# Patient Record
Sex: FEMALE | Race: WHITE | Employment: OTHER | URBAN - METROPOLITAN AREA
[De-identification: names, ages, dates, MRNs, and addresses within clinical notes are randomized per-mention and may not be internally consistent; named-entity substitution may affect disease eponyms.]

---

## 2017-01-06 ENCOUNTER — OFFICE VISIT (OUTPATIENT)
Dept: FAMILY MEDICINE | Facility: CLINIC | Age: 72
End: 2017-01-06

## 2017-01-06 VITALS
HEIGHT: 63 IN | BODY MASS INDEX: 21.81 KG/M2 | HEART RATE: 65 BPM | WEIGHT: 123.08 LBS | OXYGEN SATURATION: 100 % | TEMPERATURE: 97.5 F | DIASTOLIC BLOOD PRESSURE: 77 MMHG | SYSTOLIC BLOOD PRESSURE: 111 MMHG

## 2017-01-06 DIAGNOSIS — R09.89 CHOKING EPISODE: ICD-10-CM

## 2017-01-06 DIAGNOSIS — R29.6 FALLS FREQUENTLY: Primary | ICD-10-CM

## 2017-01-06 LAB
BUN SERPL-MCNC: 11 MG/DL (ref 7–30)
CALCIUM SERPL-MCNC: 9.2 MG/DL (ref 8.5–10.4)
CHLORIDE SERPLBLD-SCNC: 102 MMOL/L (ref 94–109)
CO2 SERPL-SCNC: 29 MMOL/L (ref 20–32)
CREAT SERPL-MCNC: 0.7 MG/DL (ref 0.6–1.3)
EGFR CALCULATED (BLACK REFERENCE): 106.1
EGFR CALCULATED (NON BLACK REFERENCE): 87.7
GLUCOSE SERPL-MCNC: 90 MG/DL (ref 60–109)
POTASSIUM SERPL-SCNC: 4.3 MMOL/L (ref 3.4–5.3)
SODIUM SERPL-SCNC: 136 MMOL/L (ref 133–144)
T3FREE SERPL-MCNC: 2.2 PG/ML (ref 2.3–4.2)
T4 FREE SERPL-MCNC: 1.19 NG/DL (ref 0.76–1.46)
TSH SERPL DL<=0.05 MIU/L-ACNC: 0.25 MU/L (ref 0.4–4)

## 2017-01-06 RX ORDER — FEXOFENADINE HCL AND PSEUDOEPHEDRINE HCL 180; 240 MG/1; MG/1
1 TABLET, EXTENDED RELEASE ORAL DAILY
Status: ON HOLD | COMMUNITY
End: 2021-09-27

## 2017-01-06 ASSESSMENT — PAIN SCALES - GENERAL: PAINLEVEL: NO PAIN (0)

## 2017-01-06 NOTE — NURSING NOTE
"71 year old  Chief Complaint   Patient presents with     Fall     Today visit was recommended by her Neurologist to be seen.        Blood pressure 111/77, pulse 65, temperature 97.5  F (36.4  C), height 5' 2.99\" (160 cm), weight 123 lb 1.3 oz (55.829 kg), SpO2 100 %, not currently breastfeeding. Body mass index is 21.81 kg/(m^2).  Patient Active Problem List   Diagnosis     Bladder neck obstruction     Constipation     Transient global amnesia     Other hammer toe (acquired)     Ostium secundum type atrial septal defect     Senile osteoporosis     Syndrome affecting cervical region     Variants of migraine     Malignant neoplasm of skin     Insomnia     Hypothyroidism     Routine general medical examination at a health care facility     Encounter for counseling     Family history of cardiovascular disease     Family history of malignant neoplasm of ovary     Tear film insufficiency     Circulatory system disorder     PFO (patent foramen ovale)     Gastroesophageal reflux disease without esophagitis     Diarrhea       Wt Readings from Last 2 Encounters:   01/06/17 123 lb 1.3 oz (55.829 kg)   11/11/16 121 lb (54.885 kg)     BP Readings from Last 3 Encounters:   01/06/17 111/77   11/11/16 110/76   06/01/16 105/68         Current Outpatient Prescriptions   Medication     fexofenadine-pseudoePHEDrine (ALLEGRA-D 24) 180-240 MG per 24 hr tablet     gabapentin (NEURONTIN) 300 MG capsule     citalopram (CELEXA) 10 MG tablet     levothyroxine (SYNTHROID, LEVOTHROID) 88 MCG tablet     pravastatin (PRAVACHOL) 10 MG tablet     zolpidem (AMBIEN) 10 MG tablet     triamcinolone (NASACORT) 55 MCG/ACT nasal inhaler     omeprazole (PRILOSEC) 20 MG capsule     VITAMIN D, CHOLECALCIFEROL, PO     aspirin 81 MG tablet     Riboflavin (VITAMIN B-2 PO)     Rizatriptan Benzoate (MAXALT PO)     albuterol (PROAIR HFA, PROVENTIL HFA, VENTOLIN HFA) 108 (90 BASE) MCG/ACT inhaler     ALPHA LIPOIC ACID PO     Nutritional Supplements (DHEA PO)     " Cyanocobalamin (VITAMIN B 12 PO)     MAGNESIUM PO     Multiple vitamin TABS     Calcium-Vitamin D 600-200 MG-UNIT TABS     [DISCONTINUED] gabapentin (NEURONTIN) 300 MG capsule     No current facility-administered medications for this visit.       Social History   Substance Use Topics     Smoking status: Former Smoker -- 0.50 packs/day for 10 years     Quit date: 01/01/1970     Smokeless tobacco: Never Used     Alcohol Use: No       Health Maintenance Due   Topic Date Due     FIT Q1 YR (NO INBASKET)  11/19/1955     HEPATITIS C SCREENING  11/19/1963     DEXA Q1 YR  11/19/1975     ADVANCE DIRECTIVE PLANNING Q5 YRS (NO INBASKET)  08/24/2012     COLONOSCOPY Q5 YR INBASKET MESSAGE  11/16/2015       No results found for this basename: irma Jennings CMA  January 6, 2017 11:14 AM

## 2017-01-06 NOTE — MR AVS SNAPSHOT
"              After Visit Summary   1/6/2017    Rakel Parish    MRN: [id number]           Patient Information     Date Of Birth          1945        Visit Information        Provider Department      1/6/2017 11:20 AM Aida Bey MD [facility]        Today's Diagnoses     Falls frequently    -  1     Choking episode            Follow-ups after your visit        Who to contact     Please call your clinic at [phone] to:    Ask questions about your health    Make or cancel appointments    Discuss your medicines    Learn about your test results    Speak to your doctor   If you have compliments or concerns about an experience at your clinic, or if you wish to file a complaint, please contact [facility] Patient Relations at [phone] or email us at [email or web address]         Additional Information About Your Visit        MyChart Information     [employer] gives you secure access to your electronic health record. If you see a primary care provider, you can also send messages to your care team and make appointments. If you have questions, please call your primary care clinic.  If you do not have a primary care provider, please call [phone] and they will assist you.      [employer] is an electronic gateway that provides easy, online access to your medical records. With [employer], you can request a clinic appointment, read your test results, renew a prescription or communicate with your care team.     To access your existing account, please contact your [facility] or call [phone] for assistance.        Care EveryWhere ID     This is your Care EveryWhere ID. This could be used by other organizations to access your [place] medical records  [id number]        Your Vitals Were     Pulse Temperature Height BMI (Body Mass Index) Pulse Oximetry       65 97.5  F (36.4  C) 5' 2.99\" (160 cm) 21.81 kg/m2 100%        Blood Pressure " from Last 3 Encounters:   01/06/17 111/77   11/11/16 110/76   06/01/16 105/68    Weight from Last 3 Encounters:   01/06/17 123 lb 1.3 oz (55.829 kg)   11/11/16 121 lb (54.885 kg)   06/01/16 120 lb 8 oz (54.658 kg)              We Performed the Following     Basic Metabolic Panel ([place])     T3 Free     T4 free     TSH        Primary Care Provider Office Phone # Fax #    Aida Bey [phone]       [facility] [address] [facility] [address]  [facility] [address]        Thank you!     Thank you for choosing [facility]  for your care. Our goal is always to provide you with excellent care. Hearing back from our patients is one way we can continue to improve our services. Please take a few minutes to complete the written survey that you may receive in the mail after your visit with us. Thank you!             Your Updated Medication List - Protect others around you: Learn how to safely use, store and throw away your medicines at www.disposemymeds.org.          This list is accurate as of: 1/6/17 11:51 AM.  Always use your most recent med list.                   Brand Name Dispense Instructions for use    albuterol 108 (90 BASE) MCG/ACT Inhaler    PROAIR HFA/PROVENTIL HFA/VENTOLIN HFA     Inhale 2 puffs into the lungs every 6 hours       ALPHA LIPOIC ACID PO      Take 200 mg by mouth daily       aspirin 81 MG tablet      Take by mouth daily       Calcium-Vitamin D 600-200 MG-UNIT Tabs      Take 1 tablet by mouth daily       citalopram 10 MG tablet    celeXA    90 tablet    Take 1 tab daily       DHEA PO      Take 5 mg by mouth daily       fexofenadine-pseudoePHEDrine 180-240 MG per 24 hr tablet    ALLEGRA-D 24     Take 1 tablet by mouth daily       gabapentin 300 MG capsule    NEURONTIN    360 capsule    Take 1 capsule (300 mg) by mouth 4 times daily       levothyroxine 88 MCG tablet    SYNTHROID/LEVOTHROID    90 tablet    Take 1 tablet (88 mcg) by mouth daily       MAGNESIUM PO       Take by mouth daily       MAXALT PO      Take 5-10 mg by mouth as needed       Multiple vitamin Tabs      Take by mouth daily       omeprazole 20 MG CR capsule    priLOSEC     Take by mouth daily       pravastatin 10 MG tablet    PRAVACHOL    90 tablet    Take 1 tablet (10 mg) by mouth every evening       triamcinolone 55 MCG/ACT Inhaler    NASACORT    1 Bottle    Spray 1 spray in nostril daily       VITAMIN B 12 PO      Take by mouth daily       VITAMIN B-2 PO      Take 400 mg by mouth daily       VITAMIN D (CHOLECALCIFEROL) PO      Take 2,000 Int'l Units by mouth daily       zolpidem 10 MG tablet    AMBIEN    30 tablet    Take 1 tablet (10 mg) by mouth nightly as needed for sleep

## 2017-02-09 ENCOUNTER — DOCUMENTATION ONLY (OUTPATIENT)
Dept: OTHER | Facility: CLINIC | Age: 72
End: 2017-02-09

## 2017-02-09 DIAGNOSIS — Z71.89 ACP (ADVANCE CARE PLANNING): Primary | Chronic | ICD-10-CM

## 2017-03-07 DIAGNOSIS — G47.00 PERSISTENT INSOMNIA: ICD-10-CM

## 2017-03-08 RX ORDER — ZOLPIDEM TARTRATE 10 MG/1
TABLET ORAL
Qty: 30 TABLET | Refills: 5
Start: 2017-03-08 | End: 2018-02-23

## 2017-04-24 DIAGNOSIS — G43.919 INTRACTABLE MIGRAINE, UNSPECIFIED MIGRAINE TYPE: ICD-10-CM

## 2017-04-24 RX ORDER — GABAPENTIN 300 MG/1
CAPSULE ORAL
Qty: 360 CAPSULE | Refills: 1 | Status: SHIPPED | OUTPATIENT
Start: 2017-04-24 | End: 2017-10-16

## 2017-05-26 ENCOUNTER — TRANSFERRED RECORDS (OUTPATIENT)
Dept: HEALTH INFORMATION MANAGEMENT | Facility: CLINIC | Age: 72
End: 2017-05-26

## 2017-06-12 ENCOUNTER — TRANSFERRED RECORDS (OUTPATIENT)
Dept: HEALTH INFORMATION MANAGEMENT | Facility: CLINIC | Age: 72
End: 2017-06-12

## 2017-07-20 ENCOUNTER — HOSPITAL ENCOUNTER (OUTPATIENT)
Dept: LAB | Facility: CLINIC | Age: 72
Discharge: HOME | End: 2017-07-20
Attending: PHYSICIAN ASSISTANT | Admitting: PHYSICIAN ASSISTANT
Payer: MEDICARE

## 2017-07-20 ENCOUNTER — OFFICE VISIT (OUTPATIENT)
Dept: FAMILY MEDICINE | Facility: CLINIC | Age: 72
End: 2017-07-20

## 2017-07-20 VITALS
DIASTOLIC BLOOD PRESSURE: 65 MMHG | HEIGHT: 63 IN | HEART RATE: 67 BPM | SYSTOLIC BLOOD PRESSURE: 92 MMHG | WEIGHT: 121.25 LBS | TEMPERATURE: 98.1 F | OXYGEN SATURATION: 97 % | BODY MASS INDEX: 21.48 KG/M2

## 2017-07-20 DIAGNOSIS — K21.9 GASTROESOPHAGEAL REFLUX DISEASE WITHOUT ESOPHAGITIS: ICD-10-CM

## 2017-07-20 DIAGNOSIS — R19.7 DIARRHEA, UNSPECIFIED TYPE: Primary | ICD-10-CM

## 2017-07-20 PROCEDURE — 87177 OVA AND PARASITES SMEARS: CPT | Performed by: PHYSICIAN ASSISTANT

## 2017-07-20 PROCEDURE — 87209 SMEAR COMPLEX STAIN: CPT | Performed by: PHYSICIAN ASSISTANT

## 2017-07-20 ASSESSMENT — PAIN SCALES - GENERAL: PAINLEVEL: NO PAIN (0)

## 2017-07-20 NOTE — MR AVS SNAPSHOT
After Visit Summary   7/20/2017    Rakel Parish    MRN: [id number]           Patient Information     Date Of Birth          1945        Visit Information        Provider Department      7/20/2017 4:00 PM Marleny Jones PA-C [facility]        Today's Diagnoses     Diarrhea, unspecified type    -  1    Gastroesophageal reflux disease without esophagitis           Follow-ups after your visit        Future tests that were ordered for you today     Open Future Orders        Priority Expected Expires Ordered    Ova and Parasite Exam Routine Routine  8/19/2017 7/20/2017    Enteric Bacteria and Virus Panel by [facility]OTT Stool Routine  8/19/2017 7/20/2017    Giardia antigen Routine  8/19/2017 7/20/2017            Who to contact     Please call your clinic at [phone] to:    Ask questions about your health    Make or cancel appointments    Discuss your medicines    Learn about your test results    Speak to your doctor   If you have compliments or concerns about an experience at your clinic, or if you wish to file a complaint, please contact [facility] Patient Relations at [phone] or email us at [email or web address]         Additional Information About Your Visit        MyChart Information     [employer] gives you secure access to your electronic health record. If you see a primary care provider, you can also send messages to your care team and make appointments. If you have questions, please call your primary care clinic.  If you do not have a primary care provider, please call [phone] and they will assist you.      [employer] is an electronic gateway that provides easy, online access to your medical records. With [employer], you can request a clinic appointment, read your test results, renew a prescription or communicate with your care team.     To access your existing account, please contact your [facility] or call  "[phone] for assistance.        Care EveryWhere ID     This is your Care EveryWhere ID. This could be used by other organizations to access your [place] medical records  [id number]        Your Vitals Were     Pulse Temperature Height Pulse Oximetry BMI (Body Mass Index)       67 98.1  F (36.7  C) (Oral) 5' 2.99\" (160 cm) 97% 21.48 kg/m2        Blood Pressure from Last 3 Encounters:   07/20/17 92/65   01/06/17 111/77   11/11/16 110/76    Weight from Last 3 Encounters:   07/20/17 121 lb 4 oz (55 kg)   01/06/17 123 lb 1.3 oz (55.8 kg)   11/11/16 121 lb (54.9 kg)              We Performed the Following     CBC with platelets differential     CRP inflammation        Primary Care Provider Office Phone # Fax #    Aida Bey [phone]       [facility] [address] [facility] [address]  [facility] [address]        Equal Access to Services     U[facility]IR [facility] Center[facility]ENNY : Hadii aad ku hadasho Soomaali, waaxda luqadaha, qaybta kaalmada adeegyada, waxay yaoin haykirstie prater . So [facility] [phone].    ATENCIÓN: Si habla español, tiene a levy disposición servicios gratuitos de asistencia lingüística. Reinier al [phone].    We comply with applicable federal civil rights laws and [place] laws. We do not discriminate on the basis of race, color, national origin, age, disability sex, sexual orientation or gender identity.            Thank you!     Thank you for choosing [facility]  for your care. Our goal is always to provide you with excellent care. Hearing back from our patients is one way we can continue to improve our services. Please take a few minutes to complete the written survey that you may receive in the mail after your visit with us. Thank you!             Your Updated Medication List - Protect others around you: Learn how to safely use, store and throw away your medicines at www.disposemymeds.org.          This list is accurate as of: 7/20/17  5:16 PM.  Always use your most " recent med list.                   Brand Name Dispense Instructions for use Diagnosis    albuterol 108 (90 BASE) MCG/ACT Inhaler    PROAIR HFA/PROVENTIL HFA/VENTOLIN HFA     Inhale 2 puffs into the lungs every 6 hours        ALPHA LIPOIC ACID PO      Take 200 mg by mouth daily        aspirin 81 MG tablet      Take by mouth daily        Calcium-Vitamin D 600-200 MG-UNIT Tabs      Take 1 tablet by mouth daily        citalopram 10 MG tablet    celeXA    90 tablet    Take 1 tab daily    VLAD (generalized anxiety disorder)       DHEA PO      Take 5 mg by mouth daily        fexofenadine-pseudoePHEDrine 180-240 MG per 24 hr tablet    ALLEGRA-D 24     Take 1 tablet by mouth daily        gabapentin 300 MG capsule    NEURONTIN    360 capsule    TAKE ONE CAPSULE BY MOUTH 4 TIMES A DAY    Intractable migraine, unspecified migraine type       levothyroxine 88 MCG tablet    SYNTHROID/LEVOTHROID    90 tablet    Take 1 tablet (88 mcg) by mouth daily    Hypothyroidism, unspecified type       MAGNESIUM PO      Take by mouth daily        MAXALT PO      Take 5-10 mg by mouth as needed        Multiple vitamin Tabs      Take by mouth daily        omeprazole 20 MG CR capsule    priLOSEC     Take by mouth daily        pravastatin 10 MG tablet    PRAVACHOL    90 tablet    Take 1 tablet (10 mg) by mouth every evening    Hyperlipidemia LDL goal <130       triamcinolone 55 MCG/ACT Inhaler    NASACORT    1 Bottle    Spray 1 spray in nostril daily    Nasal congestion       VITAMIN B 12 PO      Take by mouth daily        VITAMIN B-2 PO      Take 400 mg by mouth daily        VITAMIN D (CHOLECALCIFEROL) PO      Take 2,000 Int'l Units by mouth daily        zolpidem 10 MG tablet    AMBIEN    30 tablet    TAKE 1 TABLET BY MOUTH AT BEDTIME AS NEEDED FOR SLEEP    Persistent insomnia

## 2017-07-20 NOTE — PROGRESS NOTES
SUBJECTIVE:                                                    Rakel Parish is a 71 year old female who presents to clinic today for the following health issues:    Diarrhea:  2 weeks history of loose stools. 5 episodes of lose stool daily.  Explosive at times and occasionally watery.  No blood or mucous. Diarrhea has improved over the past week but she has felt nauseas and had increase in epigastric symptoms and heartburn.  She has a known history of Reflux and had EGD in the past. She takes omeprazole about every other day but is not able to not take it.      Description:   Consistency of stool: loose  Blood in stool: no   Number of loose stools in past 24 hours: 1    Progression of Symptoms:  same    Accompanying Signs & Symptoms:  Fever: no   Nausea or vomiting; no   Abdominal pain: no.  Rare episode of abdominal cramping with the stools that improved with a bowel movement  Episodes of constipation: no   Weight loss: no     History:   Ill contacts: Sister had stomach flu and was hospitalized for dehydration 3 weeks ago.  Recent use of antibiotics: no    Recent travels: Recently traveled to [facility]         Recent medication-new or changes(Rx or OTC): no     Precipitating factors:   ?    Alleviating factors:   Improved with time    Therapies Tried and outcome:  none;     History of chronic GERD.  Has had EGD 3/2016.  Records from [facility] were reviewed.  Takes prilosec 20mg most days.      Problem list and histories reviewed & adjusted, as indicated.  Additional history: as documented    Patient Active Problem List   Diagnosis     Bladder neck obstruction     Constipation     Transient global amnesia     Other hammer toe (acquired)     Ostium secundum type atrial septal defect     Senile osteoporosis     Syndrome affecting cervical region     Variants of migraine     Malignant neoplasm of skin     Insomnia     Hypothyroidism     Routine general medical examination at a health care facility     Encounter  for counseling     Family history of cardiovascular disease     Family history of malignant neoplasm of ovary     Tear film insufficiency     Circulatory system disorder     PFO (patent foramen ovale)     Gastroesophageal reflux disease without esophagitis     Diarrhea     ACP (advance care planning)     Past Surgical History:   Procedure Laterality Date     APPENDECTOMY       BUNIONECTOMY Right 2010     COLONOSCOPY  11/16/10    Every 7 years     HC REPAIR OF HAMMERTOE,ONE       TONSILLECTOMY & ADENOIDECTOMY       TUBAL LIGATION         Social History   Substance Use Topics     Smoking status: Former Smoker     Packs/day: 0.50     Years: 10.00     Quit date: 1970     Smokeless tobacco: Never Used     Alcohol use No     Family History   Problem Relation Age of Onset     Breast Cancer Niece 40     Maternal     C.A.D. Father 85      of renal failure. ASCVD, MI x 2     Prostate Cancer Father 80     CANCER Brother      CLL     Prostate Cancer Brother 68     Ovarian Cancer Sister 82     OSTEOPOROSIS Mother 94      at 94 after hip fracture     Dementia Mother      Hypertension Mother      Colon Cancer No family hx of          Current Outpatient Prescriptions   Medication Sig Dispense Refill     gabapentin (NEURONTIN) 300 MG capsule TAKE ONE CAPSULE BY MOUTH 4 TIMES A  capsule 1     zolpidem (AMBIEN) 10 MG tablet TAKE 1 TABLET BY MOUTH AT BEDTIME AS NEEDED FOR SLEEP 30 tablet 5     fexofenadine-pseudoePHEDrine (ALLEGRA-D 24) 180-240 MG per 24 hr tablet Take 1 tablet by mouth daily       citalopram (CELEXA) 10 MG tablet Take 1 tab daily 90 tablet 3     levothyroxine (SYNTHROID, LEVOTHROID) 88 MCG tablet Take 1 tablet (88 mcg) by mouth daily 90 tablet 3     pravastatin (PRAVACHOL) 10 MG tablet Take 1 tablet (10 mg) by mouth every evening 90 tablet 4     triamcinolone (NASACORT) 55 MCG/ACT nasal inhaler Spray 1 spray in nostril daily 1 Bottle 1     omeprazole (PRILOSEC) 20 MG capsule Take by mouth  "daily       VITAMIN D, CHOLECALCIFEROL, PO Take 2,000 Int'l Units by mouth daily       aspirin 81 MG tablet Take by mouth daily       Riboflavin (VITAMIN B-2 PO) Take 400 mg by mouth daily       Rizatriptan Benzoate (MAXALT PO) Take 5-10 mg by mouth as needed       albuterol (PROAIR HFA, PROVENTIL HFA, VENTOLIN HFA) 108 (90 BASE) MCG/ACT inhaler Inhale 2 puffs into the lungs every 6 hours       ALPHA LIPOIC ACID PO Take 200 mg by mouth daily       Nutritional Supplements (DHEA PO) Take 5 mg by mouth daily       Cyanocobalamin (VITAMIN B 12 PO) Take by mouth daily       MAGNESIUM PO Take by mouth daily       Multiple vitamin TABS Take by mouth daily       Calcium-Vitamin D 600-200 MG-UNIT TABS Take 1 tablet by mouth daily       Allergies   Allergen Reactions     Cyclobenzaprine Other (See Comments)     Levofloxacin Unknown     Tendon injury (torn)     Rofecoxib Nausea     Simvastatin Other (See Comments)     Trazodone Other (See Comments)     Other reaction(s): Nightmares     Vicodin [Hydrocodone-Acetaminophen] Itching     Latex Rash         Reviewed and updated as needed this visit by clinical staffTobacco  Allergies  Meds  Problems       Reviewed and updated as needed this visit by Provider  Allergies  Meds  Problems         ROS:  Constitutional, HEENT, cardiovascular, pulmonary, gi and gu systems are negative, except as otherwise noted.      OBJECTIVE:   BP 92/65  Pulse 67  Temp 98.1  F (36.7  C) (Oral)  Ht 5' 2.99\" (160 cm)  Wt 121 lb 4 oz (55 kg)  SpO2 97%  BMI 21.48 kg/m2  Body mass index is 21.48 kg/(m^2).  GENERAL: healthy, alert and no distress  HENT: ear canals and TM's normal, nose and mouth without ulcers or lesions  NECK: no adenopathy, no asymmetry, masses, or scars and thyroid normal to palpation  RESP: lungs clear to auscultation - no rales, rhonchi or wheezes  CV: regular rate and rhythm, normal S1 S2, no S3 or S4, no murmur, click or rub, no peripheral edema and peripheral pulses " strong  ABDOMEN: Soft with normoactive bowel sounds.  Mild epigastric tenderness with rebound or guarding.  No organomegaly, no masses.  MS: no gross musculoskeletal defects noted, no edema    Diagnostic Test Results:  Results for orders placed or performed in visit on 07/20/17   CBC with platelets differential   Result Value Ref Range    WBC 5.9 4.0 - 11.0 10e9/L    RBC Count 4.10 3.8 - 5.2 10e12/L    Hemoglobin 13.0 11.7 - 15.7 g/dL    Hematocrit 39.5 35.0 - 47.0 %    MCV 96 78 - 100 fl    MCH 31.7 26.5 - 33.0 pg    MCHC 32.9 31.5 - 36.5 g/dL    RDW 12.8 10.0 - 15.0 %    Platelet Count 240 150 - 450 10e9/L    Diff Method Automated Method     % Neutrophils 54.3 %    % Lymphocytes 37.0 %    % Monocytes 6.5 %    % Eosinophils 1.4 %    % Basophils 0.5 %    % Immature Granulocytes 0.3 %    Nucleated RBCs 0 0 /100    Absolute Neutrophil 3.2 1.6 - 8.3 10e9/L    Absolute Lymphocytes 2.2 0.8 - 5.3 10e9/L    Absolute Monocytes 0.4 0.0 - 1.3 10e9/L    Absolute Eosinophils 0.1 0.0 - 0.7 10e9/L    Absolute Basophils 0.0 0.0 - 0.2 10e9/L    Abs Immature Granulocytes 0.0 0 - 0.4 10e9/L    Absolute Nucleated RBC 0.0    CRP inflammation   Result Value Ref Range    CRP Inflammation <2.9 0.0 - 8.0 mg/L       ASSESSMENT/PLAN:         ICD-10-CM    1. Diarrhea, unspecified type R19.7 Ova and Parasite Exam Routine     Enteric Bacteria and Virus Panel by [facility] Stool     Giardia antigen     CBC with platelets differential     CRP inflammation   2. Gastroesophageal reflux disease without esophagitis K21.9      Labs as noted above.  If diarrhea does not continue to improve or returns consider stool tests.  Suspect post gastroenteritis worsening of reflux.   Take omeprazole every day for the next 4 weeks.  Can add maalox or Mylanta during the day. Recommend eating small meals throughout the day and no eating late at night.  Can add ranitidine if needed.  Follow up if persistent problems.      Marleny Jones PA-C  [facility]

## 2017-07-20 NOTE — NURSING NOTE
"71 year old  Chief Complaint   Patient presents with     Diarrhea     losse stools for 2 weeks not eating alot      Headache       Blood pressure 92/65, pulse 67, temperature 98.1  F (36.7  C), temperature source Oral, height 5' 2.99\" (160 cm), weight 121 lb 4 oz (55 kg), SpO2 97 %, not currently breastfeeding. Body mass index is 21.48 kg/(m^2).  Patient Active Problem List   Diagnosis     Bladder neck obstruction     Constipation     Transient global amnesia     Other hammer toe (acquired)     Ostium secundum type atrial septal defect     Senile osteoporosis     Syndrome affecting cervical region     Variants of migraine     Malignant neoplasm of skin     Insomnia     Hypothyroidism     Routine general medical examination at a health care facility     Encounter for counseling     Family history of cardiovascular disease     Family history of malignant neoplasm of ovary     Tear film insufficiency     Circulatory system disorder     PFO (patent foramen ovale)     Gastroesophageal reflux disease without esophagitis     Diarrhea     ACP (advance care planning)       Wt Readings from Last 2 Encounters:   07/20/17 121 lb 4 oz (55 kg)   01/06/17 123 lb 1.3 oz (55.8 kg)     BP Readings from Last 3 Encounters:   07/20/17 92/65   01/06/17 111/77   11/11/16 110/76         Current Outpatient Prescriptions   Medication     gabapentin (NEURONTIN) 300 MG capsule     zolpidem (AMBIEN) 10 MG tablet     fexofenadine-pseudoePHEDrine (ALLEGRA-D 24) 180-240 MG per 24 hr tablet     citalopram (CELEXA) 10 MG tablet     levothyroxine (SYNTHROID, LEVOTHROID) 88 MCG tablet     pravastatin (PRAVACHOL) 10 MG tablet     triamcinolone (NASACORT) 55 MCG/ACT nasal inhaler     omeprazole (PRILOSEC) 20 MG capsule     VITAMIN D, CHOLECALCIFEROL, PO     aspirin 81 MG tablet     Riboflavin (VITAMIN B-2 PO)     Rizatriptan Benzoate (MAXALT PO)     albuterol (PROAIR HFA, PROVENTIL HFA, VENTOLIN HFA) 108 (90 BASE) MCG/ACT inhaler     ALPHA LIPOIC ACID " PO     Nutritional Supplements (DHEA PO)     Cyanocobalamin (VITAMIN B 12 PO)     MAGNESIUM PO     Multiple vitamin TABS     Calcium-Vitamin D 600-200 MG-UNIT TABS     No current facility-administered medications for this visit.        Social History   Substance Use Topics     Smoking status: Former Smoker     Packs/day: 0.50     Years: 10.00     Quit date: 1/1/1970     Smokeless tobacco: Never Used     Alcohol use No       Health Maintenance Due   Topic Date Due     HEPATITIS C SCREENING  11/19/1963     DEXA Q1 YR  11/19/1975       No results found for: PAP      July 20, 2017 4:09 PM

## 2017-07-21 LAB
BASOPHILS # BLD AUTO: 0 10E9/L (ref 0–0.2)
BASOPHILS NFR BLD AUTO: 0.5 %
CRP SERPL-MCNC: <2.9 MG/L (ref 0–8)
DIFFERENTIAL METHOD BLD: NORMAL
EOSINOPHIL # BLD AUTO: 0.1 10E9/L (ref 0–0.7)
EOSINOPHIL NFR BLD AUTO: 1.4 %
ERYTHROCYTE [DISTWIDTH] IN BLOOD BY AUTOMATED COUNT: 12.8 % (ref 10–15)
HCT VFR BLD AUTO: 39.5 % (ref 35–47)
HGB BLD-MCNC: 13 G/DL (ref 11.7–15.7)
IMM GRANULOCYTES # BLD: 0 10E9/L (ref 0–0.4)
IMM GRANULOCYTES NFR BLD: 0.3 %
LYMPHOCYTES # BLD AUTO: 2.2 10E9/L (ref 0.8–5.3)
LYMPHOCYTES NFR BLD AUTO: 37 %
MCH RBC QN AUTO: 31.7 PG (ref 26.5–33)
MCHC RBC AUTO-ENTMCNC: 32.9 G/DL (ref 31.5–36.5)
MCV RBC AUTO: 96 FL (ref 78–100)
MONOCYTES # BLD AUTO: 0.4 10E9/L (ref 0–1.3)
MONOCYTES NFR BLD AUTO: 6.5 %
NEUTROPHILS # BLD AUTO: 3.2 10E9/L (ref 1.6–8.3)
NEUTROPHILS NFR BLD AUTO: 54.3 %
NRBC # BLD AUTO: 0 10*3/UL
NRBC BLD AUTO-RTO: 0 /100
PLATELET # BLD AUTO: 240 10E9/L (ref 150–450)
RBC # BLD AUTO: 4.1 10E12/L (ref 3.8–5.2)
WBC # BLD AUTO: 5.9 10E9/L (ref 4–11)

## 2017-07-23 ENCOUNTER — HOSPITAL ENCOUNTER (OUTPATIENT)
Facility: CLINIC | Age: 72
Setting detail: SPECIMEN
Discharge: HOME | End: 2017-07-23
Admitting: PHYSICIAN ASSISTANT
Payer: MEDICARE

## 2017-07-23 ENCOUNTER — HOSPITAL ENCOUNTER (OUTPATIENT)
Dept: LAB | Facility: CLINIC | Age: 72
End: 2017-07-23
Payer: COMMERCIAL

## 2017-07-23 DIAGNOSIS — R19.7 DIARRHEA, UNSPECIFIED TYPE: ICD-10-CM

## 2017-07-23 PROCEDURE — 87209 SMEAR COMPLEX STAIN: CPT | Performed by: PHYSICIAN ASSISTANT

## 2017-07-23 PROCEDURE — 87177 OVA AND PARASITES SMEARS: CPT | Performed by: PHYSICIAN ASSISTANT

## 2017-07-25 ENCOUNTER — HOSPITAL ENCOUNTER (OUTPATIENT)
Dept: LAB | Facility: CLINIC | Age: 72
Discharge: HOME | End: 2017-07-25
Attending: FAMILY MEDICINE | Admitting: INTERNAL MEDICINE
Payer: MEDICARE

## 2017-07-25 DIAGNOSIS — R19.7 DIARRHEA, UNSPECIFIED TYPE: ICD-10-CM

## 2017-07-25 DIAGNOSIS — R19.7 DIARRHEA: ICD-10-CM

## 2017-07-25 DIAGNOSIS — R19.7 DIARRHEA, UNSPECIFIED TYPE: Primary | ICD-10-CM

## 2017-07-25 LAB
C DIFF TOX B STL QL: NORMAL
SPECIMEN SOURCE: NORMAL

## 2017-07-25 PROCEDURE — 87209 SMEAR COMPLEX STAIN: CPT | Performed by: PHYSICIAN ASSISTANT

## 2017-07-25 PROCEDURE — 87329 GIARDIA AG IA: CPT | Performed by: PHYSICIAN ASSISTANT

## 2017-07-25 PROCEDURE — 87493 C DIFF AMPLIFIED PROBE: CPT | Performed by: INTERNAL MEDICINE

## 2017-07-25 PROCEDURE — 87177 OVA AND PARASITES SMEARS: CPT | Performed by: PHYSICIAN ASSISTANT

## 2017-07-26 LAB
G LAMBLIA AG STL QL IA: NORMAL
MICRO REPORT STATUS: NORMAL
O+P STL MICRO: NORMAL
SPECIMEN SOURCE: NORMAL

## 2017-07-31 ENCOUNTER — TRANSFERRED RECORDS (OUTPATIENT)
Dept: HEALTH INFORMATION MANAGEMENT | Facility: CLINIC | Age: 72
End: 2017-07-31

## 2017-10-16 DIAGNOSIS — G43.919 INTRACTABLE MIGRAINE, UNSPECIFIED MIGRAINE TYPE: ICD-10-CM

## 2017-10-18 RX ORDER — GABAPENTIN 300 MG/1
CAPSULE ORAL
Qty: 360 CAPSULE | Refills: 1 | Status: SHIPPED | OUTPATIENT
Start: 2017-10-18 | End: 2018-04-26

## 2017-10-18 NOTE — TELEPHONE ENCOUNTER
To MD to suri, not on RN refill protocol    Last seen at [facility] July 2017 , last filled for 3 month supply 3 months ago.   Yulissa Lucero RN  October 18, 2017 8:44 AM

## 2017-11-21 DIAGNOSIS — F41.1 GAD (GENERALIZED ANXIETY DISORDER): ICD-10-CM

## 2017-11-21 RX ORDER — CITALOPRAM HYDROBROMIDE 10 MG/1
TABLET ORAL
Qty: 90 TABLET | Refills: 2 | Status: SHIPPED | OUTPATIENT
Start: 2017-11-21 | End: 2018-08-16

## 2017-11-28 DIAGNOSIS — E03.9 HYPOTHYROIDISM, UNSPECIFIED TYPE: ICD-10-CM

## 2017-11-28 RX ORDER — LEVOTHYROXINE SODIUM 88 UG/1
TABLET ORAL
Qty: 90 TABLET | Refills: 0 | Status: SHIPPED | OUTPATIENT
Start: 2017-11-28 | End: 2018-01-19

## 2017-11-28 NOTE — TELEPHONE ENCOUNTER
Medication is being filled for 1 time refill only due to:  Patient needs labs thyroid labs. Future labs ordered thyroid labs.

## 2017-12-13 ENCOUNTER — TRANSFERRED RECORDS (OUTPATIENT)
Dept: HEALTH INFORMATION MANAGEMENT | Facility: CLINIC | Age: 72
End: 2017-12-13

## 2017-12-26 DIAGNOSIS — E78.5 HYPERLIPIDEMIA LDL GOAL <130: ICD-10-CM

## 2017-12-26 RX ORDER — PRAVASTATIN SODIUM 10 MG
TABLET ORAL
Qty: 90 TABLET | Refills: 0 | Status: SHIPPED | OUTPATIENT
Start: 2017-12-26 | End: 2018-01-19

## 2018-01-18 NOTE — PROGRESS NOTES
SUBJECTIVE:                                                            Rakel Parish is a 73 year old female who presents for Preventive Visit.  Are you in the first 12 months of your Medicare Part B coverage?  No  Healthy Habits:    Do you get at least three servings of calcium containing foods daily (dairy, green leafy vegetables, etc.)? yes    Amount of exercise or daily activities, outside of work: most days     Problems taking medications regularly No    Medication side effects: No    Have you had an eye exam in the past two years? yes    Do you see a dentist twice per year? yes    Do you have sleep apnea, excessive snoring or daytime drowsiness? Disruptive sleep.   Concerns:   - Recent Chiropractor adjustment  - left ring finger is tender at the base X one week  - Lots of Tinnitus [present for years] but worse in the last months, bilateral. No recent dental work. Maybe some more hearing loss.  Not ready for hearing aids.   - Headaches are better on Celexa and Neuronton 300 QID  - VLAD: on Celexa and attitude is better [less fear]  - persistent insomnia: not on ambien at this and sleep is acceptable.   - Hypothyroidism on Levothyroxine 88 mcg  - Hyperlipidemia: 10 mg daily   OB/GYN HISTORY:   Obstetric History       T0      TAB0   SAB0   E0   M0   L2       # Outcome Date GA Lbr Cory/2nd Weight Sex Delivery Anes PTL Lv   2 Para            1 Para               PAST MEDICAL HISTORY:  Past Medical History   Diagnosis Date     Osteopenia      Migraine, unspecified, without mention of intractable migraine without mention of status migrainosus      Unspecified asthma(493.90)      Global amnesia      Hypothyroidism     Hyperlipidaemia LDL goal < 130      Hypothyroidism      Insomnia      Raynaud's disease      Asthma      Osteoporosis      Past Surgical History:   Procedure Laterality Date     APPENDECTOMY       BUNIONECTOMY Right 2010     COLONOSCOPY  11/16/10    Every 7 years     HC REPAIR OF  SIDRA,DIYA       TONSILLECTOMY & ADENOIDECTOMY       TUBAL LIGATION  1965   SOCIAL HISTORY:  . 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  Headaches are limiting. Two adult children with six grandchildren. Will spend some time in [place] this winter.   Social History   Substance Use Topics     Smoking status: Former Smoker     Packs/day: 0.50     Years: 10.00     Quit date: 1/1/1970     Smokeless tobacco: Never Used     Alcohol use No     The patient does not drink >3 drinks per day nor >7 drinks per week.  Today's PHQ-2 Score:   PHQ-2 ( 1999 Pfizer) 1/6/2017 11/11/2016   Q1: Little interest or pleasure in doing things 0 0   Q2: Feeling down, depressed or hopeless 0 0   PHQ-2 Score 0 0   Do you feel safe in your environment - Yes  Do you have a Health Care Directive?: Yes: Patient states has Advance Directive and will bring in a copy to clinic.  Current providers sharing in care for this patient include:   Patient Care Team:  Aida Bey MD as PCP - General (Family Practice)  Alondra Foster MD as MD (Internal Medicine)  Marleny Jones PA-C as Physician Assistant (Family Practice)  Aida Bey MD as MD (Family Practice)  Cristobal Fox MD as MD (Neurology)    Hearing impairment: Yes, Difficulty following a conversation in a noisy restaurant or crowded room.    Ability to successfully perform activities of daily living: Yes, no assistance needed     Fall risk: has tripped X 2 in the last week         Home safety:  Talked about walking sticks   click delete button to remove this line nowThe following health maintenance items are reviewed in Epic and correct as of today:  Health Maintenance   Topic Date Due     FIT Q1 YR  11/19/1955     HEPATITIS C SCREENING  11/19/1963     DEXA Q1 YR  11/19/1975     COLONOSCOPY Q5 YR  11/16/2015     INFLUENZA VACCINE (SYSTEM ASSIGNED)  09/01/2017     FALL RISK ASSESSMENT  01/06/2018     MAMMO SCREEN Q2 YR (SYSTEM ASSIGNED)  12/06/2018      "TETANUS IMMUNIZATION (SYSTEM ASSIGNED)  10/21/2020     LIPID SCREEN Q5 YR FEMALE (SYSTEM ASSIGNED)  10/14/2021     ADVANCE DIRECTIVE PLANNING Q5 YRS  02/09/2022     PNEUMOCOCCAL  Completed   ROS:  C: NEGATIVE for fever, chills, change in weight  E/M: NEGATIVE for ear, mouth and throat problems  R: NEGATIVE for significant cough or SOB  CV: NEGATIVE for chest pain, palpitations or peripheral edema  Social History:   Retired.  Exercises daily.  Involved with her Islam.   OBJECTIVE:                                                            /78 (BP Location: Right arm, Patient Position: Chair, Cuff Size: Adult Regular)  Temp 97.2  F (36.2  C) (Temporal)  Ht 5' 2.95\" (159.9 cm)  Wt 127 lb (57.6 kg)  SpO2 99%  BMI 22.53 kg/m2 Estimated body mass index is 22.53 kg/(m^2) as calculated from the following:    Height as of this encounter: 5' 2.95\" (159.9 cm).    Weight as of this encounter: 127 lb (57.6 kg).   Wt Readings from Last 5 Encounters:   01/19/18 127 lb (57.6 kg)   07/20/17 121 lb 4 oz (55 kg)   01/06/17 123 lb 1.3 oz (55.8 kg)   11/11/16 121 lb (54.9 kg)   06/01/16 120 lb 8 oz (54.7 kg)   PE:  Alert and oriented X 3.  No evidence of memory loss.   GENERAL: healthy, alert and no distress  EYES: Eyes grossly normal to inspection  NECK: no adenopathy, no asymmetry, masses, or scars and thyroid normal to palpation  RESP: lungs clear to auscultation - no rales, rhonchi or wheezes  BREAST: normal without masses, tenderness or nipple discharge and no palpable axillary masses or adenopathy  CV: regular rate and rhythm, normal S1 S2, no S3 or S4, no murmur, click or rub, no peripheral edema and peripheral pulses strong  ABDOMEN: soft, nontender, no hepatosplenomegaly, no masses and bowel sounds normal  MS: no gross musculoskeletal defects noted, no edema  SKIN: no suspicious lesions or rashes  GYN: Declined   NEURO: Normal strength and tone, mentation intact and speech normal  PSYCH: mentation appears normal, " "affect normal/bright  ASSESSMENT / PLAN:                                                                ICD-10-CM    1. Annual physical exam Z00.00 Mammogram will be scheduled. DEXA at a later date. Declined DEXA. Was on Fosamax one year or more. Immunization up to date.  Once new Shingles - advised immunization.    2. Colon cancer screening Z12.11 Due 11/2017 - will schedule   3. VLAD (generalized anxiety disorder) F41.1 citalopram (CELEXA) 10 MG tablet continue   4. Hypothyroidism, unspecified type E03.9 levothyroxine (SYNTHROID, LEVOTHROID) 88 MCG tablet -RX sent.  Labs in future when lipids are drawn   5. Hyperlipidemia LDL goal <130 E78.5 pravastatin (PRAVACHOL) 10 MG tablet - take daily.  Fasting labs    End of Life Planning:  Patient currently has an advanced directive: Yes.  Practitioner is supportive of decision.  COUNSELING:  Reviewed preventive health counseling, as reflected in patient instructions       Regular exercise  /78 (BP Location: Right arm, Patient Position: Chair, Cuff Size: Adult Regular)  Temp 97.2  F (36.2  C) (Temporal)  Ht 5' 2.95\" (159.9 cm)  Wt 127 lb (57.6 kg)  SpO2 99%  BMI 22.53 kg/m2  Estimated body mass index is 21.48 kg/(m^2) as calculated from the following:    Height as of 7/20/17: 1.6 m (5' 2.99\").    Weight as of 7/20/17: 55 kg (121 lb 4 oz).     reports that she quit smoking about 48 years ago. She has a 5.00 pack-year smoking history. She has never used smokeless tobacco.  Appropriate preventive services were discussed with this patient, including applicable screening as appropriate for cardiovascular disease, diabetes, osteopenia/osteoporosis, and glaucoma.  As appropriate for age/gender, discussed screening for colorectal cancer, prostate cancer, breast cancer, and cervical cancer. Checklist reviewing preventive services available has been given to the patient.    Reviewed patients plan of care and provided an AVS. The Basic Care Plan (routine screening as " documented in Health Maintenance) for Rakel meets the Care Plan requirement. This Care Plan has been established and reviewed with the Patient.  Counseling Resources:  ATP IV Guidelines  Pooled Cohorts Equation Calculator  Breast Cancer Risk Calculator  FRAX Risk Assessment  ICSI Preventive Guidelines  Dietary Guidelines for Americans, 2010  USDA's MyPlate  ASA Prophylaxis

## 2018-01-19 ENCOUNTER — OFFICE VISIT (OUTPATIENT)
Dept: FAMILY MEDICINE | Facility: CLINIC | Age: 73
End: 2018-01-19
Payer: COMMERCIAL

## 2018-01-19 VITALS
HEIGHT: 63 IN | WEIGHT: 127 LBS | SYSTOLIC BLOOD PRESSURE: 126 MMHG | OXYGEN SATURATION: 99 % | TEMPERATURE: 97.2 F | DIASTOLIC BLOOD PRESSURE: 78 MMHG | BODY MASS INDEX: 22.5 KG/M2

## 2018-01-19 DIAGNOSIS — H93.13 TINNITUS, BILATERAL: ICD-10-CM

## 2018-01-19 DIAGNOSIS — Z00.00 ANNUAL PHYSICAL EXAM: Primary | ICD-10-CM

## 2018-01-19 DIAGNOSIS — E78.5 HYPERLIPIDEMIA LDL GOAL <130: ICD-10-CM

## 2018-01-19 DIAGNOSIS — Z13.1 SCREENING FOR DIABETES MELLITUS: ICD-10-CM

## 2018-01-19 DIAGNOSIS — E03.9 HYPOTHYROIDISM, UNSPECIFIED TYPE: ICD-10-CM

## 2018-01-19 RX ORDER — PRAVASTATIN SODIUM 10 MG
10 TABLET ORAL DAILY
Qty: 90 TABLET | Refills: 3 | Status: SHIPPED | OUTPATIENT
Start: 2018-01-19 | End: 2019-02-06

## 2018-01-19 RX ORDER — LEVOTHYROXINE SODIUM 88 UG/1
88 TABLET ORAL DAILY
Qty: 90 TABLET | Refills: 3 | Status: SHIPPED | OUTPATIENT
Start: 2018-01-19 | End: 2019-02-06

## 2018-01-19 ASSESSMENT — PAIN SCALES - GENERAL: PAINLEVEL: MILD PAIN (3)

## 2018-01-19 NOTE — NURSING NOTE
"72 year old  Chief Complaint   Patient presents with     Physical     Pt. is not fasting.        Blood pressure 126/78, temperature 97.2  F (36.2  C), temperature source Temporal, height 5' 2.95\" (159.9 cm), weight 127 lb (57.6 kg), SpO2 99 %, not currently breastfeeding. Body mass index is 22.53 kg/(m^2).  Patient Active Problem List   Diagnosis     Bladder neck obstruction     Constipation     Transient global amnesia     Other hammer toe (acquired)     Ostium secundum type atrial septal defect     Senile osteoporosis     Syndrome affecting cervical region     Variants of migraine     Malignant neoplasm of skin     Insomnia     Hypothyroidism     Routine general medical examination at a health care facility     Encounter for counseling     Family history of cardiovascular disease     Family history of malignant neoplasm of ovary     Tear film insufficiency     Circulatory system disorder     PFO (patent foramen ovale)     Gastroesophageal reflux disease without esophagitis     Diarrhea     ACP (advance care planning)       Wt Readings from Last 2 Encounters:   01/19/18 127 lb (57.6 kg)   07/20/17 121 lb 4 oz (55 kg)     BP Readings from Last 3 Encounters:   01/19/18 126/78   07/20/17 92/65   01/06/17 111/77         Current Outpatient Prescriptions   Medication     pravastatin (PRAVACHOL) 10 MG tablet     levothyroxine (SYNTHROID/LEVOTHROID) 88 MCG tablet     citalopram (CELEXA) 10 MG tablet     gabapentin (NEURONTIN) 300 MG capsule     zolpidem (AMBIEN) 10 MG tablet     fexofenadine-pseudoePHEDrine (ALLEGRA-D 24) 180-240 MG per 24 hr tablet     triamcinolone (NASACORT) 55 MCG/ACT nasal inhaler     omeprazole (PRILOSEC) 20 MG capsule     VITAMIN D, CHOLECALCIFEROL, PO     aspirin 81 MG tablet     Riboflavin (VITAMIN B-2 PO)     Rizatriptan Benzoate (MAXALT PO)     albuterol (PROAIR HFA, PROVENTIL HFA, VENTOLIN HFA) 108 (90 BASE) MCG/ACT inhaler     ALPHA LIPOIC ACID PO     Nutritional Supplements (DHEA PO)     " Cyanocobalamin (VITAMIN B 12 PO)     MAGNESIUM PO     Multiple vitamin TABS     Calcium-Vitamin D 600-200 MG-UNIT TABS     No current facility-administered medications for this visit.        Social History   Substance Use Topics     Smoking status: Former Smoker     Packs/day: 0.50     Years: 10.00     Quit date: 1/1/1970     Smokeless tobacco: Never Used     Alcohol use No       Health Maintenance Due   Topic Date Due     FIT Q1 YR  11/19/1955     HEPATITIS C SCREENING  11/19/1963     DEXA Q1 YR  11/19/1975     COLONOSCOPY Q5 YR  11/16/2015     INFLUENZA VACCINE (SYSTEM ASSIGNED)  09/01/2017     FALL RISK ASSESSMENT  01/06/2018       No results found for: MIGUELINA Dong MA  January 19, 2018 10:43 AM

## 2018-01-19 NOTE — PATIENT INSTRUCTIONS
Colonoscopy:     [facility] [phone].      [facility] [phone]    [facility]: [phone] endoscopy    [facility]: [phone]    Mammogram: [facility]    Labs when fasting

## 2018-01-19 NOTE — MR AVS SNAPSHOT
After Visit Summary   1/19/2018    Rakel Parish    MRN: [id number]           Patient Information     Date Of Birth          1945        Visit Information        Provider Department      1/19/2018 10:40 AM Aida Bey MD [facility]        Today's Diagnoses     Annual physical exam    -  1    Tinnitus, bilateral        Hyperlipidemia LDL goal <130        Hypothyroidism, unspecified type        Screening for diabetes mellitus          Care Instructions    Colonoscopy:     [facility] [phone].      [facility] [phone]    [facility]: [phone] endoscopy    [facility]: [phone]    Mammogram: [facility]    Labs when fasting             Follow-ups after your visit        Who to contact     Please call your clinic at [phone] to:    Ask questions about your health    Make or cancel appointments    Discuss your medicines    Learn about your test results    Speak to your doctor   If you have compliments or concerns about an experience at your clinic, or if you wish to file a complaint, please contact [facility] Patient Relations at [phone] or email us at [email or web address]         Additional Information About Your Visit        MyChart Information     [employer] gives you secure access to your electronic health record. If you see a primary care provider, you can also send messages to your care team and make appointments. If you have questions, please call your primary care clinic.  If you do not have a primary care provider, please call [phone] and they will assist you.      [employer] is an electronic gateway that provides easy, online access to your medical records. With uberVU, you can request a clinic appointment, read your test results, renew a prescription or communicate with your care team.     To access your existing account, please contact your [facility]  "or call [phone] for assistance.        Care EveryWhere ID     This is your Care EveryWhere ID. This could be used by other organizations to access your [facility] medical records  [id number]        Your Vitals Were     Temperature Height Pulse Oximetry BMI (Body Mass Index)          97.2  F (36.2  C) (Temporal) 5' 2.95\" (159.9 cm) 99% 22.53 kg/m2         Blood Pressure from Last 3 Encounters:   01/19/18 126/78   07/20/17 92/65   01/06/17 111/77    Weight from Last 3 Encounters:   01/19/18 127 lb (57.6 kg)   07/20/17 121 lb 4 oz (55 kg)   01/06/17 123 lb 1.3 oz (55.8 kg)              We Performed the Following     Basic Metabolic Panel ([place])          Today's Medication Changes          These changes are accurate as of: 1/19/18 11:12 AM.  If you have any questions, ask your nurse or doctor.               These medicines have changed or have updated prescriptions.        Dose/Directions    levothyroxine 88 MCG tablet   Commonly known as:  SYNTHROID/LEVOTHROID   This may have changed:  See the new instructions.   Used for:  Hypothyroidism, unspecified type   Changed by:  Aida Bey MD        Dose:  88 mcg   Take 1 tablet (88 mcg) by mouth daily   Quantity:  90 tablet   Refills:  3       pravastatin 10 MG tablet   Commonly known as:  PRAVACHOL   This may have changed:  See the new instructions.   Used for:  Hyperlipidemia LDL goal <130   Changed by:  Aida Bey MD        Dose:  10 mg   Take 1 tablet (10 mg) by mouth daily   Quantity:  90 tablet   Refills:  3            Where to get your medicines      These medications were sent to [id number] - [place], [place] - [address]  [address] [address]     Phone:  [phone]     levothyroxine 88 MCG tablet    pravastatin 10 MG tablet                Primary Care Provider Office Phone # Fax #    Aida Bey [phone] [address] [address]        Equal Access to Services     " CASSANDRA HARVEY : Hadii aad ku stephania Christianson, waaxda luqadaha, qaybta kaalmada adeezekiel, keagan francesco haykirstie hollidaymliliegeorge prater . So [facility] [place] [phone].    ATENCIÓN: Si habla valencia, tiene a levy disposición servicios gratuitos de asistencia lingüística. Llame al [phone].    We comply with applicable federal civil rights laws and [place] laws. We do not discriminate on the basis of race, color, national origin, age, disability, sex, sexual orientation, or gender identity.            Thank you!     Thank you for choosing [facility]  for your care. Our goal is always to provide you with excellent care. Hearing back from our patients is one way we can continue to improve our services. Please take a few minutes to complete the written survey that you may receive in the mail after your visit with us. Thank you!             Your Updated Medication List - Protect others around you: Learn how to safely use, store and throw away your medicines at www.disposemymeds.org.          This list is accurate as of: 1/19/18 11:12 AM.  Always use your most recent med list.                   Brand Name Dispense Instructions for use Diagnosis    albuterol 108 (90 BASE) MCG/ACT Inhaler    PROAIR HFA/PROVENTIL HFA/VENTOLIN HFA     Inhale 2 puffs into the lungs every 6 hours        ALPHA LIPOIC ACID PO      Take 200 mg by mouth daily        aspirin 81 MG tablet      Take by mouth daily        Calcium-Vitamin D 600-200 MG-UNIT Tabs      Take 1 tablet by mouth daily        citalopram 10 MG tablet    celeXA    90 tablet    TAKE 1 TABLET BY MOUTH EVERY DAY    VLAD (generalized anxiety disorder)       DHEA PO      Take 5 mg by mouth daily        fexofenadine-pseudoePHEDrine 180-240 MG per 24 hr tablet    ALLEGRA-D 24     Take 1 tablet by mouth daily        gabapentin 300 MG capsule    NEURONTIN    360 capsule    TAKE ONE CAPSULE BY MOUTH 4 TIMES A DAY    Intractable migraine, unspecified migraine type       levothyroxine 88 MCG  tablet    SYNTHROID/LEVOTHROID    90 tablet    Take 1 tablet (88 mcg) by mouth daily    Hypothyroidism, unspecified type       MAGNESIUM PO      Take by mouth daily        MAXALT PO      Take 5-10 mg by mouth as needed        Multiple vitamin Tabs      Take by mouth daily        omeprazole 20 MG CR capsule    priLOSEC     Take by mouth daily        pravastatin 10 MG tablet    PRAVACHOL    90 tablet    Take 1 tablet (10 mg) by mouth daily    Hyperlipidemia LDL goal <130       triamcinolone 55 MCG/ACT Inhaler    NASACORT    1 Bottle    Spray 1 spray in nostril daily    Nasal congestion       VITAMIN B 12 PO      Take by mouth daily        VITAMIN B-2 PO      Take 400 mg by mouth daily        VITAMIN D (CHOLECALCIFEROL) PO      Take 2,000 Int'l Units by mouth daily        zolpidem 10 MG tablet    AMBIEN    30 tablet    TAKE 1 TABLET BY MOUTH AT BEDTIME AS NEEDED FOR SLEEP    Persistent insomnia

## 2018-01-22 ENCOUNTER — HOSPITAL ENCOUNTER (OUTPATIENT)
Dept: MAMMOGRAPHY | Facility: CLINIC | Age: 73
End: 2018-01-22
Attending: FAMILY MEDICINE
Payer: MEDICARE

## 2018-01-22 ENCOUNTER — HOSPITAL ENCOUNTER (OUTPATIENT)
Dept: LAB | Facility: CLINIC | Age: 73
Discharge: HOME | End: 2018-01-22
Attending: FAMILY MEDICINE | Admitting: FAMILY MEDICINE
Payer: MEDICARE

## 2018-01-22 DIAGNOSIS — E03.9 HYPOTHYROIDISM, UNSPECIFIED TYPE: ICD-10-CM

## 2018-01-22 DIAGNOSIS — E78.5 HYPERLIPIDEMIA LDL GOAL <130: ICD-10-CM

## 2018-01-22 DIAGNOSIS — Z12.31 VISIT FOR SCREENING MAMMOGRAM: ICD-10-CM

## 2018-01-22 DIAGNOSIS — Z13.1 SCREENING FOR DIABETES MELLITUS: ICD-10-CM

## 2018-01-22 LAB
ANION GAP SERPL CALCULATED.3IONS-SCNC: 8 MMOL/L (ref 3–14)
BUN SERPL-MCNC: 21 MG/DL (ref 7–30)
CALCIUM SERPL-MCNC: 8.7 MG/DL (ref 8.5–10.1)
CHLORIDE SERPL-SCNC: 106 MMOL/L (ref 94–109)
CHOLEST SERPL-MCNC: 214 MG/DL
CO2 SERPL-SCNC: 26 MMOL/L (ref 20–32)
CREAT SERPL-MCNC: 0.82 MG/DL (ref 0.52–1.04)
GFR SERPL CREATININE-BSD FRML MDRD: 69 ML/MIN/1.7M2
GLUCOSE SERPL-MCNC: 94 MG/DL (ref 70–99)
HDLC SERPL-MCNC: 82 MG/DL
LDLC SERPL CALC-MCNC: 110 MG/DL
NONHDLC SERPL-MCNC: 132 MG/DL
POTASSIUM SERPL-SCNC: 4.2 MMOL/L (ref 3.4–5.3)
SODIUM SERPL-SCNC: 140 MMOL/L (ref 133–144)
T3 SERPL-MCNC: 99 NG/DL (ref 60–181)
T4 FREE SERPL-MCNC: 1.11 NG/DL (ref 0.76–1.46)
TRIGL SERPL-MCNC: 109 MG/DL
TSH SERPL DL<=0.005 MIU/L-ACNC: 0.22 MU/L (ref 0.4–4)

## 2018-01-22 PROCEDURE — 80061 LIPID PANEL: CPT | Performed by: FAMILY MEDICINE

## 2018-01-22 PROCEDURE — 84439 ASSAY OF FREE THYROXINE: CPT | Performed by: FAMILY MEDICINE

## 2018-01-22 PROCEDURE — 77063 BREAST TOMOSYNTHESIS BI: CPT

## 2018-01-22 PROCEDURE — 84480 ASSAY TRIIODOTHYRONINE (T3): CPT | Performed by: FAMILY MEDICINE

## 2018-01-22 PROCEDURE — 36415 COLL VENOUS BLD VENIPUNCTURE: CPT | Performed by: FAMILY MEDICINE

## 2018-01-22 PROCEDURE — 84443 ASSAY THYROID STIM HORMONE: CPT | Performed by: FAMILY MEDICINE

## 2018-01-22 PROCEDURE — 80048 BASIC METABOLIC PNL TOTAL CA: CPT | Performed by: FAMILY MEDICINE

## 2018-02-02 ENCOUNTER — TELEPHONE (OUTPATIENT)
Dept: FAMILY MEDICINE | Facility: CLINIC | Age: 73
End: 2018-02-02

## 2018-02-02 DIAGNOSIS — M54.50 LOWER BACK PAIN: Primary | ICD-10-CM

## 2018-02-02 NOTE — TELEPHONE ENCOUNTER
I spoke with Dr. Hahn and she is ok with the referral for Physical Therapy to [facility] for her lower back.  I called her and told her that I have faxed the referral to [facility] Physical Therapy.  Rodri ARNDT  [facility]  February 2, 2018 4:48 PM            ----- Message from Kalyani Francisco sent at 2/2/2018  8:15 AM CST -----  Regarding: PT REQUESTING ORDERS FOR PHYSICAL THERAPY FOR LOWER BACK - DR HAHN  Contact: [phone]  Pt requesting orders for physical therapy for lower back from Dr. Hahn for her to see a provider at [facility]. Per Pt, they discuss this at her last visit.     Please give Pt a call back at [phone].    Thank you,    Kalyani  Call Center    Please DO NOT send message and or reply back to sender. Call center Representatives DO NOT respond to Messages.

## 2018-02-15 ENCOUNTER — TRANSFERRED RECORDS (OUTPATIENT)
Dept: HEALTH INFORMATION MANAGEMENT | Facility: CLINIC | Age: 73
End: 2018-02-15

## 2018-02-23 DIAGNOSIS — G47.00 PERSISTENT INSOMNIA: ICD-10-CM

## 2018-02-23 RX ORDER — ZOLPIDEM TARTRATE 10 MG/1
10 TABLET ORAL
Qty: 30 TABLET | Refills: 0
Start: 2018-02-23 | End: 2018-08-23

## 2018-02-23 NOTE — TELEPHONE ENCOUNTER
Maida pt, last seen for physical on 1/19/18.  Has been on this med for years, since 2015 .  She last filled for # 30 with 5 refills back on 3/8/17.  On time for refill.      To Zoe to auth, as Maida not available  Will not add extra refills this time as not primary MD.    Yulissa Lucero RN  February 23, 2018 11:29 AM

## 2018-04-26 DIAGNOSIS — G43.919 INTRACTABLE MIGRAINE, UNSPECIFIED MIGRAINE TYPE: ICD-10-CM

## 2018-04-26 RX ORDER — GABAPENTIN 300 MG/1
CAPSULE ORAL
Qty: 360 CAPSULE | Refills: 1 | Status: SHIPPED | OUTPATIENT
Start: 2018-04-26 | End: 2018-11-02

## 2018-04-26 NOTE — TELEPHONE ENCOUNTER
Last visit 1/19/18, no future appt    Routing refill request to provider for review/approval because:  Drug not on the [facility] refill protocol

## 2018-08-16 DIAGNOSIS — F41.1 GAD (GENERALIZED ANXIETY DISORDER): ICD-10-CM

## 2018-08-17 RX ORDER — CITALOPRAM HYDROBROMIDE 10 MG/1
TABLET ORAL
Qty: 90 TABLET | Refills: 1 | Status: SHIPPED | OUTPATIENT
Start: 2018-08-17 | End: 2019-02-06

## 2018-08-17 NOTE — TELEPHONE ENCOUNTER
Last seen 1/19/18,  OK to refill for 6 months    Prescription approved per [facility] Refill Protocol.      Yulissa Lucero RN  August 17, 2018 3:08 PM

## 2018-08-23 DIAGNOSIS — G47.00 PERSISTENT INSOMNIA: ICD-10-CM

## 2018-08-23 RX ORDER — ZOLPIDEM TARTRATE 10 MG/1
10 TABLET ORAL
Qty: 30 TABLET | Refills: 0 | Status: SHIPPED | OUTPATIENT
Start: 2018-08-23 | End: 2019-02-07

## 2018-08-24 NOTE — TELEPHONE ENCOUNTER
I called this prescription for the Ambien into the [facility] [id number].  Approved per Dr. Foster.  Rodri ARNDT  [facility]  August 24, 2018 11:00 AM

## 2018-11-02 DIAGNOSIS — G43.919 INTRACTABLE MIGRAINE, UNSPECIFIED MIGRAINE TYPE: ICD-10-CM

## 2018-11-07 RX ORDER — GABAPENTIN 300 MG/1
CAPSULE ORAL
Qty: 360 CAPSULE | Refills: 1 | Status: SHIPPED | OUTPATIENT
Start: 2018-11-07 | End: 2019-02-06

## 2018-11-07 NOTE — TELEPHONE ENCOUNTER
Last seen 1/19/18 at [facility] not on refill RN protocol,  To MD to suri    Last filled for 360 with 1 refill ( 6 month supply) on 4/26/18  On time for refill    Pt using for headaches.    Yulissa Lucero RN  November 7, 2018 10:44 AM

## 2018-11-19 ENCOUNTER — TELEPHONE (OUTPATIENT)
Dept: FAMILY MEDICINE | Facility: CLINIC | Age: 73
End: 2018-11-19

## 2018-11-19 NOTE — TELEPHONE ENCOUNTER
Spoke to pt - advised that she needs to see sports med provider to evaluate, and then order appropriate PT.  Spoke to pt - states she will choose to go to [facility] near her home, and try to see sports med provider there. She will call back prn concerns.  Agustina James RN  Care Coordinator  [facility]

## 2018-11-19 NOTE — TELEPHONE ENCOUNTER
----- Message from Aida Bey MD sent at 11/17/2018  7:42 AM CST -----  Regarding: RE: Dinh Bey requesting Physical Therapy Referral  Contact: [phone]  Agustina:    Typically I am ok with A PT referral however I see that she was seen in February for similar pain,  Maybe it is  Best she see sports medicine and they can assess and decide if PT is appropriate.     Can you discuss with her.     Aida Bey MD  ----- Message -----     From: Agustina James, RN     Sent: 11/16/2018   4:47 PM       To: Aida Bey MD  Subject: FW: Dinh Bey requesting Physical Therap#    She has not been seen for this - does she need a visit for this?   No chiro notes that I can see - I have not talked to her -thanks  ----- Message -----     From: Dominique De La Torre     Sent: 11/16/2018  10:31 AM       To: [facility] Nurse Pool  Subject: Dinh Bey requesting Physical Therapy Re#    [facility] Call Center    Phone Message    May a detailed message be left on voicemail: yes    Reason for Call: Other: Patient has been having back pain for a while, has been going to a chiropractor. Patient states has not been helping. Patient is wanting to get a Physical Therapy Referral to [facility].  Please advise.     Action Taken: Message routed to:  [facility]

## 2019-02-06 ENCOUNTER — OFFICE VISIT (OUTPATIENT)
Dept: FAMILY MEDICINE | Facility: CLINIC | Age: 74
End: 2019-02-06
Payer: COMMERCIAL

## 2019-02-06 VITALS
OXYGEN SATURATION: 98 % | HEIGHT: 63 IN | DIASTOLIC BLOOD PRESSURE: 70 MMHG | WEIGHT: 127.5 LBS | TEMPERATURE: 97.4 F | SYSTOLIC BLOOD PRESSURE: 109 MMHG | RESPIRATION RATE: 16 BRPM | HEART RATE: 60 BPM | BODY MASS INDEX: 22.59 KG/M2

## 2019-02-06 DIAGNOSIS — Z00.00 ANNUAL PHYSICAL EXAM: Primary | ICD-10-CM

## 2019-02-06 DIAGNOSIS — E03.9 HYPOTHYROIDISM, UNSPECIFIED TYPE: ICD-10-CM

## 2019-02-06 DIAGNOSIS — G43.919 INTRACTABLE MIGRAINE, UNSPECIFIED MIGRAINE TYPE: ICD-10-CM

## 2019-02-06 DIAGNOSIS — Z78.0 MENOPAUSE: ICD-10-CM

## 2019-02-06 DIAGNOSIS — F41.1 GAD (GENERALIZED ANXIETY DISORDER): ICD-10-CM

## 2019-02-06 DIAGNOSIS — Z12.11 SCREEN FOR COLON CANCER: ICD-10-CM

## 2019-02-06 DIAGNOSIS — E78.5 HYPERLIPIDEMIA LDL GOAL <130: ICD-10-CM

## 2019-02-06 DIAGNOSIS — Z11.59 NEED FOR HEPATITIS C SCREENING TEST: ICD-10-CM

## 2019-02-06 DIAGNOSIS — Z13.220 SCREENING CHOLESTEROL LEVEL: ICD-10-CM

## 2019-02-06 LAB
CHOLEST SERPL-MCNC: 235 MG/DL (ref 0–200)
CHOLEST/HDLC SERPL: 2.7 {RATIO} (ref 0–5)
ERYTHROCYTE [DISTWIDTH] IN BLOOD BY AUTOMATED COUNT: 12.2 %
FASTING SPECIMEN: ABNORMAL
HCT VFR BLD AUTO: 40.9 % (ref 35–47)
HDLC SERPL-MCNC: 87 MG/DL
HEMOGLOBIN: 13.6 G/DL (ref 11.7–15.7)
LDLC SERPL CALC-MCNC: 101 MG/DL (ref 0–129)
MCH RBC QN AUTO: 31.9 PG (ref 26.5–35)
MCHC RBC AUTO-ENTMCNC: 33.3 G/DL (ref 32–36)
MCV RBC AUTO: 96 FL (ref 78–100)
PLATELET # BLD AUTO: 282 K/UL (ref 150–450)
RBC # BLD AUTO: 4.26 M/UL (ref 3.8–5.2)
T4 FREE SERPL-MCNC: 1.05 NG/DL (ref 0.76–1.46)
TRIGL SERPL-MCNC: 234 MG/DL (ref 0–150)
TSH SERPL DL<=0.005 MIU/L-ACNC: 0.36 MU/L (ref 0.4–4)
VLDL-CHOLESTEROL: 47 (ref 7–32)
WBC # BLD AUTO: 6.5 K/UL (ref 4–11)

## 2019-02-06 RX ORDER — CITALOPRAM HYDROBROMIDE 10 MG/1
10 TABLET ORAL DAILY
Qty: 90 TABLET | Refills: 3 | Status: SHIPPED | OUTPATIENT
Start: 2019-02-06 | End: 2020-02-04

## 2019-02-06 RX ORDER — PRAVASTATIN SODIUM 10 MG
10 TABLET ORAL DAILY
Qty: 90 TABLET | Refills: 3 | Status: SHIPPED | OUTPATIENT
Start: 2019-02-06 | End: 2020-03-18

## 2019-02-06 RX ORDER — LEVOTHYROXINE SODIUM 88 UG/1
88 TABLET ORAL DAILY
Qty: 90 TABLET | Refills: 3 | Status: SHIPPED | OUTPATIENT
Start: 2019-02-06 | End: 2019-02-10

## 2019-02-06 RX ORDER — GABAPENTIN 300 MG/1
CAPSULE ORAL
Qty: 360 CAPSULE | Refills: 3 | Status: SHIPPED | OUTPATIENT
Start: 2019-02-06 | End: 2020-03-18

## 2019-02-06 ASSESSMENT — MIFFLIN-ST. JEOR: SCORE: 1051.08

## 2019-02-06 ASSESSMENT — PAIN SCALES - GENERAL: PAINLEVEL: NO PAIN (0)

## 2019-02-06 NOTE — NURSING NOTE
"73 year old  Chief Complaint   Patient presents with     Physical       Blood pressure 109/70, pulse 60, temperature 97.4  F (36.3  C), temperature source Oral, resp. rate 16, height 1.598 m (5' 2.91\"), weight 57.8 kg (127 lb 8 oz), SpO2 98 %, not currently breastfeeding. Body mass index is 22.65 kg/m .  Patient Active Problem List   Diagnosis     Bladder neck obstruction     Constipation     Transient global amnesia     Other hammer toe (acquired)     Ostium secundum type atrial septal defect     Senile osteoporosis     Syndrome affecting cervical region     Variants of migraine     Malignant neoplasm of skin     Insomnia     Hypothyroidism     Routine general medical examination at a health care facility     Encounter for counseling     Family history of cardiovascular disease     Family history of malignant neoplasm of ovary     Tear film insufficiency     Circulatory system disorder     PFO (patent foramen ovale)     Gastroesophageal reflux disease without esophagitis     Diarrhea     ACP (advance care planning)       Wt Readings from Last 2 Encounters:   02/06/19 57.8 kg (127 lb 8 oz)   01/19/18 57.6 kg (127 lb)     BP Readings from Last 3 Encounters:   02/06/19 109/70   01/19/18 126/78   07/20/17 92/65         Current Outpatient Medications   Medication     albuterol (PROAIR HFA, PROVENTIL HFA, VENTOLIN HFA) 108 (90 BASE) MCG/ACT inhaler     ALPHA LIPOIC ACID PO     aspirin 81 MG tablet     Calcium-Vitamin D 600-200 MG-UNIT TABS     citalopram (CELEXA) 10 MG tablet     Cyanocobalamin (VITAMIN B 12 PO)     fexofenadine-pseudoePHEDrine (ALLEGRA-D 24) 180-240 MG per 24 hr tablet     gabapentin (NEURONTIN) 300 MG capsule     levothyroxine (SYNTHROID/LEVOTHROID) 88 MCG tablet     MAGNESIUM PO     Multiple vitamin TABS     Nutritional Supplements (DHEA PO)     omeprazole (PRILOSEC) 20 MG capsule     pravastatin (PRAVACHOL) 10 MG tablet     Riboflavin (VITAMIN B-2 PO)     Rizatriptan Benzoate (MAXALT PO)     " triamcinolone (NASACORT) 55 MCG/ACT nasal inhaler     VITAMIN D, CHOLECALCIFEROL, PO     zolpidem (AMBIEN) 10 MG tablet     No current facility-administered medications for this visit.        Social History     Tobacco Use     Smoking status: Former Smoker     Packs/day: 0.50     Years: 10.00     Pack years: 5.00     Last attempt to quit: 1970     Years since quittin.1     Smokeless tobacco: Never Used   Substance Use Topics     Alcohol use: No     Alcohol/week: 0.0 oz     Drug use: No       Health Maintenance Due   Topic Date Due     FIT Q1 YR  1955     HEPATITIS C SCREENING  1963     DEXA Q1 YR  1975     COLONOSCOPY Q5 YR  2015       No results found for: PAP      2019 1:10 PM

## 2019-02-06 NOTE — PROGRESS NOTES
"Rakel Parish is here for a general check up. She is not fasting. She is up to date on eye exams (no concern for macular degeneration) and dental visits. Wears seat belt-yes. Bike helmet- yes.   Concerns today:    HCM  Nancy is a 74 yo female who is a patient of Dr. Bey (on sabbatical). Nancy is in good health, here for a general check up. Vaccines up to date, she completed the Shingrix series.  She is due for colonoscopy, last completed in 2010. She is due for DEXA scan, last completed in 2013 at [facility]. She has a hx of osteopenia and took Fosamax for 2 years. Last mammogram 1/22/18, normal.     Diet: Wide variety, eats fish, some meat.     GERD  aNncy takes omeprazole 20 mg daily, she is trying to taper off. She has a lot of belching after eating. She does have some dysphagia. She had an upper endoscopy March 2016, no hiatal hernia but some gastritis seen.. She does not take ibuprofen.      Advance directive:  On file dated 2016  Hearing concerns: Note her hearing is changing, declines formal evaluation.   Fall Risk: No recent falls, working on balance.   Independent at home: Yes  Safe : Yes  Memory concerns: Yes, \"my memory is crap\". Her mom had dementia in her 80s and she is concerned. She feels foggy on gabapentin. Declines memory evaluation.     COGNITIVE SCREEN  1) Repeat 3 items (Banana, Sunrise, Chair)    2) Clock draw: NORMAL  3) 3 item recall: Recalls 3 objects  Results: 3 items recalled: COGNITIVE IMPAIRMENT LESS LIKELY    Mini-CogTM Copyright S Christian. Licensed by the author for use in [facility] [employer]; reprinted with permission (ashley@.[email or web address]). All rights reserved.        Health Maintenance   Topic Date Due     FIT Q1 YR  11/19/1955     HEPATITIS C SCREENING  11/19/1963     DEXA Q1 YR  11/19/1975     COLONOSCOPY Q5 YR  11/16/2015     FALL RISK ASSESSMENT  01/06/2018     PHQ-2 Q1 YR  01/19/2019     MAMMO SCREEN Q2 YR (SYSTEM ASSIGNED)  01/22/2020     DTAP/TDAP/TD IMMUNIZATION (4 - Td) " 10/21/2020     ADVANCE DIRECTIVE PLANNING Q5 YRS  2022     LIPID SCREEN Q5 YR FEMALE (SYSTEM ASSIGNED)  2023     INFLUENZA VACCINE  Completed     ZOSTER IMMUNIZATION  Completed     IPV IMMUNIZATION  Aged Out     MENINGITIS IMMUNIZATION  Aged Out         Patient Active Problem List   Diagnosis     Bladder neck obstruction     Constipation     Transient global amnesia     Other hammer toe (acquired)     Ostium secundum type atrial septal defect     Senile osteoporosis     Syndrome affecting cervical region     Variants of migraine     Malignant neoplasm of skin     Insomnia     Hypothyroidism     Routine general medical examination at a health care facility     Encounter for counseling     Family history of cardiovascular disease     Family history of malignant neoplasm of ovary     Tear film insufficiency     Circulatory system disorder     PFO (patent foramen ovale)     Gastroesophageal reflux disease without esophagitis     Diarrhea     ACP (advance care planning)       Past Surgical History:   Procedure Laterality Date     APPENDECTOMY       BUNIONECTOMY Right 2010     COLONOSCOPY  11/16/10    Every 7 years     HC REPAIR OF HAMMERTOE,ONE       TONSILLECTOMY & ADENOIDECTOMY       TUBAL LIGATION  1965       Family History   Problem Relation Age of Onset     Breast Cancer Niece 40        Maternal     C.A.D. Father 85         of renal failure. ASCVD, MI x 2     Prostate Cancer Father 80     Cancer Brother         CLL     Prostate Cancer Brother 68     Ovarian Cancer Sister 82     Osteoporosis Mother 94         at 94 after hip fracture     Dementia Mother      Hypertension Mother      Colon Cancer No family hx of        Social  .    2 adult children and 6 grandchildren.     HABITS:  Tob: None  ETOH: None  Calcium: 3 per day and calcium/vitamin D supplement.   Caffeine: 1/day  Exercise: Walking daily    OB/GYN HISTORY:  LMP: No LMP recorded. Patient is postmenopausal.  Hx abnormal pap?   No, paps no longer indicated  Contraception: n/a  G 2   P 2   A 0  Self Breast exam: sometimes    Current Outpatient Medications   Medication Sig Dispense Refill     albuterol (PROAIR HFA, PROVENTIL HFA, VENTOLIN HFA) 108 (90 BASE) MCG/ACT inhaler Inhale 2 puffs into the lungs every 6 hours       ALPHA LIPOIC ACID PO Take 200 mg by mouth daily       aspirin 81 MG tablet Take by mouth daily       Calcium-Vitamin D 600-200 MG-UNIT TABS Take 1 tablet by mouth daily       citalopram (CELEXA) 10 MG tablet Take 1 tablet (10 mg) by mouth daily 90 tablet 3     Cyanocobalamin (VITAMIN B 12 PO) Take by mouth daily       fexofenadine-pseudoePHEDrine (ALLEGRA-D 24) 180-240 MG per 24 hr tablet Take 1 tablet by mouth daily       gabapentin (NEURONTIN) 300 MG capsule TAKE ONE CAPSULE BY MOUTH 4 TIMES A  capsule 3     levothyroxine (SYNTHROID/LEVOTHROID) 88 MCG tablet Take 1 tablet (88 mcg) by mouth daily Except skip Sunday dose 90 tablet 3     MAGNESIUM PO Take by mouth daily       Multiple vitamin TABS Take by mouth daily       Nutritional Supplements (DHEA PO) Take 5 mg by mouth daily       omeprazole (PRILOSEC) 20 MG capsule Take by mouth daily       pravastatin (PRAVACHOL) 10 MG tablet Take 1 tablet (10 mg) by mouth daily 90 tablet 3     Riboflavin (VITAMIN B-2 PO) Take 400 mg by mouth daily       Rizatriptan Benzoate (MAXALT PO) Take 5-10 mg by mouth as needed       triamcinolone (NASACORT) 55 MCG/ACT nasal inhaler Spray 1 spray in nostril daily 1 Bottle 1     VITAMIN D, CHOLECALCIFEROL, PO Take 2,000 Int'l Units by mouth daily       zolpidem (AMBIEN) 10 MG tablet Take 1 tablet (10 mg) by mouth nightly as needed for sleep 30 tablet 0     Allergies   Allergen Reactions     Cyclobenzaprine Other (See Comments)     Levofloxacin Unknown     Tendon injury (torn)     Rofecoxib Nausea     Simvastatin Other (See Comments)     Trazodone Other (See Comments)     Other reaction(s): Nightmares     Vicodin  "[Hydrocodone-Acetaminophen] Itching     Latex Rash       ROS  CONSTITUTIONAL:NEGATIVE for fever, chills, change in weight  INTEGUMENTARY/SKIN: NEGATIVE for worrisome rashes, moles or lesions  EYES: NEGATIVE for vision changes or irritation  ENT/MOUTH: NEGATIVE for ear, mouth and throat problems  RESP:NEGATIVE for significant cough or SOB  BREAST: NEGATIVE for masses, tenderness or discharge  CV: NEGATIVE for chest pain, palpitations, MAN, orthopnea, PND  or peripheral edema  GI: NEGATIVE for nausea, abdominal pain, or change in bowel habits. Hx of GERD, taking omeprazole.   :NEGATIVE for frequency, dysuria, or hematuria.  MUSCULOSKELETAL:NEGATIVE for significant arthralgias or myalgia  NEURO: NEGATIVE for weakness, dizziness or paresthesias. Hx of migraines.   ENDOCRINE: NEGATIVE for polyuria/dipsia,  temperature intolerance, skin/hair changes  HEME/ALLERGY/IMMUNE: NEGATIVE for bleeding problems  PSYCHIATRIC: NEGATIVE for changes in mood or affect    EXAM  /70 (BP Location: Right arm, Patient Position: Sitting, Cuff Size: Adult Regular)   Pulse 60   Temp 97.4  F (36.3  C) (Oral)   Resp 16   Ht 1.598 m (5' 2.91\")   Wt 57.8 kg (127 lb 8 oz)   SpO2 98%   BMI 22.65 kg/m    GENERAL APPEARANCE: Alert, pleasant, NAD  EYES: PERRL, EOMI, conjunctiva clear  HENT: TM normal bilaterally. Nose and mouth without lesions  NECK: no adenopathy, thyroid normal to palpation   RESP: lungs clear to auscultation bilaterally,   BREAST: normal without masses, no tenderness or nipple discharge and no palpable  axillary masses or adenopathy   CV: regular rate and rhythm, normal S1 S2, no murmur, no carotid bruits  ABDOMEN: soft, nontender, without HSM or masses. Bowel sounds normal  : Not done  RECTAL EXAM: not done   MS: extremities normal- no gross deformities noted, no tender, hot or swollen joints.    SKIN: no suspicious lesions or rashes  NEURO: Normal strength and tone, sensory exam grossly normal, DTR normoreflexive " in upper and lower extremities  PSYCH: mentation appears normal. and affect normal/bright.  EXT: no peripheral edema, pedal pulses palpable    Assessment:  (Z00.00) Annual physical exam  (primary encounter diagnosis)  Comment: 72 yo female in good health  Plan: CBC with Plt ([facility])        Anticipatory guidance given today regarding diet, exercise, calcium intake.     (Z12.11) Screen for colon cancer  Comment: due for routine screening  Plan: GASTROENTEROLOGY ADULT REF PROCEDURE ONLY         Other; ([place] location), Fecal colorectal         cancer screen FIT        Will give FIT card, discussed need for colonoscopy    (Z11.59) Need for hepatitis C screening test  Comment: routine screening  Plan: Hepatitis C Screen Reflex to HCV RNA Quant and         Genotype            (Z13.220) Screening cholesterol level  Comment: Routine screening, not fasting  Plan: Lipid Panel ([place])        Continue medication    (Z78.0) Menopause  Comment: due for routine DEXA  Plan: Dexa hip/pelvis/spine*        No current use of Fosamax. Discussed adequate calcium/ D intake    (F41.1) VLAD (generalized anxiety disorder)  Comment: currently doing well with citalopram  Plan: citalopram (CELEXA) 10 MG tablet        Refilled medication    (E03.9) Hypothyroidism, unspecified type  Comment: TSH is suppressed  TSH   Date Value Ref Range Status   02/06/2019 0.36 (L) 0.40 - 4.00 mU/L Final     Plan: levothyroxine (SYNTHROID/LEVOTHROID) 88 MCG         tablet, TSH with free T4 reflex        Recommend she hold levothyroxine dose on Sunday, return in 6 wks for rechedck    (G43.919) Intractable migraine, unspecified migraine type  Comment: doing well with gabapentin  Plan: gabapentin (NEURONTIN) 300 MG capsule        Refilled me to help prevent migraine headache    (E78.5) Hyperlipidemia LDL goal <130  Comment: on Pravachol, LDL in target range  Plan: pravastatin (PRAVACHOL) 10 MG tablet          Recent Labs   Lab Test 02/06/19  1337 01/22/18  0750   05/07/15  0803   CHOL 235.0* 214*   < > 205*   HDL 87.0 82   < > 70   .0 110*   < > 118   TRIG 234.0* 109   < > 87   CHOLHDLRATIO 2.7  --   --  2.9    < > = values in this interval not displayed.         Alondra Foster MD  Internal Medicine/Pediatrics      I, Katie Guajardo, am serving as a scribe to document services personally performed by Dr. Alondra Foster, based on data collection and the provider's statements to me. Dr. Foster has reviewed, edited, and approved the above note.

## 2019-02-07 DIAGNOSIS — G47.00 PERSISTENT INSOMNIA: ICD-10-CM

## 2019-02-07 NOTE — TELEPHONE ENCOUNTER
Last visit 2/6/18  Last refill 8/24/18    Routing refill request to provider for review/approval because:  Drug not on the [facility] refill protocol   Agustina James RN  [facility]

## 2019-02-08 LAB — HCV AB SERPL QL IA: NONREACTIVE

## 2019-02-08 RX ORDER — ZOLPIDEM TARTRATE 10 MG/1
10 TABLET ORAL
Qty: 30 TABLET | Refills: 0
Start: 2019-02-08 | End: 2019-11-08

## 2019-02-10 RX ORDER — LEVOTHYROXINE SODIUM 88 UG/1
88 TABLET ORAL DAILY
Qty: 90 TABLET | Refills: 3
Start: 2019-02-10 | End: 2020-03-06

## 2019-02-27 ENCOUNTER — NURSE TRIAGE (OUTPATIENT)
Dept: NURSING | Facility: CLINIC | Age: 74
End: 2019-02-27

## 2019-02-27 NOTE — TELEPHONE ENCOUNTER
"Patient calling transferred from [facility]. Patient reporting abdominal cramping starting 5 days ago while traveling in [place]. Reporting she returned from [place] yesterday. Ongoing mild abdominal \"discomfort.\" Loose stool x 1 today. Reporting she has been taking Pepto Bismol daily. Loose stools 1-4 per day x 5 days. Afebrile.   Per guidelines advised to be seen with in 4 hours. Patient unsure what she will do at this time. Offered to transfer to [facility]. Patient declines.    Ana Maria Darnell RN  [facility]        Reason for Disposition    [1] Constant abdominal pain AND [2] present > 2 hours    Additional Information    Negative: Shock suspected (e.g., cold/pale/clammy skin, too weak to stand, low BP, rapid pulse)    Negative: Difficult to awaken or acting confused  (e.g., disoriented, slurred speech)    Negative: Sounds like a life-threatening emergency to the triager    Negative: Vomiting also present and worse than the diarrhea    Negative: [1] Blood in stool AND [2] without diarrhea    Negative: [1] SEVERE abdominal pain (e.g., excruciating) AND [2] present > 1 hour    Negative: [1] SEVERE abdominal pain AND [2] age > 60    Negative: [1] Blood in the stool AND [2] moderate or large amount of blood    Negative: Black or tarry bowel movements  (Exception: chronic-unchanged  black-grey bowel movements AND is taking iron pills or Pepto-bismol)    Negative: [1] Drinking very little AND [2] dehydration suspected (e.g., no urine > 12 hours, very dry mouth, very lightheaded)    Negative: Patient sounds very sick or weak to the triager    Negative: [1] SEVERE diarrhea (e.g., 7 or more times / day more than normal) AND [2]  age > 60 years    Protocols used: DIARRHEA-ADULT-      "

## 2019-05-01 ENCOUNTER — HOSPITAL ENCOUNTER (OUTPATIENT)
Dept: BONE DENSITY | Facility: CLINIC | Age: 74
Discharge: HOME | End: 2019-05-01
Attending: INTERNAL MEDICINE | Admitting: INTERNAL MEDICINE
Payer: COMMERCIAL

## 2019-05-01 DIAGNOSIS — Z78.0 MENOPAUSE: ICD-10-CM

## 2019-05-01 PROCEDURE — 77080 DXA BONE DENSITY AXIAL: CPT

## 2019-05-03 DIAGNOSIS — M81.0 SENILE OSTEOPOROSIS: Primary | ICD-10-CM

## 2019-05-13 ENCOUNTER — OFFICE VISIT (OUTPATIENT)
Dept: FAMILY MEDICINE | Facility: CLINIC | Age: 74
End: 2019-05-13
Payer: COMMERCIAL

## 2019-05-13 VITALS
OXYGEN SATURATION: 98 % | BODY MASS INDEX: 22.86 KG/M2 | SYSTOLIC BLOOD PRESSURE: 109 MMHG | TEMPERATURE: 97.5 F | DIASTOLIC BLOOD PRESSURE: 75 MMHG | HEART RATE: 60 BPM | HEIGHT: 63 IN | WEIGHT: 129 LBS

## 2019-05-13 DIAGNOSIS — R51.9 NOCTURNAL HEADACHES: Primary | ICD-10-CM

## 2019-05-13 RX ORDER — PREDNISONE 10 MG/1
TABLET ORAL
Qty: 19 TABLET | Refills: 0 | Status: SHIPPED | OUTPATIENT
Start: 2019-05-13 | End: 2019-11-08

## 2019-05-13 ASSESSMENT — MIFFLIN-ST. JEOR: SCORE: 1057.88

## 2019-05-13 NOTE — NURSING NOTE
"73 year old  Chief Complaint   Patient presents with     RECHECK     pt has been sick for 6 weeks. She just finished 10 days. she has been having to take her migrane medication more. She also reports headaches , and pressure on the brooke eof her head.        Blood pressure 109/75, pulse 60, temperature 97.5  F (36.4  C), temperature source Oral, height 1.598 m (5' 2.91\"), weight 58.5 kg (129 lb), SpO2 98 %, not currently breastfeeding. Body mass index is 22.91 kg/m .  Patient Active Problem List   Diagnosis     Bladder neck obstruction     Constipation     Transient global amnesia     Other hammer toe (acquired)     Ostium secundum type atrial septal defect     Senile osteoporosis     Syndrome affecting cervical region     Variants of migraine     Malignant neoplasm of skin     Insomnia     Hypothyroidism     Routine general medical examination at a health care facility     Encounter for counseling     Family history of cardiovascular disease     Family history of malignant neoplasm of ovary     Tear film insufficiency     Circulatory system disorder     PFO (patent foramen ovale)     Gastroesophageal reflux disease without esophagitis     Diarrhea     ACP (advance care planning)       Wt Readings from Last 2 Encounters:   05/13/19 58.5 kg (129 lb)   02/06/19 57.8 kg (127 lb 8 oz)     BP Readings from Last 3 Encounters:   05/13/19 109/75   02/06/19 109/70   01/19/18 126/78         Current Outpatient Medications   Medication     albuterol (PROAIR HFA, PROVENTIL HFA, VENTOLIN HFA) 108 (90 BASE) MCG/ACT inhaler     ALPHA LIPOIC ACID PO     aspirin 81 MG tablet     Calcium-Vitamin D 600-200 MG-UNIT TABS     citalopram (CELEXA) 10 MG tablet     Cyanocobalamin (VITAMIN B 12 PO)     fexofenadine-pseudoePHEDrine (ALLEGRA-D 24) 180-240 MG per 24 hr tablet     gabapentin (NEURONTIN) 300 MG capsule     levothyroxine (SYNTHROID/LEVOTHROID) 88 MCG tablet     MAGNESIUM PO     Multiple vitamin TABS     Nutritional Supplements (DHEA " PO)     omeprazole (PRILOSEC) 20 MG capsule     pravastatin (PRAVACHOL) 10 MG tablet     Riboflavin (VITAMIN B-2 PO)     Rizatriptan Benzoate (MAXALT PO)     triamcinolone (NASACORT) 55 MCG/ACT nasal inhaler     VITAMIN D, CHOLECALCIFEROL, PO     zolpidem (AMBIEN) 10 MG tablet     No current facility-administered medications for this visit.        Social History     Tobacco Use     Smoking status: Former Smoker     Packs/day: 0.50     Years: 10.00     Pack years: 5.00     Last attempt to quit: 1970     Years since quittin.3     Smokeless tobacco: Never Used   Substance Use Topics     Alcohol use: No     Alcohol/week: 0.0 oz     Drug use: No       Health Maintenance Due   Topic Date Due     FIT Q1 YR  1955       No results found for: PAP      May 13, 2019 10:42 AM

## 2019-05-13 NOTE — PROGRESS NOTES
"  SUBJECTIVE:   Rakel Parish is a 73 year old female who presents to clinic today for a return visit.    # Sinus Issues  - history of nocturnal headaches for years  - also has history of migraines, typically one a month    - past couple of months has had a headache every night  - typically nighttime headaches occur around 3am, more recently have been occurring around midnight  - feels pain on the entire left side of her head, from scalp to jaw  - throbbing pain  - additionally has \"stabbing pains\" behind left eye  - typically start after being in bed for a couple of hours  - wake her up from sleep  - has taken to sleeping upright which results in less severe pain  - has pain every night  - pain typically worsens if she doesn't do anything, takes about 15 minutes to reach peak severity  - can't remember the last time she laid down during the day, so unsure if would cause pains  - when she wakes up with the pains, she sits up, uses neti pot, sometimes ice  - doing these things lessens the pain   - symptoms last until she \"does something\" for them  - she has some immediate relief after using the neti pot and blowing her nose, relieves the \"stuffiness\" and allows her to breath better  - if particularly severe will use Rizatriptan which typically eliminates pain  - headaches can last 5 hours    - no accompanying nausea    - has intermittent daytime headaches, identical as the above  - happen maybe once a week  - typically more common in the Spring and Fall  - no association with exertion/exercise    - with her migraines, she typically gets \"flashing lights\" in vision during her headaches  - rarely will see \"prism\", without a headache    - when she is outside her throat gets \"scratchy\"  - mild congestion, worse in the evening    - saw ENT about 12 weeks ago, told she had swelling in left side of nose  - was prescribed 10 days of amoxicillin which she recently completed  - typically takes Allegra and Nasocort for her " allergies  - has appointment Allergist coming up next week    - has seen neurology in the past, last visit was over a year ago  - previously she reports a neurologist gave her percocet to take for migraines in order to not need to take triptans so often, she reports taking about once a month  - doesn't regularly take Tylenol/NSAIDs because they upset her stomach    11/9/15 MRI Brain  FINDINGS: The ventricles and basal cisterns are within normal limits  in configuration. There is no midline shift. There are no extra-axial  fluid collections. There is no evidence for acute stroke or for acute  intracranial hemorrhage. Gray-white differentiation is well  maintained.     There is no sinusitis or mastoiditis.  IMPRESSION: Normal brain MRI.    - reports she has had a sleep study in the past, told she didn't have sleep apnea or require sleep apnea    ROS: Denies fevers, chills, chest pain, difficulty breathing, abdominal pain    Patient Active Problem List   Diagnosis     Bladder neck obstruction     Constipation     Transient global amnesia     Other hammer toe (acquired)     Ostium secundum type atrial septal defect     Senile osteoporosis     Syndrome affecting cervical region     Variants of migraine     Malignant neoplasm of skin     Insomnia     Hypothyroidism     Routine general medical examination at a health care facility     Encounter for counseling     Family history of cardiovascular disease     Family history of malignant neoplasm of ovary     Tear film insufficiency     Circulatory system disorder     PFO (patent foramen ovale)     Gastroesophageal reflux disease without esophagitis     Diarrhea     ACP (advance care planning)     Current Outpatient Medications   Medication     albuterol (PROAIR HFA, PROVENTIL HFA, VENTOLIN HFA) 108 (90 BASE) MCG/ACT inhaler     ALPHA LIPOIC ACID PO     aspirin 81 MG tablet     Calcium-Vitamin D 600-200 MG-UNIT TABS     citalopram (CELEXA) 10 MG tablet     Cyanocobalamin  "(VITAMIN B 12 PO)     fexofenadine-pseudoePHEDrine (ALLEGRA-D 24) 180-240 MG per 24 hr tablet     gabapentin (NEURONTIN) 300 MG capsule     levothyroxine (SYNTHROID/LEVOTHROID) 88 MCG tablet     MAGNESIUM PO     Multiple vitamin TABS     Nutritional Supplements (DHEA PO)     omeprazole (PRILOSEC) 20 MG capsule     pravastatin (PRAVACHOL) 10 MG tablet     Riboflavin (VITAMIN B-2 PO)     Rizatriptan Benzoate (MAXALT PO)     triamcinolone (NASACORT) 55 MCG/ACT nasal inhaler     VITAMIN D, CHOLECALCIFEROL, PO     zolpidem (AMBIEN) 10 MG tablet     No current facility-administered medications for this visit.        I have reviewed the patient's relevant past medical history.     OBJECTIVE:   /75   Pulse 60   Temp 97.5  F (36.4  C) (Oral)   Ht 1.598 m (5' 2.91\")   Wt 58.5 kg (129 lb)   SpO2 98%   BMI 22.91 kg/m      Constitutional: well-appearing, appears stated age  Eyes: conjunctivae without erythema, sclera anicteric, PERRL, EOMI  Skin: no rashes, lesions, or wounds  Psych: affect is full and appropriate, speech is fluent and non-pressured  Neuro: CN 2-12 grossly intact, gait normal.     ASSESSMENT AND PLAN:     (R51) Nocturnal headaches  (primary encounter diagnosis)  Comment: Months of subacute worsening of chronic nocturnal headaches. The clear association with laying flat and the headaches wakening her up from sleep are both concerning for processes that increase intracranial pressure (mass effect, pseudotumor, etc.). Her history is rather bizarre though with these same headaches alternatively labeled as sinus related and as migraines. Furthermore, she experiences significant relief from her headache with both a triptan and with using a neti pot. She came in mostly she reports looking for imaging of her sinuses. I advised her that I would not image her sinuses until we decide whether what she really needs is intracranial imaging.   She believes she may be able to get in to see the neurologist who has " previously evaluated her for her migraines later this week, which I have encouraged her to do to assess need for repeat MRI given the worsening of her headaches. She will also see allergy later this week. I have offered her referral to ENT at the  if she desires after seeing the neurologist and allergist.    She requested medication for pain relief as well. Given that she does not tolerate NSAIDs and I am not clear on what I am treating, I am hesitant to prescribe anything. Ultimately, I did offer a prednisone taper which can assist with some possible causes of her headaches including medication overuse (from the triptan) and allergic sinusitis. Because her allergist wanted her not to take her typical allergy medications before that visit, I have advised her not to start the prednisone before seeing the allergist as well.    Plan: predniSONE (DELTASONE) 10 MG tablet    Hernandez Charles   [facility]  05/13/2019, 11:17 AM

## 2019-05-15 ENCOUNTER — TRANSFERRED RECORDS (OUTPATIENT)
Dept: HEALTH INFORMATION MANAGEMENT | Facility: CLINIC | Age: 74
End: 2019-05-15

## 2019-06-10 ASSESSMENT — ENCOUNTER SYMPTOMS
HEADACHES: 1
SORE THROAT: 1
TROUBLE SWALLOWING: 0
NUMBNESS: 0
BRUISES/BLEEDS EASILY: 1
NECK PAIN: 1
TASTE DISTURBANCE: 1
DISTURBANCES IN COORDINATION: 0
SMELL DISTURBANCE: 1
MUSCLE CRAMPS: 0
TINGLING: 0
MEMORY LOSS: 1
SEIZURES: 0
LOSS OF CONSCIOUSNESS: 0
SINUS PAIN: 0
SINUS CONGESTION: 1
WEAKNESS: 0
JOINT SWELLING: 0
TREMORS: 0
SPEECH CHANGE: 0
BACK PAIN: 1
ARTHRALGIAS: 0
HOARSE VOICE: 1
MUSCLE WEAKNESS: 0
STIFFNESS: 0
MYALGIAS: 1
DIZZINESS: 0
SWOLLEN GLANDS: 0
NECK MASS: 0
PARALYSIS: 0

## 2019-06-13 ENCOUNTER — OFFICE VISIT (OUTPATIENT)
Dept: ENDOCRINOLOGY | Facility: CLINIC | Age: 74
End: 2019-06-13
Attending: INTERNAL MEDICINE
Payer: COMMERCIAL

## 2019-06-13 VITALS
BODY MASS INDEX: 22.84 KG/M2 | WEIGHT: 128.9 LBS | HEIGHT: 63 IN | DIASTOLIC BLOOD PRESSURE: 69 MMHG | SYSTOLIC BLOOD PRESSURE: 106 MMHG | HEART RATE: 61 BPM

## 2019-06-13 DIAGNOSIS — E03.9 HYPOTHYROIDISM, UNSPECIFIED TYPE: ICD-10-CM

## 2019-06-13 DIAGNOSIS — M81.0 OSTEOPOROSIS, UNSPECIFIED OSTEOPOROSIS TYPE, UNSPECIFIED PATHOLOGICAL FRACTURE PRESENCE: ICD-10-CM

## 2019-06-13 DIAGNOSIS — M81.0 OSTEOPOROSIS, UNSPECIFIED OSTEOPOROSIS TYPE, UNSPECIFIED PATHOLOGICAL FRACTURE PRESENCE: Primary | ICD-10-CM

## 2019-06-13 LAB
ANION GAP SERPL CALCULATED.3IONS-SCNC: 5 MMOL/L (ref 3–14)
BUN SERPL-MCNC: 14 MG/DL (ref 7–30)
CALCIUM SERPL-MCNC: 8.7 MG/DL (ref 8.5–10.1)
CHLORIDE SERPL-SCNC: 106 MMOL/L (ref 94–109)
CO2 SERPL-SCNC: 26 MMOL/L (ref 20–32)
CREAT SERPL-MCNC: 0.72 MG/DL (ref 0.52–1.04)
GFR SERPL CREATININE-BSD FRML MDRD: 82 ML/MIN/{1.73_M2}
GLUCOSE SERPL-MCNC: 76 MG/DL (ref 70–99)
POTASSIUM SERPL-SCNC: 4.5 MMOL/L (ref 3.4–5.3)
PTH-INTACT SERPL-MCNC: 54 PG/ML (ref 18–80)
SODIUM SERPL-SCNC: 138 MMOL/L (ref 133–144)
TSH SERPL DL<=0.005 MIU/L-ACNC: 0.58 MU/L (ref 0.4–4)

## 2019-06-13 RX ORDER — ZOLPIDEM TARTRATE 5 MG/1
TABLET ORAL
COMMUNITY
End: 2019-11-08 | Stop reason: DRUGHIGH

## 2019-06-13 ASSESSMENT — PAIN SCALES - GENERAL: PAINLEVEL: NO PAIN (0)

## 2019-06-13 ASSESSMENT — MIFFLIN-ST. JEOR: SCORE: 1057.43

## 2019-06-13 NOTE — NURSING NOTE
Chief Complaint   Patient presents with     Consult     Senile Osteoporosis     Esther Aparicio MA

## 2019-06-13 NOTE — PATIENT INSTRUCTIONS
- Labs today. We will notify you with results  - take your calcium pill twice a day  - come back to clinic in 2 years with another bone density scan  - have your primary check vitamin d and calcium levels in 1 year

## 2019-06-13 NOTE — LETTER
6/13/2019       RE: Rakel Parish  [address] [place] [address]     Dear Colleague,    Thank you for referring your patient, Rakel Parish, to the [facility] ENDOCRINOLOGY at [facility]. Please see a copy of my visit note below.    Endocrinology Clinic Visit 6/13/2019    NAME:  Rakel Parish  PCP:  Aida Bey  MRN:  [id number]  Reason for Consult:  osteopenia  Requesting Provider:  Alondra Foster    Chief Complaint     Chief Complaint   Patient presents with     Consult     Senile Osteoporosis       History of Present Illness   Rakel Parish is a 73 year old female who is seen in clinic for consultation of osteopenia.     Patient was referred by her pcp to discuss recent dxa results. She is a little reluctant to restart therapy, but is willing to be on treatment if we think it's necessary.    Patient DXA history:  - 2000: lowest T-score -0.8 at femoral neck  - 2006: lowest T-score -1.7 at left femoral neck  - 2010: lowest T-score -1.9 at left femoral neck  - 2013: lowest T-score -2.2 at left femoral neck  - 05/2019: lowest T-score -2.4 at left femoral neck    Patient treatment history:  - 2003 - 2010: Risendronate (Actonel)  - 2013: Fosamax  - Patient isn't exactly sure about specific dates    - Previous fractures: none  - Family history of fragility fracture in parent: yes, mom  - Current smoking: no  - Steroid Use: systemic: takes prednisone occasionally (3 times in the past year) - takes it for headaches and neck spasms. Is on them for 5-6 days at a time.   - Rheumatoid arthritis: no  - Alcohol: none  - Other medications known to affect BMD: levothyroxine (has been on for 10 years)  - Calcium intake: Dietary: probably 1 serving of dairy daily (yogurt, cheese, cottage cheese), Supplements: 600mg daily   - Vitamin D intake: Supplements: yes with calcium, unclear dose  - Reported loss of height: maybe 1 inch  - Menstrual history: age at menopause:  50, was on HRT for 1-2 years  - Tendency for falls: no  - GI history: Malabsorption (IBD, Celiac, gastric bypass ): none. She is on a ppi for gerd  - History of kidney stones: no  - History of thyroid disease: yes  - Physical activity: walks or goes to gym every day. For about 60 minutes. If gym, will do weight or cardio  - Weight history: around 120-125 lbs for most of life      Problem List     Patient Active Problem List   Diagnosis     Bladder neck obstruction     Constipation     Transient global amnesia     Other hammer toe (acquired)     Ostium secundum type atrial septal defect     Senile osteoporosis     Syndrome affecting cervical region     Variants of migraine     Malignant neoplasm of skin     Insomnia     Hypothyroidism     Routine general medical examination at a health care facility     Encounter for counseling     Family history of cardiovascular disease     Family history of malignant neoplasm of ovary     Tear film insufficiency     Circulatory system disorder     PFO (patent foramen ovale)     Gastroesophageal reflux disease without esophagitis     Diarrhea     ACP (advance care planning)        Medications     Current Outpatient Medications   Medication     albuterol (PROAIR HFA, PROVENTIL HFA, VENTOLIN HFA) 108 (90 BASE) MCG/ACT inhaler     ALPHA LIPOIC ACID PO     aspirin 81 MG tablet     Calcium-Vitamin D 600-200 MG-UNIT TABS     citalopram (CELEXA) 10 MG tablet     fexofenadine-pseudoePHEDrine (ALLEGRA-D 24) 180-240 MG per 24 hr tablet     gabapentin (NEURONTIN) 300 MG capsule     levothyroxine (SYNTHROID/LEVOTHROID) 88 MCG tablet     MAGNESIUM PO     Multiple vitamin TABS     omeprazole (PRILOSEC) 20 MG capsule     pravastatin (PRAVACHOL) 10 MG tablet     Riboflavin (VITAMIN B-2 PO)     Rizatriptan Benzoate (MAXALT PO)     triamcinolone (NASACORT) 55 MCG/ACT nasal inhaler     VITAMIN D, CHOLECALCIFEROL, PO     zolpidem (AMBIEN) 10 MG tablet     Multiple Vitamins-Iron (MULTIPLE VITAMIN/IRON  OR)     zolpidem (AMBIEN) 5 MG tablet     No current facility-administered medications for this visit.         Allergies     Allergies   Allergen Reactions     Cyclobenzaprine Other (See Comments)     Levofloxacin Unknown     Tendon injury (torn)     Rofecoxib Nausea     Simvastatin Other (See Comments)     Trazodone Other (See Comments)     Other reaction(s): Nightmares     Vicodin [Hydrocodone-Acetaminophen] Itching     Latex Rash       Medical / Surgical History     Past Medical History:   Diagnosis Date     Asthma      Global amnesia     Hypothyroidism     Hyperlipidaemia LDL goal < 130      Hypothyroidism      Insomnia      Migraine, unspecified, without mention of intractable migraine without mention of status migrainosus      Osteopenia      Osteoporosis      Raynaud's disease      Unspecified asthma(493.90)      Past Surgical History:   Procedure Laterality Date     APPENDECTOMY       BUNIONECTOMY Right 2010     COLONOSCOPY  11/16/10    Every 7 years     HC REPAIR OF HAMMERTOE,ONE       TONSILLECTOMY & ADENOIDECTOMY       TUBAL LIGATION         Social History     Social History     Socioeconomic History     Marital status:      Spouse name: Not on file     Number of children: Not on file     Years of education: Not on file     Highest education level: Not on file   Occupational History     Not on file   Social Needs     Financial resource strain: Not on file     Food insecurity:     Worry: Not on file     Inability: Not on file     Transportation needs:     Medical: Not on file     Non-medical: Not on file   Tobacco Use     Smoking status: Former Smoker     Packs/day: 0.50     Years: 10.00     Pack years: 5.00     Last attempt to quit: 1970     Years since quittin.4     Smokeless tobacco: Never Used   Substance and Sexual Activity     Alcohol use: No     Alcohol/week: 0.0 oz     Drug use: No     Sexual activity: Yes     Partners: Male   Lifestyle     Physical activity:     Days per  "week: Not on file     Minutes per session: Not on file     Stress: Not on file   Relationships     Social connections:     Talks on phone: Not on file     Gets together: Not on file     Attends Confucianist service: Not on file     Active member of club or organization: Not on file     Attends meetings of clubs or organizations: Not on file     Relationship status: Not on file     Intimate partner violence:     Fear of current or ex partner: Not on file     Emotionally abused: Not on file     Physically abused: Not on file     Forced sexual activity: Not on file   Other Topics Concern     Parent/sibling w/ CABG, MI or angioplasty before 65F 55M? Not Asked   Social History Narrative     Not on file       Family History     Family History   Problem Relation Age of Onset     Breast Cancer Niece 40        Maternal     C.A.D. Father 85         of renal failure. ASCVD, MI x 2     Prostate Cancer Father 80     Cancer Brother         CLL     Prostate Cancer Brother 68     Ovarian Cancer Sister 82     Osteoporosis Mother 94         at 94 after hip fracture     Dementia Mother      Hypertension Mother      Colon Cancer No family hx of        ROS   10 point ROS negative unless noted above    Physical Exam    Previous Weights:    Wt Readings from Last 2 Encounters:   19 58.5 kg (128 lb 14.4 oz)   19 58.5 kg (129 lb)                    BMI:  Body mass index is 22.9 kg/m .  /69   Pulse 61   Ht 1.598 m (5' 2.91\")   Wt 58.5 kg (128 lb 14.4 oz)   BMI 22.90 kg/m     Gen: NAD, sitting in chair, conversant  HEENT:  Normocephalic, atraumatic, PERRL, no scleral icterus   Neck: supple, no LAD, no thyromegaly  CV: RRR, +s1/s2, no murmurs  Resp: CTAB  Abd: +BS, soft, NT/ND  Ext: WWP.  no edema.    Skin: warm and dry  Msk: intact muscle bulk, no tenderness over spine  Neuro: alert and oriented, no gross focal deficits    Labs/Imaging   Labs and imaging were reviewed in EPIC    Summary of recent findings:     TSH "   Date Value Ref Range Status   02/06/2019 0.36 (L) 0.40 - 4.00 mU/L Final   01/22/2018 0.22 (L) 0.40 - 4.00 mU/L Final   01/06/2017 0.25 (L) 0.40 - 4.00 mU/L Final   10/14/2016 0.25 (L) 0.40 - 4.00 mU/L Final   10/26/2015 1.77 0.40 - 4.00 mU/L Final     T4 Free   Date Value Ref Range Status   02/06/2019 1.05 0.76 - 1.46 ng/dL Final   01/22/2018 1.11 0.76 - 1.46 ng/dL Final   01/06/2017 1.19 0.76 - 1.46 ng/dL Final   10/14/2016 1.13 0.76 - 1.46 ng/dL Final       Creatinine   Date Value Ref Range Status   01/22/2018 0.82 0.52 - 1.04 mg/dL Final       No results found for: [id number], [id number], [id number]    Recent Labs   Lab Test 01/22/18  0750 01/06/17  1142 10/14/16  0739    136.0 141   POTASSIUM 4.2 4.3 4.3   CHLORIDE 106 102.0 106   CO2 26 29.0 31   ANIONGAP 8  --  4   GLC 94 90.0 89   BUN 21 11.0 13   CR 0.82 0.7 0.87   ERIC 8.7 9.2 9.1         Assessment / Plan     1. Low bone density for age (osteopenia)  Patient has had ~7 years of bisphosphonate therapy, last treated around 2013 (unclear exact date). She is in a drug holiday. She has never had an osteoporotic T-score and has never broken a bone. Most recent T-score is -2.4, however it is on a different scanner than previous DXAs, so we cannot make a direct comparison. Certainly, her t-score have steadily declined with time and most likely she will develop osteoporosis in the future. We cannot use FRAX scores after someone has been on treatment, however she is higher risk based on age and family history. There is no 100% right answer in this scenario. We discussed treatment vs monitoring and patient prefers continued monitoring, which is reasonable.  - check calcium, vitamin d, bmp, pth  - increase calcium to 600mg bid   - continue weight bearing activity  - repeat dxa in 2 years  - would start treatment sooner if she breaks a bone over the next 2 years  - primary care to order vitamin d and bmp in 1 year    Follow up: 2 years    Patient was seen and  discussed with attending Dr. Demetrice Bingham MD  [id number], Endocrinology Fellow    Physician Attestation   I, Amelia Suresh, saw this patient with the fellow and agree with the fellow's findings and plan of care as documented in the resident s note.      I personally reviewed vital signs, medications, labs and imaging.    Key findings: Osteopenia: s/p bisphosphonate use. Plan: repeat DXA in 2 years. Treat when T-score <= -2.5, or clinical deterioration (fx). Check labs today    Amelia Suresh  Date of Service (when I saw the patient): 6/13/19    Again, thank you for allowing me to participate in the care of your patient.      Sincerely,    Walter Bingham MD

## 2019-06-14 LAB
DEPRECATED CALCIDIOL+CALCIFEROL SERPL-MC: <55 UG/L (ref 20–75)
VITAMIN D2 SERPL-MCNC: <5 UG/L
VITAMIN D3 SERPL-MCNC: 50 UG/L

## 2019-08-20 ENCOUNTER — OFFICE VISIT (OUTPATIENT)
Dept: FAMILY MEDICINE | Facility: CLINIC | Age: 74
End: 2019-08-20
Payer: COMMERCIAL

## 2019-08-20 VITALS
BODY MASS INDEX: 22.47 KG/M2 | RESPIRATION RATE: 16 BRPM | WEIGHT: 126.5 LBS | HEART RATE: 54 BPM | SYSTOLIC BLOOD PRESSURE: 100 MMHG | DIASTOLIC BLOOD PRESSURE: 66 MMHG | OXYGEN SATURATION: 99 %

## 2019-08-20 DIAGNOSIS — R19.7 DIARRHEA OF PRESUMED INFECTIOUS ORIGIN: Primary | ICD-10-CM

## 2019-08-20 RX ORDER — GALCANEZUMAB 120 MG/ML
INJECTION, SOLUTION SUBCUTANEOUS
Refills: 1 | COMMUNITY
Start: 2019-07-27 | End: 2022-09-15

## 2019-08-20 NOTE — NURSING NOTE
73 year old  Chief Complaint   Patient presents with     Diarrhea     x 9 days     Gas     Abdominal Cramping       Blood pressure 100/66, pulse 54, resp. rate 16, weight 57.4 kg (126 lb 8 oz), SpO2 99 %, not currently breastfeeding. Body mass index is 22.47 kg/m .  Patient Active Problem List   Diagnosis     Bladder neck obstruction     Constipation     Transient global amnesia     Other hammer toe (acquired)     Ostium secundum type atrial septal defect     Senile osteoporosis     Syndrome affecting cervical region     Variants of migraine     Malignant neoplasm of skin     Insomnia     Hypothyroidism     Routine general medical examination at a health care facility     Encounter for counseling     Family history of cardiovascular disease     Family history of malignant neoplasm of ovary     Tear film insufficiency     Circulatory system disorder     PFO (patent foramen ovale)     Gastroesophageal reflux disease without esophagitis     Diarrhea     ACP (advance care planning)       Wt Readings from Last 2 Encounters:   08/20/19 57.4 kg (126 lb 8 oz)   06/13/19 58.5 kg (128 lb 14.4 oz)     BP Readings from Last 3 Encounters:   08/20/19 100/66   06/13/19 106/69   05/13/19 109/75         Current Outpatient Medications   Medication     albuterol (PROAIR HFA, PROVENTIL HFA, VENTOLIN HFA) 108 (90 BASE) MCG/ACT inhaler     ALPHA LIPOIC ACID PO     aspirin 81 MG tablet     Calcium-Vitamin D 600-200 MG-UNIT TABS     citalopram (CELEXA) 10 MG tablet     EMGALITY 120 MG/ML SOSY     fexofenadine-pseudoePHEDrine (ALLEGRA-D 24) 180-240 MG per 24 hr tablet     gabapentin (NEURONTIN) 300 MG capsule     levothyroxine (SYNTHROID/LEVOTHROID) 88 MCG tablet     MAGNESIUM PO     Multiple vitamin TABS     Multiple Vitamins-Iron (MULTIPLE VITAMIN/IRON OR)     omeprazole (PRILOSEC) 20 MG capsule     pravastatin (PRAVACHOL) 10 MG tablet     Riboflavin (VITAMIN B-2 PO)     Rizatriptan Benzoate (MAXALT PO)     triamcinolone (NASACORT) 55  MCG/ACT nasal inhaler     VITAMIN D, CHOLECALCIFEROL, PO     zolpidem (AMBIEN) 10 MG tablet     zolpidem (AMBIEN) 5 MG tablet     No current facility-administered medications for this visit.        Social History     Tobacco Use     Smoking status: Former Smoker     Packs/day: 0.50     Years: 10.00     Pack years: 5.00     Last attempt to quit: 1970     Years since quittin.6     Smokeless tobacco: Never Used   Substance Use Topics     Alcohol use: No     Alcohol/week: 0.0 oz     Drug use: No       Health Maintenance Due   Topic Date Due     FIT  1955       No results found for: PAP      2019 11:22 AM

## 2019-08-20 NOTE — PROGRESS NOTES
SUBJECTIVE:   Rakel Parish is a 73 year old female who presents to clinic today for a return visit.    # Loose stools  - at baseline, she is normally constipated, takes fiber and stool softeners -- has had no changes to regimen  - presents with 9-10 day history of loose, soft, thin caliber, non-liquid stools (started 8/10/2019)  - [place]  -  who [place] also [place] the cabin has not fallen ill  - frequency peaked with bowel movement every 2 hours, currently every 3-4 hours  - bowel movements all preceded by urge/cramping feeling to go  - no abdominal pain  - no blood, dark/tarry stools, or fever  - no exposures to pets or young children  - 1 day of chills and feeling of fatigue 4 days ago  - 1 episode of generalized achy abdominal pain last night lasting 30 minutes, since resolved, no associated bowel movement with this pain  - appetite initially reduced 2-3 days prior to onset of illness  - appetite now is back at baseline    - of note, son is carrier for C. Dif  - no antibiotic use in past 90 days    ROS: Denies fevers, chills, chest pain, difficulty breathing, abdominal pain    Patient Active Problem List   Diagnosis     Bladder neck obstruction     Constipation     Transient global amnesia     Other hammer toe (acquired)     Ostium secundum type atrial septal defect     Senile osteoporosis     Syndrome affecting cervical region     Variants of migraine     Malignant neoplasm of skin     Insomnia     Hypothyroidism     Routine general medical examination at a health care facility     Encounter for counseling     Family history of cardiovascular disease     Family history of malignant neoplasm of ovary     Tear film insufficiency     Circulatory system disorder     PFO (patent foramen ovale)     Gastroesophageal reflux disease without esophagitis     Diarrhea     ACP (advance care planning)     Current Outpatient Medications   Medication     albuterol  (PROAIR HFA, PROVENTIL HFA, VENTOLIN HFA) 108 (90 BASE) MCG/ACT inhaler     ALPHA LIPOIC ACID PO     aspirin 81 MG tablet     Calcium-Vitamin D 600-200 MG-UNIT TABS     citalopram (CELEXA) 10 MG tablet     EMGALITY 120 MG/ML SOSY     fexofenadine-pseudoePHEDrine (ALLEGRA-D 24) 180-240 MG per 24 hr tablet     gabapentin (NEURONTIN) 300 MG capsule     levothyroxine (SYNTHROID/LEVOTHROID) 88 MCG tablet     MAGNESIUM PO     Multiple vitamin TABS     Multiple Vitamins-Iron (MULTIPLE VITAMIN/IRON OR)     omeprazole (PRILOSEC) 20 MG capsule     pravastatin (PRAVACHOL) 10 MG tablet     Riboflavin (VITAMIN B-2 PO)     Rizatriptan Benzoate (MAXALT PO)     triamcinolone (NASACORT) 55 MCG/ACT nasal inhaler     VITAMIN D, CHOLECALCIFEROL, PO     zolpidem (AMBIEN) 10 MG tablet     zolpidem (AMBIEN) 5 MG tablet     No current facility-administered medications for this visit.      I have reviewed the patient's relevant past medical history.     OBJECTIVE:   /66 (BP Location: Left arm, Patient Position: Sitting, Cuff Size: Adult Regular)   Pulse 54   Resp 16   Wt 57.4 kg (126 lb 8 oz)   SpO2 99%   BMI 22.47 kg/m      Constitutional: Well-appearing, appears stated age  Eyes: Conjunctivae without erythema, sclera anicteric  Cardiac: Regular rate and rhythm, normal S1/S2, no murmur, gallops, or rubs  Respiratory: Lungs clear to auscultation bilaterally, normal work of breathing, no wheezes, crackles, or rhonchi  GI: Soft, non-distended, no organomegaly or masses. Normal bowel sounds. Mild tenderness at LLQ with palpation.  MSK: No obvious deformities, warm and well perfused  Skin: No rashes, lesions, or wounds  Psych: Affect is full and appropriate, speech is fluent and non-pressured    ASSESSMENT AND PLAN:     (R19.7) Diarrhea of presumed infectious origin  (primary encounter diagnosis)  Comment: Differential includes infectious enteritis, diverticulitis, and irritable bowel syndrome. Likely viral enteritis, given her  clinical history of an acute diarrheal process and lack of other inflammatory symptoms (blood or mucus in stool) to suggest a severe bacterial enteritis. Antibiotics would not be appropriate at this time. She is afebrile, has had no other symptoms, and appears clinically well today with normal vitals sign which are all reassuring. However, her prolonged course of continued diarrheal symptoms warrants workup with stool PCR for viral and bacterial causes, O&P and Giardia, as well as C. Diff. Lower on the differential with C. Diff but her son is reportedly a carrier. Less likely diverticulitis given lack of persistent abdominal pain, fever, or associated symptoms of nausea and vomiting. She was only mildly tender on abdominal exam, and has had no constant pain during her illness. Less likely irritable bowel as her symptoms are more acute rather than a chronic process.    Recommended she continue supportive cares and stays well hydrated. Imodium or Pepto-Bismol are appropriate for symptomatic management. Can resume her fiber as well since she discontinued it when her loose stools began.  Plan: Enteric Bacteria and Virus Panel by [facility],         Clostridium difficile toxin B PCR, Ova and         Parasite Exam Routine, Giardia antigen    Leland Butterfield MS3  [facility]  08/20/2019, 4:49 PM     I was present with the medical student who participated in the service and in the documentation of the note. I have verified the history and personally performed the physical exam and medical decision making. 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 We discussed the possibility of diverticulitis and CT imaging but will hold off for now given that her abdominal pain was for such a brief period of time and self-resolved. Stool studies as above and symptomatic treatment for diarrhea.    Hernandez Charles MD  10:35 AM, August 21,  2019

## 2019-08-27 ENCOUNTER — HOSPITAL ENCOUNTER (OUTPATIENT)
Dept: LAB | Facility: CLINIC | Age: 74
Discharge: HOME | End: 2019-08-27
Attending: FAMILY MEDICINE | Admitting: FAMILY MEDICINE
Payer: COMMERCIAL

## 2019-08-27 DIAGNOSIS — R19.7 DIARRHEA OF PRESUMED INFECTIOUS ORIGIN: ICD-10-CM

## 2019-08-27 LAB
C COLI+JEJUNI+LARI FUSA STL QL NAA+PROBE: NOT DETECTED
C DIFF TOX B STL QL: NEGATIVE
EC STX1 GENE STL QL NAA+PROBE: NOT DETECTED
EC STX2 GENE STL QL NAA+PROBE: NOT DETECTED
ENTERIC PATHOGEN COMMENT: NORMAL
NOROV GI+II ORF1-ORF2 JNC STL QL NAA+PR: NOT DETECTED
RVA NSP5 STL QL NAA+PROBE: NOT DETECTED
SALMONELLA SP RPOD STL QL NAA+PROBE: NOT DETECTED
SHIGELLA SP+EIEC IPAH STL QL NAA+PROBE: NOT DETECTED
SPECIMEN SOURCE: NORMAL
V CHOL+PARA RFBL+TRKH+TNAA STL QL NAA+PR: NOT DETECTED
Y ENTERO RECN STL QL NAA+PROBE: NOT DETECTED

## 2019-08-27 PROCEDURE — 87493 C DIFF AMPLIFIED PROBE: CPT | Performed by: FAMILY MEDICINE

## 2019-08-27 PROCEDURE — 87177 OVA AND PARASITES SMEARS: CPT | Performed by: FAMILY MEDICINE

## 2019-08-27 PROCEDURE — 87329 GIARDIA AG IA: CPT | Mod: XU | Performed by: FAMILY MEDICINE

## 2019-08-27 PROCEDURE — 87506 IADNA-DNA/RNA PROBE TQ 6-11: CPT | Performed by: FAMILY MEDICINE

## 2019-08-27 PROCEDURE — 87209 SMEAR COMPLEX STAIN: CPT | Performed by: FAMILY MEDICINE

## 2019-08-28 LAB
G LAMBLIA AG STL QL IA: NORMAL
O+P STL MICRO: NORMAL
O+P STL MICRO: NORMAL
SPECIMEN SOURCE: NORMAL
SPECIMEN SOURCE: NORMAL

## 2019-08-29 ENCOUNTER — TELEPHONE (OUTPATIENT)
Dept: FAMILY MEDICINE | Facility: CLINIC | Age: 74
End: 2019-08-29

## 2019-08-29 ENCOUNTER — NURSE TRIAGE (OUTPATIENT)
Dept: FAMILY MEDICINE | Facility: CLINIC | Age: 74
End: 2019-08-29

## 2019-08-29 NOTE — TELEPHONE ENCOUNTER
Health Call Center    Phone Message    May a detailed message be left on voicemail: yes    Reason for Call: Other: Pt is wanting to let Dr. Aida eBy know that pt is feeling weak, fatigued, left side flank pain for the last 3 days, pt is wondering if Dr. Billy Bey can order labs at the [facility] lab, please call the pt back ASAP, thank you     Action Taken: Message routed to:  [facility]: sanchez

## 2019-08-29 NOTE — TELEPHONE ENCOUNTER
Patient c/o left side flank pain x 3 days. The pain is constant but does not interfere with sleep or daily activities. She denies fever, abdominal pain, leg/foot weakness, burning with urination, hematuria. She admits to feeling very fatigued today.     Reason for Disposition    [1] MILD pain (i.e., scale 1-3; does not interfere with normal activities) AND [2] present > 3 days    Protocols used: FLANK PAIN-A-    Advised patient be seen in clinic today or tomorrow, she agrees with this plan, call transferred to front desk for scheduling.   Susy Johnson RN  08/29/19  12:15 PM

## 2019-08-30 NOTE — TELEPHONE ENCOUNTER
Called patient to check in.    Loose stools are still loose but not as frequent  Thinks the flank pain is just a pulled muscle and had canceled appointment with Dr. Bey.    Sounds reasonable for Nancy to just self-monitor symptoms over the long weekend and get in touch Tuesday if not continuing to improve.    Hernandez Charles MD on 8/30/2019 at 2:58 PM

## 2019-09-19 ENCOUNTER — TRANSFERRED RECORDS (OUTPATIENT)
Dept: HEALTH INFORMATION MANAGEMENT | Facility: CLINIC | Age: 74
End: 2019-09-19

## 2019-10-02 ENCOUNTER — HEALTH MAINTENANCE LETTER (OUTPATIENT)
Age: 74
End: 2019-10-02

## 2019-10-08 ENCOUNTER — TRANSFERRED RECORDS (OUTPATIENT)
Dept: HEALTH INFORMATION MANAGEMENT | Facility: CLINIC | Age: 74
End: 2019-10-08

## 2019-10-10 ENCOUNTER — TRANSFERRED RECORDS (OUTPATIENT)
Dept: HEALTH INFORMATION MANAGEMENT | Facility: CLINIC | Age: 74
End: 2019-10-10

## 2019-11-08 DIAGNOSIS — G47.00 PERSISTENT INSOMNIA: ICD-10-CM

## 2019-11-08 RX ORDER — NARATRIPTAN 2.5 MG/1
2.5 TABLET ORAL
Refills: 3 | COMMUNITY
Start: 2019-06-29 | End: 2023-03-21

## 2019-11-08 RX ORDER — ZOLPIDEM TARTRATE 10 MG/1
10 TABLET ORAL
Qty: 30 TABLET | Refills: 0 | Status: SHIPPED | OUTPATIENT
Start: 2019-11-08 | End: 2020-03-19

## 2019-11-08 NOTE — TELEPHONE ENCOUNTER
Last visit 8/20/19  Last refill 2/8/19 - checked   Agustina James RN  [facility]

## 2019-12-23 ENCOUNTER — TELEPHONE (OUTPATIENT)
Dept: FAMILY MEDICINE | Facility: CLINIC | Age: 74
End: 2019-12-23

## 2019-12-23 NOTE — TELEPHONE ENCOUNTER
Dr. Foster - see below.   ++++++++++++++++++++++++  Patient wants to reduce gabapentin, Currently taking gabapentin 300 mg 4 times daily. She is requesting Dr. Foster make this change. Informed patient to schedule an appointment with Dr. Foster to discuss medication change, since she is former Dr. Bey patient. Patient declines appointment at this time and states she has a physical exam in March. Will route to Dr. Foster to review.     Susy Johnson RN  12/23/19  2:28 PM

## 2019-12-23 NOTE — TELEPHONE ENCOUNTER
Health Call Center    Phone Message    May a detailed message be left on voicemail: yes    Reason for Call: Medication Question or concern regarding medication   Prescription Clarification  Name of Medication: gabapentin (NEURONTIN) 300 MG capsule     Prescribing Provider: Alondra Foster MD   Pharmacy: [id number] - [place], [place] - [address]   What on the order needs clarification?   The patient started a new injection once a month and it helps her migraines and wanted to know if she can get an additional 100 mg of this medication on top of the 300 mg so she can slowly decrease from this medication please call patient to address concerns.          Action Taken: Message routed to:  [facility]: [facility]

## 2019-12-26 NOTE — TELEPHONE ENCOUNTER
Called patient and relayed message from Dr. Foster, below. She will think about it, and either call her neurologist or schedule an appointment with Dr. Foster to discuss.    Susy Johnson RN      I agree with clinic appt to discuss management of migraine medication.   If she is seeing a neurologist or other provider, she can discuss taper with them as they may be willing to make medication adjustments.     Otherwise she can see me to discuss in March.     Alondra Foster MD   Internal Medicine/Pediatrics

## 2020-01-31 DIAGNOSIS — F41.1 GAD (GENERALIZED ANXIETY DISORDER): ICD-10-CM

## 2020-01-31 NOTE — TELEPHONE ENCOUNTER
Last time prescribed: 2/6/19, 90 tabs x 3 refills  Last office visit: 8/20/19  Next appointment: 3/18/20  Prescription approved per [facility] Refill Protocol.  Agustina James RN  [facility]

## 2020-02-04 RX ORDER — CITALOPRAM HYDROBROMIDE 10 MG/1
10 TABLET ORAL DAILY
Qty: 90 TABLET | Refills: 1 | Status: SHIPPED | OUTPATIENT
Start: 2020-02-04 | End: 2020-07-21

## 2020-03-06 DIAGNOSIS — E03.9 HYPOTHYROIDISM, UNSPECIFIED TYPE: ICD-10-CM

## 2020-03-06 RX ORDER — LEVOTHYROXINE SODIUM 88 UG/1
88 TABLET ORAL DAILY
Qty: 90 TABLET | Refills: 0 | Status: SHIPPED | OUTPATIENT
Start: 2020-03-06 | End: 2020-03-18

## 2020-03-06 NOTE — TELEPHONE ENCOUNTER
Last office visit was on 08/20/2019, next visit is on 03/18/2020 with Dr. Foster.      Prescription approved per [facility] Refill Protocol.    Yulissa Lucero RN  March 6, 2020 4:18 PM

## 2020-03-10 ENCOUNTER — TRANSFERRED RECORDS (OUTPATIENT)
Dept: HEALTH INFORMATION MANAGEMENT | Facility: CLINIC | Age: 75
End: 2020-03-10

## 2020-03-18 ENCOUNTER — VIRTUAL VISIT (OUTPATIENT)
Dept: FAMILY MEDICINE | Facility: CLINIC | Age: 75
End: 2020-03-18
Payer: COMMERCIAL

## 2020-03-18 DIAGNOSIS — E78.5 HYPERLIPIDEMIA LDL GOAL <130: ICD-10-CM

## 2020-03-18 DIAGNOSIS — E03.9 HYPOTHYROIDISM, UNSPECIFIED TYPE: ICD-10-CM

## 2020-03-18 DIAGNOSIS — K21.9 GASTROESOPHAGEAL REFLUX DISEASE WITHOUT ESOPHAGITIS: Primary | ICD-10-CM

## 2020-03-18 DIAGNOSIS — F41.9 ANXIETY: ICD-10-CM

## 2020-03-18 DIAGNOSIS — G47.00 PERSISTENT INSOMNIA: ICD-10-CM

## 2020-03-18 DIAGNOSIS — G43.919 INTRACTABLE MIGRAINE, UNSPECIFIED MIGRAINE TYPE: ICD-10-CM

## 2020-03-18 RX ORDER — PRAVASTATIN SODIUM 10 MG
10 TABLET ORAL DAILY
Qty: 90 TABLET | Refills: 3 | Status: SHIPPED | OUTPATIENT
Start: 2020-03-18 | End: 2021-01-23

## 2020-03-18 RX ORDER — UBIDECARENONE 75 MG
100 CAPSULE ORAL DAILY
Status: ON HOLD | COMMUNITY
End: 2021-09-27

## 2020-03-18 RX ORDER — GABAPENTIN 100 MG/1
100 CAPSULE ORAL 3 TIMES DAILY
Qty: 90 CAPSULE | Refills: 1 | Status: SHIPPED | OUTPATIENT
Start: 2020-03-18 | End: 2020-05-19

## 2020-03-18 RX ORDER — GABAPENTIN 300 MG/1
CAPSULE ORAL
Qty: 360 CAPSULE | Refills: 3 | Status: SHIPPED | OUTPATIENT
Start: 2020-03-18 | End: 2021-04-07

## 2020-03-18 RX ORDER — LEVOTHYROXINE SODIUM 88 UG/1
88 TABLET ORAL DAILY
Qty: 90 TABLET | Refills: 3 | Status: SHIPPED | OUTPATIENT
Start: 2020-03-18 | End: 2021-03-24

## 2020-03-18 NOTE — PROGRESS NOTES
"Rakel Parish is a 74 year old female who is being evaluated via a billable telephone visit.      The patient has been notified of following: medications review celexa increase in dosage.      \"This telephone visit will be conducted via a call between you and your physician/provider. We have found that certain health care needs can be provided without the need for a physical exam.  This service lets us provide the care you need with a short phone conversation.  If a prescription is necessary we can send it directly to your pharmacy.  If lab work is needed we can place an order for that and you can then stop by our lab to have the test done at a later time.    If during the course of the call the physician/provider feels a telephone visit is not appropriate, you will not be charged for this service.\"       Rakel Parish complains of    Chief Complaint   Patient presents with     Refill Request     celexa dosage increased        I have reviewed and updated the patient's Past Medical History, Social History, Family History and Medication List.    ALLERGIES  Cyclobenzaprine; Levofloxacin; Rofecoxib; Simvastatin; Trazodone; Vicodin [hydrocodone-acetaminophen]; and Latex    Rhymon LPN     GERD  Nancy reports a history of dyspepsia, bloating, flatus and abdominal discomfort after eating. She has been following with a GI doctor. They recommended recent dose change of omeprazole from once daily to twice daily. She had not been consistent with once daily dosing. Had been taking a single dose 3x per week with breakthrough symptoms. She has since resumed daily dosing and reports she is feeling much better. The GI doctor also recommended she increase the citalopram dose to help with GI symptoms but she currently feels well and is not sure this is needed. No hx of ulcers, no current dysphagia. She follows with [facility] and her last visit there was one week ago.        Hypothyroidism  Nancy has hypothyroidism. She " takes 88mcg dose Monday through Saturday, then skips the Sunday dose. She is due for medication refills. Her last TSH was in June 2019. She tends toward constipation.  Weight has been stable  TSH   Date Value Ref Range Status   06/13/2019 0.58 0.40 - 4.00 mU/L Final       Hyperlipidemia LDL goal <130  Nancy takes pravastatin for treatment of hyperlipidemia. LDL was in target range. No hx of ASCVD or Stroke. No hx of DM or hypertension. She will be due for labs this summer. Deferring office visits just now, due to COVID19 outbreak.   Recent Labs   Lab Test 02/06/19  1337 01/22/18  0750  05/07/15  0803   CHOL 235.0* 214*   < > 205*   HDL 87.0 82   < > 70   .0 110*   < > 118   TRIG 234.0* 109   < > 87   CHOLHDLRATIO 2.7  --   --  2.9    < > = values in this interval not displayed.       Intractable migraines  Nancy [facility] with Crist[facility], for treatment of migraine headaches. She notes they are triggered by sun, heat and airline travel. She can get as many as 3 per month. She was recently started on Emgality monthly injection and thinks that is helping. She also takes gabapentin 300mg po TID for prevention of migraine headaches. Now that she is on the Emgality, she would like to try weaning from the gabapentin. She is asking me for 100mg size capsules with the plan of slowly weaning down on medication. She wishes to start by taking 300mg BID then 200mg as the last dose of the day. If headaches return then she will resume next higher dose.       Anxiety  Nancy uses Escitalopram 10mg dose for treatment of anxiety. She was started on this 4 yrs ago for treatment of anxiety. She reports her mood is stable, no panic attacks. Denies depression. GAD7 and PHQ9 inventories sent to pt via My Chart, prior to visit, pending. She uses Ambien, 5mg dose for insomnia and is asking for medication refill today.    ASSESSMENT:.  (K21.9) Gastroesophageal reflux disease without esophagitis  (primary  encounter diagnosis)  Comment: doing with with daily dosing of omeprazole, follows with [place] [facility] doctor  Plan: continue current dosing. No need for additional medication, intervention    (E03.9) Hypothyroidism, unspecified type  Comment: currently compliant with medication, cannot come in just now due to COVID 19 outbreak  Plan: levothyroxine (SYNTHROID/LEVOTHROID) 88 MCG         tablet        Check labs in next 3 months, when situation allows. Refilled medication    (E78.5) Hyperlipidemia LDL goal <130  Comment: due for medication refill, cannot come in for labs at this time due to COVID19 outbreak  Plan: pravastatin (PRAVACHOL) 10 MG tablet        Will refill medication, recheck labs as situation allows.    (G43.919) Intractable migraine, unspecified migraine type  Comment: [facility], wishes to try to wean gabapentin  Plan: gabapentin (NEURONTIN) 300 MG capsule,         gabapentin (NEURONTIN) 100 MG capsule        Recommend she go very slowly, start with 300mg bid then 200mg in evening x 2 wks, then may decrease to 100mg in evening, then no tablet in the evening. She will follow up with status.    (F41.9) Anxiety  Comment: doing well with escitalopram  Plan: continue current dose    (G47.00) Persistent insomnia  Comment: uses Ambien 5mg dose for insomnia  Plan: zolpidem (AMBIEN) 10 MG tablet        Refilled medication      I have reviewed the note as documented above.  This accurately captures the substance of my conversation with the patient.      Phone call contact time 25 minutes    Alondra Foster MD  Internal Medicine/Pediatrics

## 2020-03-19 RX ORDER — ZOLPIDEM TARTRATE 10 MG/1
10 TABLET ORAL
Qty: 30 TABLET | Refills: 0 | Status: SHIPPED | OUTPATIENT
Start: 2020-03-19 | End: 2021-04-17

## 2020-04-15 DIAGNOSIS — G43.919 INTRACTABLE MIGRAINE, UNSPECIFIED MIGRAINE TYPE: ICD-10-CM

## 2020-04-15 RX ORDER — GABAPENTIN 100 MG/1
100 CAPSULE ORAL 3 TIMES DAILY
Qty: 90 CAPSULE | Refills: 1 | Status: CANCELLED | OUTPATIENT
Start: 2020-04-15

## 2020-05-18 DIAGNOSIS — G43.919 INTRACTABLE MIGRAINE, UNSPECIFIED MIGRAINE TYPE: ICD-10-CM

## 2020-05-18 RX ORDER — GABAPENTIN 100 MG/1
100 CAPSULE ORAL 3 TIMES DAILY
Qty: 90 CAPSULE | Refills: 1 | Status: CANCELLED | OUTPATIENT
Start: 2020-05-18

## 2020-05-18 NOTE — TELEPHONE ENCOUNTER
Last time prescribed: 03/18/2020 , 90 caps x 1 refills  Last office visit: 03/18/2020  Next appointment: 07/15/2020    Routing refill request to provider for review/approval because:  Drug not on the [facility] refill protocol  - checked with pt - she is still taking the gabapentin 3 times daily.  Agustina James RN  [facility]

## 2020-05-19 DIAGNOSIS — G43.919 INTRACTABLE MIGRAINE, UNSPECIFIED MIGRAINE TYPE: ICD-10-CM

## 2020-05-19 RX ORDER — GABAPENTIN 100 MG/1
100 CAPSULE ORAL 3 TIMES DAILY
Qty: 90 CAPSULE | Refills: 1 | Status: SHIPPED | OUTPATIENT
Start: 2020-05-19 | End: 2021-04-17

## 2020-05-19 RX ORDER — GABAPENTIN 100 MG/1
100 CAPSULE ORAL 3 TIMES DAILY
Qty: 90 CAPSULE | Refills: 1 | Status: CANCELLED | OUTPATIENT
Start: 2020-05-19

## 2020-07-10 ENCOUNTER — VIRTUAL VISIT (OUTPATIENT)
Dept: FAMILY MEDICINE | Facility: OTHER | Age: 75
End: 2020-07-10

## 2020-07-10 ENCOUNTER — AMBULATORY - HEALTHEAST (OUTPATIENT)
Dept: FAMILY MEDICINE | Facility: CLINIC | Age: 75
End: 2020-07-10

## 2020-07-10 DIAGNOSIS — Z20.822 SUSPECTED COVID-19 VIRUS INFECTION: ICD-10-CM

## 2020-07-10 NOTE — PROGRESS NOTES
"Date: 07/10/2020 08:16:22  Clinician: Ana Maria Caputo  Clinician NPI: [id number]  Patient: nelda saldaña  Patient : 1945  Patient Address: [address] [facility] [address] [facility] [place] [address] [address]  Patient Phone: [phone]  Visit Protocol: URI  Patient Summary:  nelda is a 74 year old ( : 1945 ) female who initiated a Visit for COVID-19 (Coronavirus) evaluation and screening. When asked the question \"Please sign me up to receive news, health information and promotions from [employer].\", nelda responded \"No\".    nelda states her symptoms started 1-2 days ago.   Her symptoms consist of diarrhea, chills, and nasal congestion.   Symptom details   Nasal secretions: The color of her mucus is clear.   nelda denies having wheezing, nausea, teeth pain, ageusia, vomiting, rhinitis, malaise, ear pain, headache, sore throat, enlarged lymph nodes, myalgias, anosmia, facial pain or pressure, fever, and cough. She also denies having recent facial or sinus surgery in the past 60 days and taking antibiotic medication in the past month. She is not experiencing dyspnea.   Precipitating events  She has not recently been exposed to someone with influenza. nelda has been in close contact with the following high risk individuals: people with asthma, heart disease or diabetes and adults 65 or older.   Pertinent COVID-19 (Coronavirus) information  In the past 14 days, nelda has not worked in a congregate living setting.   She does not work or volunteer as healthcare worker or a  and does not work or volunteer in a healthcare facility.   nelda also has not lived in a congregate living setting in the past 14 days. She does not live with a healthcare worker.   nelda has not had a close contact with a laboratory-confirmed COVID-19 patient within 14 days of symptom onset.   Pertinent medical history  nelda does not get yeast infections when she takes antibiotics.   nelda does not need a return to work/school " note.   Weight: 121 lbs   nelda does not smoke or use smokeless tobacco.   Weight: 121 lbs    MEDICATIONS: Celexa oral, Emgality Syringe subcutaneous, levothyroxine oral, Neurontin oral, ALLERGIES: NKDA  Clinician Response:  Dear nelda,   Your symptoms show that you may have coronavirus (COVID-19). This illness can cause fever, cough and trouble breathing. Many people get a mild case and get better on their own. Some people can get very sick.  What should I do?  We would like to test you for this virus.   1. Please call [phone] to schedule your visit. Explain that you were referred by [facility] to have a COVID-19 test. Be [facility].  The following will serve as your written order for this COVID Test, ordered by me, for the indication of suspected COVID [Z20.828]: The test will be ordered in [employer], our electronic health record, after you are scheduled. It will show as ordered and authorized by Vik Waterman MD.  Order: COVID-19 (Coronavirus) PCR for SYMPTOMATIC testing from [facility].      2. When it's time for your COVID test:  Stay at least 6 feet away from others. (If someone will drive you to your test, stay in the backseat, as far away from the  as you can.)   Cover your mouth and nose with a mask, tissue or washcloth.  Go straight to the testing site. Don't make any stops on the way there or back.      3.Starting now: Stay home and away from others (self-isolate) until:   You've had no fever---and no medicine that reduces fever---for 3 full days (72 hours). And...   Your other symptoms have gotten better. For example, your cough or breathing has improved. And...   At least 10 days have passed since your symptoms started.       During this time, don't leave the house except for testing or medical care.   Stay in your own room, even for meals. Use your own bathroom if you can.   Stay away from others in your home. No hugging, kissing or shaking hands. No visitors.  Don't  "go to work, school or anywhere else.    Clean \"high touch\" surfaces often (doorknobs, counters, handles, etc.). Use a household cleaning spray or wipes. You'll find a full list of  on the EPA website: www.epa.gov/pesticide-registration/list-n-disinfectants-use-against-sars-cov-2.   Cover your mouth and nose with a mask, tissue or washcloth to avoid spreading germs.  Wash your hands and face often. Use soap and water.  Caregivers in these groups are at risk for severe illness due to COVID-19:  o People 65 years and older  o People who live in a nursing home or long-term care facility  o People with chronic disease (lung, heart, cancer, diabetes, kidney, liver, immunologic)  o People who have a weakened immune system, including those who:   Are in cancer treatment  Take medicine that weakens the immune system, such as corticosteroids  Had a bone marrow or organ transplant  Have an immune deficiency  Have poorly controlled HIV or AIDS  Are obese (body mass index of 40 or higher)  Smoke regularly   o Caregivers should wear gloves while washing dishes, handling laundry and cleaning bedrooms and bathrooms.  o Use caution when washing and drying laundry: Don't shake dirty laundry, and use the warmest water setting that you can.  o For more tips, go to www.cdc.gov/coronavirus/2019-ncov/downloads/10Things.pdf.    4.Sign up for [employer]. We know it's scary to hear that you might have COVID-19. We want to track your symptoms to make sure you're okay over the next 2 weeks. Please look for an email from [employer]---this is a free, online program that we'll use to keep in touch. To sign up, follow the link in the email. Learn more at [email or web address]  How can I take care of myself?   Get lots of rest. Drink extra fluids (unless a doctor has told you not to).   Take Tylenol (acetaminophen) for fever or pain. If you have liver or kidney problems, ask your family doctor if it's okay to take Tylenol.   " Adults can take either:    650 mg (two 325 mg pills) every 4 to 6 hours, or...   1,000 mg (two 500 mg pills) every 8 hours as needed.    Note: Don't take more than 3,000 mg in one day. Acetaminophen is found in many medicines (both prescribed and over-the-counter medicines). Read all labels to be sure you don't take too much.   For children, check the Tylenol bottle for the right dose. The dose is based on the child's age or weight.    If you have other health problems (like cancer, heart failure, an organ transplant or severe kidney disease): Call your specialty clinic if you don't feel better in the next 2 days.       Know when to call 911. Emergency warning signs include:    Trouble breathing or shortness of breath Pain or pressure in the chest that doesn't go away Feeling confused like you haven't felt before, or not being able to wake up Bluish-colored lips or face.  Where can I get more information?   [facility] -- About COVID-19: [email or web address]   CDC -- What to Do If You're Sick: www.cdc.gov/coronavirus/2019-ncov/about/steps-when-sick.html   CDC -- Ending Home Isolation: www.cdc.gov/coronavirus/2019-ncov/hcp/disposition-in-home-patients.html   [facility] -- Caring for Someone: www.cdc.gov/coronavirus/2019-ncov/if-you-are-sick/care-for-someone.html   [facility] -- Interim Guidance for Hospital Discharge to Home: www.health.[facility].[place]/diseases/coronavirus/hcp/hospdischarge.pdf   [facility] clinical trials (COVID-19 research studies): clinicalaffairs.[facility]-clinical-trials    Below are the COVID-19 hotlines at the [place] Department of [facility] ([facility]). Interpreters are available.    For health questions: Call 300-417-9429 or 1-131.986.6436 (7 a.m. to 7 p.m.) For questions about schools and childcare: Call 884-816-8007 or 1-275.148.1230 (7 a.m. to 7 p.m.)    Diagnosis: Diarrhea, unspecified  Diagnosis ICD: R19.7

## 2020-07-12 ENCOUNTER — AMBULATORY - HEALTHEAST (OUTPATIENT)
Dept: FAMILY MEDICINE | Facility: CLINIC | Age: 75
End: 2020-07-12

## 2020-07-12 DIAGNOSIS — Z20.822 SUSPECTED COVID-19 VIRUS INFECTION: ICD-10-CM

## 2020-07-16 ENCOUNTER — COMMUNICATION - HEALTHEAST (OUTPATIENT)
Dept: FAMILY MEDICINE | Facility: CLINIC | Age: 75
End: 2020-07-16

## 2020-07-21 DIAGNOSIS — F41.1 GAD (GENERALIZED ANXIETY DISORDER): ICD-10-CM

## 2020-07-21 RX ORDER — CITALOPRAM HYDROBROMIDE 10 MG/1
TABLET ORAL
Qty: 90 TABLET | Refills: 1 | Status: SHIPPED | OUTPATIENT
Start: 2020-07-21 | End: 2020-12-03

## 2020-07-22 ENCOUNTER — TRANSFERRED RECORDS (OUTPATIENT)
Dept: HEALTH INFORMATION MANAGEMENT | Facility: CLINIC | Age: 75
End: 2020-07-22

## 2020-08-04 NOTE — PATIENT INSTRUCTIONS
Phone: [phone]  [facility]    Patient Education   Personalized Prevention Plan  You are due for the preventive services outlined below.  Your care team is available to assist you in scheduling these services.  If you have already completed any of these items, please share that information with your care team to update in your medical record.  Health Maintenance Due   Topic Date Due     Mammogram  01/22/2020     Annual Wellness Visit  02/06/2020     Osteoporosis Screening  05/01/2020

## 2020-08-04 NOTE — PROGRESS NOTES
Rakel S Wedl is here for a general check up. She is not fasting. She is up to date on eye exams (cataracts) and dental visits. Wears seat belt-yes. Bike helmet- na.   Concerns today:    HCM  Nancy is a 75 yo woman with hx of hypothyroidism, hyperlipidemia, migraine headaches and osteopenia. She is here for preventive exam. Due for routine mammogram. Up to date on DEXA (2019) and colonoscopy (2018, hemorrhoids only at [facility]). She is due for Td vaccine. Has completed pneumococcal and shingrix series.     Advance directive: on file  Hearing concerns: no concerns  Fall Risk: works on balance daily  Independent at home: yes  Safe : yes  Memory concerns: no    COGNITIVE SCREEN  1) Repeat 3 items (Banana, Sunrise, Chair)    2) Clock draw: NORMAL  3) 3 item recall: Recalls 3 objects  Results: 3 items recalled: COGNITIVE IMPAIRMENT LESS LIKELY    Mini-CogTM Copyright S Christian. Licensed by the author for use in [facility]; reprinted with permission (ashley@[facility]). All rights reserved.       Hyperlipidemia LDL goal <130  Nancy is on Pravastatin 10mg daily. Has taken this for at least 10 yrs. Hx of transient global amnesa. Father with stroke (70)and heart disease (60s) She is due for lipid profile. No personal history of heart disease or stroke. Smoke for about 5 yr, 1 pk per week, exercises daily, no intolerance.     Hypothyroidism  Nancy takes levothyroxine 88mcg 6 days a week (skips Sundays) for hypothyroidism.  Good compliance, usually takes on an empty stomach. Hx of constipation, uses fiber. Due for TSH today.    Osteopenia of both hips  Nancy's last DEXA was in 2019 . Lumbar spine scores were normal. Right femoral neck score T -1.7, Left femoral neck score T -2.4. Her FRAX score was elevated and I recommended she consider resuming treatment for osteopenia. She had taken Fosamax x 2 yrs. She has underlying GERD symptoms so I had suggested she return to endocrinology clinic to discuss other  treatment options. No hx of fracture . Currently she takes calcium supplement 600mg /Vitamin D daily. Eats fish once a week. Gets almond milk 1per day and one additional serving. Family hx of osteoporosis in mother.     Hot flashes  Since menopause. They usually occur at night. Was on gabapentin for headache. Gets up with very hot feet and awakens to soak them in cool water. Symptoms have been persistent since menopause. She reports taking estrogen in her 50s but it did not really help.     Headaches  Nancy currently follows with Cristobal Fox MD, [facility]. She sues a rescue naratriptan, about once a month.  She has been using Emgality as preventive injection once a month, for the past year. It has dramatically helped. Prior to use, she was having 12 migraines a month. Headaches worsened with menopause.  Son and nephew have migraines.    She is prescribed Gabapentin 300mg po QID. She tried lowering to TID dosing but she has already had 2 migraines this month so does not wish to taper.     Sleep quality varies, she can usually fall asleep but hot flashes wake her up.  Gets migraine in the middle of the night. She is usually able to get relief with naratriptan 2.5mg dose.    Urinary frequency  Nancy reports nocturia x 3. The hot flashes stimulate her bladder and she has to get up. Saw a neurologist, had a bladder stimulation treatment.     Health Maintenance   Topic Date Due     MAMMO SCREENING  01/22/2020     MEDICARE ANNUAL WELLNESS VISIT  02/06/2020     DEXA  05/01/2020     INFLUENZA VACCINE (1) 09/01/2020     DTAP/TDAP/TD IMMUNIZATION (4 - Td) 10/21/2020     FALL RISK ASSESSMENT  03/18/2021     ADVANCE CARE PLANNING  02/09/2022     COLORECTAL CANCER SCREENING  02/01/2023     LIPID  02/06/2024     HEPATITIS C SCREENING  Completed     PHQ-2  Completed     PNEUMOCOCCAL IMMUNIZATION 65+ LOW/MEDIUM RISK  Completed     ZOSTER IMMUNIZATION  Completed     IPV IMMUNIZATION  Aged Out     MENINGITIS  IMMUNIZATION  Aged Out         Patient Active Problem List   Diagnosis     Bladder neck obstruction     Constipation     Transient global amnesia     Other hammer toe (acquired)     Ostium secundum type atrial septal defect     Senile osteoporosis     Syndrome affecting cervical region     Variants of migraine     Malignant neoplasm of skin     Insomnia     Hypothyroidism     Family history of malignant neoplasm of ovary     Tear film insufficiency     Circulatory system disorder     PFO (patent foramen ovale)     Gastroesophageal reflux disease without esophagitis     ACP (advance care planning)       Past Surgical History:   Procedure Laterality Date     APPENDECTOMY       BUNIONECTOMY Right 2010     COLONOSCOPY  11/16/10    Every 7 years     COLONOSCOPY  2018    hemorrhoids, [facility]     FOOT SURGERY Left     tendon rupture     HC REPAIR OF HAMMERTOE,ONE       TONSILLECTOMY & ADENOIDECTOMY       TUBAL LIGATION  1965       Family History   Problem Relation Age of Onset     Breast Cancer Niece 40        Maternal     C.A.D. Father 85         of renal failure. ASCVD, MI x 2     Prostate Cancer Father 80     Cancer Brother         CLL     Prostate Cancer Brother 68     Ovarian Cancer Sister 82     Osteoporosis Mother 94         at 94 after hip fracture     Dementia Mother      Hypertension Mother      Colon Cancer No family hx of        Social    2 sons, 6 grandchildren    HABITS:  Tob: quit ,  5 yr pack hx  ETOH: none  Calcium: supplement plus diet  Caffeine: green tea  Exercise: regular walking    OB/GYN HISTORY:  LMP: postmenopausal  No bleeding    Current Outpatient Medications   Medication Sig Dispense Refill     albuterol (PROAIR HFA, PROVENTIL HFA, VENTOLIN HFA) 108 (90 BASE) MCG/ACT inhaler Inhale 2 puffs into the lungs every 6 hours       ALPHA LIPOIC ACID PO Take 200 mg by mouth daily       aspirin 81 MG tablet Take by mouth daily       Calcium-Vitamin D 600-200 MG-UNIT TABS Take  1 tablet by mouth daily       citalopram (CELEXA) 10 MG tablet TAKE 1 TABLET BY MOUTH EVERY DAY 90 tablet 1     cyanocobalamin (VITAMIN B-12) 100 MCG tablet Take 100 mcg by mouth daily       EMGALITY 120 MG/ML SOSY as needed  1     fexofenadine-pseudoePHEDrine (ALLEGRA-D 24) 180-240 MG per 24 hr tablet Take 1 tablet by mouth daily       gabapentin (NEURONTIN) 100 MG capsule Take 1 capsule (100 mg) by mouth 3 times daily Continue taking gabapentin 300mg four times a day 90 capsule 1     gabapentin (NEURONTIN) 300 MG capsule TAKE ONE CAPSULE BY MOUTH 4 TIMES A  capsule 3     levothyroxine (SYNTHROID/LEVOTHROID) 88 MCG tablet Take 1 tablet (88 mcg) by mouth daily Except skip Sunday dose 90 tablet 3     MAGNESIUM PO Take by mouth daily       Multiple vitamin TABS Take by mouth daily       naratriptan (AMERGE) 2.5 MG tablet TAKE 1 TABLET (2.5 MG) BY ORAL ROUTE ONCE MAY REPEAT AFTER 4 HOURS  3     omeprazole (PRILOSEC) 20 MG capsule Take by mouth daily       pravastatin (PRAVACHOL) 10 MG tablet Take 1 tablet (10 mg) by mouth daily 90 tablet 3     Rizatriptan Benzoate (MAXALT PO) Take 5-10 mg by mouth as needed       triamcinolone (NASACORT) 55 MCG/ACT nasal inhaler Spray 1 spray in nostril daily 1 Bottle 1     VITAMIN D, CHOLECALCIFEROL, PO Take 2,000 Int'l Units by mouth daily       zolpidem (AMBIEN) 10 MG tablet Take 1 tablet (10 mg) by mouth nightly as needed for sleep 30 tablet 0     Allergies   Allergen Reactions     Cyclobenzaprine Other (See Comments)     Levofloxacin Unknown     Tendon injury (torn)     Rofecoxib Nausea     Simvastatin Other (See Comments)     Trazodone Other (See Comments)     Other reaction(s): Nightmares     Vicodin [Hydrocodone-Acetaminophen] Itching     Latex Rash         ROS  CONSTITUTIONAL:NEGATIVE for fever, chills, change in weight  INTEGUMENTARY/SKIN: NEGATIVE for worrisome rashes, moles or lesions  EYES: NEGATIVE for vision changes or irritation  ENT/MOUTH: NEGATIVE for ear,  "mouth and throat problems  RESP:NEGATIVE for significant cough or SOB  BREAST: NEGATIVE for masses, tenderness or discharge  CV: NEGATIVE for chest pain, palpitations, MAN, orthopnea, PND  or peripheral edema  GI: NEGATIVE for nausea, abdominal pain, heartburn, or change in bowel habits  :NEGATIVE for frequency, dysuria, or hematuria  Positive Nocturia as above  MUSCULOSKELETAL:NEGATIVE for significant arthralgias or myalgia  NEURO: NEGATIVE for weakness, dizziness or paresthesias, Positive migraines as above  ENDOCRINE: NEGATIVE for polyuria/dipsia,  temperature intolerance, skin/hair changes. Thyroid disease as aboveHEME/ALLERGY/IMMUNE: NEGATIVE for bleeding problems  PSYCHIATRIC: NEGATIVE for changes in mood or affect    EXAM  /68   Pulse 61   Temp 97.8  F (36.6  C)   Resp 14   Ht 1.598 m (5' 2.91\")   Wt 56.9 kg (125 lb 8 oz)   SpO2 96%   BMI 22.29 kg/m    GENERAL APPEARANCE: Alert, pleasant, NAD  EYES: PERRL, EOMI, conjunctiva clear  HENT: TM normal bilaterally. Nose and mouth without lesions  NECK: no adenopathy, thyroid normal to palpation   RESP: lungs clear to auscultation bilaterally,   BREAST: normal without masses, no tenderness or nipple discharge and no palpable  axillary masses or adenopathy   CV: regular rate and rhythm, normal S1 S2, no murmur, no carotid bruits  ABDOMEN: soft, nontender, without HSM or masses. Bowel sounds normal  MS: extremities normal- no gross deformities noted, no tender, hot or swollen joints.    SKIN: no suspicious lesions or rashes  NEURO: Normal strength and tone, sensory exam grossly normal, DTR normoreflexive in upper and lower extremities  PSYCH: mentation appears normal. and affect normal/bright.  EXT: no peripheral edema, pedal pulses palpable    Assessment:  (Z00.00) Encounter for Medicare annual wellness exam  (primary encounter diagnosis)  Comment: 74 year old woman in stable health  Plan: Mammogram - routine screening        Anticipatory guidance " given today regarding diet, exercise and calcium intake, safety. She is up to date on HM x Td vaccine booster.       (Z23) Need for Td vaccine  Comment: due for routine booster  Plan: TD (ADULT, 7+) PRESERVE FREE        given    (E78.5) Hyperlipidemia LDL goal <130  Comment: on rosuvastatin, LDL in target range  Recent Labs   Lab Test 08/14/20  1400 02/06/19  1337  05/07/15  0803   CHOL 223* 235.0*   < > 205*   HDL 74 87.0   < > 70   * 101.0   < > 118   TRIG 157* 234.0*   < > 87   CHOLHDLRATIO  --  2.7  --  2.9    < > = values in this interval not displayed.       Plan: Comprehensive Metabolic Panel ([facility]), Lipid         Profile, CANCELED: Lipid Panel ([facility])        Refilled medication    (E03.9) Hypothyroidism, unspecified type  Comment: TSH in target range  TSH   Date Value Ref Range Status   08/14/2020 0.85 0.40 - 4.00 mU/L Final     Plan: TSH with free T4 reflex        Refill medications when needed    (M85.851,  M85.852) Osteopenia of both hips  Comment: on fosamax x 2 yr in the past, now progressive bone loss  Plan: encouraged her to see endocrinologist regarding treatment options.     Alondra Foster MD  Internal Medicine/Pediatrics

## 2020-08-14 ENCOUNTER — OFFICE VISIT (OUTPATIENT)
Dept: FAMILY MEDICINE | Facility: CLINIC | Age: 75
End: 2020-08-14
Payer: COMMERCIAL

## 2020-08-14 VITALS
HEART RATE: 61 BPM | RESPIRATION RATE: 14 BRPM | WEIGHT: 125.5 LBS | OXYGEN SATURATION: 96 % | SYSTOLIC BLOOD PRESSURE: 103 MMHG | DIASTOLIC BLOOD PRESSURE: 68 MMHG | BODY MASS INDEX: 22.24 KG/M2 | HEIGHT: 63 IN | TEMPERATURE: 97.8 F

## 2020-08-14 DIAGNOSIS — Z23 NEED FOR TD VACCINE: ICD-10-CM

## 2020-08-14 DIAGNOSIS — E03.9 HYPOTHYROIDISM, UNSPECIFIED TYPE: ICD-10-CM

## 2020-08-14 DIAGNOSIS — E78.5 HYPERLIPIDEMIA LDL GOAL <130: ICD-10-CM

## 2020-08-14 DIAGNOSIS — M85.852 OSTEOPENIA OF BOTH HIPS: ICD-10-CM

## 2020-08-14 DIAGNOSIS — M85.851 OSTEOPENIA OF BOTH HIPS: ICD-10-CM

## 2020-08-14 DIAGNOSIS — Z00.00 ENCOUNTER FOR MEDICARE ANNUAL WELLNESS EXAM: Primary | ICD-10-CM

## 2020-08-14 LAB
ALBUMIN SERPL-MCNC: 3.9 G/DL (ref 3.2–4.5)
ALP SERPL-CCNC: 60 U/L (ref 40–150)
ALT SERPL-CCNC: 25 U/L (ref 0–50)
AST SERPL-CCNC: 36 U/L (ref 0–45)
BILIRUB SERPL-MCNC: 0.5 MG/DL (ref 0.2–1.3)
BUN SERPL-MCNC: 16 MG/DL (ref 7–30)
CALCIUM SERPL-MCNC: 9.1 MG/DL (ref 8.5–10.4)
CHLORIDE SERPLBLD-SCNC: 101 MMOL/L (ref 94–109)
CHOLEST SERPL-MCNC: 223 MG/DL
CO2 SERPL-SCNC: 28 MMOL/L (ref 20–32)
CREAT SERPL-MCNC: 0.9 MG/DL (ref 0.6–1.3)
EGFR CALCULATED (BLACK REFERENCE): 78.7
EGFR CALCULATED (NON BLACK REFERENCE): 65.1
GLUCOSE SERPL-MCNC: 91 MG/DL (ref 60–99)
HDLC SERPL-MCNC: 74 MG/DL
LDLC SERPL CALC-MCNC: 118 MG/DL
NONHDLC SERPL-MCNC: 149 MG/DL
POTASSIUM SERPL-SCNC: 3.9 MMOL/L (ref 3.4–5.3)
PROT SERPL-MCNC: 6.6 G/DL (ref 6.8–8.8)
SODIUM SERPL-SCNC: 144 MMOL/L (ref 137.3–146.3)
TRIGL SERPL-MCNC: 157 MG/DL
TSH SERPL DL<=0.005 MIU/L-ACNC: 0.85 MU/L (ref 0.4–4)

## 2020-08-14 ASSESSMENT — PATIENT HEALTH QUESTIONNAIRE - PHQ9
5. POOR APPETITE OR OVEREATING: NOT AT ALL
SUM OF ALL RESPONSES TO PHQ QUESTIONS 1-9: 4

## 2020-08-14 ASSESSMENT — ANXIETY QUESTIONNAIRES
IF YOU CHECKED OFF ANY PROBLEMS ON THIS QUESTIONNAIRE, HOW DIFFICULT HAVE THESE PROBLEMS MADE IT FOR YOU TO DO YOUR WORK, TAKE CARE OF THINGS AT HOME, OR GET ALONG WITH OTHER PEOPLE: NOT DIFFICULT AT ALL
1. FEELING NERVOUS, ANXIOUS, OR ON EDGE: NOT AT ALL
3. WORRYING TOO MUCH ABOUT DIFFERENT THINGS: SEVERAL DAYS
2. NOT BEING ABLE TO STOP OR CONTROL WORRYING: NOT AT ALL
6. BECOMING EASILY ANNOYED OR IRRITABLE: SEVERAL DAYS
5. BEING SO RESTLESS THAT IT IS HARD TO SIT STILL: NOT AT ALL
GAD7 TOTAL SCORE: 3
7. FEELING AFRAID AS IF SOMETHING AWFUL MIGHT HAPPEN: SEVERAL DAYS

## 2020-08-14 ASSESSMENT — MIFFLIN-ST. JEOR: SCORE: 1037

## 2020-08-14 NOTE — NURSING NOTE
Prior to immunization administration, verified patients identity using patient s name and date of birth. Please see Immunization Activity for additional information.     Screening Questionnaire for Adult Immunization    Are you sick today?   No   Do you have allergies to medications, food, a vaccine component or latex?   No   Have you ever had a serious reaction after receiving a vaccination?   No   Do you have a long-term health problem with heart, lung, kidney, or metabolic disease (e.g., diabetes), asthma, a blood disorder, no spleen, complement component deficiency, a cochlear implant, or a spinal fluid leak?  Are you on long-term aspirin therapy?   No   Do you have cancer, leukemia, HIV/AIDS, or any other immune system problem?   No   Do you have a parent, brother, or sister with an immune system problem?   No   In the past 3 months, have you taken medications that affect  your immune system, such as prednisone, other steroids, or anticancer drugs; drugs for the treatment of rheumatoid arthritis, Crohn s disease, or psoriasis; or have you had radiation treatments?   No   Have you had a seizure, or a brain or other nervous system problem?   No   During the past year, have you received a transfusion of blood or blood    products, or been given immune (gamma) globulin or antiviral drug?   No   For women: Are you pregnant or is there a chance you could become       pregnant during the next month?   No   Have you received any vaccinations in the past 4 weeks?   No     Immunization questionnaire answers were all negative.        Per orders of Dr. Foster, injection of TD given by Alexandra Benton MA. Patient instructed to remain in clinic for 15 minutes afterwards, and to report any adverse reaction to me immediately.       Screening performed by Alexandra Benton MA on 8/14/2020 at 2:04 PM.

## 2020-08-14 NOTE — NURSING NOTE
"74 year old  Chief Complaint   Patient presents with     [facility]     Physical     pt reports she is not sleeping well and thinks it may be due to a medication dose. Also wants Dr. Foster to take over all her medications       Blood pressure 103/68, pulse 61, temperature 97.8  F (36.6  C), resp. rate 14, height 1.598 m (5' 2.91\"), weight 56.9 kg (125 lb 8 oz), SpO2 96 %, not currently breastfeeding. Body mass index is 22.29 kg/m .  Patient Active Problem List   Diagnosis     Bladder neck obstruction     Constipation     Transient global amnesia     Other hammer toe (acquired)     Ostium secundum type atrial septal defect     Senile osteoporosis     Syndrome affecting cervical region     Variants of migraine     Malignant neoplasm of skin     Insomnia     Hypothyroidism     Family history of malignant neoplasm of ovary     Tear film insufficiency     Circulatory system disorder     PFO (patent foramen ovale)     Gastroesophageal reflux disease without esophagitis     ACP (advance care planning)       Wt Readings from Last 2 Encounters:   08/14/20 56.9 kg (125 lb 8 oz)   08/20/19 57.4 kg (126 lb 8 oz)     BP Readings from Last 3 Encounters:   08/14/20 103/68   08/20/19 100/66   06/13/19 106/69         Current Outpatient Medications   Medication     aspirin 81 MG tablet     Calcium-Vitamin D 600-200 MG-UNIT TABS     citalopram (CELEXA) 10 MG tablet     cyanocobalamin (VITAMIN B-12) 100 MCG tablet     EMGALITY 120 MG/ML SOSY     fexofenadine-pseudoePHEDrine (ALLEGRA-D 24) 180-240 MG per 24 hr tablet     gabapentin (NEURONTIN) 300 MG capsule     levothyroxine (SYNTHROID/LEVOTHROID) 88 MCG tablet     MAGNESIUM PO     Multiple vitamin TABS     naratriptan (AMERGE) 2.5 MG tablet     omeprazole (PRILOSEC) 20 MG capsule     pravastatin (PRAVACHOL) 10 MG tablet     triamcinolone (NASACORT) 55 MCG/ACT nasal inhaler     VITAMIN D, CHOLECALCIFEROL, PO     zolpidem (AMBIEN) 10 MG tablet     albuterol (PROAIR HFA, PROVENTIL " HFA, VENTOLIN HFA) 108 (90 BASE) MCG/ACT inhaler     ALPHA LIPOIC ACID PO     gabapentin (NEURONTIN) 100 MG capsule     Rizatriptan Benzoate (MAXALT PO)     No current facility-administered medications for this visit.        Social History     Tobacco Use     Smoking status: Former Smoker     Packs/day: 0.50     Years: 10.00     Pack years: 5.00     Last attempt to quit: 1970     Years since quittin.6     Smokeless tobacco: Never Used   Substance Use Topics     Alcohol use: No     Alcohol/week: 0.0 standard drinks     Drug use: No       Health Maintenance Due   Topic Date Due     MAMMO SCREENING  2020       No results found for: PAP      2020 1:03 PM

## 2020-08-15 ASSESSMENT — ANXIETY QUESTIONNAIRES: GAD7 TOTAL SCORE: 3

## 2020-09-01 ENCOUNTER — TELEPHONE (OUTPATIENT)
Dept: FAMILY MEDICINE | Facility: CLINIC | Age: 75
End: 2020-09-01

## 2020-09-01 DIAGNOSIS — G43.809 VARIANTS OF MIGRAINE: Primary | ICD-10-CM

## 2020-09-01 NOTE — TELEPHONE ENCOUNTER
Health Call Center    Phone Message    May a detailed message be left on voicemail: yes     Reason for Call: Order(s): Other:   Reason for requested: Col  Date needed: asap  Provider name: Dr. Foster    The patient is calling to ask the care team when will be a good time to get an updated colonoscopy,  said [facility] told her she may need to get a updated screening please review and follow up with the patient on this concern thank you      Action Taken: Message routed to:  [facility]: [facility]    Travel Screening: Not Applicable

## 2020-09-02 ENCOUNTER — HOSPITAL ENCOUNTER (OUTPATIENT)
Dept: MAMMOGRAPHY | Facility: CLINIC | Age: 75
Discharge: HOME | End: 2020-09-02
Attending: INTERNAL MEDICINE | Admitting: INTERNAL MEDICINE
Payer: COMMERCIAL

## 2020-09-02 DIAGNOSIS — Z00.00 ENCOUNTER FOR MEDICARE ANNUAL WELLNESS EXAM: ICD-10-CM

## 2020-09-02 DIAGNOSIS — Z12.31 VISIT FOR SCREENING MAMMOGRAM: ICD-10-CM

## 2020-09-02 PROCEDURE — 77063 BREAST TOMOSYNTHESIS BI: CPT

## 2020-09-02 NOTE — TELEPHONE ENCOUNTER
Reviewed chart - unable to find previous colonoscopy record from 2018.  Dr Foster noted in recent physical with pt that she was up-to-date with colon cancer screening.   Spoke to pt - she will check with [facility] re; last colonoscopy and get records to our chart. She will also check with them when next colonoscopy would be due.   She will call back prn.  Agustina James RN  [facility]

## 2020-10-08 ENCOUNTER — TELEPHONE (OUTPATIENT)
Dept: FAMILY MEDICINE | Facility: CLINIC | Age: 75
End: 2020-10-08

## 2020-10-08 NOTE — TELEPHONE ENCOUNTER
Spoke with patient about her Tetanus immunization not being covered by Medicare Part B.    She will need to submit her paid invoice to Part D for reimbursement.  Radha Hayes RN

## 2020-10-14 ENCOUNTER — TRANSFERRED RECORDS (OUTPATIENT)
Dept: HEALTH INFORMATION MANAGEMENT | Facility: CLINIC | Age: 75
End: 2020-10-14

## 2020-12-02 DIAGNOSIS — F41.1 GAD (GENERALIZED ANXIETY DISORDER): ICD-10-CM

## 2020-12-03 NOTE — TELEPHONE ENCOUNTER
[facility] Call Center    Phone Message    May a detailed message be left on voicemail: yes     Reason for Call: Medication Question or concern regarding medication   Prescription Clarification  Name of Medication: citalopram (CELEXA) 10 MG tablet  Prescribing Provider: Dr. John but Patient said it's Dr. Foster. Don't know who Dr. John is.    [id number] [address]     What on the order needs clarification? Patient said the pharmacy told her that she will need a prior authorization and an appointment to see Dr. Foster before she can get this medication refilled. Patient wants to speak to a nurse because she recently had a physical on 08/14/2020. Please call patient. Thank you.           Action Taken: Message routed to:  [facility]: [facility]    [place] Screening: Not Applicable

## 2020-12-03 NOTE — TELEPHONE ENCOUNTER
Last office visit was on 08/14/2020, no future visits scheduled.      Pt was a pt of Maida's in the past,  Not sure how this Dr. 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 .      Pingel pt now.     Prescription approved per [facility].      Called pt and LVM done, and explained confusion.        Yulissa Lucero RN  December 4, 2020 11:50 AM

## 2020-12-04 RX ORDER — CITALOPRAM HYDROBROMIDE 10 MG/1
10 TABLET ORAL DAILY
Qty: 90 TABLET | Refills: 1 | Status: SHIPPED | OUTPATIENT
Start: 2020-12-04 | End: 2021-04-27

## 2021-01-03 ENCOUNTER — VIRTUAL VISIT (OUTPATIENT)
Dept: FAMILY MEDICINE | Facility: OTHER | Age: 76
End: 2021-01-03
Payer: COMMERCIAL

## 2021-01-03 DIAGNOSIS — Z20.822 SUSPECTED COVID-19 VIRUS INFECTION: Primary | ICD-10-CM

## 2021-01-03 DIAGNOSIS — Z20.822 SUSPECTED COVID-19 VIRUS INFECTION: ICD-10-CM

## 2021-01-03 PROCEDURE — U0005 INFEC AGEN DETEC AMPLI PROBE: HCPCS | Performed by: NURSE PRACTITIONER

## 2021-01-03 PROCEDURE — U0003 INFECTIOUS AGENT DETECTION BY NUCLEIC ACID (DNA OR RNA); SEVERE ACUTE RESPIRATORY SYNDROME CORONAVIRUS 2 (SARS-COV-2) (CORONAVIRUS DISEASE [COVID-19]), AMPLIFIED PROBE TECHNIQUE, MAKING USE OF HIGH THROUGHPUT TECHNOLOGIES AS DESCRIBED BY CMS-2020-01-R: HCPCS | Performed by: NURSE PRACTITIONER

## 2021-01-03 PROCEDURE — 99421 OL DIG E/M SVC 5-10 MIN: CPT | Performed by: NURSE PRACTITIONER

## 2021-01-03 NOTE — PROGRESS NOTES
"Date: 2021 10:48:31  Clinician: Ophelia Kurtz  Clinician NPI: [id number]  Patient: nelda saldaña  Patient : 1945  Patient Address: [facility] [place] [address] [address] [address]  Patient Phone: [phone]  Visit Protocol: URI  Patient Summary:  nelda is a 75 year old ( : 1945 ) female who initiated a [facility] Visit for COVID-19 (Coronavirus) evaluation and screening. When asked the question \"Please sign me up to receive news, health information and promotions from [facility].\", nelda responded \"No\".    nelda states her symptoms started today.   Her symptoms consist of chills and rhinitis.   Symptom details   Nasal secretions: The color of her mucus is clear.   nelda denies having diarrhea, facial pain or pressure, myalgias, anosmia, ear pain, headache, wheezing, fever, cough, nasal congestion, nausea, malaise, sore throat, teeth pain, ageusia, and vomiting. She also denies having recent facial or sinus surgery in the past 60 days and taking antibiotic medication in the past month. She is not experiencing dyspnea.   Precipitating events  She has not recently been exposed to someone with influenza. nelda has been in close contact with the following high risk individuals: adults 65 or older and people with asthma, heart disease or diabetes.   Pertinent COVID-19 (Coronavirus) information  nelda does not work or volunteer as healthcare worker or a . In the past 14 days, nelda has not worked or volunteered at a healthcare facility or group living setting.   In the past 14 days, she also has not lived in a congregate living setting.   nelda has not had a close contact with a laboratory-confirmed COVID-19 patient within 14 days of symptom onset.    nelda has been tested for COVID-19.      Date(s) of her COVID-19 test as reported by the patient (free text): 2020       Result of COVID-19 test as reported by the patient (free text):  had sx       Type of test as " reported by the patient (free text): nasal        Pertinent medical history  nelda has asthma. She uses quick-relief inhaler less than two times per week. She refills her quick-relief inhaler less than two times per year. She wakes up at night with asthma symptoms less than two times per month.   She has not been told by her provider to avoid NSAIDs.   nelda does not get yeast infections when she takes antibiotics.   nelda does not have diabetes. She denies having immunosuppressive conditions (e.g., chemotherapy, HIV, organ transplant, long-term use of steroids or other immunosuppressive medications, splenectomy). She denies having congestive heart failure and severe COPD.   nelda does not need a return to work/school note.   nelda does not smoke or use smokeless tobacco.   Weight: 122 lbs    MEDICATIONS: Emgality Syringe subcutaneous, levothyroxine oral, Neurontin oral, Celexa oral, ALLERGIES: NKDA  Clinician Response:  Dear nelda,   Your symptoms show that you may have coronavirus (COVID-19). This illness can cause fever, cough and trouble breathing. Many people get a mild case and get better on their own. Some people can get very sick.  What should I do?  We would like to test you for this virus.   1. Please call [phone] to schedule your visit. Explain that you were referred by [facility] to have a COVID-19 test. Be ready to share your [facility] visit ID number.  * If you need to schedule in [facility] please call [phone] or for [place] employees please call [phone].  * If you need to schedule in the [place] area please call [phone]. [place] employees call [phone].  The following will serve as your written order for this COVID Test, ordered by me, for the indication of suspected COVID [Z20.828]: The test will be ordered in [employer], our electronic health record, after you are scheduled. It will show as ordered and authorized by Vik Waterman MD.  Order: COVID-19 (Coronavirus) PCR  "[facility].   2. When it's time for your COVID test:  Stay at least 6 feet away from others. (If someone will drive you to your test, stay in the backseat, as far away from the  as you can.)   Cover your mouth and nose with a mask, tissue or washcloth.  Go straight to the testing site. Don't make any stops on the way there or back.      3.Starting now: Stay home and away from others (self-isolate) until:   You've had no fever---and no medicine that reduces fever---for one full day (24 hours). And...   Your other symptoms have gotten better. For example, your cough or breathing has improved. And...   At least 10 days have passed since your symptoms started.       During this time, don't leave the house except for testing or medical care.   Stay in your own room, even for meals. Use your own bathroom if you can.   Stay away from others in your home. No hugging, kissing or shaking hands. No visitors.  Don't go to work, school or anywhere else.    Clean \"high touch\" surfaces often (doorknobs, counters, handles, etc.). Use a household cleaning spray or wipes. You'll find a full list of  on the EPA website: www.epa.gov/pesticide-registration/list-n-disinfectants-use-against-sars-cov-2.   Cover your mouth and nose with a mask, tissue or washcloth to avoid spreading germs.  Wash your hands and face often. Use soap and water.  Caregivers in these groups are at risk for severe illness due to COVID-19:  o People 65 years and older  o People who live in a nursing home or long-term care facility  o People with chronic disease (lung, heart, cancer, diabetes, kidney, liver, immunologic)  o People who have a weakened immune system, including those who:   Are in cancer treatment  Take medicine that weakens the immune system, such as corticosteroids  Had a bone marrow or organ transplant  Have an immune deficiency  Have poorly controlled HIV or AIDS  Are obese (body mass index of 40 or higher)  " Smoke regularly   o Caregivers should wear gloves while washing dishes, handling laundry and cleaning bedrooms and bathrooms.  o Use caution when washing and drying laundry: Don't shake dirty laundry, and use the warmest water setting that you can.  o For more tips, go to www.cdc.gov/coronavirus/2019-ncov/downloads/10Things.pdf.    4.Sign up for [employer]. We know it's scary to hear that you might have COVID-19. We want to track your symptoms to make sure you're okay over the next 2 weeks. Please look for an email from [employer]---this is a free, online program that we'll use to keep in touch. To sign up, follow the link in the email. Learn more at [email or web address]  How can I take care of myself?   Get lots of rest. Drink extra fluids (unless a doctor has told you not to).   Take Tylenol (acetaminophen) for fever or pain. If you have liver or kidney problems, ask your family doctor if it's okay to take Tylenol.   Adults can take either:    650 mg (two 325 mg pills) every 4 to 6 hours, or...   1,000 mg (two 500 mg pills) every 8 hours as needed.    Note: Don't take more than 3,000 mg in one day. Acetaminophen is found in many medicines (both prescribed and over-the-counter medicines). Read all labels to be sure you don't take too much.   For children, check the Tylenol bottle for the right dose. The dose is based on the child's age or weight.    If you have other health problems (like cancer, heart failure, an organ transplant or severe kidney disease): Call your specialty clinic if you don't feel better in the next 2 days.       Know when to call 911. Emergency warning signs include:    Trouble breathing or shortness of breath Pain or pressure in the chest that doesn't go away Feeling confused like you haven't felt before, or not being able to wake up Bluish-colored lips or face.  Where can I get more information?    [facility] -- About COVID-19: [email or web address]   CDC --  What to Do If You're Sick: www.cdc.gov/coronavirus/2019-ncov/about/steps-when-sick.html   CDC -- Ending Home Isolation: www.cdc.gov/coronavirus/2019-ncov/hcp/disposition-in-home-patients.html   [facility] -- Caring for Someone: www.cdc.gov/coronavirus/2019-ncov/if-you-are-sick/care-for-someone.html   [facility] -- Interim Guidance for Hospital Discharge to Home: www.[facility].[place]./diseases/coronavirus/hcp/hospdischarge.pdf   [facility] clinical trials (COVID-19 research studies): clinicalaffairs.[facility]-clinical-trials    Below are the COVID-19 hotlines at the [place] Department of [facility] ([facility]). Interpreters are available.    For health questions: Call 804-536-8106 or 1-346.287.3534 (7 a.m. to 7 p.m.) For questions about schools and childcare: Call 046-801-8841 or 1-913.647.5461 (7 a.m. to 7 p.m.)    Diagnosis: Chills (without fever)  Diagnosis ICD: R68.83

## 2021-01-04 LAB
SARS-COV-2 RNA SPEC QL NAA+PROBE: NOT DETECTED
SPECIMEN SOURCE: NORMAL

## 2021-01-20 ENCOUNTER — TELEPHONE (OUTPATIENT)
Dept: FAMILY MEDICINE | Facility: CLINIC | Age: 76
End: 2021-01-20

## 2021-01-20 NOTE — TELEPHONE ENCOUNTER
[facility]    Phone Message    May a detailed message be left on voicemail: yes     Reason for Call: Other: Pt requesting call back. Pt stated she received a letter from colon and rec that she was due for another colonoscopy. But pt did not think she needed to have any more, pt needing to verify     Action Taken: Message routed to:  [place] [facility]: [place]

## 2021-01-21 NOTE — TELEPHONE ENCOUNTER
Called patient, MIKAM that the colonoscopy report did not indicate that she needed another colonoscopy in 3 years, the exam was normal. Call [facility] to get clarification. Call back with questions.    Susy Johnson RN  01/21/21  9:17 AM

## 2021-01-22 DIAGNOSIS — E78.5 HYPERLIPIDEMIA LDL GOAL <130: ICD-10-CM

## 2021-01-22 NOTE — TELEPHONE ENCOUNTER
Last office visit was on 08/14/2020, no future visits scheduled.    Prescription approved per [facility] Refill Protocol.    Routing refill request to provider for review/approval because:  Drug interaction warning    Yulissa Lucero RN  January 23, 2021 4:52 PM

## 2021-01-23 RX ORDER — PRAVASTATIN SODIUM 10 MG
10 TABLET ORAL DAILY
Qty: 90 TABLET | Refills: 2 | Status: SHIPPED | OUTPATIENT
Start: 2021-01-23 | End: 2021-04-19

## 2021-03-23 DIAGNOSIS — E03.9 HYPOTHYROIDISM, UNSPECIFIED TYPE: ICD-10-CM

## 2021-03-23 NOTE — TELEPHONE ENCOUNTER
Last time prescribed: 3/18/2020 , 90  tabs/caps x 3  refills  Last office visit: 7/15/20 - annual exam  Next appointment: 3/31/2021 - for an acute problem  Last TSH 8/14/20    Prescription approved per Refill Protocol.  Susy Johnson RN  03/24/21  4:20 PM

## 2021-03-24 RX ORDER — LEVOTHYROXINE SODIUM 88 UG/1
88 TABLET ORAL DAILY
Qty: 90 TABLET | Refills: 1 | Status: SHIPPED | OUTPATIENT
Start: 2021-03-24 | End: 2021-04-17

## 2021-04-07 DIAGNOSIS — G43.919 INTRACTABLE MIGRAINE, UNSPECIFIED MIGRAINE TYPE: ICD-10-CM

## 2021-04-07 NOTE — TELEPHONE ENCOUNTER
Last office visit was on 08/14/2020, no future visits scheduled.    Last refil gabapentin 300 mg 1/9/21 - checked .  Agustina James RN  [facility]

## 2021-04-08 RX ORDER — GABAPENTIN 300 MG/1
300 CAPSULE ORAL 4 TIMES DAILY
Qty: 120 CAPSULE | Refills: 0 | Status: SHIPPED | OUTPATIENT
Start: 2021-04-08 | End: 2021-04-16

## 2021-04-08 NOTE — TELEPHONE ENCOUNTER
Patient now scheduled for 4/16/21 for Med Refill (gabapentin) in-clinic.    Teed up 30-day supply.    Susy Johnson RN  04/08/21  9:17 AM

## 2021-04-16 ENCOUNTER — OFFICE VISIT (OUTPATIENT)
Dept: FAMILY MEDICINE | Facility: CLINIC | Age: 76
End: 2021-04-16
Payer: COMMERCIAL

## 2021-04-16 VITALS
HEIGHT: 64 IN | SYSTOLIC BLOOD PRESSURE: 90 MMHG | HEART RATE: 71 BPM | WEIGHT: 129 LBS | DIASTOLIC BLOOD PRESSURE: 53 MMHG | BODY MASS INDEX: 22.02 KG/M2 | TEMPERATURE: 96.8 F | OXYGEN SATURATION: 98 %

## 2021-04-16 DIAGNOSIS — E78.5 HYPERLIPIDEMIA LDL GOAL <130: ICD-10-CM

## 2021-04-16 DIAGNOSIS — E03.9 ACQUIRED HYPOTHYROIDISM: ICD-10-CM

## 2021-04-16 DIAGNOSIS — G47.00 PERSISTENT INSOMNIA: ICD-10-CM

## 2021-04-16 DIAGNOSIS — E78.5 HYPERLIPIDEMIA LDL GOAL <100: ICD-10-CM

## 2021-04-16 DIAGNOSIS — G43.919 INTRACTABLE MIGRAINE, UNSPECIFIED MIGRAINE TYPE: Primary | ICD-10-CM

## 2021-04-16 DIAGNOSIS — E03.9 HYPOTHYROIDISM, UNSPECIFIED TYPE: ICD-10-CM

## 2021-04-16 LAB
ALBUMIN SERPL-MCNC: 3.3 G/DL (ref 3.2–4.5)
ALP SERPL-CCNC: 75 U/L (ref 40–150)
ALT SERPL-CCNC: 26 U/L (ref 0–50)
AST SERPL-CCNC: 39 U/L (ref 0–45)
BILIRUB SERPL-MCNC: 0.6 MG/DL (ref 0.2–1.3)
BUN SERPL-MCNC: 20 MG/DL (ref 7–30)
CALCIUM SERPL-MCNC: 9.3 MG/DL (ref 8.5–10.4)
CHLORIDE SERPLBLD-SCNC: 109 MMOL/L (ref 94–109)
CHOLEST SERPL-MCNC: 234 MG/DL (ref 0–200)
CHOLEST/HDLC SERPL: 3.3 {RATIO} (ref 0–5)
CO2 SERPL-SCNC: 28 MMOL/L (ref 20–32)
CREAT SERPL-MCNC: 0.8 MG/DL (ref 0.6–1.3)
EGFR CALCULATED (NON BLACK REFERENCE): 74.3
FASTING SPECIMEN: NO
GLUCOSE SERPL-MCNC: 95 MG/DL (ref 60–99)
HDLC SERPL-MCNC: 71 MG/DL
LDLC SERPL CALC-MCNC: 134 MG/DL (ref 0–129)
POTASSIUM SERPL-SCNC: 4.2 MMOL/L (ref 3.4–5.3)
PROT SERPL-MCNC: 6.6 G/DL (ref 6.8–8.8)
SODIUM SERPL-SCNC: 141 MMOL/L (ref 137.3–146.3)
TRIGL SERPL-MCNC: 144 MG/DL (ref 0–150)
TSH SERPL DL<=0.005 MIU/L-ACNC: 1.34 MU/L (ref 0.4–4)
VLDL-CHOLESTEROL: 29 (ref 7–32)

## 2021-04-16 RX ORDER — GABAPENTIN 300 MG/1
300 CAPSULE ORAL 4 TIMES DAILY
Qty: 120 CAPSULE | Refills: 0 | Status: SHIPPED | OUTPATIENT
Start: 2021-04-16 | End: 2021-04-16

## 2021-04-16 RX ORDER — GABAPENTIN 300 MG/1
300 CAPSULE ORAL 4 TIMES DAILY
Qty: 360 CAPSULE | Refills: 3 | Status: SHIPPED | OUTPATIENT
Start: 2021-04-16 | End: 2022-04-08

## 2021-04-16 ASSESSMENT — PATIENT HEALTH QUESTIONNAIRE - PHQ9
5. POOR APPETITE OR OVEREATING: NOT AT ALL
SUM OF ALL RESPONSES TO PHQ QUESTIONS 1-9: 2

## 2021-04-16 ASSESSMENT — ANXIETY QUESTIONNAIRES
5. BEING SO RESTLESS THAT IT IS HARD TO SIT STILL: NOT AT ALL
2. NOT BEING ABLE TO STOP OR CONTROL WORRYING: NOT AT ALL
IF YOU CHECKED OFF ANY PROBLEMS ON THIS QUESTIONNAIRE, HOW DIFFICULT HAVE THESE PROBLEMS MADE IT FOR YOU TO DO YOUR WORK, TAKE CARE OF THINGS AT HOME, OR GET ALONG WITH OTHER PEOPLE: NOT DIFFICULT AT ALL
3. WORRYING TOO MUCH ABOUT DIFFERENT THINGS: SEVERAL DAYS
7. FEELING AFRAID AS IF SOMETHING AWFUL MIGHT HAPPEN: NOT AT ALL
1. FEELING NERVOUS, ANXIOUS, OR ON EDGE: NOT AT ALL
GAD7 TOTAL SCORE: 2
6. BECOMING EASILY ANNOYED OR IRRITABLE: SEVERAL DAYS

## 2021-04-16 ASSESSMENT — MIFFLIN-ST. JEOR: SCORE: 1065.14

## 2021-04-16 NOTE — NURSING NOTE
"75 year old  Chief Complaint   Patient presents with     Recheck Medication     refill on gabapentin        Blood pressure 90/53, pulse 71, temperature 96.8  F (36  C), temperature source Temporal, height 1.626 m (5' 4\"), weight 58.5 kg (129 lb), SpO2 98 %, not currently breastfeeding. Body mass index is 22.14 kg/m .  Patient Active Problem List   Diagnosis     Bladder neck obstruction     Constipation     Transient global amnesia     Other hammer toe (acquired)     Ostium secundum type atrial septal defect     Senile osteoporosis     Syndrome affecting cervical region     Variants of migraine     Malignant neoplasm of skin     Insomnia     Hypothyroidism     Family history of malignant neoplasm of ovary     Tear film insufficiency     Circulatory system disorder     PFO (patent foramen ovale)     Gastroesophageal reflux disease without esophagitis     ACP (advance care planning)       Wt Readings from Last 2 Encounters:   04/16/21 58.5 kg (129 lb)   08/14/20 56.9 kg (125 lb 8 oz)     BP Readings from Last 3 Encounters:   04/16/21 90/53   08/14/20 103/68   08/20/19 100/66         Current Outpatient Medications   Medication     ALPHA LIPOIC ACID PO     aspirin 81 MG tablet     Calcium-Vitamin D 600-200 MG-UNIT TABS     citalopram (CELEXA) 10 MG tablet     cyanocobalamin (VITAMIN B-12) 100 MCG tablet     EMGALITY 120 MG/ML SOSY     fexofenadine-pseudoePHEDrine (ALLEGRA-D 24) 180-240 MG per 24 hr tablet     gabapentin (NEURONTIN) 100 MG capsule     gabapentin (NEURONTIN) 300 MG capsule     levothyroxine (SYNTHROID/LEVOTHROID) 88 MCG tablet     MAGNESIUM PO     Multiple vitamin TABS     naratriptan (AMERGE) 2.5 MG tablet     omeprazole (PRILOSEC) 20 MG capsule     pravastatin (PRAVACHOL) 10 MG tablet     triamcinolone (NASACORT) 55 MCG/ACT nasal inhaler     VITAMIN D, CHOLECALCIFEROL, PO     zolpidem (AMBIEN) 10 MG tablet     albuterol (PROAIR HFA, PROVENTIL HFA, VENTOLIN HFA) 108 (90 BASE) MCG/ACT inhaler     " Rizatriptan Benzoate (MAXALT PO)     No current facility-administered medications for this visit.        Social History     Tobacco Use     Smoking status: Former Smoker     Packs/day: 0.50     Years: 10.00     Pack years: 5.00     Quit date: 1970     Years since quittin.3     Smokeless tobacco: Never Used   Substance Use Topics     Alcohol use: No     Alcohol/week: 0.0 standard drinks     Drug use: No       Health Maintenance Due   Topic Date Due     COVID-19 Vaccine (1) Never done     PHQ-2  2021     FALL RISK ASSESSMENT  2021     DEXA  2021       No results found for: PAP      2021 10:32 AM

## 2021-04-16 NOTE — PROGRESS NOTES
"Rakel Parish is a 75 year old female here for the following issues:    Migraine headaches  Nancy follows with Cristobal [facility], for treatment of migraine headaches. Triggers: alcohol, heat, change in barometric pressure. She was started on Emgality monthly injection and reports getting only about one headache per month. She uses naratriptan 2.5mg dose as a rescue medication. She uses gabapentin 300mg po TID for prevention of migraine headaches. Last year she requested weaning off the gabapentin, as headaches had been stable. She requested I write for the taper as she did not wish to schedule with Dr. Fox at that time. I wrote for 100mg capsule but she was unable to wean. She has continued taking the gabapentin 300mg TID dose.   She also has hx of \"lightening bolt\" headaches and uses Indomethacin for these headaches. Cannot recall the dose.     She reports Botox injections were not helpful. Did not find relief from craniosacral work.   Denies hx of tension headaches.    Insomnia  She has taken zolpidem for insomnia an uses medication sparingly. Her last Rx was for 30 tablets in 3/2020.     Hypothyroidism  Nancy takes levothyroxine 88mcg on Mon-Sat. She skips the Sunday dose. She is due for medication refill and lab check.     Hyperlipidemia  Nancy takes pravastatin for hyperlipidemia. She has hx of global amnesia but no CAD or stroke. Nonsmoker. Not fasting today but due for labs and med refills.     Patient Active Problem List   Diagnosis     Bladder neck obstruction     Constipation     Transient global amnesia     Other hammer toe (acquired)     Ostium secundum type atrial septal defect     Senile osteoporosis     Syndrome affecting cervical region     Variants of migraine     Malignant neoplasm of skin     Insomnia     Hypothyroidism     Family history of malignant neoplasm of ovary     Tear film insufficiency     Circulatory system disorder     PFO (patent foramen ovale)     " Gastroesophageal reflux disease without esophagitis     ACP (advance care planning)       Current Outpatient Medications   Medication Sig Dispense Refill     ALPHA LIPOIC ACID PO Take 200 mg by mouth daily       aspirin 81 MG tablet Take by mouth daily       Calcium-Vitamin D 600-200 MG-UNIT TABS Take 1 tablet by mouth daily       citalopram (CELEXA) 10 MG tablet Take 1 tablet (10 mg) by mouth daily 90 tablet 1     cyanocobalamin (VITAMIN B-12) 100 MCG tablet Take 100 mcg by mouth daily       EMGALITY 120 MG/ML SOSY as needed  1     fexofenadine-pseudoePHEDrine (ALLEGRA-D 24) 180-240 MG per 24 hr tablet Take 1 tablet by mouth daily       gabapentin (NEURONTIN) 100 MG capsule Take 1 capsule (100 mg) by mouth 3 times daily Continue taking gabapentin 300mg four times a day 90 capsule 1     gabapentin (NEURONTIN) 300 MG capsule Take 1 capsule (300 mg) by mouth 4 times daily 120 capsule 0     levothyroxine (SYNTHROID/LEVOTHROID) 88 MCG tablet Take 1 tablet (88 mcg) by mouth daily Except skip Sunday dose 90 tablet 1     MAGNESIUM PO Take by mouth daily       Multiple vitamin TABS Take by mouth daily       naratriptan (AMERGE) 2.5 MG tablet TAKE 1 TABLET (2.5 MG) BY ORAL ROUTE ONCE MAY REPEAT AFTER 4 HOURS  3     omeprazole (PRILOSEC) 20 MG capsule Take by mouth daily       pravastatin (PRAVACHOL) 10 MG tablet Take 1 tablet (10 mg) by mouth daily 90 tablet 2     triamcinolone (NASACORT) 55 MCG/ACT nasal inhaler Spray 1 spray in nostril daily 1 Bottle 1     VITAMIN D, CHOLECALCIFEROL, PO Take 2,000 Int'l Units by mouth daily       zolpidem (AMBIEN) 10 MG tablet Take 1 tablet (10 mg) by mouth nightly as needed for sleep 30 tablet 0     albuterol (PROAIR HFA, PROVENTIL HFA, VENTOLIN HFA) 108 (90 BASE) MCG/ACT inhaler Inhale 2 puffs into the lungs every 6 hours       Rizatriptan Benzoate (MAXALT PO) Take 5-10 mg by mouth as needed         Allergies   Allergen Reactions     Cyclobenzaprine Other (See Comments)     Levofloxacin  "Unknown     Tendon injury (torn)     Rofecoxib Nausea     Simvastatin Other (See Comments)     Trazodone Other (See Comments)     Other reaction(s): Nightmares     Vicodin [Hydrocodone-Acetaminophen] Itching     Latex Rash        EXAM  BP 90/53 (BP Location: Right arm, Patient Position: Sitting, Cuff Size: Adult Regular)   Pulse 71   Temp 96.8  F (36  C) (Temporal)   Ht 1.626 m (5' 4\")   Wt 58.5 kg (129 lb)   SpO2 98%   BMI 22.14 kg/m    Gen: Alert, pleasant, NAD  Neck: no LAD or TM  COR: S1,S2, no murmur  Lungs: CTA bilaterally, no rhonchi, wheezes or rales  MS: No tenderness over bony C spine  No tenderness at the suboccipital or paracervical muscles   Neuro: DTR 2/4 in all extremities    Assessment:  (G43.919) Intractable migraine, unspecified migraine type  (primary encounter diagnosis)  Comment: hx of refractory migraines, doing very well with Emgality, follows with Dr. Fox at the [facility] clinic. Failed wean of gabapentin. Reports 1 breakthrough headache per month  Plan: gabapentin (NEURONTIN) 300 MG capsule,         Comprehensive Metabolic Panel ([facility]),         DISCONTINUED: gabapentin (NEURONTIN) 300 MG         capsule        I refilled Gabapentin today 300mg QID. She will follow up with the [facility] clinic for refills of other migraine medications.     (E03.9) Acquired hypothyroidism  Comment: TSH in target range.  TSH   Date Value Ref Range Status   04/16/2021 1.34 0.40 - 4.00 mU/L Final   Plan: TSH with free T4 reflex  Refilled levothyroxine 88mcg x 1 yr supply    (E78.5) Hyperlipidemia LDL goal <100  Comment:  LDL is higher than expected  Recent Labs   Lab Test 04/16/21  1117 08/14/20  1400 02/06/19  1337   CHOL 234.0* 223* 235.0*   HDL 71.0 74 87.0   .0* 118* 101.0   TRIG 144.0 157* 234.0*   CHOLHDLRATIO 3.3  --  2.7   Plan: Lipid Panel ([facility]), Comprehensive         Metabolic Panel ([facility])        Sent inquiry to Nancy regarding consistency in dosing. Would " consider doubling to 20mg dose and recheck in 6 wks.     (G47.00) Persistent insomnia  Comment: uses ambien sparingly, 30 tablets has lasted one year  Plan: zolpidem (AMBIEN) 10 MG tablet        Refilled 30 tablets    Alondra Foster MD  Internal Medicine/Pediatrics

## 2021-04-17 RX ORDER — ZOLPIDEM TARTRATE 10 MG/1
10 TABLET ORAL
Qty: 30 TABLET | Refills: 0 | Status: SHIPPED | OUTPATIENT
Start: 2021-04-17 | End: 2022-01-18

## 2021-04-17 RX ORDER — LEVOTHYROXINE SODIUM 88 UG/1
88 TABLET ORAL DAILY
Qty: 90 TABLET | Refills: 3 | Status: SHIPPED | OUTPATIENT
Start: 2021-04-17 | End: 2022-05-05

## 2021-04-17 ASSESSMENT — ANXIETY QUESTIONNAIRES: GAD7 TOTAL SCORE: 2

## 2021-04-19 DIAGNOSIS — E78.5 HYPERLIPIDEMIA LDL GOAL <130: ICD-10-CM

## 2021-04-19 RX ORDER — PRAVASTATIN SODIUM 20 MG
20 TABLET ORAL DAILY
Qty: 90 TABLET | Refills: 3 | Status: SHIPPED | OUTPATIENT
Start: 2021-04-19 | End: 2021-08-08

## 2021-04-26 DIAGNOSIS — F41.1 GAD (GENERALIZED ANXIETY DISORDER): ICD-10-CM

## 2021-04-26 NOTE — TELEPHONE ENCOUNTER
Last office visit: 4/16/21  Next appointment: none  Medication last refilled: 12/4/20 #90+1RF      [place]    Prescription approved per [facility] Refill Protocol.     scores of 2 on both PHQ9 and GAD7 at visit on 4/16/21    Yulissa Lucero RN  April 27, 2021 10:48 AM

## 2021-04-27 RX ORDER — CITALOPRAM HYDROBROMIDE 10 MG/1
10 TABLET ORAL DAILY
Qty: 90 TABLET | Refills: 1 | Status: SHIPPED | OUTPATIENT
Start: 2021-04-27 | End: 2022-01-24

## 2021-06-01 ENCOUNTER — TELEPHONE (OUTPATIENT)
Dept: FAMILY MEDICINE | Facility: CLINIC | Age: 76
End: 2021-06-01

## 2021-06-01 NOTE — TELEPHONE ENCOUNTER
"Pt calling today - states she thinks she is not tolerating the increased dose of pravastatin 20 mg. States she began the med around 4/20 - notes she feels she has increased hot flashes at night, and some mental fogginess. States symptoms began about 4 days after starting the higher dose of pravastatin.  States she has to get up several times at night to soak her feet in cool water. Describes mental fogginess as word finding problems - states she has had this problem for some time now, but it \"seems a little worse\".   States she \"is sensitive to meds\". States she has decided to take the med every other day, and that has decreased the symptoms slightly.  She is leaving for vacation Thursday - video appt made for Wed with Dr Foster to discuss symptoms. Advised these may not be related to use of pravastatin.   Pt agrees with plan.  Agustina James RN  [facility]  "

## 2021-06-02 ENCOUNTER — VIRTUAL VISIT (OUTPATIENT)
Dept: FAMILY MEDICINE | Facility: CLINIC | Age: 76
End: 2021-06-02
Payer: COMMERCIAL

## 2021-06-02 VITALS — BODY MASS INDEX: 21 KG/M2 | HEIGHT: 64 IN | WEIGHT: 123 LBS

## 2021-06-02 DIAGNOSIS — E78.5 HYPERLIPIDEMIA LDL GOAL <130: Primary | ICD-10-CM

## 2021-06-02 ASSESSMENT — MIFFLIN-ST. JEOR: SCORE: 1029.98

## 2021-06-02 NOTE — PROGRESS NOTES
Nancy is a 75 year old who is being evaluated via a billable telephone visit.      What phone number would you like to be contacted at? [phone]    How would you like to obtain your AVS?   Start time: 10:15 AM  End time: 10:23 AM  Total : 8 minutes    ASSESSMENT:  (E78.5) Hyperlipidemia LDL goal <130  (primary encounter diagnosis)  Comment: currently experiencing more hot flashes with higher dose of pravastatin, 20mg daily  Plan: Recommend stopping pravastatin for the next month, message me by 4th of July with status. Could try again to restart 10mg daily and gradually increase dose.     Alondra Foster MD  Internal Medicine/Pediatrics        Subjective   Nancy is a 75 year old woman with hx of migraine headaches, insomnia, hypothyroidism, and hyperlipidemia. She presents for the following health issues     Hyperlipidemia  Nancy has hx of hyperlipidemia. She has hx of global amnesia. No hx of CAD or stroke. Nonsmoker. At her last visit on 4/16/21, her LDL was about 20 points higher compared to 2020. I recommended she increase Pravastatin dose to 20mg daily. Today she reports she is not tolerating increased of pravastatin.   Reports more hot flashes at night, up to every 2 hr (compared to 2x per night)with increased dose of medication. She has to soak her feet in water to obtain relief.  Symptoms do not occur during the day. She reports fatigue during the day.  No fever, not feeling ill.    Vitals:  No vitals were obtained today due to virtual visit.    Physical Exam   healthy, alert and no distress  PSYCH: Alert and oriented times 3; coherent speech, normal   rate and volume, able to articulate logical thoughts, able   to abstract reason, no tangential thoughts, no hallucinations   or delusions  Her affect is normal  RESP: No cough, no audible wheezing, able to talk in full sentences  Remainder of exam unable to be completed due to telephone visits

## 2021-06-20 NOTE — LETTER
Letter by Nan Dueñas RN at      Author: Nan Dueñas RN Service: -- Author Type: --    Filed:  Encounter Date: 7/16/2020 Status: (Other)       7/16/2020        Rakel Parish  [address] [place] [address]    This letter provides a written record that you were tested for COVID-19 on 7/12/2020.     Your result was negative. This means that we didnt find the virus that causes COVID-19 in your sample. A test may show negative when you do actually have the virus. This can happen when the virus is in the early stages of infection, before you feel illness symptoms.    If you have symptoms   Stay home and away from others (self-isolate) until you meet ALL of the guidelines below:    Youve had no fever--and no medicine that reduces fever--for 3 full days (72 hours). And ?    Your other symptoms have gotten better. For example, your cough or breathing has improved. And?    At least 10 days have passed since your symptoms started.    During this time:    Stay home. Dont go to work, school or anywhere else.     Stay in your own room, including for meals. Use your own bathroom if you can.    Stay away from others in your home. No hugging, kissing or shaking hands. No visitors.    Clean high touch surfaces often (doorknobs, counters, handles, etc.). Use a household cleaning spray or wipes. You can find a full list on the EPA website at www.epa.gov/pesticide-registration/list-n-disinfectants-use-against-sars-cov-2.    Cover your mouth and nose with a mask, tissue or washcloth to avoid spreading germs.    Wash your hands and face often with soap and water.    Going back to work  Check with your employer for any guidelines to follow for going back to work.    Employers: This document serves as formal notice that your employee tested negative for COVID-19, as of the testing date shown above.

## 2021-07-27 ENCOUNTER — TRANSFERRED RECORDS (OUTPATIENT)
Dept: HEALTH INFORMATION MANAGEMENT | Facility: CLINIC | Age: 76
End: 2021-07-27

## 2021-08-04 ENCOUNTER — TELEPHONE (OUTPATIENT)
Dept: FAMILY MEDICINE | Facility: CLINIC | Age: 76
End: 2021-08-04

## 2021-08-04 NOTE — TELEPHONE ENCOUNTER
Who is calling? Patient  Reason for Call:Requests call back to discuss Pravastatin and what to do if she continues med.

## 2021-08-08 DIAGNOSIS — E78.5 HYPERLIPIDEMIA LDL GOAL <130: ICD-10-CM

## 2021-08-08 RX ORDER — PRAVASTATIN SODIUM 10 MG
10 TABLET ORAL DAILY
Qty: 90 TABLET | Refills: 0 | Status: SHIPPED | OUTPATIENT
Start: 2021-08-08 | End: 2021-10-20

## 2021-08-09 NOTE — TELEPHONE ENCOUNTER
Patient called and notified that new prescription for Pravastatin 10mg tablets sent to pharmacy for trial and after appointment with Dr. Foster 9/7/21 can discuss tolerance and further increase as warranted.  Aida Sher RN, BSN  [facility]  08/09/21  12:02 PM

## 2021-09-04 ENCOUNTER — HEALTH MAINTENANCE LETTER (OUTPATIENT)
Age: 76
End: 2021-09-04

## 2021-09-26 ENCOUNTER — APPOINTMENT (OUTPATIENT)
Dept: MRI IMAGING | Facility: CLINIC | Age: 76
DRG: 071 | End: 2021-09-26
Attending: EMERGENCY MEDICINE
Payer: COMMERCIAL

## 2021-09-26 ENCOUNTER — HOSPITAL ENCOUNTER (INPATIENT)
Facility: CLINIC | Age: 76
LOS: 2 days | Discharge: HOME | DRG: 071 | End: 2021-09-28
Attending: EMERGENCY MEDICINE | Admitting: INTERNAL MEDICINE
Payer: COMMERCIAL

## 2021-09-26 DIAGNOSIS — Q27.9 VENOUS ANOMALY: Primary | ICD-10-CM

## 2021-09-26 DIAGNOSIS — G45.4 TRANSIENT GLOBAL AMNESIA: ICD-10-CM

## 2021-09-26 LAB
ALBUMIN SERPL-MCNC: 3.9 G/DL (ref 3.4–5)
ALP SERPL-CCNC: 93 U/L (ref 40–150)
ALT SERPL W P-5'-P-CCNC: 36 U/L (ref 0–50)
ANION GAP SERPL CALCULATED.3IONS-SCNC: 7 MMOL/L (ref 3–14)
AST SERPL W P-5'-P-CCNC: 29 U/L (ref 0–45)
BASOPHILS # BLD AUTO: 0 10E3/UL (ref 0–0.2)
BASOPHILS NFR BLD AUTO: 1 %
BILIRUB SERPL-MCNC: 0.3 MG/DL (ref 0.2–1.3)
BUN SERPL-MCNC: 20 MG/DL (ref 7–30)
CALCIUM SERPL-MCNC: 9 MG/DL (ref 8.5–10.1)
CHLORIDE BLD-SCNC: 103 MMOL/L (ref 94–109)
CO2 SERPL-SCNC: 27 MMOL/L (ref 20–32)
CREAT SERPL-MCNC: 0.85 MG/DL (ref 0.52–1.04)
EOSINOPHIL # BLD AUTO: 0.1 10E3/UL (ref 0–0.7)
EOSINOPHIL NFR BLD AUTO: 1 %
ERYTHROCYTE [DISTWIDTH] IN BLOOD BY AUTOMATED COUNT: 12.2 % (ref 10–15)
GFR SERPL CREATININE-BSD FRML MDRD: 67 ML/MIN/1.73M2
GLUCOSE BLD-MCNC: 94 MG/DL (ref 70–99)
HCT VFR BLD AUTO: 42.2 % (ref 35–47)
HGB BLD-MCNC: 13.7 G/DL (ref 11.7–15.7)
IMM GRANULOCYTES # BLD: 0 10E3/UL
IMM GRANULOCYTES NFR BLD: 0 %
LYMPHOCYTES # BLD AUTO: 2.6 10E3/UL (ref 0.8–5.3)
LYMPHOCYTES NFR BLD AUTO: 35 %
MCH RBC QN AUTO: 31.1 PG (ref 26.5–33)
MCHC RBC AUTO-ENTMCNC: 32.5 G/DL (ref 31.5–36.5)
MCV RBC AUTO: 96 FL (ref 78–100)
MONOCYTES # BLD AUTO: 0.6 10E3/UL (ref 0–1.3)
MONOCYTES NFR BLD AUTO: 8 %
NEUTROPHILS # BLD AUTO: 4 10E3/UL (ref 1.6–8.3)
NEUTROPHILS NFR BLD AUTO: 55 %
NRBC # BLD AUTO: 0 10E3/UL
NRBC BLD AUTO-RTO: 0 /100
PLATELET # BLD AUTO: 300 10E3/UL (ref 150–450)
POTASSIUM BLD-SCNC: 3.9 MMOL/L (ref 3.4–5.3)
PROT SERPL-MCNC: 7.5 G/DL (ref 6.8–8.8)
RBC # BLD AUTO: 4.41 10E6/UL (ref 3.8–5.2)
SARS-COV-2 RNA RESP QL NAA+PROBE: NEGATIVE
SARS-COV-2 RNA RESP QL NAA+PROBE: NEGATIVE
SODIUM SERPL-SCNC: 137 MMOL/L (ref 133–144)
WBC # BLD AUTO: 7.3 10E3/UL (ref 4–11)

## 2021-09-26 PROCEDURE — 87635 SARS-COV-2 COVID-19 AMP PRB: CPT | Performed by: EMERGENCY MEDICINE

## 2021-09-26 PROCEDURE — G0378 HOSPITAL OBSERVATION PER HR: HCPCS

## 2021-09-26 PROCEDURE — 80053 COMPREHEN METABOLIC PANEL: CPT | Performed by: EMERGENCY MEDICINE

## 2021-09-26 PROCEDURE — 255N000002 HC RX 255 OP 636: Performed by: EMERGENCY MEDICINE

## 2021-09-26 PROCEDURE — 93005 ELECTROCARDIOGRAM TRACING: CPT

## 2021-09-26 PROCEDURE — 85025 COMPLETE CBC W/AUTO DIFF WBC: CPT | Performed by: EMERGENCY MEDICINE

## 2021-09-26 PROCEDURE — 99285 EMERGENCY DEPT VISIT HI MDM: CPT | Mod: 25

## 2021-09-26 PROCEDURE — 250N000013 HC RX MED GY IP 250 OP 250 PS 637: Performed by: INTERNAL MEDICINE

## 2021-09-26 PROCEDURE — 36415 COLL VENOUS BLD VENIPUNCTURE: CPT | Performed by: EMERGENCY MEDICINE

## 2021-09-26 PROCEDURE — 70553 MRI BRAIN STEM W/O & W/DYE: CPT

## 2021-09-26 PROCEDURE — 99220 PR INITIAL OBSERVATION CARE,LEVEL III: CPT | Performed by: INTERNAL MEDICINE

## 2021-09-26 PROCEDURE — A9585 GADOBUTROL INJECTION: HCPCS | Performed by: EMERGENCY MEDICINE

## 2021-09-26 PROCEDURE — C9803 HOPD COVID-19 SPEC COLLECT: HCPCS

## 2021-09-26 PROCEDURE — 120N000001 HC R&B MED SURG/OB

## 2021-09-26 RX ORDER — GADOBUTROL 604.72 MG/ML
6 INJECTION INTRAVENOUS ONCE
Status: COMPLETED | OUTPATIENT
Start: 2021-09-26 | End: 2021-09-26

## 2021-09-26 RX ORDER — ACETAMINOPHEN 325 MG/1
650 TABLET ORAL EVERY 6 HOURS PRN
Status: DISCONTINUED | OUTPATIENT
Start: 2021-09-26 | End: 2021-09-28 | Stop reason: HOSPADM

## 2021-09-26 RX ORDER — ONDANSETRON 2 MG/ML
4 INJECTION INTRAMUSCULAR; INTRAVENOUS EVERY 6 HOURS PRN
Status: DISCONTINUED | OUTPATIENT
Start: 2021-09-26 | End: 2021-09-28 | Stop reason: HOSPADM

## 2021-09-26 RX ORDER — PROCHLORPERAZINE 25 MG
12.5 SUPPOSITORY, RECTAL RECTAL EVERY 12 HOURS PRN
Status: DISCONTINUED | OUTPATIENT
Start: 2021-09-26 | End: 2021-09-28 | Stop reason: HOSPADM

## 2021-09-26 RX ORDER — ACETAMINOPHEN 650 MG/1
650 SUPPOSITORY RECTAL EVERY 6 HOURS PRN
Status: DISCONTINUED | OUTPATIENT
Start: 2021-09-26 | End: 2021-09-28 | Stop reason: HOSPADM

## 2021-09-26 RX ORDER — ONDANSETRON 4 MG/1
4 TABLET, ORALLY DISINTEGRATING ORAL EVERY 6 HOURS PRN
Status: DISCONTINUED | OUTPATIENT
Start: 2021-09-26 | End: 2021-09-28 | Stop reason: HOSPADM

## 2021-09-26 RX ORDER — PROCHLORPERAZINE MALEATE 5 MG
5 TABLET ORAL EVERY 6 HOURS PRN
Status: DISCONTINUED | OUTPATIENT
Start: 2021-09-26 | End: 2021-09-28 | Stop reason: HOSPADM

## 2021-09-26 RX ADMIN — GADOBUTROL 6 ML: 604.72 INJECTION INTRAVENOUS at 21:06

## 2021-09-26 RX ADMIN — ACETAMINOPHEN 650 MG: 325 TABLET, FILM COATED ORAL at 23:10

## 2021-09-26 ASSESSMENT — ENCOUNTER SYMPTOMS: CONFUSION: 1

## 2021-09-27 ENCOUNTER — HOSPITAL ENCOUNTER (OUTPATIENT)
Dept: NEUROLOGY | Facility: CLINIC | Age: 76
Setting detail: OBSERVATION
DRG: 071 | End: 2021-09-27
Attending: PHYSICIAN ASSISTANT
Payer: COMMERCIAL

## 2021-09-27 PROBLEM — R41.3 AMNESIA: Status: ACTIVE | Noted: 2021-09-27

## 2021-09-27 LAB
ATRIAL RATE - MUSE: 49 BPM
CHOLEST SERPL-MCNC: 290 MG/DL
DIASTOLIC BLOOD PRESSURE - MUSE: NORMAL MMHG
FASTING STATUS PATIENT QL REPORTED: YES
HBA1C MFR BLD: 5.6 % (ref 0–5.6)
HDLC SERPL-MCNC: 65 MG/DL
INTERPRETATION ECG - MUSE: NORMAL
LDLC SERPL CALC-MCNC: 194 MG/DL
NONHDLC SERPL-MCNC: 225 MG/DL
P AXIS - MUSE: 67 DEGREES
PR INTERVAL - MUSE: 170 MS
QRS DURATION - MUSE: 72 MS
QT - MUSE: 450 MS
QTC - MUSE: 406 MS
R AXIS - MUSE: 33 DEGREES
SYSTOLIC BLOOD PRESSURE - MUSE: NORMAL MMHG
T AXIS - MUSE: 54 DEGREES
TRIGL SERPL-MCNC: 154 MG/DL
VENTRICULAR RATE- MUSE: 49 BPM

## 2021-09-27 PROCEDURE — 250N000013 HC RX MED GY IP 250 OP 250 PS 637: Performed by: INTERNAL MEDICINE

## 2021-09-27 PROCEDURE — 120N000001 HC R&B MED SURG/OB

## 2021-09-27 PROCEDURE — 36415 COLL VENOUS BLD VENIPUNCTURE: CPT | Performed by: PHYSICIAN ASSISTANT

## 2021-09-27 PROCEDURE — 82465 ASSAY BLD/SERUM CHOLESTEROL: CPT | Performed by: PHYSICIAN ASSISTANT

## 2021-09-27 PROCEDURE — 99232 SBSQ HOSP IP/OBS MODERATE 35: CPT | Performed by: INTERNAL MEDICINE

## 2021-09-27 PROCEDURE — 83036 HEMOGLOBIN GLYCOSYLATED A1C: CPT | Performed by: PHYSICIAN ASSISTANT

## 2021-09-27 PROCEDURE — 95714 VEEG EA 12-26 HR UNMNTR: CPT

## 2021-09-27 PROCEDURE — 99223 1ST HOSP IP/OBS HIGH 75: CPT | Mod: GC | Performed by: PSYCHIATRY & NEUROLOGY

## 2021-09-27 PROCEDURE — 250N000013 HC RX MED GY IP 250 OP 250 PS 637: Performed by: PHYSICIAN ASSISTANT

## 2021-09-27 PROCEDURE — 95720 EEG PHY/QHP EA INCR W/VEEG: CPT | Mod: GC | Performed by: PSYCHIATRY & NEUROLOGY

## 2021-09-27 RX ORDER — FLUTICASONE PROPIONATE 50 MCG
2 SPRAY, SUSPENSION (ML) NASAL DAILY
Refills: 1 | Status: DISCONTINUED | OUTPATIENT
Start: 2021-09-27 | End: 2021-09-28 | Stop reason: HOSPADM

## 2021-09-27 RX ORDER — FEXOFENADINE HCL 180 MG/1
180 TABLET ORAL DAILY
COMMUNITY

## 2021-09-27 RX ORDER — CITALOPRAM HYDROBROMIDE 10 MG/1
10 TABLET ORAL DAILY
Status: DISCONTINUED | OUTPATIENT
Start: 2021-09-27 | End: 2021-09-28 | Stop reason: HOSPADM

## 2021-09-27 RX ORDER — LEVOTHYROXINE SODIUM 88 UG/1
88 TABLET ORAL DAILY
Status: DISCONTINUED | OUTPATIENT
Start: 2021-09-28 | End: 2021-09-27

## 2021-09-27 RX ORDER — LEVOTHYROXINE SODIUM 88 UG/1
88 TABLET ORAL
Status: DISCONTINUED | OUTPATIENT
Start: 2021-09-28 | End: 2021-09-28 | Stop reason: HOSPADM

## 2021-09-27 RX ORDER — MAGNESIUM OXIDE 400 MG/1
800 TABLET ORAL AT BEDTIME
Status: DISCONTINUED | OUTPATIENT
Start: 2021-09-27 | End: 2021-09-28 | Stop reason: HOSPADM

## 2021-09-27 RX ORDER — ASPIRIN 81 MG/1
81 TABLET ORAL DAILY
Status: DISCONTINUED | OUTPATIENT
Start: 2021-09-27 | End: 2021-09-28 | Stop reason: HOSPADM

## 2021-09-27 RX ORDER — FEXOFENADINE HCL 180 MG/1
180 TABLET ORAL DAILY
Status: DISCONTINUED | OUTPATIENT
Start: 2021-09-28 | End: 2021-09-28 | Stop reason: HOSPADM

## 2021-09-27 RX ORDER — RIZATRIPTAN BENZOATE 5 MG/1
5-10 TABLET ORAL
Status: COMPLETED | OUTPATIENT
Start: 2021-09-27 | End: 2021-09-27

## 2021-09-27 RX ORDER — PRAVASTATIN SODIUM 10 MG
10 TABLET ORAL DAILY
Status: DISCONTINUED | OUTPATIENT
Start: 2021-09-28 | End: 2021-09-28 | Stop reason: HOSPADM

## 2021-09-27 RX ORDER — NARATRIPTAN 2.5 MG/1
2.5 TABLET ORAL
Status: DISCONTINUED | OUTPATIENT
Start: 2021-09-27 | End: 2021-09-28 | Stop reason: HOSPADM

## 2021-09-27 RX ORDER — MAGNESIUM 200 MG
1000 TABLET ORAL DAILY
COMMUNITY

## 2021-09-27 RX ORDER — GABAPENTIN 300 MG/1
300 CAPSULE ORAL 4 TIMES DAILY
Status: DISCONTINUED | OUTPATIENT
Start: 2021-09-27 | End: 2021-09-28 | Stop reason: HOSPADM

## 2021-09-27 RX ORDER — MAGNESIUM OXIDE 400 MG/1
800 TABLET ORAL AT BEDTIME
COMMUNITY

## 2021-09-27 RX ORDER — PRAVASTATIN SODIUM 20 MG
40 TABLET ORAL DAILY
Status: DISCONTINUED | OUTPATIENT
Start: 2021-09-27 | End: 2021-09-27

## 2021-09-27 RX ADMIN — Medication 1 MG: at 02:07

## 2021-09-27 RX ADMIN — GABAPENTIN 300 MG: 300 CAPSULE ORAL at 12:54

## 2021-09-27 RX ADMIN — GABAPENTIN 300 MG: 300 CAPSULE ORAL at 17:58

## 2021-09-27 RX ADMIN — Medication 800 MG: at 22:08

## 2021-09-27 RX ADMIN — ASPIRIN 81 MG: 81 TABLET, DELAYED RELEASE ORAL at 12:54

## 2021-09-27 RX ADMIN — GABAPENTIN 300 MG: 300 CAPSULE ORAL at 22:08

## 2021-09-27 RX ADMIN — FLUTICASONE PROPIONATE 2 SPRAY: 50 SPRAY, METERED NASAL at 22:08

## 2021-09-27 RX ADMIN — RIZATRIPTAN BENZOATE 5 MG: 5 TABLET ORAL at 00:33

## 2021-09-27 RX ADMIN — CITALOPRAM HYDROBROMIDE 10 MG: 10 TABLET ORAL at 17:58

## 2021-09-27 ASSESSMENT — ACTIVITIES OF DAILY LIVING (ADL): ADLS_ACUITY_SCORE: 4

## 2021-09-27 NOTE — PHARMACY-ADMISSION MEDICATION HISTORY
Pharmacy Medication History  Admission medication history interview status for the 9/26/2021  admission is complete. See EPIC admission navigator for prior to admission medications     Location of Interview: Patient room  Medication history sources: Patient, Surescripts and Care Everywhere    Significant changes made to the medication list:  Changed:   - Allegra D to Allegra   - Nasacort 1 spray each nostril daily to 2 sprays each nostril daily   - vitamin B12 tablets to sublingual tablets   - Albuterol inhaler q6h to q6h PRN  Added: magnesium instructions and dose    In the past week, patient estimated taking medication this percent of the time: greater than 90%    Additional medication history information:   Patient had discussion with PCP about her pravastatin. She has been experiencing some muscle aches and sleep distrubances, so she had been taking it on and off. Her PCP recommended her to continue to take medication.     Medication reconciliation completed by provider prior to medication history? No    Time spent in this activity: 20 mins    Prior to Admission medications    Medication Sig Last Dose Taking? Auth Provider   albuterol (PROAIR HFA, PROVENTIL HFA, VENTOLIN HFA) 108 (90 BASE) MCG/ACT inhaler Inhale 2 puffs into the lungs every 6 hours as needed   Yes Reported, Patient   ALPHA LIPOIC ACID PO Take 200 mg by mouth daily  Yes Reported, Patient   aspirin 81 MG tablet Take 81 mg by mouth daily   Yes Reported, Patient   Calcium-Vitamin D 600-200 MG-UNIT TABS Take 1 tablet by mouth At Bedtime   Yes Reported, Patient   citalopram (CELEXA) 10 MG tablet Take 1 tablet (10 mg) by mouth daily  Yes Alondra Foster MD   cyanocobalamin (VITAMIN B-12) 1000 MCG SUBL sublingual tablet Place 1,000 mcg under the tongue daily  Yes Unknown, Entered By History   EMGALITY 120 MG/ML SOSY every 28 days  Past Week at Unknown time Yes Reported, Patient   fexofenadine (ALLEGRA) 180 MG tablet Take 180 mg by mouth daily   Yes Unknown, Entered By History   gabapentin (NEURONTIN) 300 MG capsule Take 1 capsule (300 mg) by mouth 4 times daily  Yes Alondra Foster MD   levothyroxine (SYNTHROID/LEVOTHROID) 88 MCG tablet Take 1 tablet (88 mcg) by mouth daily Except skip Sunday dose  Yes Alondra Foster MD   magnesium oxide (MAG-OX) 400 MG tablet Take 800 mg by mouth At Bedtime  Yes Unknown, Entered By History   Multiple vitamin TABS Take 3 tablets by mouth daily   Yes Reported, Patient   naratriptan (AMERGE) 2.5 MG tablet TAKE 1 TABLET (2.5 MG) BY ORAL ROUTE ONCE MAY REPEAT AFTER 4 HOURS  Yes Reported, Patient   omeprazole (PRILOSEC) 20 MG capsule Take 20 mg by mouth daily (with dinner)   Yes Reported, Patient   pravastatin (PRAVACHOL) 10 MG tablet Take 1 tablet (10 mg) by mouth daily  Yes Alondra Foster MD   triamcinolone (NASACORT) 55 MCG/ACT nasal inhaler Spray 1 spray in nostril daily  Patient taking differently: Spray 2 sprays in nostril daily   Yes Aida Bey MD   Vitamin D (Cholecalciferol) 50 MCG (2000 UT) CAPS Take 2,000 Units by mouth daily   Yes Reported, Patient   zolpidem (AMBIEN) 10 MG tablet Take 1 tablet (10 mg) by mouth nightly as needed for sleep  Yes Alondra Foster MD       The information provided in this note is only as accurate as the sources available at the time of update(s)

## 2021-09-27 NOTE — PROGRESS NOTES
[facility]    Medicine Progress Note - Hospitalist Service       Date of Admission:  9/26/2021  Assessment & Plan   Rakel Parish is a 75 year-old female with hypothyroidism, dyslipidemia, h/o transient global amnesia, chronic migraine headaches who was admitted on 9/26/2021 for suspected transient global amnesia vs atypical migraine.      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 Has had two similar episodes in the past attributed to transient global amnesia. Longstanding history of chronic headaches and migraines for which she has had referral to pain clinic, botox injections (not effective), gabapentin for headache prophylaxis, as needed triptans, OT medications.  Brain MRI with no evidence for stroke, did demonstrate benign developmental venous anomaly/venous angioma in left frontal lobe, concern for possible contributor to seizures.  - Neurology consulted, appreciate assistance  - Plan for cEEG overnight  - Possible DSA by neuro IR to assess left frontal DVA per neurolgy  - Hold triptans, NSAIDs per neurology   - Gabapentin resumed per neurology   - Continue prior to admission aspirin  - Admit to inpatient given plan for continuous EEG overnight, possible additional neuro interventional procedure prior to discharge.      Hyperlipidemia  . Had not been taking pravastatin prior to admission.   - Resumed prior to admission pravastatin      Insomnia  Dyslipidemia  Hypothyroidism  GERD  Reynaud's syndrome  - Hold non-essential medications, otherwise will resume once pharmacy reconciliation complete        Asymptomatic Rule Out of COVID-19 infection  This patient was evaluated during a global COVID-19 pandemic, which necessitated consideration that the patient might be at risk for  infection with the SARS-CoV-2 virus that causes COVID-19. Applicable protocols for evaluation were followed during the patient's care.   - COVID-19 PCR negative    Clinically Significant Risk Factors Present on Admission              # Platelet Defect: home medication list includes an antiplatelet medication         Diet: Regular Diet Adult  NPO for Medical/Clinical Reasons Except for: Meds    DVT Prophylaxis: PCDs  Taylor Catheter: Not present  Code Status: Full Code      Disposition Plan   Expected discharge: Anticipated discharge in 1-2 days pending neurology clearance. Admit to inpatient given plan for continuous EEG overnight, possible additional neuro interventional procedure prior to discharge.    Entered: Zack Lauren MD 09/27/2021, 2:47 PM       The patient's care was discussed with the Bedside Nurse, Patient and Patient's .    Zack Lauren MD  Hospitalist Service  [facility]    ______________________________________________________________________    Interval History   No acute events overnight. Reports overall she feels better since admission, less forgetful than before though still overall more mentally slow compared to baseline. Feeling some pressure behind her eyes she attributes to headache from not receiving her gabapentin.  Denies any chest pain or shortness of breath    Data reviewed today: I reviewed all medications, new labs and imaging results over the last 24 hours. I personally reviewed no images or EKG's today.    Physical Exam   Vital Signs: Temp: 98  F (36.7  C) Temp src: Oral BP: 92/59 Pulse: 66   Resp: 16 SpO2: 98 % O2 Device: None (Room air)    Weight: 0 lbs 0 oz    Constitutional: Well-appearing, NAD  Respiratory: Clear to auscultation bilaterally, good air movement bilaterally  Cardiovascular: RRR. No peripheral edema.  GI: Soft, non-tender, non-distended.   Skin/Integumen: Warm, dry  Neuro: Alert.  Responding to questions appropriately.  5 out  of 5 upper and lower extremity strength symmetric bilaterally.    Data   Recent Labs   Lab 09/26/21 1916   WBC 7.3   HGB 13.7   MCV 96         POTASSIUM 3.9   CHLORIDE 103   CO2 27   BUN 20   CR 0.85   ANIONGAP 7   ERIC 9.0   GLC 94   ALBUMIN 3.9   PROTTOTAL 7.5   BILITOTAL 0.3   ALKPHOS 93   ALT 36   AST 29       Recent Results (from the past 24 hour(s))   MR Brain w/o & w Contrast    Narrative    EXAM: MR BRAIN W/O AND W CONTRAST  LOCATION: [facility]  DATE/TIME: 9/26/2021 8:38 PM    INDICATION: Inability to make new memories over the last half-hour, transient global amnesia.  COMPARISON: Limited sequences brain MRI 11/09/2015 consisting of sagittal T1 and coronal FLAIR only.  CONTRAST: 6 mL Gadavist.  TECHNIQUE: Routine multiplanar multisequence head MRI without and with intravenous contrast.    FINDINGS:  INTRACRANIAL CONTENTS: There is no evidence for recent acute or subacute infarction. No evidence for restricted diffusion abnormality. Corpus callosum is normal. No mass, acute hemorrhage, or extra-axial fluid collections. Scattered nonspecific T2/FLAIR   hyperintensities within the cerebral white matter most consistent with minimal chronic microvascular ischemic change. Mild generalized cerebral atrophy. No hydrocephalus. Normal position of the cerebellar tonsils. Enhancement is compatible with benign   developmental venous anomaly in the paramedian left frontal lobe anteriorly with serpiginous morphology and a prominent cortical draining vein suggested. Otherwise no pathologic enhancement. No abnormal diploic space, orbital, Meckel's cave/cavernous   sinus or skull base enhancement is identified.    SELLA: No abnormality accounting for technique.    OSSEOUS STRUCTURES/SOFT TISSUES: Normal marrow signal. The major intracranial vascular flow voids are maintained. Tortuosity cavernous ICA level bilaterally.    ORBITS: No abnormality accounting for technique.      SINUSES/MASTOIDS: Mild mucosal thickening scattered about the paranasal sinuses. Scattered fluid/membrane thickening in the mastoid air cells bilaterally.       Impression    IMPRESSION:  1.  No evidence for recent infarction. No intracranial mass effect or hemorrhage.    2.  Mild chronic ischemic changes deep white matter both cerebral hemispheres.    3.  Incidental benign developmental venous anomaly/venous angioma left frontal lobe. No additional/pathologic enhancement abnormality is identified intracranially.         Medications       aspirin  81 mg Oral Daily     gabapentin  300 mg Oral 4x Daily     [START ON 9/28/2021] pravastatin  10 mg Oral Daily

## 2021-09-27 NOTE — PROVIDER NOTIFICATION
MD Notification    Notified Person: MD    Notified Person Name: Dr. Mcgill    Notification Date/Time: 9/27/21 0016    Notification Interaction: Page     Purpose of Notification: Patient with complaints of increasing headache, now calling it a migraine. Can we get something PRN for migraine? Thanks!     Orders Received: Ordered placed for PRN Alex and Gagan     Comments:

## 2021-09-27 NOTE — PLAN OF CARE
Pt here with acute amnesia. Alert, orientated to self only. Neuros intact except acute memory loss, repeating the same question multiple times in a conversation. VSS. Tele SB. Regular diet, thin liquids. Takes pills whole. Up with SBA. Complaints of migraine pain, gave Tylenol x1, and Maxal x1. Pt scoring green on the Aggression Stop Light Tool. Plan awaiting neurology consult. Discharge pending.

## 2021-09-27 NOTE — PROGRESS NOTES
RECEIVING UNIT ED HANDOFF REVIEW    ED Nurse Handoff Report was reviewed by: ALEXSANDER GARCIA, RN on September 26, 2021 at 10:24 PM

## 2021-09-27 NOTE — ED TRIAGE NOTES
Pt walks in to ED with  with c/o acute memory loss w/o neuro deficit. Pt alert and answers some questions appropriately and unsure of others. Pt denies pain or other medical issues. Pt  reports similar event approximately 15 years ago and then about 7 years ago. Sx resolved after a few hours. Vss.

## 2021-09-27 NOTE — PROGRESS NOTES
-diagnostic tests and consults completed and resulted: awaiting neurology consult.    -vital signs normal or at patient baseline: complete.    -returns to baseline functional status: continues with confusion.     Nurse to notify provider when observation goals have been met and patient is ready for discharge.

## 2021-09-27 NOTE — PROVIDER NOTIFICATION
Paged Karli - stroke team.  187 pravastatin orders clarification needed, patient stopped taking her cholesterol med a few months ago due to sleep disturbances, patient would like to discuss change in dose with you and EEG change/updates. Will await additional orders/clarifications.
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
## 2021-09-27 NOTE — UTILIZATION REVIEW
[facility] Utilization Review  Admission Status; Secondary Review Determination     Admission Date: 9/26/2021  7:03 PM      Under the authority of the Utilization Management Committee, the utilization review process indicated a secondary review on the above patient.  The review outcome is based on review of the medical records, discussions with staff, and applying clinical experience noted on the date of the review.        (X)      Inpatient Status Appropriate - This patient's medical care is consistent with medical management for inpatient care and reasonable inpatient medical practice.          RATIONALE FOR DETERMINATION   75-year-old female with history of hypothyroidism, dyslipidemia, chronic migraine headaches, admitted for transient global amnesia versus atypical migraine.  Patient is getting neurochecks every 4 hours, holding gabapentin.  MRI brain showed left frontal region contrast enhancement consistent with DVA versus venous angioma.  Seen by neurology team and recommend EEG overnight, neuro IR consultation for possible DSA of the left frontal DVA/venous angioma, needs every 4 hours neuro checks, increase statin dose.  Complex patient with transient confusional episode, concern for complex migraine versus seizure causing confusional episode, needs IR intervention in the morning, close monitoring in the hospital with ongoing neuro checks, anticipate more than 2 midnight hospital stay, recommend change to inpatient status, communicated to Dr. Lauren      The severity of illness, intensity of service provided, expected LOS and risk for adverse outcome make the care complex, high risk and appropriate for hospital admission.The patient requires hospital based medical care which is anticipated to require a stay of 2 or more midnights; according to CMS guidelines the patient should be admitted as inpatient        The information on this document is developed by the utilization review team in order  for the business office to ensure compliance.  This only denotes the appropriateness of proper admission status and does not reflect the quality of care rendered.         The definitions of Inpatient Status and Observation Status used in making the determination above are those provided in the CMS Coverage Manual, Chapter 1 and Chapter 6, section 70.4.      Sincerely,       Ana Brown MD  Physician Advisor  Utilization Review-[place]    Phone: [phone]

## 2021-09-27 NOTE — ED NOTES
Pt ambulated to the bathroom with SBA. Pt tolerated activity well. Pt continues to repeat questions

## 2021-09-27 NOTE — ED NOTES
"Pt remains calm and compliant, vss. Pt  at side. Pt anox2 and repeating questions multiple times. \"why are you dressed up\" \"where were we tonight\" \"what were we doing\".  "

## 2021-09-27 NOTE — ED TRIAGE NOTES
[facility]  ED Arrival Note    Arrives through triage. A &O X4. ABC's intact. Pt arrives with c/o non-traumatic short term memory loss. Patient's  explains that 45 mins ago patient was noted to be confused and not recall events. For example, patient does not remember what COVID is and why she is wearing a mask.  Last well known time was 1.5 hrs prior to arrival. Patient had a negative FAST exam in triage; however, because of her AMS she was brought to a stabilization room for a stroke eval.       Visitors during triage: Spouse      Triage Interventions: N/A    Ambulatory: Yes    Meets Stroke Criteria?: Stroke Eval    Meets Trauma Criteria?: No    Directed to: Stabilization room

## 2021-09-27 NOTE — PLAN OF CARE
"Amnesia. Neuros/CMS - alert & oriented x 4, forgetful, asking appropriate questions, short term memory difficulties/recalling events of the evening, following commands, denying numbness or tingling, no dizziness/tingling, no dizziness. Tele - SD 70s first degree AV block. Regular diet tolerated/pills whole with water. Up with assist/gait belt to bathroom/steady gait. Continent of urine/\"feel constipated\" - bm yesterday.  Head discomfort/tolerable/lights dimmed/neurontin/provided once ordered. Pt scoring green on the Aggression Stop Light Tool. EEG monitoring continuous.  at bedside/supportive. NPO after midnight for possible IR intervention tomorrow.   "

## 2021-09-27 NOTE — H&P
[facility]  History and Physical  Hospitalist       Date of Admission:  9/26/2021    Assessment & Plan   Rakel Parish is a 75 year old female with hypothyroidism, dyslipidemia, h/o transient global amnesia, chronic migraine headaches who was admitted to observation on 9/26/2021 for suspected transient global amnesia vs atypical migraine.     Transient Global Amnesia vs atypical migraine  Left frontal headache  H/o migraine headaches  H/o other headaches  H/o presumed transient global amnesia  Still not remembering new information so seems to be still experiencing the amnesia. Can't recall time earlier tonight, where her  is (went home), that she just got tylenol for headache and repeats same questions over and over. Similar episode in the past about 15 years ago which was called transient global amnesia. Longstanding history of struggling with chronic headaches and migraines for which she has had referral to pain clinic, botox injections (not effective), gabapentin for headache prophylaxis, as needed triptans, OT medications.  Brain MRI with no evidence for stroke. BMP and CBC normal.   -admit to observation with telemetry  -neuro checks q 4 hours  -neurology consult tomorrow (non-stroke vs general)   -hold gabapentin for now  -as needed naratriptan or rizatriptan   -as needed acetaminophen  -avoid sedating medications    Chronic, stable issues:  Insomnia, mental health  Dyslipidemia  Hypothyroidism  GERD  Reynaud's syndrome    Asymptomatic Rule Out of COVID-19 infection  This patient was evaluated during a global COVID-19 pandemic, which necessitated consideration that the patient might be at risk for infection with the SARS-CoV-2 virus that causes COVID-19. Applicable protocols for evaluation were followed during the patient's care. Low suspicion for infection.   -follow-up COVID-19 PCR test result => negative    Clinically Significant Risk Factors Present on Admission            "   # Platelet Defect: home medication list includes an antiplatelet medication      Hold all nonessential medications, vitamins, supplements given observation status.  Allow patient to take medications from his home supply if desired. These would need to be reviewed by pharmacy prior to administration.    DVT prophylaxis: Pneumatic Compression Devices and Ambulate every shift  Code Status:  Full     Disposition: Expected discharge home tomorrow after neurology consultation and return to baseline functioning.     Maddi Mcgill MD  Text Page    ~~~~~~~~~~~~~~~~~~~~~~~~~~~~~~~~~~~~~~~~~~~~~~~~~~~~~  Primary Care Physician   Alondra Foster    Chief Complaint   Memory loss, confusion  \"I guess I'm in a hospital.\"    History is obtained from the patient and medical records.    History of Present Illness   Rakel LANDIS Wedl is a 75 year old female with hypothyroidism, dyslipidemia, h/o transient global amnesia ~ 2006, chronic migraine headaches who presents with memory loss and confusion. Earlier this evening the patient was in an online meeting, sharing her poetry to a group. Within minutes she asked her  when the call was going to happen and had no recollection of it. After arrival to the neurology unit, the patient still cannot recall what happened earlier nor anything about a video or meeting that happened. \"You'll have to ask my  about that.\" When asked where her  is she could not figure that out. 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 Denies neck stiffness or pain. Reports dull, constant headache pain of her left forehead and temporal area and wants pain medication for it. DShe could not recall that a nurse had just been in the room and given her a dose 30 min prior. Vision \"okay\" without any diplopia. Per , the patient's ister has had similar amnestic episodes--details unknown. No recent trauma or LOC. Case reviewed with Dr. Bourne from the Emergency Department. Labs and " imaging reviewed.     Past Medical History    I have reviewed this patient's medical history and updated it with pertinent information if needed.   Past Medical History:   Diagnosis Date     Asthma      Global amnesia     Hypothyroidism     Hyperlipidaemia LDL goal < 130      Hypothyroidism      Insomnia      Migraine, unspecified, without mention of intractable migraine without mention of status migrainosus      Osteopenia      Osteoporosis      Raynaud's disease      Unspecified asthma(493.90)      Past Surgical History   I have reviewed this patient's surgical history and updated it with pertinent information if needed.  Past Surgical History:   Procedure Laterality Date     APPENDECTOMY       BUNIONECTOMY Right 11/02/2010     COLONOSCOPY  11/16/10    Every 7 years     COLONOSCOPY  02/2018    hemorrhoids, [facility]     FOOT SURGERY Left     tendon rupture     HC REPAIR OF HAMMERTOE,ONE       TONSILLECTOMY & ADENOIDECTOMY       TUBAL LIGATION  1965     Prior to Admission Medications   Prior to Admission Medications   Prescriptions Last Dose Informant Patient Reported? Taking?   ALPHA LIPOIC ACID PO   Yes No   Sig: Take 200 mg by mouth daily   Calcium-Vitamin D 600-200 MG-UNIT TABS   Yes No   Sig: Take 1 tablet by mouth daily   EMGALITY 120 MG/ML SOSY   Yes No   Sig: as needed   MAGNESIUM PO   Yes No   Sig: Take by mouth daily   Multiple vitamin TABS   Yes No   Sig: Take by mouth daily   Rizatriptan Benzoate (MAXALT PO)   Yes No   Sig: Take 5-10 mg by mouth as needed   VITAMIN D, CHOLECALCIFEROL, PO   Yes No   Sig: Take 2,000 Int'l Units by mouth daily   albuterol (PROAIR HFA, PROVENTIL HFA, VENTOLIN HFA) 108 (90 BASE) MCG/ACT inhaler   Yes No   Sig: Inhale 2 puffs into the lungs every 6 hours   aspirin 81 MG tablet   Yes No   Sig: Take by mouth daily   citalopram (CELEXA) 10 MG tablet   No No   Sig: Take 1 tablet (10 mg) by mouth daily   cyanocobalamin (VITAMIN B-12) 100 MCG tablet   Yes No   Sig: Take 100 mcg by  mouth daily   fexofenadine-pseudoePHEDrine (ALLEGRA-D 24) 180-240 MG per 24 hr tablet   Yes No   Sig: Take 1 tablet by mouth daily   gabapentin (NEURONTIN) 300 MG capsule   No No   Sig: Take 1 capsule (300 mg) by mouth 4 times daily   levothyroxine (SYNTHROID/LEVOTHROID) 88 MCG tablet   No No   Sig: Take 1 tablet (88 mcg) by mouth daily Except skip  dose   naratriptan (AMERGE) 2.5 MG tablet   Yes No   Sig: TAKE 1 TABLET (2.5 MG) BY ORAL ROUTE ONCE MAY REPEAT AFTER 4 HOURS   omeprazole (PRILOSEC) 20 MG capsule   Yes No   Sig: Take by mouth daily   pravastatin (PRAVACHOL) 10 MG tablet   No No   Sig: Take 1 tablet (10 mg) by mouth daily   triamcinolone (NASACORT) 55 MCG/ACT nasal inhaler   No No   Sig: Spray 1 spray in nostril daily   zolpidem (AMBIEN) 10 MG tablet   No No   Sig: Take 1 tablet (10 mg) by mouth nightly as needed for sleep      Facility-Administered Medications: None     Allergies   Allergies   Allergen Reactions     Cyclobenzaprine Other (See Comments)     Levofloxacin Unknown     Tendon injury (torn)     Rofecoxib Nausea     Simvastatin Other (See Comments)     Trazodone Other (See Comments)     Other reaction(s): Nightmares     Vicodin [Hydrocodone-Acetaminophen] Itching     Latex Rash     Social History   I have reviewed this patient's social history and updated it with pertinent information if needed. Rakel LANDIS Wedl  reports that she quit smoking about 51 years ago. She has a 5.00 pack-year smoking history. She has never used smokeless tobacco. She reports that she does not drink alcohol and does not use drugs.    Family History   I have reviewed this patient's family history and updated it with pertinent information if needed.   Family History   Problem Relation Age of Onset     Breast Cancer Niece 40        Maternal     C.A.D. Father 85         of renal failure. ASCVD, MI x 2     Prostate Cancer Father 80     Cancer Brother         CLL     Prostate Cancer Brother 68     Ovarian Cancer  "Sister 82     Osteoporosis Mother 94         at 94 after hip fracture     Dementia Mother      Hypertension Mother      Colon Cancer No family hx of      Review of Systems   The 10 point Review of Systems is negative other than noted in the HPI or here.    Physical Exam   Temp: 98.3  F (36.8  C) Temp src: Oral BP: (!) 128/94 Pulse: 56   Resp: 16 SpO2: 96 % O2 Device: None (Room air)    Vital Signs with Ranges  Temp:  [98.3  F (36.8  C)] 98.3  F (36.8  C)  Pulse:  [56-57] 56  Resp:  [16] 16  BP: (128-133)/(91-94) 128/94  SpO2:  [96 %-97 %] 96 %  0 lbs 0 oz    Constitutional: Alert, NAD, pleasant and interactive but no memory of recent events or new information provided  Eyes: PERRL, sclerae anicteric  HEENT: mmm, atraumatic  Respiratory: Lungs CTAB, no wheezes or crackles  Cardiovascular: RRR, no murmurs  no LE edema  GI: soft, non-tender, nondistended  Skin/Integument: warm, dry, no acute rashes  Genitourinary: not examined  Musc: TRAN, normal limb strength x 4, can follow commands  Neuro: knows name and place \"hospital\" but not date, time, situation, face symmetric, speech normal, no focal deficits, no tremors. Recent memory severely impaired to absent and while calm and conversant is unable to recall now information moments after it is discussed  Psych: not anxious, no overt hallucinations     Data   Data reviewed today:  I personally reviewed Brain MRI negative for stroke  Recent Labs   Lab 21   WBC 7.3   HGB 13.7   MCV 96         POTASSIUM 3.9   CHLORIDE 103   CO2 27   BUN 20   CR 0.85   ANIONGAP 7   ERIC 9.0   GLC 94   ALBUMIN 3.9   PROTTOTAL 7.5   BILITOTAL 0.3   ALKPHOS 93   ALT 36   AST 29       Imaging:  Recent Results (from the past 24 hour(s))   MR Brain w/o & w Contrast    Narrative    EXAM: MR BRAIN W/O AND W CONTRAST  LOCATION: [facility]  DATE/TIME: 2021 8:38 PM    INDICATION: Inability to make new memories over the last half-hour, transient " global amnesia.  COMPARISON: Limited sequences brain MRI 11/09/2015 consisting of sagittal T1 and coronal FLAIR only.  CONTRAST: 6 mL Gadavist.  TECHNIQUE: Routine multiplanar multisequence head MRI without and with intravenous contrast.    FINDINGS:  INTRACRANIAL CONTENTS: There is no evidence for recent acute or subacute infarction. No evidence for restricted diffusion abnormality. Corpus callosum is normal. No mass, acute hemorrhage, or extra-axial fluid collections. Scattered nonspecific T2/FLAIR   hyperintensities within the cerebral white matter most consistent with minimal chronic microvascular ischemic change. Mild generalized cerebral atrophy. No hydrocephalus. Normal position of the cerebellar tonsils. Enhancement is compatible with benign   developmental venous anomaly in the paramedian left frontal lobe anteriorly with serpiginous morphology and a prominent cortical draining vein suggested. Otherwise no pathologic enhancement. No abnormal diploic space, orbital, Meckel's cave/cavernous   sinus or skull base enhancement is identified.    SELLA: No abnormality accounting for technique.    OSSEOUS STRUCTURES/SOFT TISSUES: Normal marrow signal. The major intracranial vascular flow voids are maintained. Tortuosity cavernous ICA level bilaterally.    ORBITS: No abnormality accounting for technique.     SINUSES/MASTOIDS: Mild mucosal thickening scattered about the paranasal sinuses. Scattered fluid/membrane thickening in the mastoid air cells bilaterally.       Impression    IMPRESSION:  1.  No evidence for recent infarction. No intracranial mass effect or hemorrhage.    2.  Mild chronic ischemic changes deep white matter both cerebral hemispheres.    3.  Incidental benign developmental venous anomaly/venous angioma left frontal lobe. No additional/pathologic enhancement abnormality is identified intracranially.

## 2021-09-27 NOTE — ED NOTES
[facility]  ED Nurse Handoff Report    ED Chief complaint: Altered Mental Status      ED Diagnosis:   Final diagnoses:   None       Code Status: Full Code    Allergies:   Allergies   Allergen Reactions     Cyclobenzaprine Other (See Comments)     Levofloxacin Unknown     Tendon injury (torn)     Rofecoxib Nausea     Simvastatin Other (See Comments)     Trazodone Other (See Comments)     Other reaction(s): Nightmares     Vicodin [Hydrocodone-Acetaminophen] Itching     Latex Rash       Patient Story: Arrives through triage. A &O X4. ABC's intact. Pt arrives with c/o non-traumatic short term memory loss. Patient's  explains that 45 mins ago patient was noted to be confused and not recall events. For example, patient does not remember what COVID is and why she is wearing a mask.  Last well known time was 1.5 hrs prior to arrival.   Focused Assessment:  Global amnesia. Pt can not remember recent events     Treatments and/or interventions provided: see MAR  Patient's response to treatments and/or interventions:     To be done/followed up on inpatient unit:      Does this patient have any cognitive concerns?: Short term memory loss    Activity level - Baseline/Home:  Independent  Activity Level - Current:   Independent    Patient's Preferred language: English   Needed?: No    Isolation: None  Infection: Not Applicable  Patient tested for COVID 19 prior to admission: YES  Bariatric?: No    Vital Signs:   Vitals:    09/26/21 2000   BP: (!) 133/91   Pulse: 57   Resp: 16   Temp: 98.3  F (36.8  C)   TempSrc: Oral   SpO2: 97%       Cardiac Rhythm:     Was the PSS-3 completed:   Yes  What interventions are required if any?               Family Comments:   OBS brochure/video discussed/provided to patient/family: Yes              Name of person given brochure if not patient:               Relationship to patient:     For the majority of the shift this patient's behavior was Green.   Behavioral  interventions performed were .    ED NURSE PHONE NUMBER: [phone]

## 2021-09-27 NOTE — ED PROVIDER NOTES
"  History   Chief Complaint:  Altered Mental Status       The history is provided by the patient and the spouse.      Rakel Parish is a 75 year old female on aspirin 81 with history of transient global anemia who presents with confusion for the last hour during which the patient is able to state her correct date of birth and location but is unsure of the month and year. 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 She has a history of similar episodes of amnesia with the most recent being 15 years ago when she had an episode lasting 8 hours. Denies alcohol use.    Review of Systems   Psychiatric/Behavioral: Positive for confusion.   All other systems reviewed and are negative.      Allergies:  Cyclobenzaprine  Levofloxacin  Rofecoxib  Simvastatin  Trazodone  Vicodin  Latex    Medications:  Celexa  Neurontin  Albuterol  Asprin 81  Synthroid/Levothroid  Pravachol  Nasacort  Ambien  Prilosec    Past Medical History:    Asthma  Transient global amnesia  Hyperlipidemia  Hypothyroidism  Migraines  Osteopenia  Osteoporosis  Raynaud's disease  Patent foramen ovale  Urethral stenosis  Malignant neoplasm of ovary  Malignant neoplasm of skin  Circulatory system disorder     Past Surgical History:    Appendectomy  Bunionectomy  Foot tendon repair  Hammertoe repair  Tonsillectomy & adenoidectomy  Tubal ligation     Family History:    Father: CAD, renal failure, MI x2, prostate cancer  Mother: osteoporosis, dementia, hypertension  Brother: chronic lymphocytic leukemia, prostate cancer  Sister: ovarian cancer    Social History:  Presents to the ED with her .  Arrived via car.    Physical Exam     Patient Vitals for the past 24 hrs:   BP Temp Temp src Pulse Resp SpO2   09/26/21 2234 126/80 97.6  F " (36.4  C) Oral 56 18 97 %   09/26/21 2139 (!) 128/94 -- -- 56 -- 96 %   09/26/21 2000 (!) 133/91 98.3  F (36.8  C) Oral 57 16 97 %       Physical Exam  General/Appearance: appears stated age, well-groomed, appears comfortable  Eyes: EOMI, no scleral injection, no icterus  ENT: MMM  Neck: supple, nl ROM, no stiffness  Cardiovascular: RRR, nl S1S2, no m/r/g, 2+ pulses in all 4 extremities, cap refill <2sec  Respiratory: CTAB, good air movement throughout, no wheezes/rhonchi/rales, no increased WOB, no retractions  GI: abd soft, non-distended, nttp,  no HSM, no rebound, no guarding, nl BS  MSK: TRAN, good tone, no bony abnormality  Skin: warm and well-perfused, no rash, no edema, no ecchymosis, nl turgor  Neuro: GCS 15, alert but not oriented to date/time/place, NIHSS 0, no gross focal neuro deficits  Psych: interacts appropriately  Heme: no petechia, no purpura, no active bleeding        Emergency Department Course   ECG  ECG taken at 1938, ECG read at 1940  Sinus bradycardia.  Low voltage QRS.  Cannot rule out anterior infarct, age undetermined.  Abnormal ECG.   Rate 49 bpm. IN interval 170 ms. QRS duration 72 ms. QT/QTc 450/406 ms. P-R-T axes 67 33 54.     Imaging:  MR Brain w/o & w Contrast:  1.  No evidence for recent infarction. No intracranial mass effect or hemorrhage.   2.  Mild chronic ischemic changes deep white matter both cerebral hemispheres.   3.  Incidental benign developmental venous anomaly/venous angioma left frontal lobe. No additional/pathologic enhancement abnormality is identified intracranially.   As per radiology.    Laboratory:  CBC: WBC 7.3, HGB 13.7,     CMP: all WNL (Creatinine: 0.85)    Asymptomatic COVID19 Virus PCR by nasopharyngeal swab: Negative.    Emergency Department Course:    Reviewed:  I reviewed nursing notes, vitals, past medical history and care everywhere    Assessments:  1908 I obtained history and examined the patient as noted above.   2130 I rechecked and updated the  patient.     Consults:   1912 I spoke with Dr. Perez, stroke neurology, regarding patient's presentation and necessary imaging.  2150 I spoke with Dr. Mcgill, hospitalist, who accepts the patient.    Interventions:  2106 Gadavist 6 mL IV    Disposition:  The patient was admitted to the hospital under the care of Dr. Mcgill.       Impression & Plan       Medical Decision Making:  This patient is a pleasant 75-year-old female with a history of transient global amnesia who presents with confusion for the past hour.  I suspect the similar diagnosis today.  She has an NIHSS of 0 however has inability to make new memories.  Her long-term memory is not impaired or affected.  MRI was obtained, under the guidance of stroke neurology, and is come back as normal.  She is slightly improved per  but still is not making new memories so she will be brought into the hospitalist service for close monitoring.    Covid-19  Rakel Parish was evaluated during a global COVID-19 pandemic, which necessitated consideration that the patient might be at risk for infection with the SARS-CoV-2 virus that causes COVID-19.   Applicable protocols for evaluation were followed during the patient's care.   COVID-19 was considered as part of the patient's evaluation. The plan for testing is:  a test was obtained during this visit.    Diagnosis:    ICD-10-CM    1. Transient global amnesia  G45.4        Scribe Disclosure:  I, Silvia Ochoa, am serving as a scribe at 7:09 PM on 9/26/2021 to document services personally performed by Katey Bourne MD based on my observations and the provider's statements to me.            Katey Bourne MD  09/26/21 7401

## 2021-09-28 VITALS
SYSTOLIC BLOOD PRESSURE: 101 MMHG | TEMPERATURE: 98 F | OXYGEN SATURATION: 94 % | RESPIRATION RATE: 16 BRPM | HEART RATE: 60 BPM | DIASTOLIC BLOOD PRESSURE: 66 MMHG

## 2021-09-28 PROCEDURE — 258N000003 HC RX IP 258 OP 636: Performed by: HOSPITALIST

## 2021-09-28 PROCEDURE — 250N000013 HC RX MED GY IP 250 OP 250 PS 637: Performed by: INTERNAL MEDICINE

## 2021-09-28 PROCEDURE — 250N000013 HC RX MED GY IP 250 OP 250 PS 637: Performed by: PHYSICIAN ASSISTANT

## 2021-09-28 PROCEDURE — 99233 SBSQ HOSP IP/OBS HIGH 50: CPT | Mod: 25 | Performed by: PSYCHIATRY & NEUROLOGY

## 2021-09-28 PROCEDURE — 99239 HOSP IP/OBS DSCHRG MGMT >30: CPT | Performed by: INTERNAL MEDICINE

## 2021-09-28 RX ADMIN — GABAPENTIN 300 MG: 300 CAPSULE ORAL at 13:37

## 2021-09-28 RX ADMIN — GABAPENTIN 300 MG: 300 CAPSULE ORAL at 08:59

## 2021-09-28 RX ADMIN — ASPIRIN 81 MG: 81 TABLET, DELAYED RELEASE ORAL at 08:59

## 2021-09-28 RX ADMIN — FLUTICASONE PROPIONATE 2 SPRAY: 50 SPRAY, METERED NASAL at 03:52

## 2021-09-28 RX ADMIN — LEVOTHYROXINE SODIUM 88 MCG: 88 TABLET ORAL at 06:42

## 2021-09-28 RX ADMIN — PRAVASTATIN SODIUM 10 MG: 10 TABLET ORAL at 08:59

## 2021-09-28 RX ADMIN — CITALOPRAM HYDROBROMIDE 10 MG: 10 TABLET ORAL at 08:59

## 2021-09-28 RX ADMIN — FEXOFENADINE HYDROCHLORIDE 180 MG: 180 TABLET ORAL at 08:59

## 2021-09-28 RX ADMIN — SODIUM CHLORIDE 500 ML: 9 INJECTION, SOLUTION INTRAVENOUS at 02:04

## 2021-09-28 RX ADMIN — ACETAMINOPHEN 650 MG: 325 TABLET, FILM COATED ORAL at 03:16

## 2021-09-28 ASSESSMENT — ACTIVITIES OF DAILY LIVING (ADL)
ADLS_ACUITY_SCORE: 4

## 2021-09-28 NOTE — PLAN OF CARE
Pt here with acute amnesia. A&Ox4. Neuros intact. VSS. Regular diet, thin liquids. Takes pills whole. Up with SBA. No complaints of pain. Pt scoring green on the Aggression Stop Light Tool. Plan discharge to home. All belongings returned.

## 2021-09-28 NOTE — PROGRESS NOTES
LTV day 1 completed. Per RN, the neuro is waiting for the report for the day 1 to decide if continue to day 2 or not.

## 2021-09-28 NOTE — PROVIDER NOTIFICATION
MD Notification    Notified Person: MD    Notified Person Name: JOSE Calvillo    Notification Date/Time: 9/28/21 3037    Notification Interaction: Page     Purpose of Notification: Just touching base on POC for this patient. Currently on EEG, saw note for cerebral angio today, no order in place. Patient has been NPO since midnight, I know she will want to eat if it wont happen today     Orders Received: Spoke with JOSE Jernigan from neurology. OK to discontinue EEG. No cerebral angio. Patient ok to eat and discharge.     Comments:

## 2021-09-28 NOTE — DISCHARGE SUMMARY
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 Repeat MRI/MRI with and   without contrast in 1 year and follow-up at that time again with general   neurology, if changes at that time should be referred to neuro IR.               Hospital Course   Rakel Parish is a 75 year-old female with hypothyroidism, dyslipidemia, h/o transient global amnesia, chronic migraine headaches who was admitted on 9/26/2021 for suspected transient global amnesia vs atypical migraine.      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 Has had two similar episodes in the past attributed to transient global amnesia. Longstanding history of chronic headaches and migraines for which she has had referral to pain clinic, botox injections (not effective), gabapentin for headache prophylaxis, as needed triptans, OT medications.  Brain MRI with no evidence for stroke, did demonstrate benign developmental venous anomaly/venous angioma in left frontal lobe, concern for possible contributor to seizures.  - Neurology consulted, cEEG overnight without evidence for seizure activity  - Plan for outpatient neurology follow-up, as well as repeat MRI/MRA in 1 year with possible DSA at that time if changes  - Resumed  outpatient headache regimen     Hyperlipidemia  . Had not been taking pravastatin prior to admission.   - Resumed prior to admission pravastatin   - Repeat lipid panel as outpatient when compliant with medications      Insomnia  Dyslipidemia  Hypothyroidism  GERD  Reynaud's syndrome  - Continue outpatient regimen     Asymptomatic Rule Out of COVID-19 infection  This patient was evaluated during a global COVID-19 pandemic, which necessitated consideration that the patient might be at risk for infection with the SARS-CoV-2 virus that causes COVID-19. Applicable protocols for evaluation were followed during the patient's care.   - COVID-19 PCR negative      Consultations This Hospital Stay   NEUROLOGY IP CONSULT    Code Status   Full Code    Time Spent on this Encounter   I, Zack Lauren MD, personally saw the patient today and spent greater than 30 minutes discharging this patient.       Zack Lauren MD  [facility]  ______________________________________________________________________    Physical Exam   Vital Signs: Temp: 98  F (36.7  C) Temp src: Oral BP: 101/66 Pulse: 60   Resp: 16 SpO2: 94 % O2 Device: None (Room air)    Weight: 0 lbs 0 oz    Constitutional: Well-appearing, NAD  Respiratory: Clear to auscultation bilaterally, good air movement bilaterally  Cardiovascular: RRR, no peripheral edema  GI: Soft, non-tender, non-distended.    Skin/Integumen: Warm, dry  Neuro: Alert. Responding to questions appropriately. 5/5 upper and lower extremity strength symmetric bilaterally.         Primary Care Physician   Alondra Foster    Discharge Disposition   Discharged to home  Condition at discharge: Stable      Discharge Orders      MRA Brain ([place] [facility] [place]) wo & w Contrast     MR Brain w/o & w Contrast     Reason for your hospital stay    You were hospitalized for an episode of confusion and memory loss.     Follow-up and recommended labs and tests     Follow-up with  general neurology in 6-10 weeks. Repeat MRI/MRI with and without contrast in 1 year and follow-up at that time again with general neurology, if changes at that time should be referred to neuro IR.     Activity    Your activity upon discharge: activity as tolerated     Diet    Follow this diet upon discharge:     Regular Diet Adult     Discharge Medications   Discharge Medication List as of 9/28/2021  2:13 PM      CONTINUE these medications which have NOT CHANGED    Details   albuterol (PROAIR HFA, PROVENTIL HFA, VENTOLIN HFA) 108 (90 BASE) MCG/ACT inhaler Inhale 2 puffs into the lungs every 6 hours as needed , Historical      ALPHA LIPOIC ACID PO Take 200 mg by mouth daily, Historical      aspirin 81 MG tablet Take 81 mg by mouth daily , Historical      Calcium-Vitamin D 600-200 MG-UNIT TABS Take 1 tablet by mouth At Bedtime , Historical      citalopram (CELEXA) 10 MG tablet Take 1 tablet (10 mg) by mouth daily, Disp-90 tablet, R-1, E-Prescribe      cyanocobalamin (VITAMIN B-12) 1000 MCG SUBL sublingual tablet Place 1,000 mcg under the tongue daily, Historical      EMGALITY 120 MG/ML SOSY every 28 days , R-1, SUSAN, Historical      fexofenadine (ALLEGRA) 180 MG tablet Take 180 mg by mouth daily, Historical      gabapentin (NEURONTIN) 300 MG capsule Take 1 capsule (300 mg) by mouth 4 times daily, Disp-360 capsule, R-3, E-Prescribe      levothyroxine (SYNTHROID/LEVOTHROID) 88 MCG tablet Take 1 tablet (88 mcg) by mouth daily Except skip Sunday dose, Disp-90 tablet, R-3, E-Prescribe      magnesium oxide (MAG-OX) 400 MG tablet Take 800 mg by mouth At Bedtime, Historical      Multiple vitamin TABS Take 3 tablets by mouth daily , Historical      naratriptan (AMERGE) 2.5 MG tablet TAKE 1 TABLET (2.5 MG) BY ORAL ROUTE ONCE MAY REPEAT AFTER 4 HOURS, R-3, Historical      omeprazole (PRILOSEC) 20 MG capsule Take 20 mg by mouth daily (with dinner) , Historical      pravastatin (PRAVACHOL) 10 MG tablet Take 1 tablet (10 mg) by  mouth daily, Disp-90 tablet, R-0, E-PrescribeNote  Change in dose from 20mg to 10mg, pt having difficulty in cutting tab      triamcinolone (NASACORT) 55 MCG/ACT nasal inhaler Spray 1 spray in nostril daily, Disp-1 Bottle, R-1, E-Prescribe      Vitamin D (Cholecalciferol) 50 MCG (2000 UT) CAPS Take 2,000 Units by mouth daily , Historical      zolpidem (AMBIEN) 10 MG tablet Take 1 tablet (10 mg) by mouth nightly as needed for sleep, Disp-30 tablet, R-0, E-Prescribe             Significant Results and Procedures   Most Recent 3 CBC's:  Recent Labs   Lab Test 09/26/21 1916 02/06/19  1337 07/20/17  1654 10/14/16  0739 10/14/16  0739   WBC 7.3  --  5.9  --  5.4   HGB 13.7 13.6 13.0   < > 13.9   MCV 96 96.0 96   < > 93     --  240  --  257    < > = values in this interval not displayed.     Most Recent 3 BMP's:  Recent Labs   Lab Test 09/26/21 1916 04/16/21  1117 08/14/20  1405 06/13/19  1150 06/13/19  1150 01/22/18  0750 01/22/18  0750    141.0 144.0   < > 138   < > 140   POTASSIUM 3.9 4.2 3.9   < > 4.5   < > 4.2   CHLORIDE 103 109.0 101.0   < > 106   < > 106   CO2 27 28.0 28.0   < > 26   < > 26   BUN 20 20.0 16.0   < > 14   < > 21   CR 0.85 0.8 0.9   < > 0.72   < > 0.82   ANIONGAP 7  --   --   --  5  --  8   ERIC 9.0 9.3 9.1   < > 8.7   < > 8.7   GLC 94 95.0 91.0   < > 76   < > 94    < > = values in this interval not displayed.     Most Recent 2 LFT's:  Recent Labs   Lab Test 09/26/21 1916 04/16/21  1117   AST 29 39.0   ALT 36 26.0   ALKPHOS 93 75.0   BILITOTAL 0.3 0.6     Most Recent 3 INR's:No lab results found.  Most Recent 3 Troponin's:No lab results found.  Most Recent 3 BNP's:No lab results found.  Most Recent Cholesterol Panel:  Recent Labs   Lab Test 09/27/21  1015   CHOL 290*   *   HDL 65   TRIG 154*     Most Recent 6 Bacteria Isolates From Any Culture (See EPIC Reports for Culture Details):No lab results found.  Most Recent TSH and T4:  Recent Labs   Lab Test 04/16/21  1125 06/13/19  1150  02/06/19  1400   TSH 1.34   < > 0.36*   T4  --   --  1.05    < > = values in this interval not displayed.     Most Recent Hemoglobin A1c:  Recent Labs   Lab Test 09/27/21  1015   A1C 5.6     Most Recent Urinalysis:No lab results found.  Most Recent ABG:No lab results found.  Most Recent ESR & CRP:  Recent Labs   Lab Test 07/20/17  1654   CRP <2.9   ,   Results for orders placed or performed during the hospital encounter of 09/26/21   MR Brain w/o & w Contrast    Narrative    EXAM: MR BRAIN W/O AND W CONTRAST  LOCATION: [facility]  DATE/TIME: 9/26/2021 8:38 PM    INDICATION: Inability to make new memories over the last half-hour, transient global amnesia.  COMPARISON: Limited sequences brain MRI 11/09/2015 consisting of sagittal T1 and coronal FLAIR only.  CONTRAST: 6 mL Gadavist.  TECHNIQUE: Routine multiplanar multisequence head MRI without and with intravenous contrast.    FINDINGS:  INTRACRANIAL CONTENTS: There is no evidence for recent acute or subacute infarction. No evidence for restricted diffusion abnormality. Corpus callosum is normal. No mass, acute hemorrhage, or extra-axial fluid collections. Scattered nonspecific T2/FLAIR   hyperintensities within the cerebral white matter most consistent with minimal chronic microvascular ischemic change. Mild generalized cerebral atrophy. No hydrocephalus. Normal position of the cerebellar tonsils. Enhancement is compatible with benign   developmental venous anomaly in the paramedian left frontal lobe anteriorly with serpiginous morphology and a prominent cortical draining vein suggested. Otherwise no pathologic enhancement. No abnormal diploic space, orbital, Meckel's cave/cavernous   sinus or skull base enhancement is identified.    SELLA: No abnormality accounting for technique.    OSSEOUS STRUCTURES/SOFT TISSUES: Normal marrow signal. The major intracranial vascular flow voids are maintained. Tortuosity cavernous ICA level  bilaterally.    ORBITS: No abnormality accounting for technique.     SINUSES/MASTOIDS: Mild mucosal thickening scattered about the paranasal sinuses. Scattered fluid/membrane thickening in the mastoid air cells bilaterally.       Impression    IMPRESSION:  1.  No evidence for recent infarction. No intracranial mass effect or hemorrhage.    2.  Mild chronic ischemic changes deep white matter both cerebral hemispheres.    3.  Incidental benign developmental venous anomaly/venous angioma left frontal lobe. No additional/pathologic enhancement abnormality is identified intracranially.           Allergies   Allergies   Allergen Reactions     Cyclobenzaprine Other (See Comments)     Levofloxacin Unknown     Tendon injury (torn)     Rofecoxib Nausea     Simvastatin Other (See Comments)     Trazodone Other (See Comments)     Other reaction(s): Nightmares     Vicodin [Hydrocodone-Acetaminophen] Itching     Latex Rash

## 2021-09-28 NOTE — PLAN OF CARE
Pt here with amnesic episode. A&Ox4. Neuros intact. VSS on RA. On continuous EEG. No seizure activity noted. NPO diet, thin liquids. NPO for angio. Takes pills by mouth. Up with SBA. C/O headache 6/10, given Tylenol 650mg and received relief. Pt scoring green on the Aggression Stop Light Tool.
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
## 2021-09-28 NOTE — PLAN OF CARE
Here with amnesic episode.  Neuros stable.  Short term memory loss about episode and disorientated to exact date at times.  Denies pain.  Tele NSR.  Continuous EEG.  No seizure activity noted.  Up with SBA.  Discharge plans pending workup.

## 2021-09-28 NOTE — PROVIDER NOTIFICATION
MD Notification    Notified Person: MD     Notified Person Name: Robert Bergman    Notification Date/Time: 09/28/2021 0145    Notification Interaction: AMCOM Textpage     Purpose of Notification: BP's have been soft 84/57, asymptomatic, currently NPO. Please advise.     Orders Received: 500mL bolus NaCl order placed    Comments:

## 2021-10-01 ENCOUNTER — TELEPHONE (OUTPATIENT)
Dept: CARE COORDINATION | Facility: CLINIC | Age: 76
End: 2021-10-01

## 2021-10-01 NOTE — TELEPHONE ENCOUNTER
----- Message from Rere Everett PA-C sent at 9/28/2021 12:44 PM CDT -----  Regarding: Hospital Follow-up  Neurology Hospital Follow Up    Please schedule the patient for hospital follow up with: general neurology approximately 6-10 weeks    Diagnosis: recurrent transient global confusion x 3 episodes in 15 years (venous anomaly on MRI), overnight EEG without seizure activity, concern of possible complex migraines?    Contact: patient him/herself    For any questions, or if this time frame does not work, please write to P [facility]    Thank you    Rere Everett PA-C

## 2021-10-13 ENCOUNTER — HOSPITAL ENCOUNTER (OUTPATIENT)
Dept: MAMMOGRAPHY | Facility: CLINIC | Age: 76
Discharge: HOME | End: 2021-10-13
Attending: INTERNAL MEDICINE | Admitting: INTERNAL MEDICINE
Payer: COMMERCIAL

## 2021-10-13 DIAGNOSIS — Z12.31 VISIT FOR SCREENING MAMMOGRAM: ICD-10-CM

## 2021-10-13 PROCEDURE — 77063 BREAST TOMOSYNTHESIS BI: CPT

## 2021-10-15 ENCOUNTER — HOSPITAL ENCOUNTER (OUTPATIENT)
Dept: MRI IMAGING | Facility: CLINIC | Age: 76
End: 2021-10-15
Attending: PHYSICIAN ASSISTANT
Payer: COMMERCIAL

## 2021-10-15 DIAGNOSIS — Q27.9 VENOUS ANOMALY: ICD-10-CM

## 2021-10-15 PROCEDURE — 70553 MRI BRAIN STEM W/O & W/DYE: CPT

## 2021-10-15 PROCEDURE — 255N000002 HC RX 255 OP 636: Performed by: PHYSICIAN ASSISTANT

## 2021-10-15 PROCEDURE — 70544 MR ANGIOGRAPHY HEAD W/O DYE: CPT

## 2021-10-15 PROCEDURE — A9585 GADOBUTROL INJECTION: HCPCS | Performed by: PHYSICIAN ASSISTANT

## 2021-10-15 RX ORDER — GADOBUTROL 604.72 MG/ML
6 INJECTION INTRAVENOUS ONCE
Status: COMPLETED | OUTPATIENT
Start: 2021-10-15 | End: 2021-10-15

## 2021-10-15 RX ADMIN — GADOBUTROL 6 ML: 604.72 INJECTION INTRAVENOUS at 10:00

## 2021-10-19 PROBLEM — G44.049 CHRONIC PAROXYSMAL HEMICRANIA, NOT INTRACTABLE: Status: ACTIVE | Noted: 2021-10-19
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
## 2021-10-20 ENCOUNTER — OFFICE VISIT (OUTPATIENT)
Dept: FAMILY MEDICINE | Facility: CLINIC | Age: 76
End: 2021-10-20
Payer: COMMERCIAL

## 2021-10-20 VITALS
TEMPERATURE: 96.9 F | RESPIRATION RATE: 13 BRPM | WEIGHT: 126.5 LBS | BODY MASS INDEX: 22.06 KG/M2 | OXYGEN SATURATION: 98 % | DIASTOLIC BLOOD PRESSURE: 65 MMHG | SYSTOLIC BLOOD PRESSURE: 100 MMHG | HEART RATE: 58 BPM

## 2021-10-20 DIAGNOSIS — E78.5 HYPERLIPIDEMIA LDL GOAL <100: ICD-10-CM

## 2021-10-20 DIAGNOSIS — G45.4 TRANSIENT GLOBAL AMNESIA: Primary | ICD-10-CM

## 2021-10-20 DIAGNOSIS — G44.049 CHRONIC PAROXYSMAL HEMICRANIA, NOT INTRACTABLE: ICD-10-CM

## 2021-10-20 RX ORDER — ROSUVASTATIN CALCIUM 5 MG/1
5 TABLET, COATED ORAL DAILY
Qty: 30 TABLET | Refills: 2 | Status: SHIPPED | OUTPATIENT
Start: 2021-10-20 | End: 2022-01-05

## 2021-10-20 NOTE — NURSING NOTE
75 year old  Chief Complaint   Patient presents with     Medication Problem     cramps and sleep issues from Prevastatin     Hyperlipidemia     check in     Hospital F/U     Amnesia episode       Blood pressure 100/65, pulse 58, temperature 96.9  F (36.1  C), temperature source Skin, resp. rate 13, weight 57.4 kg (126 lb 8 oz), SpO2 98 %, not currently breastfeeding. Body mass index is 22.06 kg/m .  Patient Active Problem List   Diagnosis     Bladder neck obstruction     Constipation     Transient global amnesia     Other hammer toe (acquired)     Ostium secundum type atrial septal defect     Senile osteoporosis     Syndrome affecting cervical region     Variants of migraine     Malignant neoplasm of skin     Insomnia     Hypothyroidism     Family history of malignant neoplasm of ovary     Tear film insufficiency     Circulatory system disorder     PFO (patent foramen ovale)     Gastroesophageal reflux disease without esophagitis     ACP (advance care planning)     Amnesia     Chronic paroxysmal hemicrania, not intractable       Wt Readings from Last 2 Encounters:   10/20/21 57.4 kg (126 lb 8 oz)   06/02/21 55.8 kg (123 lb)     BP Readings from Last 3 Encounters:   10/20/21 100/65   09/28/21 101/66   04/16/21 90/53         Current Outpatient Medications   Medication     albuterol (PROAIR HFA, PROVENTIL HFA, VENTOLIN HFA) 108 (90 BASE) MCG/ACT inhaler     ALPHA LIPOIC ACID PO     aspirin 81 MG tablet     Calcium-Vitamin D 600-200 MG-UNIT TABS     citalopram (CELEXA) 10 MG tablet     cyanocobalamin (VITAMIN B-12) 1000 MCG SUBL sublingual tablet     EMGALITY 120 MG/ML SOSY     fexofenadine (ALLEGRA) 180 MG tablet     gabapentin (NEURONTIN) 300 MG capsule     levothyroxine (SYNTHROID/LEVOTHROID) 88 MCG tablet     magnesium oxide (MAG-OX) 400 MG tablet     Multiple vitamin TABS     naratriptan (AMERGE) 2.5 MG tablet     omeprazole (PRILOSEC) 20 MG capsule     pravastatin (PRAVACHOL) 10 MG tablet     triamcinolone  (NASACORT) 55 MCG/ACT nasal inhaler     Vitamin D (Cholecalciferol) 50 MCG (2000) CAPS     zolpidem (AMBIEN) 10 MG tablet     No current facility-administered medications for this visit.       Social History     Tobacco Use     Smoking status: Former Smoker     Packs/day: 0.50     Years: 10.00     Pack years: 5.00     Quit date: 1970     Years since quittin.8     Smokeless tobacco: Never Used   Substance Use Topics     Alcohol use: No     Alcohol/week: 0.0 standard drinks     Drug use: No       Health Maintenance Due   Topic Date Due     DEXA  2021     MEDICARE ANNUAL WELLNESS VISIT  2021       No results found for: PAP      2021 10:42 AM

## 2021-10-20 NOTE — PATIENT INSTRUCTIONS
"Good hydration to help with cramping    Crestor (rosuvastatin) 5mg dose daily  Contact me if cramping returns    Follow up with neurologist  Mention you sister's history and your history of \"not being able to grab thoughts\"    Ask neurologist about restless leg symptoms  Ask neurologist about taking over the Emgality prescription /naratriptan/gabapentin  "

## 2021-10-30 ENCOUNTER — HEALTH MAINTENANCE LETTER (OUTPATIENT)
Age: 76
End: 2021-10-30

## 2021-11-24 ENCOUNTER — OFFICE VISIT (OUTPATIENT)
Dept: FAMILY MEDICINE | Facility: CLINIC | Age: 76
End: 2021-11-24
Payer: COMMERCIAL

## 2021-11-24 VITALS
WEIGHT: 126.08 LBS | DIASTOLIC BLOOD PRESSURE: 77 MMHG | HEART RATE: 66 BPM | OXYGEN SATURATION: 99 % | SYSTOLIC BLOOD PRESSURE: 111 MMHG | TEMPERATURE: 96.8 F | HEIGHT: 64 IN | BODY MASS INDEX: 21.53 KG/M2 | RESPIRATION RATE: 16 BRPM

## 2021-11-24 DIAGNOSIS — R10.13 ABDOMINAL PAIN, EPIGASTRIC: Primary | ICD-10-CM

## 2021-11-24 LAB
ALBUMIN SERPL-MCNC: 3.8 G/DL (ref 3.4–5)
ALP SERPL-CCNC: 78 U/L (ref 40–150)
ALT SERPL W P-5'-P-CCNC: 32 U/L (ref 0–50)
AMYLASE SERPL-CCNC: 106 U/L (ref 30–110)
ANION GAP SERPL CALCULATED.3IONS-SCNC: 5 MMOL/L (ref 3–14)
AST SERPL W P-5'-P-CCNC: 26 U/L (ref 0–45)
BILIRUB SERPL-MCNC: 0.4 MG/DL (ref 0.2–1.3)
BUN SERPL-MCNC: 20 MG/DL (ref 7–30)
CALCIUM SERPL-MCNC: 9.2 MG/DL (ref 8.5–10.1)
CHLORIDE BLD-SCNC: 104 MMOL/L (ref 94–109)
CO2 SERPL-SCNC: 28 MMOL/L (ref 20–32)
CREAT SERPL-MCNC: 0.8 MG/DL (ref 0.52–1.04)
GFR SERPL CREATININE-BSD FRML MDRD: 72 ML/MIN/1.73M2
GLUCOSE BLD-MCNC: 77 MG/DL (ref 70–99)
LIPASE SERPL-CCNC: 312 U/L (ref 73–393)
POTASSIUM BLD-SCNC: 4.1 MMOL/L (ref 3.4–5.3)
PROT SERPL-MCNC: 7 G/DL (ref 6.8–8.8)
SODIUM SERPL-SCNC: 137 MMOL/L (ref 133–144)

## 2021-11-24 PROCEDURE — 83690 ASSAY OF LIPASE: CPT | Performed by: FAMILY MEDICINE

## 2021-11-24 PROCEDURE — 82150 ASSAY OF AMYLASE: CPT | Performed by: FAMILY MEDICINE

## 2021-11-24 PROCEDURE — 80053 COMPREHEN METABOLIC PANEL: CPT | Performed by: FAMILY MEDICINE

## 2021-11-24 ASSESSMENT — MIFFLIN-ST. JEOR: SCORE: 1038.96

## 2021-11-24 ASSESSMENT — PAIN SCALES - GENERAL: PAINLEVEL: NO PAIN (0)

## 2021-11-24 NOTE — NURSING NOTE
"76 year old  Chief Complaint   Patient presents with     Gastrointestinal Problem     for years. No pain, but sore to touch on her abdomen.     Fatigue     feeling fullness after having breakfast x2 months.     Bloated       Blood pressure 111/77, pulse 66, temperature 96.8  F (36  C), temperature source Temporal, resp. rate 16, height 1.613 m (5' 3.5\"), weight 57.2 kg (126 lb 1.3 oz), SpO2 99 %, not currently breastfeeding. Body mass index is 21.98 kg/m .  Patient Active Problem List   Diagnosis     Bladder neck obstruction     Constipation     Transient global amnesia     Other hammer toe (acquired)     Ostium secundum type atrial septal defect     Senile osteoporosis     Syndrome affecting cervical region     Variants of migraine     Malignant neoplasm of skin     Insomnia     Hypothyroidism     Family history of malignant neoplasm of ovary     Tear film insufficiency     Circulatory system disorder     PFO (patent foramen ovale)     Gastroesophageal reflux disease without esophagitis     ACP (advance care planning)     Amnesia     Chronic paroxysmal hemicrania, not intractable       Wt Readings from Last 2 Encounters:   11/24/21 57.2 kg (126 lb 1.3 oz)   10/20/21 57.4 kg (126 lb 8 oz)     BP Readings from Last 3 Encounters:   11/24/21 111/77   10/20/21 100/65   09/28/21 101/66         Current Outpatient Medications   Medication     albuterol (PROAIR HFA, PROVENTIL HFA, VENTOLIN HFA) 108 (90 BASE) MCG/ACT inhaler     ALPHA LIPOIC ACID PO     aspirin 81 MG tablet     Calcium-Vitamin D 600-200 MG-UNIT TABS     citalopram (CELEXA) 10 MG tablet     cyanocobalamin (VITAMIN B-12) 1000 MCG SUBL sublingual tablet     EMGALITY 120 MG/ML SOSY     fexofenadine (ALLEGRA) 180 MG tablet     gabapentin (NEURONTIN) 300 MG capsule     levothyroxine (SYNTHROID/LEVOTHROID) 88 MCG tablet     magnesium oxide (MAG-OX) 400 MG tablet     Multiple vitamin TABS     naratriptan (AMERGE) 2.5 MG tablet     omeprazole (PRILOSEC) 20 MG capsule "     rosuvastatin (CRESTOR) 5 MG tablet     triamcinolone (NASACORT) 55 MCG/ACT nasal inhaler     Vitamin D (Cholecalciferol) 50 MCG (2000) CAPS     zolpidem (AMBIEN) 10 MG tablet     No current facility-administered medications for this visit.       Social History     Tobacco Use     Smoking status: Former Smoker     Packs/day: 0.50     Years: 10.00     Pack years: 5.00     Quit date: 1970     Years since quittin.9     Smokeless tobacco: Never Used   Substance Use Topics     Alcohol use: No     Alcohol/week: 0.0 standard drinks     Drug use: No       Health Maintenance Due   Topic Date Due     DEXA  2021     MEDICARE ANNUAL WELLNESS VISIT  2021       No results found for: MIGUELINA Jennings CMA, [facility]  2021 12:57 PM

## 2021-11-24 NOTE — PROGRESS NOTES
"OVERVIEW: Rakel Parish is a 76 year old female who presents to [facility] today for Gastrointestinal Problem (for years. No pain, but sore to touch on her abdomen.), Fatigue (feeling fullness after having breakfast x2 months.), and Bloated        ASSESSMENT/PLAN:    1. Nancy is a 75 yo with epigastric discomfort. Has been evaluated by Gastroenterology and they recommended Omeprazole 1-2 times/day and also a visit with a Nutritionist.    PLAN  - Will check labs including Liver function tests and test for pancreatic function  - Continue Omeprazole twice daily  - Take Probiotics  - Keep food diary and record symptoms. Consider avoiding gluten and dairy  - Schedule visit with Dietician, e.g. Susie at our clinic      --Azael Rebollar MD      SUBJECTIVE:     Has had \"stomach issues\" for years.   Nancy has some mild trouble telling me a history given trouble with word finding.     I reviewed some past notes from [facility],e.g. in April 2020. They suggested non-ulcer dyspepsia and recommended Omeprazole, and if that doesn't work, a supplement, Irbogast. And, also a visit with a nutritionist to assess for food sensitivity. Nancy notes that raw cranberries and oranges led to the current recurrence.     Main thing seems to be \"belching\" after eating. Feels that food doesn't want to go down.   Has increased her Omeprazole recently with some relief but then went back to the lower dose and symptoms recurred.   \"Gets scared\" as her sister had ovarian cancer and her first symptom was stomach pains.     For breakfast, had cheerios, bananas, blueberries and nuts.   Has not yet had lunch.   Last BM was yesterday. Her BM have a normal mild fluctuation from soft to harder.   Her weight has been stable.     Wt Readings from Last 5 Encounters:   11/24/21 57.2 kg (126 lb 1.3 oz)   10/20/21 57.4 kg (126 lb 8 oz)   06/02/21 55.8 kg (123 lb)   04/16/21 58.5 kg (129 lb)   08/14/20 56.9 kg (125 lb 8 oz) "       Review Of Systems:    Has otherwise been in usual state of health, e.g.   Cardiovascular: negative  Respiratory: No shortness of breath, dyspnea on exertion, cough, or hemoptysis  Genitourinary: negative    Problem list per EMR:  Patient Active Problem List   Diagnosis     Bladder neck obstruction     Constipation     Transient global amnesia     Other hammer toe (acquired)     Ostium secundum type atrial septal defect     Senile osteoporosis     Syndrome affecting cervical region     Variants of migraine     Malignant neoplasm of skin     Insomnia     Hypothyroidism     Family history of malignant neoplasm of ovary     Tear film insufficiency     Circulatory system disorder     PFO (patent foramen ovale)     Gastroesophageal reflux disease without esophagitis     ACP (advance care planning)     Amnesia     Chronic paroxysmal hemicrania, not intractable       Current Outpatient Medications   Medication Sig Dispense Refill     albuterol (PROAIR HFA, PROVENTIL HFA, VENTOLIN HFA) 108 (90 BASE) MCG/ACT inhaler Inhale 2 puffs into the lungs every 6 hours as needed        ALPHA LIPOIC ACID PO Take 200 mg by mouth daily       aspirin 81 MG tablet Take 81 mg by mouth daily        Calcium-Vitamin D 600-200 MG-UNIT TABS Take 1 tablet by mouth At Bedtime        citalopram (CELEXA) 10 MG tablet Take 1 tablet (10 mg) by mouth daily 90 tablet 1     cyanocobalamin (VITAMIN B-12) 1000 MCG SUBL sublingual tablet Place 1,000 mcg under the tongue daily       EMGALITY 120 MG/ML SOSY every 28 days   1     fexofenadine (ALLEGRA) 180 MG tablet Take 180 mg by mouth daily       gabapentin (NEURONTIN) 300 MG capsule Take 1 capsule (300 mg) by mouth 4 times daily 360 capsule 3     levothyroxine (SYNTHROID/LEVOTHROID) 88 MCG tablet Take 1 tablet (88 mcg) by mouth daily Except skip Sunday dose 90 tablet 3     magnesium oxide (MAG-OX) 400 MG tablet Take 800 mg by mouth At Bedtime       Multiple vitamin TABS Take 3 tablets by mouth daily     "    naratriptan (AMERGE) 2.5 MG tablet TAKE 1 TABLET (2.5 MG) BY ORAL ROUTE ONCE MAY REPEAT AFTER 4 HOURS  3     omeprazole (PRILOSEC) 20 MG capsule Take 40 mg by mouth daily (with dinner)        rosuvastatin (CRESTOR) 5 MG tablet Take 1 tablet (5 mg) by mouth daily 30 tablet 2     triamcinolone (NASACORT) 55 MCG/ACT nasal inhaler Spray 1 spray in nostril daily (Patient taking differently: Spray 2 sprays in nostril daily ) 1 Bottle 1     Vitamin D (Cholecalciferol) 50 MCG (2000 UT) CAPS Take 2,000 Units by mouth daily        zolpidem (AMBIEN) 10 MG tablet Take 1 tablet (10 mg) by mouth nightly as needed for sleep 30 tablet 0       Allergies   Allergen Reactions     Cyclobenzaprine Other (See Comments)     Levofloxacin Unknown     Tendon injury (torn)     Rofecoxib Nausea     Simvastatin Other (See Comments)     Trazodone Other (See Comments)     Other reaction(s): Nightmares     Vicodin [Hydrocodone-Acetaminophen] Itching     Latex Rash        Social:   Lives in [place], [place]      OBJECTIVE    Vitals: /77 (BP Location: Right arm, Patient Position: Chair, Cuff Size: Adult Regular)   Pulse 66   Temp 96.8  F (36  C) (Temporal)   Resp 16   Ht 1.613 m (5' 3.5\")   Wt 57.2 kg (126 lb 1.3 oz)   SpO2 99%   BMI 21.98 kg/m    BMI= Body mass index is 21.98 kg/m .  Appears well and in no distress.    Gait is normal.  Neck is supple without lymphadenopathy.     Cardiovascular-regular rate and rhythm without murmur    Lungs-clear to auscultation    Abdomen-normal bowel sounds.  No organomegaly.  No areas of tenderness.  No rebound.  No guarding.    SEE TOP OF NOTE FOR ASSESSMENT AND PLAN    --Azael Rebollar MD  [facility], Department of Family Medicine and Community Health  "

## 2021-12-01 ENCOUNTER — TRANSFERRED RECORDS (OUTPATIENT)
Dept: HEALTH INFORMATION MANAGEMENT | Facility: CLINIC | Age: 76
End: 2021-12-01
Payer: COMMERCIAL
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
## 2021-12-14 NOTE — PATIENT INSTRUCTIONS
AFTER VISIT SUMMARY (AVS):    At today's visit we thoroughly discussed various diagnostic possibilities for your symptoms (most likely consistent with transient global amnesia), evaluation results, and the plan.  I do not think that any additional neurologic evaluation is needed for your September 2021 episode at the present time.    No new medications.    We discussed that if you desire to transfer the care of headache disorder, please schedule a separate visit.  At that time I would be able to take over your Emgality prescription.    Next follow-up appointment is on as needed basis.    Please do not hesitate to call me with any questions or concerns.    Thanks.

## 2021-12-15 ENCOUNTER — OFFICE VISIT (OUTPATIENT)
Dept: NEUROLOGY | Facility: CLINIC | Age: 76
End: 2021-12-15
Attending: PSYCHIATRY & NEUROLOGY
Payer: COMMERCIAL

## 2021-12-15 VITALS
OXYGEN SATURATION: 98 % | SYSTOLIC BLOOD PRESSURE: 105 MMHG | TEMPERATURE: 96.1 F | WEIGHT: 129.2 LBS | DIASTOLIC BLOOD PRESSURE: 69 MMHG | BODY MASS INDEX: 22.89 KG/M2 | HEIGHT: 63 IN | HEART RATE: 61 BPM

## 2021-12-15 DIAGNOSIS — G45.4 TRANSIENT GLOBAL AMNESIA: Primary | ICD-10-CM

## 2021-12-15 DIAGNOSIS — R51.9 HEADACHE DISORDER: ICD-10-CM

## 2021-12-15 PROCEDURE — G0463 HOSPITAL OUTPT CLINIC VISIT: HCPCS

## 2021-12-15 PROCEDURE — 99205 OFFICE O/P NEW HI 60 MIN: CPT | Performed by: PSYCHIATRY & NEUROLOGY

## 2021-12-15 ASSESSMENT — MONTREAL COGNITIVE ASSESSMENT (MOCA)
6. READ LIST OF DIGITS [FORWARD/BACKWARD]: 2
12. MEMORY INDEX SCORE: 5
4. NAME EACH OF THE THREE ANIMALS SHOWN: 3
WHAT IS THE TOTAL SCORE (OUT OF 30): 30
VISUOSPATIAL/EXECUTIVE SUBSCORE: 5
13. ORIENTATION SUBSCORE: 6
10. [FLUENCY] NAME WORDS STARTING WITH DESIGNATED LETTER: 1
7. [VIGILENCE] TAP WHEN HEARING DESIGNATED LETTER: 1
11. FOR EACH PAIR OF WORDS, WHAT CATEGORY DO THEY BELONG TO (OUT OF 2): 2
WHAT LEVEL OF EDUCATION WAS ATTAINED: 0
8. SERIAL SUBTRACTION OF 7S: 3
9. REPEAT EACH SENTENCE: 2

## 2021-12-15 ASSESSMENT — MIFFLIN-ST. JEOR: SCORE: 1045.18
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
## 2022-01-04 DIAGNOSIS — E78.5 HYPERLIPIDEMIA LDL GOAL <100: ICD-10-CM

## 2022-01-05 RX ORDER — ROSUVASTATIN CALCIUM 5 MG/1
5 TABLET, COATED ORAL DAILY
Qty: 30 TABLET | Refills: 0 | Status: SHIPPED | OUTPATIENT
Start: 2022-01-05 | End: 2022-01-11

## 2022-01-05 NOTE — TELEPHONE ENCOUNTER
Medication requested: rosuvastatin (CRESTOR) 5 MG tablet  Last office visit: 10/20/21  [facility] appointments: none  Medication last refilled: 10/20/21 #30 + 3 refills  Last qualifying labs: Lipids: 9/27/21     staff to contact patient for scheduling a Lab Visit.     Medication is being filled for 1 time refill only due to:  Patient needs labs Needs LIPID rechecked.   LOV 10/20/21:  Plan: rosuvastatin (CRESTOR) 5 MG tablet        Start taking rosuvastatin 5 mg daily, starting with a 30 tablet trial. Encouraged staying well-hydrated to help with cramping. Instructed to contact me if symptoms persist or worsen.  Recheck lipid profile in 6-8 wks.     Susy Johnson MS RN-BC  01/05/22  9:39 AM

## 2022-01-07 ENCOUNTER — VIRTUAL VISIT (OUTPATIENT)
Dept: FAMILY MEDICINE | Facility: CLINIC | Age: 77
End: 2022-01-07
Payer: COMMERCIAL

## 2022-01-07 DIAGNOSIS — E78.5 HYPERLIPIDEMIA LDL GOAL <100: ICD-10-CM

## 2022-01-07 DIAGNOSIS — R13.10 DYSPHAGIA, UNSPECIFIED TYPE: Primary | ICD-10-CM

## 2022-01-07 NOTE — NURSING NOTE
"76 year old  Chief Complaint   Patient presents with     Gastrophageal Reflux     \"every time I eat it zaps my energy\"     Fatigue       not currently breastfeeding. There is no height or weight on file to calculate BMI.  Patient Active Problem List   Diagnosis     Bladder neck obstruction     Constipation     Transient global amnesia     Other hammer toe (acquired)     Ostium secundum type atrial septal defect     Senile osteoporosis     Syndrome affecting cervical region     Variants of migraine     Malignant neoplasm of skin     Insomnia     Hypothyroidism     Family history of malignant neoplasm of ovary     Tear film insufficiency     Circulatory system disorder     PFO (patent foramen ovale)     Gastroesophageal reflux disease without esophagitis     ACP (advance care planning)     Amnesia     Chronic paroxysmal hemicrania, not intractable       Wt Readings from Last 2 Encounters:   12/15/21 58.6 kg (129 lb 3.2 oz)   11/24/21 57.2 kg (126 lb 1.3 oz)     BP Readings from Last 3 Encounters:   12/15/21 105/69   11/24/21 111/77   10/20/21 100/65         Current Outpatient Medications   Medication     albuterol (PROAIR HFA, PROVENTIL HFA, VENTOLIN HFA) 108 (90 BASE) MCG/ACT inhaler     ALPHA LIPOIC ACID PO     aspirin 81 MG tablet     Calcium-Vitamin D 600-200 MG-UNIT TABS     citalopram (CELEXA) 10 MG tablet     cyanocobalamin (VITAMIN B-12) 1000 MCG SUBL sublingual tablet     EMGALITY 120 MG/ML SOSY     fexofenadine (ALLEGRA) 180 MG tablet     gabapentin (NEURONTIN) 300 MG capsule     levothyroxine (SYNTHROID/LEVOTHROID) 88 MCG tablet     magnesium oxide (MAG-OX) 400 MG tablet     Multiple vitamin TABS     naratriptan (AMERGE) 2.5 MG tablet     omeprazole (PRILOSEC) 20 MG capsule     rosuvastatin (CRESTOR) 5 MG tablet     triamcinolone (NASACORT) 55 MCG/ACT nasal inhaler     Vitamin D (Cholecalciferol) 50 MCG (2000 UT) CAPS     zolpidem (AMBIEN) 10 MG tablet     No current facility-administered medications for " this visit.       Social History     Tobacco Use     Smoking status: Former Smoker     Packs/day: 0.50     Years: 10.00     Pack years: 5.00     Quit date: 1970     Years since quittin.0     Smokeless tobacco: Never Used   Substance Use Topics     Alcohol use: No     Alcohol/week: 0.0 standard drinks     Drug use: No       Health Maintenance Due   Topic Date Due     DEXA  2021     MEDICARE ANNUAL WELLNESS VISIT  2021       No results found for: PAP      2022 11:34 AM

## 2022-01-07 NOTE — PROGRESS NOTES
Nancy is a 76 year old who is being evaluated via a billable video visit.      How would you like to obtain your AVS? MyChart  If the video visit is dropped, the invitation should be resent by: Send to e-mail at: [email or web address]  Will anyone else be joining your video visit? No      Video Start Time:   Start time: 11:43 am  End time: 12:13 am  Total : 30 minutes      ASSESSMENT:  (R13.10) Dysphagia, unspecified type  (primary encounter diagnosis)  Comment: persistent, worsening dysphagia, despite consistent use of omeprazole 20mg daily. Follows with [facility]  Plan: CBC with platelets, Ferritin, Comprehensive         metabolic panel, Adult Gastro Ref - Procedure         Only        I called [facility] to help facilitate EGD for evaluation of dysphagia on 1/11/22. They will call to schedule this procedure.    (E78.5) Hyperlipidemia LDL goal <100  Comment: due for lipid panel, currently on rosuvastatin 5mg daily  Plan: Lipid Profile        She will make future lab visit for lipid profile  Recent Labs   Lab Test 01/11/22  0939 09/27/21  1015 04/16/21  1117 08/14/20  1400 02/06/19  1337   CHOL 183 290* 234.0*   < > 235.0*   HDL 79 65 71.0   < > 87.0   LDL 82 194* 134.0*   < > 101.0   TRIG 111 154* 144.0   < > 234.0*   CHOLHDLRATIO  --   --  3.3  --  2.7    < > = values in this interval not displayed.   LDL cholesterol is in target range with rosuvastatin 5mg dose, will send refills to pharmacy.     34 minutes spend on the date of this encounter doing chart review, history and exam, documentation and further activities as noted above.     34 minutes spend on the date of this encounter doing chart review, history and exam, documentation and further activities as noted above.       Alondra Foster MD  Internal Medicine/Pediatrics          Subjective   Nancy is a 76 year old who presents for the following health issues    Hyperlipidemia  Nancy takes Rosuvastatin 5mg daily. She has been doing this for 2 months  "without any side effects. Previously did not tolerate statin use. She takes her medication at noon. She has had 2 ER visits for transient global amnesia and she reports her memory is not as sharp as previous. She is in need of a lab appointment so we discussed arranging this today.     Recent Labs   Lab Test 09/27/21  1015 04/16/21  1117 08/14/20  1400 02/06/19  1337   CHOL 290* 234.0*   < > 235.0*   HDL 65 71.0   < > 87.0   * 134.0*   < > 101.0   TRIG 154* 144.0   < > 234.0*   CHOLHDLRATIO  --  3.3  --  2.7    < > = values in this interval not displayed.       MADHU Cruz describes longstanding GI issues. She follows with [facility] and had a virtual visit with them 12/1/21. At that time she was diagnosed with dyspepsia and abdominal bloating. They suggested peppermint oil drops and it seemed to help after eating.  She is taking omeprazole 20mg bid. Not using any other medications for her symptoms. No nocturnal symptoms. No weight loss. Tends toward constipation, taking a fiber supplement. She was instructed to follow up with GI if symptoms were not improving.     Nancy reports that shortly after eating she begins to belch, then \"energy is sapped and I can't think straight\".   Symptom used to last about an hour but she reports her current symptoms have lasted about 3 days.   No fever , chills, no COVID symptoms. No ill contacts.     No nausea or vomiting.   She reports that she has noted food \"not going down\" and she sometimes regurgitates. This seemed to begin in Nov 2021 and has happened again recently.   She has been eating smaller meals. Cannot identify a particular food that gets stuck but reports fried foods can worsen belching and bloating.     Habits  Tea/coffee: 1 green tea, herbal tea  Etoh: 1 glass 2x per week, beer, nikhil    Wt Readings from Last 4 Encounters:   12/15/21 58.6 kg (129 lb 3.2 oz)   11/24/21 57.2 kg (126 lb 1.3 oz)   10/20/21 57.4 kg (126 lb 8 oz)   06/02/21 55.8 kg (123 lb) " "      Memory changes  Nancy is \"feeling befuddled\" today. Symptoms wax and wane. Yesterday, she could not find her car.   Other days are better. She has hx of transient global amnesia and is currently working with a neurologist. The last visit was 12/15/21 and they did not feel further testing was indicated. She has had 2 brain MRI, negative for stroke. Negative EEG, no seizure activity. They felt these episodes could represent transient global amnesia.     Objective    Vitals - Patient Reported  Weight (Patient Reported): 55.3 kg (122 lb)        Physical Exam   GENERAL: Alert, pleasant appears a bit anxious  NEURO:   She exhibits some issues with fluent speech, finding thoughts/words. Content of speech is normal.           Video-Visit Details    Type of service:  Video Visit  Originating Location (pt. Location): Home    Distant Location (provider location):  [facility]     Platform used for Video Visit: Rafael  "

## 2022-01-11 ENCOUNTER — LAB (OUTPATIENT)
Dept: LAB | Facility: CLINIC | Age: 77
End: 2022-01-11
Attending: INTERNAL MEDICINE
Payer: COMMERCIAL

## 2022-01-11 DIAGNOSIS — R13.10 DYSPHAGIA, UNSPECIFIED TYPE: ICD-10-CM

## 2022-01-11 DIAGNOSIS — E78.5 HYPERLIPIDEMIA LDL GOAL <100: ICD-10-CM

## 2022-01-11 LAB
ALBUMIN SERPL-MCNC: 3.7 G/DL (ref 3.4–5)
ALP SERPL-CCNC: 74 U/L (ref 40–150)
ALT SERPL W P-5'-P-CCNC: 31 U/L (ref 0–50)
ANION GAP SERPL CALCULATED.3IONS-SCNC: 4 MMOL/L (ref 3–14)
AST SERPL W P-5'-P-CCNC: 26 U/L (ref 0–45)
BILIRUB SERPL-MCNC: 0.4 MG/DL (ref 0.2–1.3)
BUN SERPL-MCNC: 16 MG/DL (ref 7–30)
CALCIUM SERPL-MCNC: 8.7 MG/DL (ref 8.5–10.1)
CHLORIDE BLD-SCNC: 107 MMOL/L (ref 94–109)
CHOLEST SERPL-MCNC: 183 MG/DL
CO2 SERPL-SCNC: 26 MMOL/L (ref 20–32)
CREAT SERPL-MCNC: 0.91 MG/DL (ref 0.52–1.04)
ERYTHROCYTE [DISTWIDTH] IN BLOOD BY AUTOMATED COUNT: 12.3 % (ref 10–15)
FASTING STATUS PATIENT QL REPORTED: YES
FERRITIN SERPL-MCNC: 54 NG/ML (ref 8–252)
GFR SERPL CREATININE-BSD FRML MDRD: 65 ML/MIN/1.73M2
GLUCOSE BLD-MCNC: 89 MG/DL (ref 70–99)
HCT VFR BLD AUTO: 42 % (ref 35–47)
HDLC SERPL-MCNC: 79 MG/DL
HGB BLD-MCNC: 13.4 G/DL (ref 11.7–15.7)
LDLC SERPL CALC-MCNC: 82 MG/DL
MCH RBC QN AUTO: 31.3 PG (ref 26.5–33)
MCHC RBC AUTO-ENTMCNC: 31.9 G/DL (ref 31.5–36.5)
MCV RBC AUTO: 98 FL (ref 78–100)
NONHDLC SERPL-MCNC: 104 MG/DL
PLATELET # BLD AUTO: 261 10E3/UL (ref 150–450)
POTASSIUM BLD-SCNC: 4.4 MMOL/L (ref 3.4–5.3)
PROT SERPL-MCNC: 7 G/DL (ref 6.8–8.8)
RBC # BLD AUTO: 4.28 10E6/UL (ref 3.8–5.2)
SODIUM SERPL-SCNC: 137 MMOL/L (ref 133–144)
TRIGL SERPL-MCNC: 111 MG/DL
WBC # BLD AUTO: 4.8 10E3/UL (ref 4–11)

## 2022-01-11 PROCEDURE — 36415 COLL VENOUS BLD VENIPUNCTURE: CPT

## 2022-01-11 PROCEDURE — 85027 COMPLETE CBC AUTOMATED: CPT | Mod: GA

## 2022-01-11 PROCEDURE — 80061 LIPID PANEL: CPT

## 2022-01-11 PROCEDURE — 80053 COMPREHEN METABOLIC PANEL: CPT

## 2022-01-11 PROCEDURE — 82728 ASSAY OF FERRITIN: CPT | Mod: GA

## 2022-01-11 RX ORDER — ROSUVASTATIN CALCIUM 5 MG/1
5 TABLET, COATED ORAL DAILY
Qty: 90 TABLET | Refills: 3 | Status: SHIPPED | OUTPATIENT
Start: 2022-01-11 | End: 2022-10-24

## 2022-01-18 ENCOUNTER — TRANSFERRED RECORDS (OUTPATIENT)
Dept: HEALTH INFORMATION MANAGEMENT | Facility: CLINIC | Age: 77
End: 2022-01-18
Payer: COMMERCIAL

## 2022-01-18 DIAGNOSIS — G47.00 PERSISTENT INSOMNIA: ICD-10-CM

## 2022-01-18 RX ORDER — ZOLPIDEM TARTRATE 10 MG/1
10 TABLET ORAL
Qty: 30 TABLET | Refills: 0 | Status: SHIPPED | OUTPATIENT
Start: 2022-01-18 | End: 2022-05-05

## 2022-01-18 NOTE — TELEPHONE ENCOUNTER
Who is calling? Patient  Medication name: zolpidem  Is this a refill request? Yes  Have they contacted the pharmacy?  Yes  Pharmacy:   [id number] - [facility] - [address] & [place] [address] [place] [address]  Phone: [phone] Fax: [phone]    Question/Concern:    Would patient like a call back? Dalia Mittal

## 2022-01-18 NOTE — TELEPHONE ENCOUNTER
Medication requested: ZOLPIDEM  Last office visit: 1/7/22  [facility] appointments: NONE  Medication last refilled: 8/3/21 #30 per  review  Last qualifying labs: n/a    Routing refill request to provider for review/approval because: Drug not on the G refill protocol     Susy Johnson MS RN-BC  01/18/22  12:16 PM

## 2022-01-24 DIAGNOSIS — F41.1 GAD (GENERALIZED ANXIETY DISORDER): ICD-10-CM

## 2022-01-24 RX ORDER — CITALOPRAM HYDROBROMIDE 10 MG/1
10 TABLET ORAL DAILY
Qty: 90 TABLET | Refills: 1 | Status: SHIPPED | OUTPATIENT
Start: 2022-01-24 | End: 2022-06-17

## 2022-01-24 NOTE — PROGRESS NOTES
"Nancy is a 76 year old who is being evaluated via a billable video visit.      How would you like to obtain your AVS? MyChart  If the video visit is dropped, the invitation should be resent by: Text to cell phone: [phone]  Will anyone else be joining your video visit? No      Video Start Time: 2:57 PM   Video End Time: 3:09 PM  Total video time: 12 minutes    Assessment & Plan     (A09) Traveler's diarrhea  (primary encounter diagnosis)  Comment: upcoming travel to [place] and [facility], past hx of traveler's diarrhea after travel to [place]  Plan: azithromycin (ZITHROMAX) 500 MG tablet        Rx given for azithromycin 500 mg tablets, take one tablet BID at the onset of traveler's diarrhea for up to 3 days. Can also use Pepto Bismol up to four times daily. Recommend drinking filtered/bottled water, opting for foods that can be peeled like mangos or bananas.       Alondra Foster MD  Internal Medicine/Pediatrics      I, Suzie Cruz, am serving as a scribe to document services personally performed by Dr. Alondra Foster, based on data collection and the provider's statements to me. Dr. Foster has reviewed, edited, and approved the above note.       Subjective   Nancy is a 76 year old who presents for the following health issues:    HPI     Medication for upcoming travel  Nancy is traveling to [facility] for 3 weeks and [facility] for 1 month, leaving on Sunday (1/30/22). She has had problems with traveler's diarrhea after going to [place] in the past, requesting an Rx for an antibiotic to use in case this happens again. She will be washing her produce in iodine and is renting a house, which she believes has filtered water.       Dysphagia  I saw Nancy in a virtual visit on 1/7/22, at which time she reported belching and \"sapped energy\" immediately after eating. Also endorsed dysphagia with occasional regurgitation beginning in Nov 2021. Symptoms not improved with use of omeprazole 20 mg daily. I referred her " for an EGD, scheduled for 1/11/22.     Today, Nancy reports that her symptoms have continued and are typically worse after breakfast, better after dinner. Abdomen can feel sore and bloated. BMs are fairly normal. No nausea. She completed the EGD, but results are not yet available on Epic. Nancy recalls that they found a benign-appearing polyp, no stricture or narrowing. It was recommended that she take GasX and peppermint tablets.       Review of Systems   Constitutional, HEENT, cardiovascular, pulmonary, gi and gu systems are negative, except as otherwise noted.      Objective           Vitals:  No vitals were obtained today due to virtual visit.    Physical Exam   GENERAL: Healthy, alert and no distress          Video-Visit Details    Type of service:  Video Visit    Video End Time:3:09 PM    Originating Location (pt. Location): Home    Distant Location (provider location):  [facility]     Platform used for Video Visit: [employer]

## 2022-01-24 NOTE — TELEPHONE ENCOUNTER
Who is calling? Patient  Medication name: citalopram (CELEXA) 10 MG tablet  Is this a refill request? Yes  Have they contacted the pharmacy?  Yes  Pharmacy:   [id number] - [place] - [address] & [place] [address] [place] [address]  Phone: [phone] Fax: [phone]  Question/Concern: Refill request for three months  Would patient like a call back? No

## 2022-01-24 NOTE — TELEPHONE ENCOUNTER
Citalopram (Celexa) 10 mg    Last Office Visit: 1/7/22  Future [facility] Appointments: 1/25/22  Medication last refilled: 4/27/21 #90 with 1 refill(s)    Last PHQ-9 3/19/2020 8/14/2020 4/16/2021   PHQ-9 Total Score 2 4 2     VLAD-7 SCORE 3/19/2020 8/14/2020 4/16/2021   Total Score 1 3 2     Prescription approved per [facility] Refill Protocol.    Aida Sher, RN, BSN

## 2022-01-25 ENCOUNTER — VIRTUAL VISIT (OUTPATIENT)
Dept: FAMILY MEDICINE | Facility: CLINIC | Age: 77
End: 2022-01-25
Payer: COMMERCIAL

## 2022-01-25 DIAGNOSIS — A09 TRAVELER'S DIARRHEA: Primary | ICD-10-CM

## 2022-01-25 RX ORDER — AZITHROMYCIN 500 MG/1
TABLET, FILM COATED ORAL
Qty: 12 TABLET | Refills: 0 | Status: SHIPPED | OUTPATIENT
Start: 2022-01-25 | End: 2022-04-27

## 2022-01-25 NOTE — PATIENT INSTRUCTIONS
Pepto bismol tablets, up to 4 times     Azithromycin 500mg tablet, take one twice daily until gone, up to 3 days

## 2022-04-08 DIAGNOSIS — G43.919 INTRACTABLE MIGRAINE, UNSPECIFIED MIGRAINE TYPE: ICD-10-CM

## 2022-04-08 RX ORDER — GABAPENTIN 300 MG/1
300 CAPSULE ORAL 4 TIMES DAILY
Qty: 360 CAPSULE | Refills: 1 | Status: SHIPPED | OUTPATIENT
Start: 2022-04-08 | End: 2022-10-24

## 2022-04-08 NOTE — TELEPHONE ENCOUNTER
Gabapentin (Neurontin) 300 mg    Last Office Visit: 1/25/22 (Virtual)  Future [facility] Appointments: None  Medication last refilled: 4/16/21 #360 with 3 refill(s)     verified - last fill date: 1/23/22 #360    Routing refill request to provider for review/approval because:  Drug not on the [facility] refill protocol     SHIREEN SethN, RN, CCM

## 2022-04-08 NOTE — TELEPHONE ENCOUNTER
Last visit was virtually on 01/25/2022, no future visits scheduled.  Medication last filled on 04/16/2021, with 360 capsules and 3 refills.

## 2022-04-14 ENCOUNTER — TRANSFERRED RECORDS (OUTPATIENT)
Dept: HEALTH INFORMATION MANAGEMENT | Facility: CLINIC | Age: 77
End: 2022-04-14
Payer: COMMERCIAL

## 2022-04-15 ENCOUNTER — TRANSFERRED RECORDS (OUTPATIENT)
Dept: HEALTH INFORMATION MANAGEMENT | Facility: CLINIC | Age: 77
End: 2022-04-15
Payer: COMMERCIAL

## 2022-04-26 ENCOUNTER — MYC MEDICAL ADVICE (OUTPATIENT)
Dept: FAMILY MEDICINE | Facility: CLINIC | Age: 77
End: 2022-04-26
Payer: COMMERCIAL

## 2022-04-27 ENCOUNTER — TELEPHONE (OUTPATIENT)
Dept: FAMILY MEDICINE | Facility: CLINIC | Age: 77
End: 2022-04-27

## 2022-04-27 ENCOUNTER — VIRTUAL VISIT (OUTPATIENT)
Dept: FAMILY MEDICINE | Facility: CLINIC | Age: 77
End: 2022-04-27
Payer: COMMERCIAL

## 2022-04-27 VITALS — BODY MASS INDEX: 22.86 KG/M2 | WEIGHT: 129 LBS | HEIGHT: 63 IN

## 2022-04-27 DIAGNOSIS — U07.1 CLINICAL DIAGNOSIS OF COVID-19: Primary | ICD-10-CM

## 2022-04-27 NOTE — TELEPHONE ENCOUNTER
Pt scheduled for same-day Covid treatment visit with Dr. Rebollar. Please see telephone encounter from today, 4/27.    Hans METZ, RN  04/27/22 12:14 PM

## 2022-04-27 NOTE — TELEPHONE ENCOUNTER
Family Medicine COVID treatment RN note  04/27/22    Ask patient the following questions  What was the date of your positive COVID test?  4/25/22  Where were you tested?  home    Do you have any of the following conditions that place you at risk of being very sick from COVID-19? 65 years or older, chronic lung disease, heart conditions and physical inactivity    Do you currently have symptoms?  Yes  Current symptoms: Low grade fever, extremely sore throat, very bad headache  When did your symptoms begin? 4/24/22  Was the onset of symptoms within the last 5 days? Yes      Eligibility for treatment   Type of treatment Eligibility criteria   Oral antivirals Currently symptomatic, symptoms began within last 5 days, and part of a high risk condition population   Monoclonal antibodies Currently symptomatic, symptoms began within last 7 days, and part of a high risk condition population     Is patient eligible for treatment? Yes, patient has a high risk condition and symptoms within last 5 days.  READ TO PATIENT: There are now oral medications available for the treatment of COVID-19.  Taking one of these medications within the first five days of symptoms (when people may not yet feel severely ill) has been shown to make people feel better, prevent them from getting sicker, and preventing hospitalization and death.  May I assist you in scheduling a virtual (video or telephone) visit with a provider to discuss treatment options?  Yes, scheduled virtual visit for patient.      Reviewed following information with patient  How can I protect others?    These guidelines are for isolating before returning to work, school or .    If you DO have symptoms    Stay home and away from others     For at least 5 days after your symptoms started, AND    You are fever free for 24 hours (with no medicine that reduces fever), AND    Your other symptoms are better    Wear a mask for 10 full days anytime you are around others    If you  DON'T have symptoms    Stay home and away from others for at least 5 days after your positive test    Wear a mask for 10 full days anytime you are around others    There may be different guidelines for healthcare facilities.  Please check with the specific sites before arriving.    If you have been told by a doctor that you were severely ill with COVID-19 or are immunocompromised, you should isolate for at least 10 days.      Hans Heredia RN

## 2022-04-27 NOTE — PROGRESS NOTES
Assessment:     COVID-19 positive patient.  Encounter for consideration of medication intervention.    Patient does qualify for a prescription.   Full discussion with patient including medication options, risks and benefits.   Potential drug interactions reviewed with patient.      Plan:  Treatment planned -  Paxlovid, Rx sent to [place] pharmacy  [facility]   [phone]  [address] [place] [address]    Hours:  Mon-Fri: 8:30a - 6:00p  Sat-Sun: 8:30a - 12:30p    Curbside Delivery: Patient to call [phone] to set up and  at door 2 of [facility]    Temporary change to home medications:   Hold Rosuvastatin for 8 days.   Also, minimize use of Triamcinolone sprays    See patient instructions    40 minutes spent on the date of the encounter doing chart review, history and exam, documentation and further activities per the note  {Provider  Link to MDM Help Grid :15    Azael Rebollar MD      Patient preference to obtain AVS:  Bryantsolomon Ellington  is a 76 year old  who has a confirmed new positive COVID-19 diagnosis.    She has been identified as high risk for complications of this infection, and is being evaluated via a billable Video visit:     Video-Visit Details    Type of service:  Video Visit    Video Visit Start Time:3:55pm    Video End Time:4:34 PM    Originating Location (pt. Location): Home    Distant Location (provider location):  [facility]     Platform used for Video Visit: [employer]    Concern for COVID-19  Exactly how many days ago did these symptoms start? 3 days ago and had positive test for covid yesterday after testing negative 2 times.    Has received the vaccinations to covid.     Lives with  and he has covid also.      Are any of the following symptoms significant for you?    New or worsening difficulty breathing? No    Worsening cough? Yes, it's a dry cough.     Fever or chills? Yes, I felt feverish (low grade and  had  "chills.    Headache: YES    Sore throat: YES    Chest pain: no    Diarrhea: no    Body aches? YES, two days ago. None now.     What treatments has patient tried? None as treatments upset her stomach   Does patient live in a nursing home, group home, or shelter? no  Does patient have a way to get food/medications during quarantined? Yes, I have a friend or family member who can help me.           MASSBP Score 4/27/2022   Age Greater than or equal to 65 years 2   BMI greater than or equal to 35 kg/m2 0   Has Diabetes Mellitus 0   Has Chronic Kidney Disease 0   Has Cardiovascular Disease and 55 years or older 0   Has Chronic Respiratory Disease and 55 years or older 3   Has Hypertension and 55 years or older 0   Is Immunocompromised 0   Is Pregnant 0   Member of BIPOC community (Black/, /, ,  or , or  or [place] Native)  0   MASSBP Score 5   Has the patient had a positive COVID test outside our system?  Yes   What day did symptoms start?  4/24/2022                 Objective    Vitals:  No vitals were obtained today due to virtual visit.  Estimated body mass index is 22.85 kg/m  as calculated from the following:    Height as of this encounter: 1.6 m (5' 3\").    Weight as of this encounter: 58.5 kg (129 lb).   GENERAL: Appears mildly ill with a hoarse throat. No distress  EYES: Eyes grossly normal to inspection.  No discharge or erythema, or obvious scleral/conjunctival abnormalities.  RESP: No audible wheeze, cough, or visible cyanosis.  No visible retractions or increased work of breathing.    NEURO: Cranial nerves grossly intact.  Mentation and speech appropriate for age.  PSYCH: Mentation appears normal, affect normal/bright, judgement and insight intact, normal speech and appearance well-groomed.  GFR Estimate   Date Value Ref Range Status   01/11/2022 65 >60 mL/min/1.73m2 Final     Comment:     Effective December 21, 2021 eGFRcr in " adults is calculated using the 2021 CKD-EPI creatinine equation which includes age and gender (Emerson et al., NEJM, DOI: 10.1056/PWNPln5320558)   06/13/2019 82 >60 mL/min/[1.73_m2] Final     Comment:     Non  GFR Calc  Starting 12/18/2018, serum creatinine based estimated GFR (eGFR) will be   calculated using the Chronic Kidney Disease Epidemiology Collaboration   (CKD-EPI) equation.

## 2022-04-27 NOTE — TELEPHONE ENCOUNTER
*Put Sx in reason for call  Symptom: Patient is experiencing pain running from the side of her face, ear pain, and throat pain.   Additional Comments: Patient tested positive for COVID 4/25 night. Had all the symptoms but subsided. Would like to speak with RN about medication for the pain.

## 2022-04-29 ENCOUNTER — TELEPHONE (OUTPATIENT)
Dept: FAMILY MEDICINE | Facility: CLINIC | Age: 77
End: 2022-04-29
Payer: COMMERCIAL

## 2022-04-29 NOTE — TELEPHONE ENCOUNTER
"Pt calling reporting she is not sleeping well because she has had to hold her Nasacort while using her Paxlovid therapy. Also reporting altered taste, which is a known side effect of medication. Pt states she feels \"poisoned\" by the medication. Discussed strategies for management of altered taste, including sugar free hard candies, sugar free gum, flavored beverages, etc. Pt states she is unsure if she would like to continue taking the Paxlovid. Discussed the risks and benefits of continuing therapy and pt states she will consider it. Requested that pt send us a message if she intends to stop taking Paxlovid.    Hans METZ, RN  04/29/22 10:08 AM    "

## 2022-05-03 ENCOUNTER — TELEPHONE (OUTPATIENT)
Dept: FAMILY MEDICINE | Facility: CLINIC | Age: 77
End: 2022-05-03
Payer: COMMERCIAL

## 2022-05-03 NOTE — TELEPHONE ENCOUNTER
Who is calling? Patient  Reason for Call:Wanting to speak with RN regarding her covid--patient states she wants reassureance    Daxa

## 2022-05-03 NOTE — TELEPHONE ENCOUNTER
Patient reports she took 3 of the 5 day course of the Paxlovid and stopped it due to altered taste which she couldn't tolerate.  Nancy reports today she has lost her sense of taste all together and is very frustrated.  I did explain that the COVID virus does alter the sense of taste or smell and can last long-term.  Asked her to stay well hydrated and make sure she's getting plenty of rest.  Nancy denies sinus, nasal or ear pressure or congestion.  Nancy will call back with further questions or concerns.  SHIREEN SethN, RN, [facility]  05/03/22  12:19 PM

## 2022-05-05 DIAGNOSIS — E03.9 HYPOTHYROIDISM, UNSPECIFIED TYPE: ICD-10-CM

## 2022-05-05 DIAGNOSIS — G47.00 PERSISTENT INSOMNIA: ICD-10-CM

## 2022-05-05 RX ORDER — ZOLPIDEM TARTRATE 10 MG/1
10 TABLET ORAL
Qty: 30 TABLET | Refills: 0 | Status: SHIPPED | OUTPATIENT
Start: 2022-05-05 | End: 2022-06-01

## 2022-05-05 RX ORDER — LEVOTHYROXINE SODIUM 88 UG/1
88 TABLET ORAL DAILY
Qty: 30 TABLET | Refills: 0 | Status: SHIPPED | OUTPATIENT
Start: 2022-05-05 | End: 2022-05-12

## 2022-05-05 NOTE — TELEPHONE ENCOUNTER
Last visit 4/27/22, no future visit  Last refill zolpidem 10 mg 1/18/22 - checked     For levothyroxine - Medication is being filled for 1 time refill only due to:  Patient needs labs TSH. Future labs ordered TSH. [employer] message for pt to have TSH drawn - will refill for 30 days until lab drawn.  Agustina James RN  [facility]

## 2022-05-10 ENCOUNTER — TELEPHONE (OUTPATIENT)
Dept: FAMILY MEDICINE | Facility: CLINIC | Age: 77
End: 2022-05-10
Payer: COMMERCIAL

## 2022-05-10 ENCOUNTER — LAB (OUTPATIENT)
Dept: LAB | Facility: CLINIC | Age: 77
End: 2022-05-10
Payer: COMMERCIAL

## 2022-05-10 DIAGNOSIS — E03.9 HYPOTHYROIDISM, UNSPECIFIED TYPE: ICD-10-CM

## 2022-05-10 PROCEDURE — 84443 ASSAY THYROID STIM HORMONE: CPT

## 2022-05-10 PROCEDURE — 36415 COLL VENOUS BLD VENIPUNCTURE: CPT

## 2022-05-10 NOTE — TELEPHONE ENCOUNTER
----- Message from Alondra Foster MD sent at 5/5/2022  9:30 PM CDT -----  Regarding: please call pt to help schedule appt  Please call Nancy and ask her to schedule appt with me to get refills on Thyroid medication and Ambien. No need to fast but she will need labs.  Bailey had written to her that she only needed a lab appt but she needs a visit with me.     She had COVID 4/25 but should be ok to be seen in clinic.  If she has residual COVID symptoms, offer 40 min appt, otherwise 20 min is fine.    We prescribed 30 days of meds.    Thanks  Alondra

## 2022-05-11 LAB — TSH SERPL DL<=0.005 MIU/L-ACNC: 2.2 MU/L (ref 0.4–4)

## 2022-05-12 ENCOUNTER — VIRTUAL VISIT (OUTPATIENT)
Dept: FAMILY MEDICINE | Facility: CLINIC | Age: 77
End: 2022-05-12
Payer: COMMERCIAL

## 2022-05-12 DIAGNOSIS — G47.09 OTHER INSOMNIA: ICD-10-CM

## 2022-05-12 DIAGNOSIS — U09.9 POST-COVID-19 CONDITION: ICD-10-CM

## 2022-05-12 DIAGNOSIS — E03.9 HYPOTHYROIDISM, UNSPECIFIED TYPE: Primary | ICD-10-CM

## 2022-05-12 RX ORDER — LEVOTHYROXINE SODIUM 88 UG/1
88 TABLET ORAL DAILY
Qty: 90 TABLET | Refills: 3 | Status: SHIPPED | OUTPATIENT
Start: 2022-05-12 | End: 2023-01-23

## 2022-05-12 NOTE — PROGRESS NOTES
Nancy is a 76 year old who is being evaluated via a billable telephone visit.      What phone number would you like to be contacted at? [phone]  How would you like to obtain your AVS? 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    25 minutes spend on the date of this encounter doing chart review, history and exam, documentation and further activities as noted above.     Nancy is to call to schedule Medicare preventive physical    Alondra Foster MD  [facility]    Subjective   Nancy is a 76 year old who presents for the following health issues:    HPI   Hypothyroidism, unspecified type  Nancy has a history of hypothyroidism which has been stable. She is currently on 88 mcg of levothyroxine daily. She takes this in the morning. Denies constipation or diarrhea. Usually takes it on an empty stomach but sometimes with other pills or with breakfast.     TSH   Date Value Ref Range Status   05/10/2022 2.20 0.40 - 4.00 mU/L Final   04/16/2021 1.34 0.40 - 4.00 mU/L Final     Persistent insomnia  Nancy has a history of insomnia and has used Ambien to treat this. She has been taking a third of a pill as needed, typically when she travels. She tries not to take this nightly.    Post-COVID-19  condition  (primary encounter diagnosis)  She tested positive for COVID on 4/25/22, and is now doing well. She did take Paxlovid for three days, but notes she did not tolerate this well so she stopped using it.      Review of Systems   Constitutional, HEENT, cardiovascular, pulmonary, gi and gu systems are negative, except as otherwise noted.      Objective           Vitals:  No vitals were obtained today due to virtual visit.    Physical Exam   alert and no distress  PSYCH: Alert and oriented times 3; coherent speech, normal   RESP: No cough, no audible wheezing, able to talk in full sentences  Remainder of exam unable to be completed due to telephone visits          Phone call duration: Start time: 4:12PM, End time: 4:28PM.  14 minutes

## 2022-05-19 DIAGNOSIS — G47.00 PERSISTENT INSOMNIA: ICD-10-CM

## 2022-05-20 NOTE — TELEPHONE ENCOUNTER
Zolpidem (Ambien) 10 mg    Last Office Visit: 5/12/22  Future [facility] Appointments: None  Medication last refilled: 5/5/22 #30 with 0 refill(s)     verified - last fill date: 5/6/22 #30    Routing refill request to provider for review/approval because:  Drug not on the [facility] refill protocol     SHIREEN SethN, RN, CCM

## 2022-06-03 RX ORDER — ZOLPIDEM TARTRATE 10 MG/1
10 TABLET ORAL
Qty: 30 TABLET | Refills: 0 | Status: SHIPPED | OUTPATIENT
Start: 2022-06-03 | End: 2023-01-23

## 2022-06-15 ENCOUNTER — TELEPHONE (OUTPATIENT)
Dept: WOUND CARE | Facility: CLINIC | Age: 77
End: 2022-06-15
Payer: COMMERCIAL

## 2022-06-15 DIAGNOSIS — L97.329 LOWER LIMB ULCER, ANKLE, LEFT, WITH UNSPECIFIED SEVERITY (H): Primary | ICD-10-CM

## 2022-06-15 NOTE — TELEPHONE ENCOUNTER
Nancy is new to [facility] and does not have a referral but would like to be seen for a wound above her left ankle. It is burn-like (but not a burn), painful and slow healing. She has been seen twice at [facility] in the past  3 weeks.     [phone]

## 2022-06-15 NOTE — TELEPHONE ENCOUNTER
Consult received via phone for wound of the left ankle.    Patient has history of cancer. Per Standing Order patient qualifies for ALLEN to be scheduled prior to assessment with Dr. Neal, Dr. Ball, or Shana ARAGON at [facility] at [facility] for next available appointment.      Is patient a JUAN lift?  Yes or No    Routing to [facility] Appointment Scheduling Pool.  Orders pending.

## 2022-06-15 NOTE — TELEPHONE ENCOUNTER
Spoke with patient.  Scheduled the ALLEN on 7/27/22 at [facility] and scheduled the assessment with Dr. Neal also on 7/27/22.

## 2022-06-17 DIAGNOSIS — F41.1 GAD (GENERALIZED ANXIETY DISORDER): ICD-10-CM

## 2022-06-17 RX ORDER — CITALOPRAM HYDROBROMIDE 10 MG/1
10 TABLET ORAL DAILY
Qty: 90 TABLET | Refills: 1 | Status: SHIPPED | OUTPATIENT
Start: 2022-06-17 | End: 2022-10-24

## 2022-06-17 NOTE — TELEPHONE ENCOUNTER
Citalopram (Celexa) 10 mg    Last Office Visit: 5/12/22  Future [facility] Appointments: None  Medication last refilled: 1/24/22 #90 with 1 refill(s)    Last PHQ-9 3/19/2020 8/14/2020 4/16/2021   PHQ-9 Total Score 2 4 2     VLAD-7 SCORE 3/19/2020 8/14/2020 4/16/2021   Total Score 1 3 2     Prescription approved per [facility] Refill Protocol.    Aida Sher, SHIREENN, RN, CCM

## 2022-06-22 ENCOUNTER — TRANSFERRED RECORDS (OUTPATIENT)
Dept: HEALTH INFORMATION MANAGEMENT | Facility: CLINIC | Age: 77
End: 2022-06-22

## 2022-06-29 ENCOUNTER — TRANSFERRED RECORDS (OUTPATIENT)
Dept: HEALTH INFORMATION MANAGEMENT | Facility: CLINIC | Age: 77
End: 2022-06-29

## 2022-07-07 ENCOUNTER — TRANSFERRED RECORDS (OUTPATIENT)
Dept: FAMILY MEDICINE | Facility: CLINIC | Age: 77
End: 2022-07-07

## 2022-08-03 DIAGNOSIS — G43.919 INTRACTABLE MIGRAINE, UNSPECIFIED MIGRAINE TYPE: ICD-10-CM

## 2022-08-03 NOTE — TELEPHONE ENCOUNTER
Who is calling? Patient  Medication name: Gabapentin   Is this a refill request? Yes  Have they contacted the pharmacy?  No  Pharmacy:   [facility] #[id number] - [place] - [address] [place] [address]  Phone: [phone] Fax: [phone]    Question/Concern: Pt would like 100 mg refilled. She currently uses an old prescription of 100 mg occassionally instead of taking her 300 mg tabs. States she is trying to limit the amount of gabapentin she is taking by using 100 mg sometimes vs the 300 mg  Would patient like a call back? No--unless needed

## 2022-08-08 RX ORDER — GABAPENTIN 100 MG/1
100 CAPSULE ORAL 3 TIMES DAILY PRN
Qty: 90 CAPSULE | Refills: 1 | Status: SHIPPED | OUTPATIENT
Start: 2022-08-08 | End: 2022-10-24

## 2022-08-08 NOTE — TELEPHONE ENCOUNTER
Gabapentin 100 mg    Last Office Visit: 5/12/22  [facility] [place] None  Medication last refilled: 5/19/20 #90 with 1 refill(s)     verified - last fill date: None in the past year    Patient requesting refill as she tries as often as possible to use less than her 300 mg prescribed dose and often uses the 100 mg instead.    SHIREEN SethN, RN, CCM

## 2022-08-08 NOTE — TELEPHONE ENCOUNTER
Who is calling? Patient  Reason for Call: Requesting call back to see if medication will be able to be filled today.

## 2022-08-17 ENCOUNTER — OFFICE VISIT (OUTPATIENT)
Dept: NEUROLOGY | Facility: CLINIC | Age: 77
End: 2022-08-17
Payer: COMMERCIAL

## 2022-08-17 VITALS — HEART RATE: 57 BPM | DIASTOLIC BLOOD PRESSURE: 71 MMHG | OXYGEN SATURATION: 98 % | SYSTOLIC BLOOD PRESSURE: 106 MMHG

## 2022-08-17 DIAGNOSIS — G44.049 CHRONIC PAROXYSMAL HEMICRANIA, NOT INTRACTABLE: ICD-10-CM

## 2022-08-17 DIAGNOSIS — G43.101 MIGRAINE WITH AURA AND WITH STATUS MIGRAINOSUS, NOT INTRACTABLE: Primary | ICD-10-CM

## 2022-08-17 PROCEDURE — 99214 OFFICE O/P EST MOD 30 MIN: CPT | Performed by: PSYCHIATRY & NEUROLOGY

## 2022-08-17 RX ORDER — INDOMETHACIN 50 MG/1
50 CAPSULE ORAL PRN
Qty: 10 CAPSULE | Refills: 3 | Status: SHIPPED | OUTPATIENT
Start: 2022-08-17

## 2022-08-17 RX ORDER — INDOMETHACIN 50 MG/1
50 CAPSULE ORAL PRN
COMMUNITY
End: 2022-08-17

## 2022-08-17 NOTE — PROGRESS NOTES
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  [facility]  Please refer to my initial note from 12/15/2021 for further information.    Since the last visit, the patient reports no additional transient global amnesia episodes or interval development of new focal neurological symptoms.    Regarding her headaches, the patient was previously followed at [facility].  According to scanned note from October 2020, she presented with spells of stabbing right-sided head pain located over the ear in the parietal region that occurred frequently throughout the day lasting for few seconds.  It felt that differential includes paroxysmal hemicrania versus stabbing headache or lesser occipital neuralgia.  The plan was to use indomethacin and consider lesser occipital block if indomethacin is not effective.  She was also treated for chronic migraines at the same clinic with Emgality 120 mg subcutaneously every month along with naratriptan 2.5 mg.    At the present time, the patient reports that her migraine headaches are well controlled with regular use of Emgality.  She averages 2 migraines or less per month.  She reports visual aura preceding her headache, which starts in the back of her head and becomes holocephalic throbbing up to 8/10 and could last up to 2 days if untreated.  Associated symptoms include light/sound sensitivity and intolerance of physical activity.  In addition, the  patient also reports shooting pain on the right side of the scalp that occurs several times per year and is highly responsive to indomethacin.  She needs a refill of both medications.  She is also on gabapentin and has naratriptan, but these are filled by her primary care provider.  EXAM:                                                    Physical Exam:   Vitals: /71 (BP Location: Left arm, Patient Position: Sitting, Cuff Size: Adult Regular)   Pulse 57   SpO2 98%     General: pt is in NAD, cooperative.  Skin: normal turgor, moist mucous membranes, no lesions/rashes noticed.  HEENT: ATNC, white sclera, normal conjunctiva.  Respiratory: Symmetric lung excursion, no accessory respiratory muscle use.  Abdomen: Non distended.  Neurological: awake, cooperative, follows commands, no exam changes compared to the initial visit.  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  She reports that her symptoms are currently stable/well-controlled.  I do not think that we need to make any medication changes.  The rest of my recommendations and the plan are summarized below.    Diagnoses:    ICD-10-CM    1. Migraine with aura and with status migrainosus, not intractable  G43.101    2. Chronic paroxysmal hemicrania, not intractable  G44.049      Plan: At today's visit we thoroughly discussed current symptoms, available treatment options, and the plan.  I am pleased to hear that patient's symptoms are well controlled at the present time on current therapy.    We decided not to make any medication changes.  Emgality and indomethacin prescriptions were refilled.    I advised the patient to keep the headache diary (Migraine Travon) and bring it to the next follow-up visit or upload via My Chart.     Next  follow-up appointment is in the next 6 months or earlier if needed.    Total Time: 31 minutes spent on the date of the encounter doing chart review, history and exam, documentation and further activities per the note.    Lazarus Villa MD  [facility] Neurology  (Chart documentation was completed in part with Dragon voice-recognition software. Even though reviewed, some grammatical, spelling, and word errors may remain.)

## 2022-08-17 NOTE — PROGRESS NOTES
"Rakel Parish is a 76 year old female who presents for:  Chief Complaint   Patient presents with     Migraine     Patient say the emgality is a life changer and is decreasing migraine         Initial Vitals:  /71 (BP Location: Left arm, Patient Position: Sitting, Cuff Size: Adult Regular)   Pulse 57   SpO2 98%  Estimated body mass index is 22.85 kg/m  as calculated from the following:    Height as of 4/27/22: 1.6 m (5' 3\").    Weight as of 4/27/22: 58.5 kg (129 lb).. There is no height or weight on file to calculate BSA. BP completed using cuff size: regular        Ana Maria Lind    "

## 2022-08-17 NOTE — LETTER
8/17/2022         RE: Rakel Parish  [address]  [place] [facility] [place] [address]        Dear Colleague,    Thank you for referring your patient, Rakel Parish, to the [facility]. Please see a copy of my visit note below.    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  She would [facility].  Please refer to my initial note from 12/15/2021 for further information.    Since the last visit, the patient reports no additional transient global amnesia episodes or interval development of new focal neurological symptoms.    Regarding her headaches, the patient was previously followed at [facility].  According to scanned note from October 2020, she presented with spells of stabbing right-sided head pain located over the ear in the parietal region that occurred frequently throughout the day lasting for few seconds.  It felt that differential includes paroxysmal hemicrania versus stabbing headache or lesser occipital neuralgia.  The plan was to use indomethacin and consider lesser occipital block if indomethacin is not effective.  She was also treated for chronic migraines at the same clinic with Emgality 120 mg subcutaneously every month along with naratriptan 2.5 mg.    At the present time, the patient reports that her migraine headaches are well controlled with regular use of Emgality.  She averages 2 migraines or less per month.  She reports visual aura  preceding her headache, which starts in the back of her head and becomes holocephalic throbbing up to 8/10 and could last up to 2 days if untreated.  Associated symptoms include light/sound sensitivity and intolerance of physical activity.  In addition, the patient also reports shooting pain on the right side of the scalp that occurs several times per year and is highly responsive to indomethacin.  She needs a refill of both medications.  She is also on gabapentin and has naratriptan, but these are filled by her primary care provider.  EXAM:                                                    Physical Exam:   Vitals: /71 (BP Location: Left arm, Patient Position: Sitting, Cuff Size: Adult Regular)   Pulse 57   SpO2 98%     General: pt is in NAD, cooperative.  Skin: normal turgor, moist mucous membranes, no lesions/rashes noticed.  HEENT: ATNC, white sclera, normal conjunctiva.  Respiratory: Symmetric lung excursion, no accessory respiratory muscle use.  Abdomen: Non distended.  Neurological: awake, cooperative, follows commands, no exam changes compared to the initial visit.  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  She reports that her symptoms are currently stable/well-controlled.  I do not think that we need to make any medication changes.  The rest of my recommendations and the plan are summarized below.    Diagnoses:    ICD-10-CM    1. Migraine with aura and with status migrainosus, not intractable  G43.101    2. Chronic paroxysmal hemicrania, not intractable  G44.049      Plan: At today's visit we thoroughly discussed current symptoms, available treatment options, and the plan.  I am pleased to hear that patient's symptoms are well controlled at the present time on current  "therapy.    We decided not to make any medication changes.  Emgality and indomethacin prescriptions were refilled.    I advised the patient to keep the headache diary (Migraine Travon) and bring it to the next follow-up visit or upload via My Chart.     Next follow-up appointment is in the next 6 months or earlier if needed.    Total Time: 31 minutes spent on the date of the encounter doing chart review, history and exam, documentation and further activities per the note.    Lazarus Villa MD  [facility] Neurology  (Chart documentation was completed in part with Dragon voice-recognition software. Even though reviewed, some grammatical, spelling, and word errors may remain.)      Rakel Parish is a 76 year old female who presents for:  Chief Complaint   Patient presents with     Migraine     Patient say the emgality is a life changer and is decreasing migraine         Initial Vitals:  /71 (BP Location: Left arm, Patient Position: Sitting, Cuff Size: Adult Regular)   Pulse 57   SpO2 98%  Estimated body mass index is 22.85 kg/m  as calculated from the following:    Height as of 4/27/22: 1.6 m (5' 3\").    Weight as of 4/27/22: 58.5 kg (129 lb).. There is no height or weight on file to calculate BSA. BP completed using cuff size: regular        Ana Maria Lind        Again, thank you for allowing me to participate in the care of your patient.        Sincerely,        Lazarus Villa MD    "

## 2022-08-17 NOTE — PATIENT INSTRUCTIONS
AFTER VISIT SUMMARY (AVS):    At today's visit we thoroughly discussed current symptoms, available treatment options, and the plan.  I am pleased to hear that your symptoms are well controlled at the present time on current therapy.    We decided not to make any medication changes.  Your Emgality and indomethacin prescriptions were refilled.    Please keep the headache diary (Migraine Travon) and bring it to the next follow-up visit or upload via My Chart.     Next follow-up appointment is in the next 6 months or earlier if needed.    Please do not hesitate to call me with any questions or concerns.    Thanks.

## 2022-08-18 ENCOUNTER — TELEPHONE (OUTPATIENT)
Dept: NEUROLOGY | Facility: CLINIC | Age: 77
End: 2022-08-18

## 2022-08-18 NOTE — TELEPHONE ENCOUNTER
Prior Authorization Specialty Medication Request    Medication/Dose: indomethacin (INDOCIN) 50 MG capsule  ICD code (if different than what is on RX):    Previously Tried and Failed:      Important Lab Values:   Rationale:     Insurance Name:   Insurance ID:   Insurance Phone Number:     Pharmacy Information (if different than what is on RX)  Name:    Phone:

## 2022-08-18 NOTE — LETTER
2022    INSURER: Payor: [facility] / Plan: [facility] MEDICARE ADVANTAGE / Product Type: Medicare /   Re: Prior Authorization Request  Patient: Rakel Parish  Policy ID#:  [id number]  : 1945      To Whom it May Concern:    I am writing to formally request a prior authorization of coverage for my patient,  Rakel Parish, for treatment using indomethacin 50 mg capsule.  I am requesting authorization for applicable provider professional and facility services associated with this therapy.    The therapy involves taking 50 mg capsule of indomethacin as needed for headache.    The benefits of the therapy include that indomethacin is treatment of choice for paroxysmal hemicrania .    I have treated Rakel Parish since 2021 and I have determined that it is medically appropriate for  this patient to receive be treated with indomethacin  for the reason(s) stated below:      Indomethacin is the treatment of choice for paroxysmal hemicrania      Patient has been taking indomethacin since 2020, and it has been effective in reducing the severity of her headaches.        If this patient is unable to receive this medication, they are likely to have increased need for clinic visits along with associated cost and their quality of life will further deteriorate.     In addition, the following billing codes will be used for therapy and follow-up:Chronic paroxysmal hemicrania, not intractable [G44.049]  .        I firmly believe that this therapy is clinically appropriate and that Rakel Parish would benefit from improved quality of life if allowed the opportunity to receive this treatment.  Please contact me at Dept: [phone] if you require additional information to ensure the prompt approval for coverage.    Please send your written decision to me at this address:  [facility] NEUROLOGY [address] [address] [phone]  Dept:  [phone]        Sincerely,      Lazarus Villa MD

## 2022-08-18 NOTE — TELEPHONE ENCOUNTER
Central Prior Authorization Team   Phone: [phone]      PA Initiation    Medication: indomethacin (INDOCIN) 50 MG capsule  Insurance Company: [employer] - Phone [phone] Fax [phone]  Pharmacy Filling the Rx: [id number] - [place], [place] - [address]  Filling Pharmacy Phone: [phone]  Filling Pharmacy Fax:    Start Date: 8/18/2022

## 2022-08-22 NOTE — TELEPHONE ENCOUNTER
PRIOR AUTHORIZATION DENIED    Medication: indomethacin (INDOCIN) 50 MG capsule-DENIED    Denial Date: 8/19/2022    Denial Rational:               Appeal Information:

## 2022-08-22 NOTE — TELEPHONE ENCOUNTER
Medication Appeal Initiation    We have initiated an appeal for the requested medication:  Medication: indomethacin (INDOCIN) 50 MG capsule-APPEAL Pending  Appeal Start Date:  8/22/2022  Insurance Company: [employer] - Phone [phone] Fax [phone]  Comments:  Appeal and denial letter faxed

## 2022-08-29 ASSESSMENT — ENCOUNTER SYMPTOMS
JOINT SWELLING: 0
WEAKNESS: 0
HEMATURIA: 0
HEMATOCHEZIA: 0
MYALGIAS: 1
ARTHRALGIAS: 1
DIZZINESS: 1
DIARRHEA: 0
EYE PAIN: 0
PARESTHESIAS: 0
DYSURIA: 0
ABDOMINAL PAIN: 1
BREAST MASS: 0
HEARTBURN: 1
HEADACHES: 1
COUGH: 1
PALPITATIONS: 0
FREQUENCY: 0
NAUSEA: 0
SHORTNESS OF BREATH: 0
FEVER: 0
NERVOUS/ANXIOUS: 1
CONSTIPATION: 0
CHILLS: 0
SORE THROAT: 0

## 2022-08-29 ASSESSMENT — ACTIVITIES OF DAILY LIVING (ADL): CURRENT_FUNCTION: NO ASSISTANCE NEEDED

## 2022-08-31 NOTE — TELEPHONE ENCOUNTER
"Called pt and let her know that the indomethacin appeal was denied by her insurance company.  She is going to check with the pharmacy to see how much it will be out of pocket.  She will call us back if it is too expensive.  She states that it is the only medication that has worked on her headaches in the past.     She did state that she had tried naproxen in the past and \"it did not work\" on her headaches.  If the indomethacin is too much, she will try the flurbiprofen.     Galina ALVARADO RN, BSN  [facility]    "

## 2022-08-31 NOTE — TELEPHONE ENCOUNTER
MEDICATION APPEAL DENIED    Medication: indomethacin (INDOCIN) 50 MG capsule-APPEAL DENIED    Denial Date: 8/30/2022    Denial Rational:           Second Level Appeal Information: Second level appeals will be managed by the clinic staff and provider.

## 2022-09-01 NOTE — TELEPHONE ENCOUNTER
Spoke to pharmacy and medication is 11.99 out of pocket.      Called and updated patient.  She said that she will pay for medication out of pocket.      No need for alternative medication.      Advised to call back with any further questions.     Galina ALVARADO RN, BSN  [facility] [place]

## 2022-09-01 NOTE — TELEPHONE ENCOUNTER
[facility] Call Center    Phone Message    May a detailed message be left on voicemail: yes     Reason for Call: Other: Mona from [facility] called to inform Dr. Villa that the PA for Indomethacin has been denied.      Please call Rukun at [phone] with any additional questions.    Action Taken: Message routed to:  Other: [facility]    Travel Screening: Not Applicable

## 2022-09-15 ENCOUNTER — TELEPHONE (OUTPATIENT)
Dept: FAMILY MEDICINE | Facility: CLINIC | Age: 77
End: 2022-09-15

## 2022-09-15 ENCOUNTER — TRANSFERRED RECORDS (OUTPATIENT)
Dept: HEALTH INFORMATION MANAGEMENT | Facility: CLINIC | Age: 77
End: 2022-09-15

## 2022-09-15 DIAGNOSIS — G43.919 INTRACTABLE MIGRAINE, UNSPECIFIED MIGRAINE TYPE: ICD-10-CM

## 2022-09-15 RX ORDER — GABAPENTIN 300 MG/1
300 CAPSULE ORAL 4 TIMES DAILY
Qty: 360 CAPSULE | Refills: 1 | OUTPATIENT
Start: 2022-09-15

## 2022-09-15 NOTE — TELEPHONE ENCOUNTER
Gabapentin (Neurontin) 300 mg    Last Office Visit: 5/12/22  [facility]oint[facility]: 10/24/22  Medication last refilled: 4/8/22 #360 with 1 refill(s)     verified - last fill date: 9/7/22 #360 (3-month supply)    Routing refill request to provider for review/approval because:  Drug not on the [facility] refill protocol     SHIREEN SethN, RN, CCM

## 2022-09-23 DIAGNOSIS — G43.919 INTRACTABLE MIGRAINE, UNSPECIFIED MIGRAINE TYPE: ICD-10-CM

## 2022-09-23 RX ORDER — GABAPENTIN 300 MG/1
300 CAPSULE ORAL 4 TIMES DAILY
Qty: 360 CAPSULE | Refills: 1 | Status: CANCELLED | OUTPATIENT
Start: 2022-09-23

## 2022-09-23 NOTE — TELEPHONE ENCOUNTER
Last office visit: 5/12/22  Next appointment: 10/24/22  Medication last refilled: 4/8/22 #360+1RF

## 2022-09-23 NOTE — TELEPHONE ENCOUNTER
Duplicate request.  Patient had a refill left and filled it for a 90-day supply on 9/7/22.  Patient due for an appointment in November.  Kaminario message sent to Nancy requesting she call and schedule an appointment.  ALSHAY Seth, RN, [facility]  09/23/22  4:41 PM

## 2022-09-26 ENCOUNTER — TELEPHONE (OUTPATIENT)
Dept: NEUROLOGY | Facility: CLINIC | Age: 77
End: 2022-09-26

## 2022-09-26 DIAGNOSIS — G43.101 MIGRAINE WITH AURA AND WITH STATUS MIGRAINOSUS, NOT INTRACTABLE: Primary | ICD-10-CM

## 2022-09-26 RX ORDER — GALCANEZUMAB 120 MG/ML
120 INJECTION, SOLUTION SUBCUTANEOUS
Qty: 1 ML | Refills: 10 | Status: SHIPPED | OUTPATIENT
Start: 2022-09-26 | End: 2022-09-26

## 2022-09-26 NOTE — TELEPHONE ENCOUNTER
[facility] Call Center    Phone Message    May a detailed message be left on voicemail: yes     Reason for Call: Medication Question or concern regarding medication   Prescription Clarification  Name of Medication: Emgality  Prescribing Provider: Lazarus Villa MD   Pharmacy: [id number] - [place], [place] - [address] AT [address] [place] [address]   What on the order needs clarification? Pt calling because she says [facility] told her they received a notice stating Emgality has been stopped. Please call back to advise.      Action Taken: Other: neurology    Travel Screening: Not Applicable

## 2022-09-26 NOTE — TELEPHONE ENCOUNTER
Per chart review 9/15/22 Aida Sher RN discontinued the Emgality stating that it was stopped by patient.     Called patient and verified that this was an error. Pt is still taking this medication and she is now out so needs it as soon as possible.     New script sent, spoke to pharmacy and they received the new script, and they will fill it.     Galina ALVARADO RN, BSN  [facility]

## 2022-10-10 ENCOUNTER — HOSPITAL ENCOUNTER (OUTPATIENT)
Dept: ULTRASOUND IMAGING | Facility: CLINIC | Age: 77
Discharge: HOME | End: 2022-10-10
Attending: INTERNAL MEDICINE
Payer: COMMERCIAL

## 2022-10-10 DIAGNOSIS — R14.0 ABDOMINAL BLOATING: ICD-10-CM

## 2022-10-10 DIAGNOSIS — R10.9 ABDOMINAL DISCOMFORT: ICD-10-CM

## 2022-10-10 DIAGNOSIS — K21.9 CHRONIC GERD: ICD-10-CM

## 2022-10-10 PROCEDURE — 76830 TRANSVAGINAL US NON-OB: CPT

## 2022-10-10 PROCEDURE — 76700 US EXAM ABDOM COMPLETE: CPT

## 2022-10-11 ENCOUNTER — TRANSFERRED RECORDS (OUTPATIENT)
Dept: HEALTH INFORMATION MANAGEMENT | Facility: CLINIC | Age: 77
End: 2022-10-11

## 2022-10-14 ENCOUNTER — TELEPHONE (OUTPATIENT)
Dept: NEUROLOGY | Facility: CLINIC | Age: 77
End: 2022-10-14

## 2022-10-14 NOTE — TELEPHONE ENCOUNTER
Central Prior Authorization Team  Phone: [phone]    Will not let me pro-actively submit PA. States there's one on file already. Will need to submit once it expires.     Prior Authorization Not Needed per Insurance    Medication: galcanezumab-gnlm (EMGALITY) 120 MG/ML injection  Insurance Company: [employer] - Phone [phone] Fax [phone]  Expected CoPay:      Pharmacy Filling the Rx: [id number] [place], [place] - [address]  Pharmacy Notified: Yes  Patient Notified:

## 2022-10-14 NOTE — TELEPHONE ENCOUNTER
Here is the link for the information [facility] is gathering:    [email or web address]

## 2022-10-14 NOTE — TELEPHONE ENCOUNTER
[facility] Call Center    Phone Message    May a detailed message be left on voicemail: yes     Reason for Call: Form or Letter   Type or form/letter needing completion: Coverage Determination Form  Provider: Lazarus Villa MD  Date form needed: Before 11/01/22  Once completed: Fax form to: Attn: Clinical Review  [employer]  Address: [address] [place] [address], [address]  Fax: [phone]  Phone: [phone]    Pt is calling because she received a letter in the mail from [facility] stating she  needs a renewal for emgality and  need to fill out a coverage determination form. Please contact pt once form has been completed. Pt says this expires on 11/01/22 so it needs to be taken care of right away.      Action Taken: Message routed to:  Other: [facility]    Travel Screening: Not Applicable

## 2022-10-14 NOTE — TELEPHONE ENCOUNTER
Called pt to see what form she is needing. PA team said they are unable to submit a proactive PA, as the PA is valid until 8/17/23.      Pt called back and gave link to form: Here is the link for the information [facility] is gathering:     [email or web address]    Called insurance company and they said if the PA is good through 8/17/23, we should not need to file anymore forms.      Called and updated pt, and advised that she can double check with her insurance company as well.      Galina ALVARADO RN, BSN  [facility]

## 2022-10-14 NOTE — TELEPHONE ENCOUNTER
Prior Authorization Specialty Medication Request    Medication/Dose: galcanezumab-gnlm (EMGALITY) 120 MG/ML injection  ICD code (if different than what is on RX):    Previously Tried and Failed:      Important Lab Values:   Rationale:     Insurance Name:   Insurance ID:   Insurance Phone Number:     Pharmacy Information (if different than what is on RX)  Name:    Phone:

## 2022-10-14 NOTE — TELEPHONE ENCOUNTER
Message sent to PA team to review.   Galina ALVARADO RN, BSN  [facility] Neurology [facility]

## 2022-10-21 NOTE — PATIENT INSTRUCTIONS
Calcium 1200mg   Vitamin D 8943-9006 international unit(s) daily    Patient Education   Personalized Prevention Plan  You are due for the preventive services outlined below.  Your care team is available to assist you in scheduling these services.  If you have already completed any of these items, please share that information with your care team to update in your medical record.  Health Maintenance Due   Topic Date Due    Osteoporosis Screening  05/01/2021    Discuss Advance Care Planning  02/09/2022    FALL RISK ASSESSMENT  06/02/2022     Preventive Health Recommendations    See your health care provider every year to  Review health changes.   Discuss preventive care.    Review your medicines if your doctor has prescribed any.  You no longer need a yearly Pap test unless you've had an abnormal Pap test in the past 10 years. If you have vaginal symptoms, such as bleeding or discharge, be sure to talk with your provider about a Pap test.  Every 1 to 2 years, have a mammogram.  If you are over 69, talk with your health care provider about whether or not you want to continue having screening mammograms.  Every 10 years, have a colonoscopy. Or, have a yearly FIT test (stool test). These exams will check for colon cancer.   Have a cholesterol test every 5 years, or more often if your doctor advises it.   Have a diabetes test (fasting glucose) every three years. If you are at risk for diabetes, you should have this test more often.   At age 65, have a bone density scan (DEXA) to check for osteoporosis (brittle bone disease).    Shots:  Get a flu shot each year.  Get a tetanus shot every 10 years.  Talk to your doctor about your pneumonia vaccines. There are now two you should receive - Pneumovax (PPSV 23) and Prevnar (PCV 13).  Talk to your pharmacist about the shingles vaccine.  Talk to your doctor about the hepatitis B vaccine.    Nutrition:   Eat at least 5 servings of fruits and vegetables each day.  Eat whole-grain  bread, whole-wheat pasta and brown rice instead of white grains and rice.  Get adequate Calcium and Vitamin D.     Lifestyle  Exercise at least 150 minutes a week (30 minutes a day, 5 days a week). This will help you control your weight and prevent disease.  Limit alcohol to one drink per day.  No smoking.   Wear sunscreen to prevent skin cancer.   See your dentist twice a year for an exam and cleaning.  See your eye doctor every 1 to 2 years to screen for conditions such as glaucoma, macular degeneration and cataracts.    Personalized Prevention Plan  You are due for the preventive services outlined below.  Your care team is available to assist you in scheduling these services.  If you have already completed any of these items, please share that information with your care team to update in your medical record.  Health Maintenance   Topic Date Due    DEXA  05/01/2021    ADVANCE CARE PLANNING  02/09/2022    FALL RISK ASSESSMENT  06/02/2022    COLORECTAL CANCER SCREENING  02/15/2023    MEDICARE ANNUAL WELLNESS VISIT  10/24/2023    LIPID  01/11/2027    DTAP/TDAP/TD IMMUNIZATION (5 - Td or Tdap) 08/14/2030    HEPATITIS C SCREENING  Completed    PHQ-2 (once per calendar year)  Completed    INFLUENZA VACCINE  Completed    Pneumococcal Vaccine: 65+ Years  Completed    ZOSTER IMMUNIZATION  Completed    HEPATITIS B IMMUNIZATION  Completed    COVID-19 Vaccine  Completed    IPV IMMUNIZATION  Aged Out    MENINGITIS IMMUNIZATION  Aged Out    MAMMO SCREENING  Discontinued

## 2022-10-24 ENCOUNTER — OFFICE VISIT (OUTPATIENT)
Dept: FAMILY MEDICINE | Facility: CLINIC | Age: 77
End: 2022-10-24
Payer: COMMERCIAL

## 2022-10-24 VITALS
OXYGEN SATURATION: 97 % | DIASTOLIC BLOOD PRESSURE: 72 MMHG | HEART RATE: 64 BPM | TEMPERATURE: 97.1 F | SYSTOLIC BLOOD PRESSURE: 108 MMHG | HEIGHT: 63 IN | WEIGHT: 123.04 LBS | BODY MASS INDEX: 21.8 KG/M2

## 2022-10-24 DIAGNOSIS — E28.39 ESTROGEN DEFICIENCY: ICD-10-CM

## 2022-10-24 DIAGNOSIS — M85.852 OSTEOPENIA OF NECKS OF BOTH FEMURS: ICD-10-CM

## 2022-10-24 DIAGNOSIS — Z00.00 ENCOUNTER FOR MEDICARE ANNUAL WELLNESS EXAM: Primary | ICD-10-CM

## 2022-10-24 DIAGNOSIS — M85.851 OSTEOPENIA OF NECKS OF BOTH FEMURS: ICD-10-CM

## 2022-10-24 DIAGNOSIS — F41.1 GAD (GENERALIZED ANXIETY DISORDER): ICD-10-CM

## 2022-10-24 DIAGNOSIS — E78.5 HYPERLIPIDEMIA LDL GOAL <100: ICD-10-CM

## 2022-10-24 DIAGNOSIS — G43.919 INTRACTABLE MIGRAINE, UNSPECIFIED MIGRAINE TYPE: ICD-10-CM

## 2022-10-24 LAB
ALT SERPL W P-5'-P-CCNC: 26 U/L (ref 10–35)
CHOLEST SERPL-MCNC: 205 MG/DL
HDLC SERPL-MCNC: 75 MG/DL
LDLC SERPL CALC-MCNC: 104 MG/DL
NONHDLC SERPL-MCNC: 130 MG/DL
TRIGL SERPL-MCNC: 129 MG/DL

## 2022-10-24 RX ORDER — GABAPENTIN 100 MG/1
100 CAPSULE ORAL 3 TIMES DAILY PRN
Qty: 90 CAPSULE | Refills: 1 | Status: CANCELLED | OUTPATIENT
Start: 2022-10-24

## 2022-10-24 RX ORDER — CITALOPRAM HYDROBROMIDE 10 MG/1
10 TABLET ORAL DAILY
Qty: 90 TABLET | Refills: 1 | Status: SHIPPED | OUTPATIENT
Start: 2022-10-24 | End: 2023-01-23

## 2022-10-24 RX ORDER — ROSUVASTATIN CALCIUM 5 MG/1
5 TABLET, COATED ORAL DAILY
Qty: 90 TABLET | Refills: 3 | Status: SHIPPED | OUTPATIENT
Start: 2022-10-24 | End: 2022-10-25

## 2022-10-24 RX ORDER — GABAPENTIN 300 MG/1
300 CAPSULE ORAL 3 TIMES DAILY
Qty: 270 CAPSULE | Refills: 1 | Status: SHIPPED | OUTPATIENT
Start: 2022-10-24 | End: 2023-03-21

## 2022-10-24 ASSESSMENT — PATIENT HEALTH QUESTIONNAIRE - PHQ9
SUM OF ALL RESPONSES TO PHQ QUESTIONS 1-9: 3
5. POOR APPETITE OR OVEREATING: NOT AT ALL

## 2022-10-24 ASSESSMENT — ANXIETY QUESTIONNAIRES
5. BEING SO RESTLESS THAT IT IS HARD TO SIT STILL: NOT AT ALL
GAD7 TOTAL SCORE: 2
6. BECOMING EASILY ANNOYED OR IRRITABLE: SEVERAL DAYS
GAD7 TOTAL SCORE: 2
7. FEELING AFRAID AS IF SOMETHING AWFUL MIGHT HAPPEN: NOT AT ALL
IF YOU CHECKED OFF ANY PROBLEMS ON THIS QUESTIONNAIRE, HOW DIFFICULT HAVE THESE PROBLEMS MADE IT FOR YOU TO DO YOUR WORK, TAKE CARE OF THINGS AT HOME, OR GET ALONG WITH OTHER PEOPLE: NOT DIFFICULT AT ALL
1. FEELING NERVOUS, ANXIOUS, OR ON EDGE: NOT AT ALL
3. WORRYING TOO MUCH ABOUT DIFFERENT THINGS: SEVERAL DAYS
2. NOT BEING ABLE TO STOP OR CONTROL WORRYING: NOT AT ALL

## 2022-10-24 NOTE — PROGRESS NOTES
Rakel Parish is a 77 yo woman with hx of hypothyroidism, hyperlipidemia, migraine headaches and osteopenia. She is here for a preventive exam.  She is not fasting. She is up to date on eye exams and dental visits. Wears seat belt-yes. Bike helmet- n/a.       HCM  Advanced Directive: on file  COVID vaccine series: 4/4 + Bivalent completed  Other vaccinations Flu, Pneumococcal , Td, Shingrix are up to date  Mammogram: scheduled 11/2022  Colonoscopy: no longer requires screening, given age, no change in GI habits  DEXA hx of osteopenia, took fosamax x 2 yr then stopped. After last DEXA 2019, recommended referral to see endocrinologist. FRAX score was 36% major risk and 22% for hip fracture. She is willing to go for repeat DEXA and discuss treatment for bone loss with endocrinologist, concern for chronic GERD symptoms. May be candidate for prolia or reclast infusion.     Hearing concerns: Pt has noticed a change but declines referral to audiology  Fall Risk: No Concerns  Independent at home: Yes  Safe : Yes  Memory concerns: Yes, trouble with word/name finding during conversations, declines referral for formal neurocognitive testing    COGNITIVE SCREEN  1) Repeat 3 items (Banana, Sunrise, Chair)    2) Clock draw: NORMAL  3) 3 item recall: Recalls 3 objects  Results: 3 items recalled: COGNITIVE IMPAIRMENT LESS LIKELY    Mini-CogTM Copyright S Christian. Licensed by the author for use in [facility]; reprinted with permission (ashley@.[email or web address]). All rights reserved.      Diet: Balanced, meats  Wt Readings from Last 4 Encounters:   10/24/22 55.8 kg (123 lb 0.6 oz)   04/27/22 58.5 kg (129 lb)   12/15/21 58.6 kg (129 lb 3.2 oz)   11/24/21 57.2 kg (126 lb 1.3 oz)     Body mass index is 22.05 kg/m .      VLAD (generalized anxiety disorder)  Nancy takes citalopram 10mg daily for treatment of anxiety. She reports doing well.  She does have difficulty both falling and staying asleep, though this can vary. She believes her  "recent move may be contributing to her sleep issues, as her bedroom is now brighter at night. No current therapy, wishes to continue citalopram.    PHQ 8/14/2020 4/16/2021 10/24/2022   PHQ-9 Total Score 4 2 3   Q9: Thoughts of better off dead/self-harm past 2 weeks Not at all Not at all Not at all     VLAD-7 SCORE 8/14/2020 4/16/2021 10/24/2022   Total Score - - -   Total Score 3 2 2       Intractable migraine  Nancy follows with neurologist for treatment of migraine headaches. She recently decreased gabapentin from 400 mg QID to 300 mg TID. Since resuming Emgality injections once every 28 days, she rarely has headaches. Uses indomethacin and naratriptan when a headache occurs. Frequency of headaches, 0-1/month.    Hyperlipidemia LDL goal <100  Nancy takes rosuvastatin 5mg dose for treatment of hyperlipidemia. She has had transient global amnesia, no recurrence. No longer smokes. No coronary artery disease.    Recent Labs   Lab Test 01/11/22  0939 09/27/21  1015 04/16/21  1117 08/14/20  1400 02/06/19  1337   CHOL 183 290* 234.0*   < > 235.0*   HDL 79 65 71.0   < > 87.0   LDL 82 194* 134.0*   < > 101.0   TRIG 111 154* 144.0   < > 234.0*   CHOLHDLRATIO  --   --  3.3  --  2.7    < > = values in this interval not displayed.     Blunt Trauma to Lower Extremity  Nancy reports that her  dropped something heavy on her left leg during the process of moving from her home, in May 2022. Wound was deep enough that she had to follow with the wound clinic. Area is healed. She reports that the area still \"pings\" occasionally.  Up to date on Td booster.     Health Maintenance   Topic Date Due     DEXA  05/01/2021     ADVANCE CARE PLANNING  02/09/2022     FALL RISK ASSESSMENT  06/02/2022     MEDICARE ANNUAL WELLNESS VISIT  10/24/2023     LIPID  01/11/2027     DTAP/TDAP/TD IMMUNIZATION (5 - Td or Tdap) 08/14/2030     HEPATITIS C SCREENING  Completed     PHQ-2 (once per calendar year)  Completed     INFLUENZA VACCINE  Completed " "    Pneumococcal Vaccine: 65+ Years  Completed     ZOSTER IMMUNIZATION  Completed     HEPATITIS B IMMUNIZATION  Completed     COVID-19 Vaccine  Completed     IPV IMMUNIZATION  Aged Out     MENINGITIS IMMUNIZATION  Aged Out     MAMMO SCREENING  Discontinued     COLORECTAL CANCER SCREENING  Discontinued         Patient Active Problem List   Diagnosis     Bladder neck obstruction     Constipation     Transient global amnesia     Other hammer toe (acquired)     Ostium secundum type atrial septal defect     Senile osteoporosis     Syndrome affecting cervical region     Variants of migraine     Malignant neoplasm of skin     Insomnia     Hypothyroidism     Family history of malignant neoplasm of ovary     Tear film insufficiency     Circulatory system disorder     PFO (patent foramen ovale)     Gastroesophageal reflux disease without esophagitis     ACP (advance care planning)     Amnesia     Chronic paroxysmal hemicrania, not intractable       Past Surgical History:   Procedure Laterality Date     APPENDECTOMY       BUNIONECTOMY Right 2010     COLONOSCOPY  11/16/10    Every 7 years     COLONOSCOPY  2018    hemorrhoids, [facility]     FOOT SURGERY Left     tendon rupture     HC REPAIR OF HAMMERTOE,ONE       TONSILLECTOMY & ADENOIDECTOMY       TUBAL LIGATION  1965       Family History   Problem Relation Age of Onset     Osteoporosis Mother 94         at 94 after hip fracture     Dementia Mother      Hypertension Mother      C.A.D. Father 85         of renal failure. ASCVD, MI x 2     Prostate Cancer Father 80     Cerebrovascular Disease Father      Ovarian Cancer Sister 82     Neurologic Disorder Sister         \"unable to grab thoughts\"     Cancer Brother         CLL     Prostate Cancer Brother 68     Breast Cancer Niece 40        Maternal     Colon Cancer No family hx of        Social    Retired RN  2 sons, 6 grandchildren     HABITS:  Tob: quit 1970,  5 yr pack hx  ETOH: 1/day  Calcium: almond milk " + Calcium supplement, separate Vitamin D supplement + MVI  Caffeine: green tea  Exercise: regular walking, pickleball     OB/GYN HISTORY:  LMP: postmenopausal  No bleeding    Current Outpatient Medications   Medication Sig Dispense Refill     albuterol (PROAIR HFA, PROVENTIL HFA, VENTOLIN HFA) 108 (90 BASE) MCG/ACT inhaler Inhale 2 puffs into the lungs every 6 hours as needed        ALPHA LIPOIC ACID PO Take 200 mg by mouth daily       aspirin 81 MG tablet Take 81 mg by mouth daily        Calcium-Vitamin D 600-200 MG-UNIT TABS Take 1 tablet by mouth At Bedtime        citalopram (CELEXA) 10 MG tablet Take 1 tablet (10 mg) by mouth daily 90 tablet 1     cyanocobalamin (VITAMIN B-12) 1000 MCG SUBL sublingual tablet Place 1,000 mcg under the tongue daily       fexofenadine (ALLEGRA) 180 MG tablet Take 180 mg by mouth daily       gabapentin (NEURONTIN) 100 MG capsule Take 1 capsule (100 mg) by mouth 3 times daily as needed for neuropathic pain Continue taking gabapentin 300mg four times a day 90 capsule 1     gabapentin (NEURONTIN) 300 MG capsule Take 1 capsule (300 mg) by mouth 4 times daily 360 capsule 1     galcanezumab-gnlm (EMGALITY) 120 MG/ML injection Inject 1 mL (120 mg) Subcutaneous every 28 days 1 mL 10     indomethacin (INDOCIN) 50 MG capsule Take 1 capsule (50 mg) by mouth as needed for moderate pain Take 1 tablet as needed 10 capsule 3     levothyroxine (SYNTHROID/LEVOTHROID) 88 MCG tablet Take 1 tablet (88 mcg) by mouth daily Except skip Sunday dose 90 tablet 3     magnesium oxide (MAG-OX) 400 MG tablet Take 800 mg by mouth At Bedtime       Multiple vitamin TABS Take 3 tablets by mouth daily        naratriptan (AMERGE) 2.5 MG tablet TAKE 1 TABLET (2.5 MG) BY ORAL ROUTE ONCE MAY REPEAT AFTER 4 HOURS  3     omeprazole (PRILOSEC) 20 MG capsule Take 40 mg by mouth daily (with dinner)        rosuvastatin (CRESTOR) 5 MG tablet Take 1 tablet (5 mg) by mouth daily 90 tablet 3     Vitamin D (Cholecalciferol) 50 MCG  "(2000 UT) CAPS Take 2,000 Units by mouth daily        zolpidem (AMBIEN) 10 MG tablet Take 1 tablet (10 mg) by mouth nightly as needed for sleep 30 tablet 0     Allergies   Allergen Reactions     Cyclobenzaprine Other (See Comments)     Levofloxacin Unknown     Tendon injury (torn)     Rofecoxib Nausea     Simvastatin Other (See Comments)     Trazodone Other (See Comments)     Other reaction(s): Nightmares     Vicodin [Hydrocodone-Acetaminophen] Itching     Latex Rash         ROS  CONSTITUTIONAL:NEGATIVE for fever, chills, change in weight  INTEGUMENTARY/SKIN: Healed wound at the left lower calf, required wound care, occurred in May 2022. Also has red patch over top of nose. Saw dermatologist who was not concerned.   EYES: NEGATIVE for vision changes or irritation  ENT/MOUTH: NEGATIVE for ear, mouth and throat problems  RESP:NEGATIVE for significant cough or SOB  BREAST: NEGATIVE for masses, tenderness or discharge  CV: NEGATIVE for chest pain, palpitations, MAN, orthopnea, PND  or peripheral edema  GI: Positive for persistent heartburn,longstanding upper abdominal pain, follows with [facility], on omeprazole  :NEGATIVE for frequency, dysuria, or hematuria  MUSCULOSKELETAL:NEGATIVE for significant arthralgias or myalgia  NEURO: NEGATIVE for weakness, dizziness or paresthesias,. + hx of migraines, in good control with Emgality  ENDOCRINE: NEGATIVE for polyuria/dipsia,  temperature intolerance, skin/hair changes, history of osteopenia, borderline osteoporosis, discussed above  HEME/ALLERGY/IMMUNE: NEGATIVE for bleeding problems  PSYCHIATRIC: NEGATIVE for changes in mood or affect, doing well on Citalopram    EXAM  /72   Pulse 64   Temp 97.1  F (36.2  C)   Ht 1.591 m (5' 2.64\")   Wt 55.8 kg (123 lb 0.6 oz)   SpO2 97%   BMI 22.05 kg/m      GENERAL APPEARANCE: Alert, pleasant, NAD  EYES: PERRL, EOMI, conjunctiva clear  HENT: TM normal bilaterally. Nose and mouth are masked.  NECK: no adenopathy, thyroid " normal to palpation  BREAST: pt declines exam today. Axillae are examined, no adenopathy.   RESP: lungs clear to auscultation bilaterally  CV: regular rate and rhythm, normal S1 S2, no murmur, no carotid bruits  ABDOMEN: soft, nontender, without HSM or masses. Bowel sounds normal  MS: extremities normal- no gross deformities noted, no tender, hot or swollen joints.  SKIN: no suspicious lesions or rashes, faint red patch over top of tip of nose  NEURO: Normal strength and tone, sensory exam grossly normal, DTR normoreflexive in upper and lower extremities  PSYCH: mentation appears normal. and affect normal/bright.  EXT: no peripheral edema, pedal pulses palpable    Assessment:  (Z00.00) Encounter for Medicare annual wellness exam  (primary encounter diagnosis)  Comment: Healthy 76 year old female.  Plan: Today we discussed ways to maintain a healthy lifestyle with sensible eating, regular exercise and self cares. We dicussed calcium and Vitamin D intake, vaccinations and preventive health screens.    She is up to date on vaccinations. No longer needs colonoscopy. Mammogram is scheduled. DEXA is ordered below.     (E28.39) Estrogen deficiency  Comment: Routine DEXA screening is due, she has previously used Fosamax x 2 yr, unclear why this was stopped, she currently has GERD and is leery of potential side effects.   Plan: Dexa hip/pelvis/spine*  Refer for DEXA now, discussed adequate calcium /D. Refer to endocrinologist to discuss findings and address medication choices. , she is interested in proceeding          (F41.1) VLAD (generalized anxiety disorder)  Comment: doing well with citalopram  Plan: citalopram (CELEXA) 10 MG tablet        Medication refilled.    (G43.919) Intractable migraine, unspecified migraine type  Comment: doing very well with Emgality, prescribed by neurologist. She is also lowering the dose of gabapentin and symptoms are stable  Plan: gabapentin (NEURONTIN) 300 MG capsule        Medication  "refilled    (E78.5) Hyperlipidemia LDL goal <100  Comment: Not fasting. Hx of transient global amnesia, >1 episode  Plan: rosuvastatin (CRESTOR) 5 MG tablet, Lipid         Panel, ALT        Medication refilled.    (M85.851,  M85.852) Osteopenia of necks of both femurs  Comment: as above, due for DEXA scan  Plan: Adult Endocrinology  Referral        Referred to endocrinology for evaluation and treatment, as FRAX scores indicate she would benefit from treatment.       Alondra Foster MD  Internal Medicine/Pediatrics      I, Susy Wolf, am serving as a scribe to document services personally performed by Dr. Alondra Foster, based on data collection and the provider's statements to me. Dr. Foster has reviewed, edited, and approved the above note.     Answers for HPI/ROS submitted by the patient on 8/29/2022  In general, how would you rate your overall physical health?: good  Frequency of exercise:: 6-7 days/week  Do you usually eat at least 4 servings of fruit and vegetables a day, include whole grains & fiber, and avoid regularly eating high fat or \"junk\" foods? : Yes  Taking medications regularly:: Yes  Medication side effects:: None  Activities of Daily Living: no assistance needed  Home safety: lack of grab bars in the bathroom  Hearing Impairment:: difficulty following a conversation in a noisy restaurant or crowded room, find that men's voices are easier to understand than woman's, difficulty understanding soft or whispered speech  In the past 6 months, have you been bothered by leaking of urine?: No  abdominal pain: Yes  Blood in stool: No  Blood in urine: No  chest pain: No  chills: No  congestion: No  constipation: No  cough: Yes  diarrhea: No  dizziness: Yes  ear pain: No  eye pain: No  nervous/anxious: Yes  fever: No  frequency: No  genital sores: No  headaches: Yes  hearing loss: No  heartburn: Yes  arthralgias: Yes  joint swelling: No  peripheral edema: Yes  mood changes: No  myalgias: Yes  nausea: " No  dysuria: No  palpitations: No  Skin sensation changes: No  sore throat: No  urgency: No  rash: No  shortness of breath: No  visual disturbance: No  weakness: No  pelvic pain: No  vaginal bleeding: No  vaginal discharge: No  tenderness: No  breast mass: No  breast discharge: No  In general, how would you rate your overall mental or emotional health?: excellent  Additional concerns today:: Yes  Duration of exercise:: 45-60 minutes

## 2022-10-24 NOTE — NURSING NOTE
"76 year old  Chief Complaint   Patient presents with     Wellness Visit       Blood pressure 108/72, pulse 64, temperature 97.1  F (36.2  C), height 1.591 m (5' 2.64\"), weight 55.8 kg (123 lb 0.6 oz), SpO2 97 %, not currently breastfeeding. Body mass index is 22.05 kg/m .  Patient Active Problem List   Diagnosis     Bladder neck obstruction     Constipation     Transient global amnesia     Other hammer toe (acquired)     Ostium secundum type atrial septal defect     Senile osteoporosis     Syndrome affecting cervical region     Variants of migraine     Malignant neoplasm of skin     Insomnia     Hypothyroidism     Family history of malignant neoplasm of ovary     Tear film insufficiency     Circulatory system disorder     PFO (patent foramen ovale)     Gastroesophageal reflux disease without esophagitis     ACP (advance care planning)     Amnesia     Chronic paroxysmal hemicrania, not intractable       Wt Readings from Last 2 Encounters:   10/24/22 55.8 kg (123 lb 0.6 oz)   04/27/22 58.5 kg (129 lb)     BP Readings from Last 3 Encounters:   10/24/22 108/72   08/17/22 106/71   12/15/21 105/69         Current Outpatient Medications   Medication     albuterol (PROAIR HFA, PROVENTIL HFA, VENTOLIN HFA) 108 (90 BASE) MCG/ACT inhaler     ALPHA LIPOIC ACID PO     aspirin 81 MG tablet     Calcium-Vitamin D 600-200 MG-UNIT TABS     citalopram (CELEXA) 10 MG tablet     cyanocobalamin (VITAMIN B-12) 1000 MCG SUBL sublingual tablet     fexofenadine (ALLEGRA) 180 MG tablet     gabapentin (NEURONTIN) 100 MG capsule     gabapentin (NEURONTIN) 300 MG capsule     galcanezumab-gnlm (EMGALITY) 120 MG/ML injection     indomethacin (INDOCIN) 50 MG capsule     levothyroxine (SYNTHROID/LEVOTHROID) 88 MCG tablet     magnesium oxide (MAG-OX) 400 MG tablet     Multiple vitamin TABS     naratriptan (AMERGE) 2.5 MG tablet     omeprazole (PRILOSEC) 20 MG capsule     rosuvastatin (CRESTOR) 5 MG tablet     Vitamin D (Cholecalciferol) 50 MCG (2000 " UT) CAPS     zolpidem (AMBIEN) 10 MG tablet     No current facility-administered medications for this visit.       Social History     Tobacco Use     Smoking status: Former     Packs/day: 0.50     Years: 10.00     Pack years: 5.00     Types: Cigarettes     Quit date: 1970     Years since quittin.8     Smokeless tobacco: Never   Substance Use Topics     Alcohol use: No     Alcohol/week: 0.0 standard drinks     Drug use: No       Health Maintenance Due   Topic Date Due     DEXA  2021     ADVANCE CARE PLANNING  2022     FALL RISK ASSESSMENT  2022       No results found for: PAP      2022 1:36 PM

## 2022-10-25 DIAGNOSIS — E78.5 HYPERLIPIDEMIA LDL GOAL <100: Primary | ICD-10-CM

## 2022-10-25 RX ORDER — ROSUVASTATIN CALCIUM 10 MG/1
10 TABLET, COATED ORAL DAILY
Qty: 90 TABLET | Refills: 2 | Status: SHIPPED | OUTPATIENT
Start: 2022-10-25 | End: 2022-10-25

## 2022-10-25 RX ORDER — ROSUVASTATIN CALCIUM 10 MG/1
TABLET, COATED ORAL
Qty: 90 TABLET | Refills: 2
Start: 2022-10-25 | End: 2023-01-23

## 2022-11-01 ENCOUNTER — HOSPITAL ENCOUNTER (OUTPATIENT)
Dept: MAMMOGRAPHY | Facility: CLINIC | Age: 77
Discharge: HOME | End: 2022-11-01
Attending: INTERNAL MEDICINE | Admitting: INTERNAL MEDICINE
Payer: COMMERCIAL

## 2022-11-01 DIAGNOSIS — Z12.31 VISIT FOR SCREENING MAMMOGRAM: ICD-10-CM

## 2022-11-01 PROCEDURE — 77067 SCR MAMMO BI INCL CAD: CPT

## 2022-11-02 ENCOUNTER — TELEPHONE (OUTPATIENT)
Dept: FAMILY MEDICINE | Facility: CLINIC | Age: 77
End: 2022-11-02

## 2022-11-02 DIAGNOSIS — M79.18 GLUTEAL PAIN: Primary | ICD-10-CM

## 2022-11-02 NOTE — TELEPHONE ENCOUNTER
Pt reports ongoing problem with her right gluteus that has been exacerbated by playing pickleball recently.  She has been seeing a chiropractor at [facility] and wants to have some PT sessions there and is requesting a referral.  [facility]ha external referral to [facility] at [phone].  Called pt to confirm referral was sent.    LASHAY Hernandez, RN  11/02/22, 11:25 AM

## 2022-11-02 NOTE — TELEPHONE ENCOUNTER
Pt wanted PT referral sent to [facility] as her insurance didn't cover [facility]. Faxed @ 3:15pm.     LASHAY Hernandez, RN  11/02/22, 3:18 PM

## 2022-11-06 ENCOUNTER — TRANSFERRED RECORDS (OUTPATIENT)
Dept: HEALTH INFORMATION MANAGEMENT | Facility: CLINIC | Age: 77
End: 2022-11-06

## 2022-11-16 ENCOUNTER — HOSPITAL ENCOUNTER (OUTPATIENT)
Dept: BONE DENSITY | Facility: CLINIC | Age: 77
Discharge: HOME | End: 2022-11-16
Attending: INTERNAL MEDICINE | Admitting: INTERNAL MEDICINE
Payer: COMMERCIAL

## 2022-11-16 DIAGNOSIS — E28.39 ESTROGEN DEFICIENCY: ICD-10-CM

## 2022-11-16 PROCEDURE — 77080 DXA BONE DENSITY AXIAL: CPT

## 2022-11-17 ENCOUNTER — TRANSFERRED RECORDS (OUTPATIENT)
Dept: HEALTH INFORMATION MANAGEMENT | Facility: CLINIC | Age: 77
End: 2022-11-17

## 2022-12-06 ENCOUNTER — TRANSFERRED RECORDS (OUTPATIENT)
Dept: HEALTH INFORMATION MANAGEMENT | Facility: CLINIC | Age: 77
End: 2022-12-06

## 2022-12-28 ENCOUNTER — TELEPHONE (OUTPATIENT)
Dept: NEUROLOGY | Facility: CLINIC | Age: 77
End: 2022-12-28

## 2022-12-28 NOTE — TELEPHONE ENCOUNTER
Patient needs nurse advice. She is experiencing migraines and has a question about her medication. Please call her back at [phone] Thank you ~

## 2022-12-29 NOTE — TELEPHONE ENCOUNTER
Returned call to Nancy. She reports that she has throbbing left sided pain from top of head down into left neck. Symptoms induced when she lays down, improve slightly if she does not lay on left side. Does not have headache symptoms when she is upright or sitting.     Symptoms occurring most nights for past week. Her scalp and skin on left neck feel tender even when she doesn't have migraine pain.     She moved about 2 weeks ago and is in new bed, has had increased stressors in relation to the move.    She has taken naratriptan with no relief. Is on emgality monthly injection for prevention. Migraines were very controlled prior to this past week- 2 or less migraines per month.     Please advise if you have any recommendations for acute pain or any input on why she might only be having pain when lying down. Does not fit pattern of past migraines per pt statement.     Gurvinder Cherry, RN, BSN  [facility]

## 2022-12-30 ENCOUNTER — MYC MEDICAL ADVICE (OUTPATIENT)
Dept: NEUROLOGY | Facility: CLINIC | Age: 77
End: 2022-12-30

## 2023-01-02 NOTE — TELEPHONE ENCOUNTER
Outbound call to Nancy [phone]. S/W Nancy and she has not had a headache for a few nights so she is not so sure she needs an appointment. I set up with her that I will put in a reminder and call her next Monday to see how she is doing and we can then scheduled her if still needed

## 2023-01-09 NOTE — TELEPHONE ENCOUNTER
Followed up with Nancy as promised. [place] all of Feb and start of March. She will just keep her existing appointment on 3/28/2023

## 2023-01-17 ENCOUNTER — OFFICE VISIT (OUTPATIENT)
Dept: NEUROLOGY | Facility: CLINIC | Age: 78
End: 2023-01-17
Payer: COMMERCIAL

## 2023-01-17 VITALS — SYSTOLIC BLOOD PRESSURE: 114 MMHG | DIASTOLIC BLOOD PRESSURE: 77 MMHG | OXYGEN SATURATION: 99 % | HEART RATE: 60 BPM

## 2023-01-17 DIAGNOSIS — G44.049 CHRONIC PAROXYSMAL HEMICRANIA, NOT INTRACTABLE: ICD-10-CM

## 2023-01-17 DIAGNOSIS — G43.101 MIGRAINE WITH AURA AND WITH STATUS MIGRAINOSUS, NOT INTRACTABLE: Primary | ICD-10-CM

## 2023-01-17 DIAGNOSIS — M54.81 CERVICO-OCCIPITAL NEURALGIA OF LEFT SIDE: ICD-10-CM

## 2023-01-17 PROCEDURE — 99214 OFFICE O/P EST MOD 30 MIN: CPT | Performed by: PSYCHIATRY & NEUROLOGY

## 2023-01-17 NOTE — PATIENT INSTRUCTIONS
AFTER VISIT SUMMARY (AVS):    At today's visit we thoroughly discussed current symptoms, available treatment options, and the plan.    We decided not to make any medication changes.  Please contact my clinic if you need a refill for Emgality, indomethacin or naratriptan.    Please keep the headache diary and bring it to the next follow-up visit or upload via My Chart.     Next follow-up appointment is in the next 3 months or earlier if needed.    Please do not hesitate to call me with any questions or concerns.    Thanks.

## 2023-01-17 NOTE — PROGRESS NOTES
ESTABLISHED PATIENT NEUROLOGY NOTE    DATE OF VISIT: 1/17/2023  CLINIC LOCATION: [facility]  MRN: [id number]  PATIENT NAME: Rakel Parish  YOB: 1945    REASON FOR VISIT: No chief complaint on file.    SUBJECTIVE:                                                      HISTORY OF PRESENT ILLNESS: Patient is here to follow up regarding multifactorial headache disorder, including migraines and chronic paroxysmal hemicrania.  The last visit was on 8/17/2022.  At that time, no medication changes were made due to good control of headaches.  Please refer to my initial/other prior notes for further information.    Since the last visit, the patient contacted my office in December 2022 reporting throbbing left-sided pain that originated in the left occipital area spreading to the top of the head that occurred most of the nights for 1 week.  She called again in January reporting that headache spontaneously resolved.  For headache prevention she is on Emgality 120 mg every 28 days subcutaneously.  For acute therapy she uses 2.5 mg of naratriptan.  She also has as needed 50 mg of indomethacin.  EXAM:                                                    Physical Exam:   Vitals: There were no vitals taken for this visit.    General: pt is in NAD, cooperative.  Skin: normal turgor, moist mucous membranes, no lesions/rashes noticed.  HEENT: ATNC, white sclera, normal conjunctiva.  Respiratory: Symmetric lung excursion, no accessory respiratory muscle use.  Abdomen: Non distended.  Neurological: awake, cooperative, follows commands, no aphasia or dysarthria noted, cranial nerves II-XII: no ptosis, extraocular motility is full, face is symmetric, tongue is midline, equally moves all extremities, normal tone in upper and lower extremities, sensation to the light touch is intact bilaterally, pronator drift is negative, finger to nose intact bilaterally, casual gait is normal.  ASSESSMENT AND PLAN:                                                     Assessment: 77 year old female patient presents for follow-up of ***.    Diagnoses:  No diagnosis found.  Plan:  There are no Patient Instructions on file for this visit.    Total Time: *** minutes spent on the date of the encounter doing chart review, history and exam, documentation and further activities per the note.    Lazarus Villa MD  [facility]  (Chart documentation was completed in part with Dragon voice-recognition software. Even though reviewed, some grammatical, spelling, and word errors may remain.)

## 2023-01-17 NOTE — PROGRESS NOTES
ESTABLISHED PATIENT NEUROLOGY NOTE    DATE OF VISIT: 1/17/2023  CLINIC LOCATION: [facility]  MRN: [id number]  PATIENT NAME: Rakel Parish  YOB: 1945    REASON FOR VISIT:   Chief Complaint   Patient presents with     Follow Up     Migraines- patient wondering if Emgality is working as they have had more headaches      SUBJECTIVE:                                                      HISTORY OF PRESENT ILLNESS: Patient is here to follow up regarding multifactorial headache disorder, including migraines and chronic paroxysmal hemicrania.  The last visit was on 8/17/2022.  At that time, no medication changes were made due to good control of headaches.  Please refer to my initial/other prior notes for further information.    Since the last visit, the patient contacted my office in December 2022 reporting throbbing left-sided pain that originated in the left occipital area spreading to the top of the head that occurred most of the nights for 1 week.  She called again in January reporting that headache spontaneously resolved.  She did not have any headaches for the last 2 weeks.  For headache prevention she is on Emgality 120 mg every 28 days subcutaneously.  For acute therapy she uses 2.5 mg of naratriptan.  She also has as needed 50 mg of indomethacin.  Denies interval development of new focal neurological symptoms.  Plans to go to [place] for 5 weeks.  EXAM:                                                    Physical Exam:   Vitals: /77 (BP Location: Left arm, Patient Position: Sitting, Cuff Size: Adult Regular)   Pulse 60   SpO2 99%     General: pt is in NAD, cooperative.  Skin: normal turgor, moist mucous membranes, no lesions/rashes noticed.  HEENT: ATNC, white sclera, normal conjunctiva.  Respiratory: Symmetric lung excursion, no accessory respiratory muscle use.  Abdomen: Non distended.  Neurological: awake, cooperative, follows commands, no aphasia or dysarthria noted,  cranial nerves II-XII: no ptosis, face is symmetric, tongue is midline, equally moves all extremities, no dysmetria bilaterally, casual gait is normal.  ASSESSMENT AND PLAN:                                                    Assessment: 77 year old female patient presents for follow-up of multifactorial headaches, including migraines and chronic paroxysmal hemicrania.  She had a flare of left occipital headache spreading to the top of the head in December 2022, but it spontaneously resolved.  I discussed with the patient that might be due to left occipital neuralgia.  We discussed the diagnosis and available treatment options.  We will monitor for recurrence.  Otherwise, she feels that her migraines and paroxysmal hemicrania are under good control.  She has enough refills of Emgality, indomethacin, and naratriptan.    Diagnoses:    ICD-10-CM    1. Migraine with aura and with status migrainosus, not intractable  G43.101       2. Chronic paroxysmal hemicrania, not intractable  G44.049       3. Cervico-occipital neuralgia of left side  M54.81         Plan: At today's visit we thoroughly discussed current symptoms, available treatment options, and the plan.    We decided not to make any medication changes.  The patient will contact my clinic if she needs a refill for Emgality, indomethacin or naratriptan.    I advised the patient to keep the headache diary and bring it to the next follow-up visit or upload via My Chart.     Next follow-up appointment is in the next 3 months or earlier if needed.    Total Time: 21 minutes spent on the date of the encounter doing chart review, history and exam, documentation and further activities per the note.    Lazarus Villa MD  [facility]  (Chart documentation was completed in part with Dragon voice-recognition software. Even though reviewed, some grammatical, spelling, and word errors may remain.)

## 2023-01-17 NOTE — PROGRESS NOTES
"Rakel Parish is a 77 year old female who presents for:  Chief Complaint   Patient presents with     Follow Up     Migraines- patient wondering if Emgality is working as they have had more headaches         Initial Vitals:  /77 (BP Location: Left arm, Patient Position: Sitting, Cuff Size: Adult Regular)   Pulse 60   SpO2 99%  Estimated body mass index is 22.05 kg/m  as calculated from the following:    Height as of 10/24/22: 1.591 m (5' 2.64\").    Weight as of 10/24/22: 55.8 kg (123 lb 0.6 oz).. There is no height or weight on file to calculate BSA. BP completed using cuff size: xiomara Lind    "

## 2023-01-17 NOTE — LETTER
1/17/2023         RE: Rakel Parish  [address]  [place] [address]        Dear Colleague,    Thank you for referring your patient, Rakel Parish, to the [facility]. Please see a copy of my visit note below.    ESTABLISHED PATIENT NEUROLOGY NOTE    DATE OF VISIT: 1/17/2023  CLINIC LOCATION: [facility]  MRN: [id number]  PATIENT NAME: Rakel Parish  YOB: 1945    REASON FOR VISIT:   Chief Complaint   Patient presents with     Follow Up     Migraines- patient wondering if Emgality is working as they have had more headaches      SUBJECTIVE:                                                      HISTORY OF PRESENT ILLNESS: Patient is here to follow up regarding multifactorial headache disorder, including migraines and chronic paroxysmal hemicrania.  The last visit was on 8/17/2022.  At that time, no medication changes were made due to good control of headaches.  Please refer to my initial/other prior notes for further information.    Since the last visit, the patient contacted my office in December 2022 reporting throbbing left-sided pain that originated in the left occipital area spreading to the top of the head that occurred most of the nights for 1 week.  She called again in January reporting that headache spontaneously resolved.  She did not have any headaches for the last 2 weeks.  For headache prevention she is on Emgality 120 mg every 28 days subcutaneously.  For acute therapy she uses 2.5 mg of naratriptan.  She also has as needed 50 mg of indomethacin.  Denies interval development of new focal neurological symptoms.  Plans to go to [place] for 5 weeks.  EXAM:                                                    Physical Exam:   Vitals: /77 (BP Location: Left arm, Patient Position: Sitting, Cuff Size: Adult Regular)   Pulse 60   SpO2 99%     General: pt is in NAD, cooperative.  Skin: normal turgor, moist mucous  membranes, no lesions/rashes noticed.  HEENT: ATNC, white sclera, normal conjunctiva.  Respiratory: Symmetric lung excursion, no accessory respiratory muscle use.  Abdomen: Non distended.  Neurological: awake, cooperative, follows commands, no aphasia or dysarthria noted, cranial nerves II-XII: no ptosis, face is symmetric, tongue is midline, equally moves all extremities, no dysmetria bilaterally, casual gait is normal.  ASSESSMENT AND PLAN:                                                    Assessment: 77 year old female patient presents for follow-up of multifactorial headaches, including migraines and chronic paroxysmal hemicrania.  She had a flare of left occipital headache spreading to the top of the head in December 2022, but it spontaneously resolved.  I discussed with the patient that might be due to left occipital neuralgia.  We discussed the diagnosis and available treatment options.  We will monitor for recurrence.  Otherwise, she feels that her migraines and paroxysmal hemicrania are under good control.  She has enough refills of Emgality, indomethacin, and naratriptan.    Diagnoses:    ICD-10-CM    1. Migraine with aura and with status migrainosus, not intractable  G43.101       2. Chronic paroxysmal hemicrania, not intractable  G44.049       3. Cervico-occipital neuralgia of left side  M54.81         Plan: At today's visit we thoroughly discussed current symptoms, available treatment options, and the plan.    We decided not to make any medication changes.  The patient will contact my clinic if she needs a refill for Emgality, indomethacin or naratriptan.    I advised the patient to keep the headache diary and bring it to the next follow-up visit or upload via My Chart.     Next follow-up appointment is in the next 3 months or earlier if needed.    Total Time: 21 minutes spent on the date of the encounter doing chart review, history and exam, documentation and further activities per the  "note.    Lazarus Villa MD  [facility]  (Chart documentation was completed in part with Dragon voice-recognition software. Even though reviewed, some grammatical, spelling, and word errors may remain.)      Rakel Parish is a 77 year old female who presents for:  Chief Complaint   Patient presents with     Follow Up     Migraines- patient wondering if Emgality is working as they have had more headaches         Initial Vitals:  /77 (BP Location: Left arm, Patient Position: Sitting, Cuff Size: Adult Regular)   Pulse 60   SpO2 99%  Estimated body mass index is 22.05 kg/m  as calculated from the following:    Height as of 10/24/22: 1.591 m (5' 2.64\").    Weight as of 10/24/22: 55.8 kg (123 lb 0.6 oz).. There is no height or weight on file to calculate BSA. BP completed using cuff size: regular      Ana Maria Lind        Again, thank you for allowing me to participate in the care of your patient.        Sincerely,        Lazarus Villa MD    "

## 2023-01-18 ENCOUNTER — TELEPHONE (OUTPATIENT)
Dept: FAMILY MEDICINE | Facility: CLINIC | Age: 78
End: 2023-01-18

## 2023-01-18 DIAGNOSIS — L98.9 SKIN LESION: Primary | ICD-10-CM

## 2023-01-18 NOTE — TELEPHONE ENCOUNTER
Placing referral to Dr. Sarah Busby @ [facility] per pt request for skin lesion on face.    LASHAY Hernandez, RN  01/18/23, 10:22 AM

## 2023-01-18 NOTE — TELEPHONE ENCOUNTER
Who is calling? Patient  What do they need? Referral to see Dr. Sarah Busby @ [facility]  Is this an insurance referral? No  Does caller need a call back? No  Additional Comments: Pt said she has a spot on her nose and needed a referral to make an appointment    Daxa

## 2023-01-23 DIAGNOSIS — G44.049 CHRONIC PAROXYSMAL HEMICRANIA, NOT INTRACTABLE: ICD-10-CM

## 2023-01-23 DIAGNOSIS — G43.919 INTRACTABLE MIGRAINE, UNSPECIFIED MIGRAINE TYPE: ICD-10-CM

## 2023-01-23 DIAGNOSIS — G47.00 PERSISTENT INSOMNIA: ICD-10-CM

## 2023-01-23 DIAGNOSIS — F41.1 GAD (GENERALIZED ANXIETY DISORDER): ICD-10-CM

## 2023-01-23 DIAGNOSIS — E03.9 HYPOTHYROIDISM, UNSPECIFIED TYPE: ICD-10-CM

## 2023-01-23 DIAGNOSIS — E78.5 HYPERLIPIDEMIA LDL GOAL <100: ICD-10-CM

## 2023-01-23 RX ORDER — CITALOPRAM HYDROBROMIDE 10 MG/1
10 TABLET ORAL DAILY
Qty: 90 TABLET | Refills: 2 | Status: SHIPPED | OUTPATIENT
Start: 2023-01-23 | End: 2023-03-21

## 2023-01-23 RX ORDER — LEVOTHYROXINE SODIUM 88 UG/1
88 TABLET ORAL DAILY
Qty: 90 TABLET | Refills: 0 | Status: SHIPPED | OUTPATIENT
Start: 2023-01-23 | End: 2023-03-21

## 2023-01-23 RX ORDER — ZOLPIDEM TARTRATE 10 MG/1
10 TABLET ORAL
Qty: 30 TABLET | Refills: 0 | Status: SHIPPED | OUTPATIENT
Start: 2023-01-23 | End: 2023-04-04

## 2023-01-23 RX ORDER — INDOMETHACIN 50 MG/1
50 CAPSULE ORAL PRN
Qty: 10 CAPSULE | Refills: 0 | OUTPATIENT
Start: 2023-01-23

## 2023-01-23 RX ORDER — GABAPENTIN 300 MG/1
300 CAPSULE ORAL 3 TIMES DAILY
Qty: 270 CAPSULE | Refills: 0 | OUTPATIENT
Start: 2023-01-23

## 2023-01-23 RX ORDER — ROSUVASTATIN CALCIUM 10 MG/1
10 TABLET, COATED ORAL DAILY
Qty: 90 TABLET | Refills: 0 | Status: SHIPPED | OUTPATIENT
Start: 2023-01-23 | End: 2023-03-20

## 2023-01-23 NOTE — TELEPHONE ENCOUNTER
[employer] and [facility] are telling patient she needs to contact us and have her prescriptions sent to [facility] (the new pharmacy)    Daxa

## 2023-01-23 NOTE — TELEPHONE ENCOUNTER
Citalopram (Celexa) 10 mg, Gabapentin (Neurontin) 300 mg, Indomethacin (Indocin) 50 mg, Levothyroxine (Synthroid) 88 mcg, Rosuvastatin (Crestor) 10 mg , Zolpidem (Ambien) 10 mg    Last Office Visit: 10/24/22  [facility] Appointments: None  Medication last refilled:     10/24/22 #90 with 1 refill(s)-Citalopram  10/24/22 #270 with 1 refill(s)-Gabapentin  8/17/22 #10 with 3 refill(s)-Indomethacin  5/12/22 #90 with 3 refill(s)-Levothyroxine  10/25/22 #90 with 2 refill(s)-Rosuvastatin  6/3/22 #30 with 0 refill(s)-Zolpidem     verified - last fill date:     12/29/22 #270-Gabapentin  6/6/22 #30-Zolpidem    Vital Signs 12/15/2021 10/24/2022 1/17/2023   Systolic 105 108 114   Diastolic 69 72 77     Required labs per protocol:    LAB REF RANGE 5/10/22 10/24/22   LDL < 100 mg/dL -- 104 High   TSH 0.40-4.0 mU/L 2.20 --     Routing refill request to provider for review/approval because:  Drug not on the G refill protocol     MyChart message sent to patient to call and schedule appointment.    Aida Sher, BSN, RN, CCM

## 2023-03-14 DIAGNOSIS — E78.5 HYPERLIPIDEMIA LDL GOAL <100: ICD-10-CM

## 2023-03-15 RX ORDER — ROSUVASTATIN CALCIUM 10 MG/1
10 TABLET, COATED ORAL DAILY
Qty: 90 TABLET | Refills: 0 | OUTPATIENT
Start: 2023-03-15

## 2023-03-20 ENCOUNTER — TELEPHONE (OUTPATIENT)
Dept: FAMILY MEDICINE | Facility: CLINIC | Age: 78
End: 2023-03-20

## 2023-03-20 ENCOUNTER — OFFICE VISIT (OUTPATIENT)
Dept: FAMILY MEDICINE | Facility: CLINIC | Age: 78
End: 2023-03-20
Payer: COMMERCIAL

## 2023-03-20 VITALS
BODY MASS INDEX: 22.16 KG/M2 | TEMPERATURE: 97 F | SYSTOLIC BLOOD PRESSURE: 115 MMHG | HEART RATE: 55 BPM | DIASTOLIC BLOOD PRESSURE: 74 MMHG | OXYGEN SATURATION: 99 % | HEIGHT: 63 IN | WEIGHT: 125.04 LBS

## 2023-03-20 DIAGNOSIS — H92.01 RIGHT EAR PAIN: Primary | ICD-10-CM

## 2023-03-20 RX ORDER — ROSUVASTATIN CALCIUM 10 MG/1
10 TABLET, COATED ORAL DAILY
Qty: 30 TABLET | Refills: 0 | Status: SHIPPED | OUTPATIENT
Start: 2023-03-20 | End: 2023-04-03

## 2023-03-20 NOTE — NURSING NOTE
77 year old  Chief Complaint   Patient presents with     Ear Problem     Right ear ache for about a month, pain comes and goes.       not currently breastfeeding. There is no height or weight on file to calculate BMI.  Patient Active Problem List   Diagnosis     Bladder neck obstruction     Constipation     Transient global amnesia     Other hammer toe (acquired)     Ostium secundum type atrial septal defect     Senile osteoporosis     Syndrome affecting cervical region     Variants of migraine     Malignant neoplasm of skin     Insomnia     Hypothyroidism     Family history of malignant neoplasm of ovary     Tear film insufficiency     Circulatory system disorder     PFO (patent foramen ovale)     Gastroesophageal reflux disease without esophagitis     ACP (advance care planning)     Amnesia     Chronic paroxysmal hemicrania, not intractable     Osteopenia of necks of both femurs     Hyperlipidemia LDL goal <100     Intractable migraine, unspecified migraine type     VLAD (generalized anxiety disorder)       Wt Readings from Last 2 Encounters:   10/24/22 55.8 kg (123 lb 0.6 oz)   04/27/22 58.5 kg (129 lb)     BP Readings from Last 3 Encounters:   01/17/23 114/77   10/24/22 108/72   08/17/22 106/71         Current Outpatient Medications   Medication     aspirin 81 MG tablet     Calcium-Vitamin D 600-200 MG-UNIT TABS     citalopram (CELEXA) 10 MG tablet     cyanocobalamin (VITAMIN B-12) 1000 MCG SUBL sublingual tablet     fexofenadine (ALLEGRA) 180 MG tablet     gabapentin (NEURONTIN) 300 MG capsule     galcanezumab-gnlm (EMGALITY) 120 MG/ML injection     indomethacin (INDOCIN) 50 MG capsule     levothyroxine (SYNTHROID/LEVOTHROID) 88 MCG tablet     magnesium oxide (MAG-OX) 400 MG tablet     Multiple vitamin TABS     naratriptan (AMERGE) 2.5 MG tablet     omeprazole (PRILOSEC) 20 MG capsule     rosuvastatin (CRESTOR) 10 MG tablet     Vitamin D (Cholecalciferol) 50 MCG (2000 UT) CAPS     zolpidem (AMBIEN) 10 MG tablet      No current facility-administered medications for this visit.       Social History     Tobacco Use     Smoking status: Former     Packs/day: 0.50     Years: 10.00     Pack years: 5.00     Types: Cigarettes     Quit date: 1970     Years since quittin.2     Smokeless tobacco: Never   Substance Use Topics     Alcohol use: No     Alcohol/week: 0.0 standard drinks     Drug use: No       Health Maintenance Due   Topic Date Due     ADVANCE CARE PLANNING  2022     FALL RISK ASSESSMENT  2022       No results found for: PAP      2023 12:42 PM

## 2023-03-20 NOTE — TELEPHONE ENCOUNTER
Called [facility] pharmacy and they do have the prescription but insurance is stating it cannot be filled until May.  Nancy states she hasn't been on it or received any medication in months.  I asked Nancy to please call [facility] pharmacy as the time is right for the Rx that we sent to them 1/23/23, even though Nancy states she never received anything from them.  In the meantime, I asked [facility] to call the insurance and let them know what's occurred to see if they can fill it for her now.  SHIREEN SethN, RN, [facility]  03/20/23  3:43 PM

## 2023-03-20 NOTE — TELEPHONE ENCOUNTER
FUTURE VISIT INFORMATION      FUTURE VISIT INFORMATION:    Date: 5/4/23    Time: 8:20am    Location[place] [facility] [place]  REFERRAL INFORMATION:    Referring provider:  Isaac Mccullough MD    Referring providers clinic:  [facility]    Reason for visit/diagnosis  Per pt/ Right ear pain/ med rec in epic/ Ref Isaac Mccullough MD in [facility]    RECORDS REQUESTED FROM:       Clinic name Comments Records Status Imaging Status   [facility]  3/20/23- note with Isaac Mccullough MD Epic

## 2023-03-20 NOTE — PROGRESS NOTES
"  Assessment & Plan   Problem List Items Addressed This Visit    None  Visit Diagnoses     Right ear pain    -  Primary    Relevant Orders    Adult ENT  Referral         Concern at this point for possible ruptured RIGHT ear drum. Suspicion that this may have occurred during flight home from [place] as this lines up with onset of pain. No report of drainage, but unable to visualize TM on the RIGHT and symptoms with attempt to flush are concerning. Referring to ENT for repeat attempt to clean external canal to visualize TM.     32 minutes spent on the date of the encounter doing chart review, history and exam, documentation and further activities as noted.    Isaac Mccullough MD  [facility]    Subjective   Nancy is a 77 year old presenting for the following health issues:  Ear Problem (Right ear ache for about a month, pain comes and goes.)      HPI   RIGHT Ear Ache  - Been going on for the past month, since coming home from [place]  - intermittent, maybe once or twice a day, lasts for about a half an hour  - describes it as a throbbing, 7/10 at its worst  - no fever, chills, nausea, sore throat  - no loss of hearing, tinnitus, dizziness  - no bleeding or drainage from the ear  - no significant sinus congestion or PND  - history of migraines but reports this pain is different  - hasn't taken any medication to treat this    Review of Systems   Constitutional, HEENT, cardiovascular, pulmonary, gi and gu systems are negative, except as otherwise noted.      Objective    /74   Pulse 55   Temp 97  F (36.1  C)   Ht 1.591 m (5' 2.64\")   Wt 56.7 kg (125 lb 0.6 oz)   SpO2 99%   BMI 22.41 kg/m    Body mass index is 22.41 kg/m .  Physical Exam   GENERAL: healthy, alert and no distress  HENT: on initial exam canals are occluded with cerumen bilaterally; LEFT ear flushed successfully, RIGHT ear attempt to flush was unsuccessful and caused significant acute pain.   NECK: no adenopathy, no asymmetry, masses, " or scars and thyroid normal to palpation  RESP: lungs clear to auscultation - no rales, rhonchi or wheezes  CV: regular rate and rhythm, normal S1 S2, no S3 or S4, no murmur, click or rub, no peripheral edema and peripheral pulses strong  MS: no gross musculoskeletal defects noted, no edema

## 2023-03-20 NOTE — TELEPHONE ENCOUNTER
Pt is scheduled for clinic visit/physical on 4/4/23. Will send 30 day supply for now.    Hans METZ, RN  03/20/23 10:12 AM

## 2023-03-21 DIAGNOSIS — G43.919 INTRACTABLE MIGRAINE, UNSPECIFIED MIGRAINE TYPE: ICD-10-CM

## 2023-03-21 DIAGNOSIS — E03.9 HYPOTHYROIDISM, UNSPECIFIED TYPE: ICD-10-CM

## 2023-03-21 DIAGNOSIS — F41.1 GAD (GENERALIZED ANXIETY DISORDER): ICD-10-CM

## 2023-03-21 RX ORDER — CITALOPRAM HYDROBROMIDE 10 MG/1
10 TABLET ORAL DAILY
Qty: 90 TABLET | Refills: 0 | Status: SHIPPED | OUTPATIENT
Start: 2023-03-21 | End: 2023-04-04

## 2023-03-21 RX ORDER — GABAPENTIN 300 MG/1
300 CAPSULE ORAL 3 TIMES DAILY
Qty: 270 CAPSULE | Refills: 0 | Status: SHIPPED | OUTPATIENT
Start: 2023-03-21 | End: 2023-04-04

## 2023-03-21 RX ORDER — LEVOTHYROXINE SODIUM 88 UG/1
88 TABLET ORAL DAILY
Qty: 90 TABLET | Refills: 0 | Status: SHIPPED | OUTPATIENT
Start: 2023-03-21 | End: 2023-04-04

## 2023-03-21 NOTE — TELEPHONE ENCOUNTER
Levothyroxine (Synthroid) 88 mcg, Gabapentin (Neurontin) 300 mg + Citalopram (Celexa) 10 mg    Last Office Visit: 3/20/23  [facility] Appointments: 4/4/23  Medication last refilled:     1/23/23 #90 with 2 refill(s) - Citalopram  10/24/22 #270 with 1 refill(s) - Gabapentin  1/23/23 #90 with 0 refill(s) - Levothyroxine     verified - last fill date: 12/29/22 #270 - Gabapentin    VLAD-7 SCORE 8/14/2020 4/16/2021 10/24/2022   Total Score 3 2 2     Required labs per protocol:    LAB REF RANGE 4/16/21 5/10/22   TSH 0.40-4.0 mU/L 4/16/21 2.20     Patient transferring all prescriptions back to [facility] pharmacy as unhappy with [facility] service.  Gabapentin refill was cancelled when she made all of those transfers and was never used.  Needs new Rx.    Aida Sher, SHIREENN, RN, CCM    
[id number]

## 2023-03-22 ENCOUNTER — OFFICE VISIT (OUTPATIENT)
Dept: OTOLARYNGOLOGY | Facility: CLINIC | Age: 78
End: 2023-03-22
Payer: COMMERCIAL

## 2023-03-22 VITALS
BODY MASS INDEX: 23 KG/M2 | TEMPERATURE: 98.6 F | SYSTOLIC BLOOD PRESSURE: 118 MMHG | OXYGEN SATURATION: 100 % | DIASTOLIC BLOOD PRESSURE: 77 MMHG | HEART RATE: 60 BPM | HEIGHT: 62 IN | WEIGHT: 125 LBS

## 2023-03-22 DIAGNOSIS — H61.21 IMPACTED CERUMEN OF RIGHT EAR: Primary | ICD-10-CM

## 2023-03-22 DIAGNOSIS — H92.01 RIGHT EAR PAIN: ICD-10-CM

## 2023-03-22 PROCEDURE — 69210 REMOVE IMPACTED EAR WAX UNI: CPT | Mod: RT | Performed by: REGISTERED NURSE

## 2023-03-22 PROCEDURE — 99203 OFFICE O/P NEW LOW 30 MIN: CPT | Mod: 25 | Performed by: REGISTERED NURSE

## 2023-03-22 ASSESSMENT — PAIN SCALES - GENERAL: PAINLEVEL: NO PAIN (0)

## 2023-03-22 NOTE — PROGRESS NOTES
Otolaryngology Clinic  March 22, 2023    HPI:  Rakel Parish is here for assessment of their right ear.  Patient reports significant right ear pressure and pain after a flight from [place] about a month ago.  Concerned for a right perforation.  Patient denies any drainage from the ear but right ear does feel muffled.  Patient was recently seen in her primary care clinic where they were unable to assess right TM due to cerumen impaction.  Ear irrigation attempted but was aborted due to significant pain from irrigation.    Today, patient presents to clinic for assessment of right ear under microscopy.  Continues to have fullness and pressure in the right ear.  Denies any pain.  Patient denies history of frequent ear infections or ear surgeries.  No symptoms in the left ear.    Otologic microscope exam:    Patient's ear pathology required use of the binocular microscope for the purpose of cleaning and improving visualization in order to assure a more accurate diagnostic evaluation.    Right ear was examined under the microscope.  Narrow canal.  Complete cerumen impaction.  It was cleaned with right angle hook, suction and alligator forceps. Once cleaned, TM visualized under microscope. Normal appearing TM without evidence of perforation.  TM is intact with nicely aerated middle ear space.     Left ear was also examined under the microscope.  Ear canal is clean and dry, free of cerumen.TM visualized under microscope. Normal appearing TM, nicely aerated middle ear space.     Assessment and Plan:  The patient presents for assessment of right ear due to intermittent right ear pain and pressure. Suspect patient's symptoms were due to significant cerumen impaction of the right ear. Ear was thoroughly cleaned under microscopy today. Ear canal is healthy with intact tympanic membrane on exam after cleaning. No evidence of perforation or infection.  Due to narrow right ear canal, patient may require repeat ear cleanign under  microscopy in the future. Return as needed for cleaning.     Patient will follow up as needed for cleaning.     Dariela Villeda DNP, APRN, CNP  Otolaryngology  Head & Neck Surgery  [phone]    30 minutes spent on the date of the encounter doing chart review, history and exam, documentation and further activities per the note excluding time spent examining and cleaning ear under microscopy.

## 2023-03-22 NOTE — LETTER
3/22/2023       RE: Rakel Parish  [address]  [facility] [address]     Dear Colleague,    Thank you for referring your patient, Rakel Parish, to the [facility] [place] [facility]. Please see a copy of my visit note below.      Otolaryngology Clinic  March 22, 2023    HPI:  Rakel Parish is here for assessment of their right ear.  Patient reports significant right ear pressure and pain after a flight from [place] about a month ago.  Concerned for a right perforation.  Patient denies any drainage from the ear but right ear does feel muffled.  Patient was recently seen in her primary care clinic where they were unable to assess right TM due to cerumen impaction.  Ear irrigation attempted but was aborted due to significant pain from irrigation.    Today, patient presents to clinic for assessment of right ear under microscopy.  Continues to have fullness and pressure in the right ear.  Denies any pain.  Patient denies history of frequent ear infections or ear surgeries.  No symptoms in the left ear.    Otologic microscope exam:    Patient's ear pathology required use of the binocular microscope for the purpose of cleaning and improving visualization in order to assure a more accurate diagnostic evaluation.    Right ear was examined under the microscope.  Narrow canal.  Complete cerumen impaction.  It was cleaned with right angle hook, suction and alligator forceps. Once cleaned, TM visualized under microscope. Normal appearing TM without evidence of perforation.  TM is intact with nicely aerated middle ear space.     Left ear was also examined under the microscope.  Ear canal is clean and dry, free of cerumen.TM visualized under microscope. Normal appearing TM, nicely aerated middle ear space.     Assessment and Plan:  The patient presents for assessment of right ear due to intermittent right ear pain and pressure.  Suspect patient's symptoms were due to significant cerumen impaction of the right ear. Ear was thoroughly cleaned under microscopy today. Ear canal is healthy with intact tympanic membrane on exam after cleaning. No evidence of perforation or infection.  Due to narrow right ear canal, patient may require repeat ear cleanign under microscopy in the future. Return as needed for cleaning.     Patient will follow up as needed for cleaning.     Dariela Villeda DNP, APRN, CNP  Otolaryngology  Head & Neck Surgery  [phone]    30 minutes spent on the date of the encounter doing chart review, history and exam, documentation and further activities per the note excluding time spent examining and cleaning ear under microscopy.

## 2023-03-28 ASSESSMENT — ENCOUNTER SYMPTOMS
HEMATURIA: 0
EYE PAIN: 0
DIARRHEA: 0
SHORTNESS OF BREATH: 0
JOINT SWELLING: 0
MYALGIAS: 1
BREAST MASS: 0
COUGH: 1
ABDOMINAL PAIN: 0
CHILLS: 0
FEVER: 0
HEARTBURN: 0
CONSTIPATION: 0
DYSURIA: 0
WEAKNESS: 0
ARTHRALGIAS: 0
NAUSEA: 0
PALPITATIONS: 0
SORE THROAT: 0
HEMATOCHEZIA: 0
FREQUENCY: 0
HEADACHES: 1
PARESTHESIAS: 0
DIZZINESS: 0
NERVOUS/ANXIOUS: 0

## 2023-03-28 ASSESSMENT — ACTIVITIES OF DAILY LIVING (ADL): CURRENT_FUNCTION: NO ASSISTANCE NEEDED

## 2023-04-04 ENCOUNTER — OFFICE VISIT (OUTPATIENT)
Dept: FAMILY MEDICINE | Facility: CLINIC | Age: 78
End: 2023-04-04
Payer: COMMERCIAL

## 2023-04-04 VITALS
SYSTOLIC BLOOD PRESSURE: 109 MMHG | HEART RATE: 63 BPM | HEIGHT: 62 IN | WEIGHT: 125 LBS | RESPIRATION RATE: 17 BRPM | OXYGEN SATURATION: 100 % | TEMPERATURE: 96.3 F | DIASTOLIC BLOOD PRESSURE: 69 MMHG | BODY MASS INDEX: 23 KG/M2

## 2023-04-04 DIAGNOSIS — F41.1 GAD (GENERALIZED ANXIETY DISORDER): ICD-10-CM

## 2023-04-04 DIAGNOSIS — E78.5 HYPERLIPIDEMIA LDL GOAL <100: ICD-10-CM

## 2023-04-04 DIAGNOSIS — R09.89 CHOKING SENSATION: ICD-10-CM

## 2023-04-04 DIAGNOSIS — R22.40 LOCALIZED SWELLING OF LOWER LEG: ICD-10-CM

## 2023-04-04 DIAGNOSIS — G43.919 INTRACTABLE MIGRAINE, UNSPECIFIED MIGRAINE TYPE: ICD-10-CM

## 2023-04-04 DIAGNOSIS — Z00.00 MEDICARE ANNUAL WELLNESS VISIT, SUBSEQUENT: Primary | ICD-10-CM

## 2023-04-04 DIAGNOSIS — G47.00 PERSISTENT INSOMNIA: ICD-10-CM

## 2023-04-04 DIAGNOSIS — E03.9 HYPOTHYROIDISM, UNSPECIFIED TYPE: ICD-10-CM

## 2023-04-04 LAB
ALBUMIN SERPL BCG-MCNC: 4.2 G/DL (ref 3.5–5.2)
ALP SERPL-CCNC: 69 U/L (ref 35–104)
ALT SERPL W P-5'-P-CCNC: 27 U/L (ref 10–35)
ANION GAP SERPL CALCULATED.3IONS-SCNC: 12 MMOL/L (ref 7–15)
AST SERPL W P-5'-P-CCNC: 31 U/L (ref 10–35)
BILIRUB SERPL-MCNC: 0.2 MG/DL
BUN SERPL-MCNC: 18.2 MG/DL (ref 8–23)
CALCIUM SERPL-MCNC: 9.2 MG/DL (ref 8.8–10.2)
CHLORIDE SERPL-SCNC: 100 MMOL/L (ref 98–107)
CHOLEST SERPL-MCNC: 203 MG/DL
CREAT SERPL-MCNC: 0.85 MG/DL (ref 0.51–0.95)
DEPRECATED HCO3 PLAS-SCNC: 24 MMOL/L (ref 22–29)
GFR SERPL CREATININE-BSD FRML MDRD: 70 ML/MIN/1.73M2
GLUCOSE SERPL-MCNC: 81 MG/DL (ref 70–99)
HDLC SERPL-MCNC: 75 MG/DL
LDLC SERPL CALC-MCNC: 89 MG/DL
NONHDLC SERPL-MCNC: 128 MG/DL
POTASSIUM SERPL-SCNC: 3.9 MMOL/L (ref 3.4–5.3)
PROT SERPL-MCNC: 6.7 G/DL (ref 6.4–8.3)
SODIUM SERPL-SCNC: 136 MMOL/L (ref 136–145)
TRIGL SERPL-MCNC: 196 MG/DL
TSH SERPL DL<=0.005 MIU/L-ACNC: 0.32 UIU/ML (ref 0.3–4.2)

## 2023-04-04 PROCEDURE — 80053 COMPREHEN METABOLIC PANEL: CPT | Performed by: INTERNAL MEDICINE

## 2023-04-04 PROCEDURE — 80061 LIPID PANEL: CPT | Performed by: INTERNAL MEDICINE

## 2023-04-04 PROCEDURE — 82435 ASSAY OF BLOOD CHLORIDE: CPT | Performed by: INTERNAL MEDICINE

## 2023-04-04 PROCEDURE — 84443 ASSAY THYROID STIM HORMONE: CPT | Performed by: INTERNAL MEDICINE

## 2023-04-04 RX ORDER — GABAPENTIN 300 MG/1
300 CAPSULE ORAL 3 TIMES DAILY
Qty: 270 CAPSULE | Refills: 3 | Status: SHIPPED | OUTPATIENT
Start: 2023-04-04 | End: 2024-03-01

## 2023-04-04 RX ORDER — ROSUVASTATIN CALCIUM 10 MG/1
10 TABLET, COATED ORAL DAILY
Qty: 90 TABLET | Refills: 3 | Status: SHIPPED | OUTPATIENT
Start: 2023-04-04 | End: 2024-05-28

## 2023-04-04 RX ORDER — ZOLPIDEM TARTRATE 10 MG/1
10 TABLET ORAL
Qty: 30 TABLET | Refills: 0 | Status: SHIPPED | OUTPATIENT
Start: 2023-04-04 | End: 2023-08-11

## 2023-04-04 RX ORDER — CITALOPRAM HYDROBROMIDE 10 MG/1
10 TABLET ORAL DAILY
Qty: 90 TABLET | Refills: 3 | Status: SHIPPED | OUTPATIENT
Start: 2023-04-04 | End: 2024-07-11

## 2023-04-04 RX ORDER — LEVOTHYROXINE SODIUM 88 UG/1
88 TABLET ORAL DAILY
Qty: 90 TABLET | Refills: 3 | Status: SHIPPED | OUTPATIENT
Start: 2023-04-04 | End: 2024-07-11

## 2023-04-04 NOTE — PROGRESS NOTES
"Rakel Parish is a 78 yo woman with hx of osteoporosis, constipation, complex migraines, insomnia, hypothyroidism, GERD, hyperlipidemia, anxiety . She is here for a preventive exam.  She is not fasting. She is up to date on eye exams and dental visits.       HCM  Advanced Directive: on file, recommend reviewing document  COVID vaccine series: Bivalent completed  Other vaccinations up to date  Mammogram: completed 11/2022  Colonoscopy: no longer indicated  DEXA 11/2022, referred to endocrinology to consider treatment other than oral bisphosphonates given GI symptoms  DEXA hx of osteopenia, took fosamax x 2 yr then stopped. After last DEXA 2019, recommended referral to see endocrinologist. FRAX score was 36% major risk and 22% for hip fracture. She is willing to go for repeat DEXA and discuss treatment for bone loss with endocrinologist, concern for chronic GERD symptoms. May be candidate for prolia or reclast infusion.   She has upcoming appt with endocrinologist-5/2023      Hearing concerns: yes-declines referral. Does not wish to wear hearing aids.   Fall Risk: no  Independent at home: yes  Safe : yes  Memory concerns: no concerns    COGNITIVE SCREEN  1) Repeat 3 items (Banana, Sunrise, Chair)    2) Clock draw: NORMAL  3) 3 item recall: Recalls 3 objects  Results: 3 items recalled: COGNITIVE IMPAIRMENT LESS LIKELY    Mini-CogTM Copyright S Christian. Licensed by the author for use in [facility]; reprinted with permission (ashley@[email or web address]). All rights reserved.        Diet: regular diet, less red meat  Wt Readings from Last 4 Encounters:   04/04/23 56.7 kg (125 lb)   03/22/23 56.7 kg (125 lb)   03/20/23 56.7 kg (125 lb 0.6 oz)   10/24/22 55.8 kg (123 lb 0.6 oz)     Body mass index is 22.86 kg/m .    Choking  Nancy reports longstanding history of choking on food with eating \"since I was a child\". Reports this can be triggered by eating granola, peppers, limes, nuts, grains. Does not occur with pills. She " "sometimes has to leave the room due to coughing fits.  She has history of GERD and hx of dysphagia. Sometimes has difficulty getting food to go down. No nocturnal symptoms.  Previous EGD with hiatal hernia in 2022. On PPI. Declines swallow study to investigate further. She feels PPI is working well. Saw provider at [facility] in the past. No current care there.    Injury to left lower leg  Nancy reports she moved from her home to [facility] last year. While moving, she injured her left lateral calf and suffered a deep wound, that required wound care.  Now there is a scar present and localized swelling around it. No current pain. Swelling is local and has not improved. Swelling does not extend to her ankle or foot.      Headaches  Nancy has history of complex migraines, follows with a neurologist. Using Emgality, and reports doing well overall. She went on a recent vacation to [place] and felt the higher altitude triggered her headaches. She uses naratriptan once or twice a month. Also takes gabapentin 1 po TID which helps.     Hypothyroid  Nancy is on levothyroxine, 88mcg daily, skips Sunday. No current palpitations, no diarrhea or constipation. She is due for TSH level.  TSH   Date Value Ref Range Status   05/10/2022 2.20 0.40 - 4.00 mU/L Final   04/16/2021 1.34 0.40 - 4.00 mU/L Final     Lipid  Nancy takes rosuvastatin 10mg, full tablet daily. She had slightly elevated LDL at the last check. Had been taking 5mg daily so I recommended increasing to full 10mg tablet.     Insomnia  Nancy has history of insomnia, uses Ambien mainly with traveling. She recently got a new mattress and pillows and reports continued sleep disruption. \"not yet used to the mattress\". She uses 10mg dose when taking it. Finds it hard to fall asleep. Sometimes takes 15 min, sometimes 2 hr. She admits to looking at her phone right before bed. If awakening in the middle of the night can usually fall back to sleep. Napping only " occasionally. Generally sleeps 10pm - 7 am.     PMH, PSH, FH, medications, allergies and immunizations are reviewed/ updated this visit.       Social    Retired RN  2 sons, 6 grandchildren  Knee pain with treadmill use, likes to walk outside     HABITS:  Tob: quit 1970,  5 yr pack hx  ETOH: 0-1/day  Calcium: almond milk + yoghurt Calcium supplement, separate Vitamin D supplement + MVI  Caffeine: green tea  Exercise: regular walking     OB/GYN HISTORY:  LMP: postmenopausal  No bleeding    Current Outpatient Medications   Medication Sig Dispense Refill     aspirin 81 MG tablet Take 81 mg by mouth daily        Calcium-Vitamin D 600-200 MG-UNIT TABS Take 1 tablet by mouth At Bedtime        citalopram (CELEXA) 10 MG tablet Take 1 tablet (10 mg) by mouth daily 90 tablet 0     cyanocobalamin (VITAMIN B-12) 1000 MCG SUBL sublingual tablet Place 1,000 mcg under the tongue daily       fexofenadine (ALLEGRA) 180 MG tablet Take 180 mg by mouth daily       gabapentin (NEURONTIN) 300 MG capsule Take 1 capsule (300 mg) by mouth 3 times daily 270 capsule 0     galcanezumab-gnlm (EMGALITY) 120 MG/ML injection Inject 1 mL (120 mg) Subcutaneous every 28 days 1 mL 10     indomethacin (INDOCIN) 50 MG capsule Take 1 capsule (50 mg) by mouth as needed for moderate pain Take 1 tablet as needed 10 capsule 3     levothyroxine (SYNTHROID/LEVOTHROID) 88 MCG tablet Take 1 tablet (88 mcg) by mouth daily Except skip Sunday dose, due for clinic visit to ensure refills 90 tablet 0     magnesium oxide (MAG-OX) 400 MG tablet Take 800 mg by mouth At Bedtime       Multiple vitamin TABS Take 3 tablets by mouth daily        omeprazole (PRILOSEC) 20 MG capsule Take 40 mg by mouth daily (with dinner)        rosuvastatin (CRESTOR) 10 MG tablet Take 1 tablet (10 mg) by mouth daily Due for clinic visit to ensure refills 30 tablet 0     Vitamin D (Cholecalciferol) 50 MCG (2000 UT) CAPS Take 2,000 Units by mouth daily        zolpidem (AMBIEN) 10 MG  "tablet Take 1 tablet (10 mg) by mouth nightly as needed for sleep 30 tablet 0     Allergies   Allergen Reactions     Cyclobenzaprine Other (See Comments)     Levofloxacin Unknown     Tendon injury (torn)     Rofecoxib Nausea     Simvastatin Other (See Comments)     Trazodone Other (See Comments)     Other reaction(s): Nightmares     Vicodin [Hydrocodone-Acetaminophen] Itching     Latex Rash         ROS  CONSTITUTIONAL:NEGATIVE for fever, chills, change in weight  INTEGUMENTARY/SKIN: NEGATIVE for worrisome rashes, moles or lesions  EYES: NEGATIVE for vision changes or irritation, + Vision changes  ENT/MOUTH: NEGATIVE for mouth and throat problems + hearing loss  RESP:NEGATIVE for significant SOB, + cough  BREAST: NEGATIVE for masses, tenderness or discharge  CV: NEGATIVE for chest pain, palpitations, MAN, orthopnea, PND  or peripheral edema  GI: NEGATIVE for nausea, abdominal pain, heartburn, or change in bowel habits  :NEGATIVE for frequency, dysuria, or hematuria  +urinary incontinence  MUSCULOSKELETAL:NEGATIVE for significant arthralgias ++ myalgia + bilateral knee pain  NEURO: NEGATIVE for weakness, dizziness or paresthesias +hx of migraines, stable  ENDOCRINE: NEGATIVE for polyuria/dipsia,  temperature intolerance, skin/hair changes, +   + hypothyroidism  HEME/ALLERGY/IMMUNE: NEGATIVE for bleeding problems  PSYCHIATRIC: NEGATIVE for changes in mood or affect, doing well on citalopram  EXT  + local edema around scar at left lateral shin after injury.     EXAM  /69 (BP Location: Left arm, Patient Position: Sitting, Cuff Size: Adult Regular)   Pulse 63   Temp (!) 96.3  F (35.7  C) (Temporal)   Resp 17   Ht 1.575 m (5' 2\")   Wt 56.7 kg (125 lb)   SpO2 100%   BMI 22.86 kg/m    GENERAL APPEARANCE: Alert, pleasant, NAD  EYES: PERRL, EOMI, conjunctiva clear  HENT: TM normal bilaterally. Nose and mouth masked  NECK: no adenopathy, thyroid normal to palpation   RESP: lungs clear to auscultation " bilaterally  BREAST: normal without masses, no tenderness or nipple discharge and no palpable  axillary masses or adenopathy  CV: regular rate and rhythm, normal S1 S2, no murmur, no carotid bruits  ABDOMEN: soft, nontender, without HSM or masses. Bowel sounds normal  MS: extremities normal- no gross deformities noted, no tender, hot or swollen joints.  Localized area of swelling, nontender, around a well healed incision at the left lateral calf  SKIN: no suspicious lesions or rashes  NEURO: Normal strength and tone, sensory exam grossly normal, DTR normoreflexive in upper and lower extremities  PSYCH: mentation appears normal. and affect normal/bright.  EXT: no peripheral edema, pedal pulses palpable    Assessment:  (Z00.00) Medicare annual wellness visit, subsequent  (primary encounter diagnosis)  Comment: 77 year old woman with complex medical history in stable health  Plan: Today we discussed ways to maintain a healthy lifestyle with sensible eating, regular exercise and self cares. We dicussed calcium and Vitamin D intake, vaccinations and preventive health screens. Vaccines are up to date. Mammogram and DEXA up to date.        (F41.1) VLAD (generalized anxiety disorder)  Comment: mood stable  Plan: citalopram (CELEXA) 10 MG tablet        Refilled medication x 1 yr    (G43.919) Intractable migraine, unspecified migraine type  Comment: history of complex migraine, followed by neurologist, uses Emgality monthly  Plan: gabapentin (NEURONTIN) 300 MG capsule        Continue TID dosing. Medication refilled x 1 yr    (E03.9) Hypothyroidism, unspecified type  Comment: euthyroid by history  Plan: levothyroxine (SYNTHROID/LEVOTHROID) 88 MCG         tablet, TSH with free T4 reflex        Await TSH. Adjust dose if needed.    (E78.5) Hyperlipidemia LDL goal <100  Comment: on rosuvastatin, 10mg, dose increased from 5mg after LDL returned 104  Plan: rosuvastatin (CRESTOR) 10 MG tablet, Lipid         Profile, Comprehensive  "metabolic panel            (G47.00) Persistent insomnia  Comment: uses Zolpidem as needed, tries to avoid use  Plan: zolpidem (AMBIEN) 10 MG tablet        Refilled medication. Shared resource, [facility] by [facility]    (R09.89) Choking sensation  Comment: longstanding issue with eating certain textures of foods, hx of dysphagia, denies current symptoms  Plan: discussed option for swallowing study but she declines. Discussed option also to return to GI for evaluation. Continue PPI    (R22.40) Localized swelling of lower leg  Comment: local swelling at left lateral calf at region of previous wound, suspect she sheared lymphatic channels and that swelling will be permanent.  Plan: Discussed option to wear support hose. She will consider this.    Alondra Foster MD  Internal Medicine/Pediatrics      Answers for HPI/ROS submitted by the patient on 3/28/2023  In general, how would you rate your overall physical health?: good  Frequency of exercise:: 6-7 days/week  Do you usually eat at least 4 servings of fruit and vegetables a day, include whole grains & fiber, and avoid regularly eating high fat or \"junk\" foods? : Yes  Taking medications regularly:: Yes  Medication side effects:: None  Activities of Daily Living: no assistance needed  Home safety: no safety concerns identified  Hearing Impairment:: difficulty following a conversation in a noisy restaurant or crowded room, difficulty following dialogue in the theater, find that men's voices are easier to understand than woman's, difficulty understanding soft or whispered speech  In the past 6 months, have you been bothered by leaking of urine?: Yes  abdominal pain: No  Blood in stool: No  Blood in urine: No  chest pain: No  chills: No  congestion: No  constipation: No  cough: Yes  diarrhea: No  dizziness: No  ear pain: Yes  eye pain: No  nervous/anxious: No  fever: No  frequency: No  genital sores: No  headaches: Yes  hearing loss: Yes  heartburn: " No  arthralgias: No  joint swelling: No  peripheral edema: Yes  mood changes: No  myalgias: Yes  nausea: No  dysuria: No  palpitations: No  Skin sensation changes: No  sore throat: No  urgency: No  rash: No  shortness of breath: No  visual disturbance: Yes  weakness: No  pelvic pain: No  vaginal bleeding: No  vaginal discharge: No  tenderness: No  breast mass: No  breast discharge: No  In general, how would you rate your overall mental or emotional health?: excellent  Additional concerns today:: Yes  Duration of exercise:: 30-45 minutes

## 2023-04-04 NOTE — PATIENT INSTRUCTIONS
Call insurance to see if a mail order is covered under your insurance  [facility], check with insurance??    [facility]    Preventive Health Recommendations    See your health care provider every year to  Review health changes.   Discuss preventive care.    Review your medicines if your doctor has prescribed any.    You no longer need a yearly Pap test unless you've had an abnormal Pap test in the past 10 years. If you have vaginal symptoms, such as bleeding or discharge, be sure to talk with your provider about a Pap test.    Every 1 to 2 years, have a mammogram.  If you are over 69, talk with your health care provider about whether or not you want to continue having screening mammograms.    Every 10 years, have a colonoscopy. Or, have a yearly FIT test (stool test). These exams will check for colon cancer.     Have a cholesterol test every 5 years, or more often if your doctor advises it.     Have a diabetes test (fasting glucose) every three years. If you are at risk for diabetes, you should have this test more often.     At age 65, have a bone density scan (DEXA) to check for osteoporosis (brittle bone disease).    Shots:  Get a flu shot each year.  Get a tetanus shot every 10 years.  Talk to your doctor about your pneumonia vaccines. There are now two you should receive - Pneumovax (PPSV 23) and Prevnar (PCV 13).  Talk to your pharmacist about the shingles vaccine.  Talk to your doctor about the hepatitis B vaccine.    Nutrition:   Eat at least 5 servings of fruits and vegetables each day.    Eat whole-grain bread, whole-wheat pasta and brown rice instead of white grains and rice.    Get adequate about Calcium and Vitamin D.     Lifestyle  Exercise at least 150 minutes a week (30 minutes a day, 5 days a week). This will help you control your weight and prevent disease.    Limit alcohol to one drink per day.    No smoking.     Wear sunscreen to prevent skin cancer.      See your dentist twice a year for an exam and cleaning.    See your eye doctor every 1 to 2 years to screen for conditions such as glaucoma, macular degeneration, cataracts, etc.    Personalized Prevention Plan  You are due for the preventive services outlined below.  Your care team is available to assist you in scheduling these services.  If you have already completed any of these items, please share that information with your care team to update in your medical record.    Health Maintenance Due   Topic Date Due    Discuss Advance Care Planning  02/09/2022    FALL RISK ASSESSMENT  06/02/2022

## 2023-04-04 NOTE — NURSING NOTE
"77 year old  Chief Complaint   Patient presents with     Physical     Year old injury on Left shin, fluid not returning. Noting that she also 'chokes a lot'.       Blood pressure 109/69, pulse 63, temperature (!) 96.3  F (35.7  C), temperature source Temporal, resp. rate 17, height 1.575 m (5' 2\"), weight 56.7 kg (125 lb), SpO2 100 %, not currently breastfeeding. Body mass index is 22.86 kg/m .  Patient Active Problem List   Diagnosis     Bladder neck obstruction     Constipation     Transient global amnesia     Other hammer toe (acquired)     Ostium secundum type atrial septal defect     Senile osteoporosis     Syndrome affecting cervical region     Variants of migraine     Malignant neoplasm of skin     Insomnia     Hypothyroidism     Family history of malignant neoplasm of ovary     Tear film insufficiency     Circulatory system disorder     PFO (patent foramen ovale)     Gastroesophageal reflux disease without esophagitis     ACP (advance care planning)     Amnesia     Chronic paroxysmal hemicrania, not intractable     Osteopenia of necks of both femurs     Hyperlipidemia LDL goal <100     Intractable migraine, unspecified migraine type     VLAD (generalized anxiety disorder)       Wt Readings from Last 2 Encounters:   04/04/23 56.7 kg (125 lb)   03/22/23 56.7 kg (125 lb)     BP Readings from Last 3 Encounters:   04/04/23 109/69   03/22/23 118/77   03/20/23 115/74         Current Outpatient Medications   Medication     aspirin 81 MG tablet     Calcium-Vitamin D 600-200 MG-UNIT TABS     citalopram (CELEXA) 10 MG tablet     cyanocobalamin (VITAMIN B-12) 1000 MCG SUBL sublingual tablet     fexofenadine (ALLEGRA) 180 MG tablet     gabapentin (NEURONTIN) 300 MG capsule     galcanezumab-gnlm (EMGALITY) 120 MG/ML injection     indomethacin (INDOCIN) 50 MG capsule     levothyroxine (SYNTHROID/LEVOTHROID) 88 MCG tablet     magnesium oxide (MAG-OX) 400 MG tablet     Multiple vitamin TABS     omeprazole (PRILOSEC) 20 MG " capsule     rosuvastatin (CRESTOR) 10 MG tablet     zolpidem (AMBIEN) 10 MG tablet     Vitamin D (Cholecalciferol) 50 MCG (2000) CAPS     No current facility-administered medications for this visit.       Social History     Tobacco Use     Smoking status: Former     Packs/day: 0.50     Years: 10.00     Pack years: 5.00     Types: Cigarettes     Quit date: 1970     Years since quittin.2     Smokeless tobacco: Never   Substance Use Topics     Alcohol use: Yes     Alcohol/week: 2.0 standard drinks of alcohol     Types: 2 Cans of beer per week     Comment: rare     Drug use: No       Health Maintenance Due   Topic Date Due     ADVANCE CARE PLANNING  2022     FALL RISK ASSESSMENT  2022       No results found for: PAP      2023 1:35 PM

## 2023-05-02 ENCOUNTER — TELEPHONE (OUTPATIENT)
Dept: FAMILY MEDICINE | Facility: CLINIC | Age: 78
End: 2023-05-02

## 2023-05-02 DIAGNOSIS — C44.90 MALIGNANT NEOPLASM OF SKIN: Primary | ICD-10-CM

## 2023-05-02 NOTE — TELEPHONE ENCOUNTER
Who is calling? Patient  What do they need? Referral to [facility] for [facility]  Is this an insurance referral? No  Does caller need a call back? No  Additional Comments: Fax [phone]    Daxa

## 2023-05-03 ASSESSMENT — ENCOUNTER SYMPTOMS
POLYDIPSIA: 0
INCREASED ENERGY: 1
PARALYSIS: 0
MEMORY LOSS: 1
FATIGUE: 1
NERVOUS/ANXIOUS: 1
WEIGHT GAIN: 0
POLYPHAGIA: 0
LOSS OF CONSCIOUSNESS: 0
DECREASED CONCENTRATION: 0
CHILLS: 0
SEIZURES: 0
NUMBNESS: 0
WEIGHT LOSS: 0
HEADACHES: 1
SPEECH CHANGE: 0
FEVER: 0
DECREASED APPETITE: 0
DIZZINESS: 0
INSOMNIA: 1
TINGLING: 0
ALTERED TEMPERATURE REGULATION: 0
HALLUCINATIONS: 0
DISTURBANCES IN COORDINATION: 0
WEAKNESS: 0
TREMORS: 0
PANIC: 0
NIGHT SWEATS: 0
DEPRESSION: 0

## 2023-05-03 NOTE — TELEPHONE ENCOUNTER
Called pt to clarify reason for  [facility] referral. She wants a routine skin check and reports history of squamous cell carcinoma.   Faxed to [facility] in [place] at [phone].    LASHAY Hernandez, RN  05/03/23, 9:16 AM

## 2023-05-04 ENCOUNTER — PRE VISIT (OUTPATIENT)
Dept: OTOLARYNGOLOGY | Facility: CLINIC | Age: 78
End: 2023-05-04

## 2023-05-08 ENCOUNTER — OFFICE VISIT (OUTPATIENT)
Dept: FAMILY MEDICINE | Facility: CLINIC | Age: 78
End: 2023-05-08
Payer: COMMERCIAL

## 2023-05-08 VITALS
DIASTOLIC BLOOD PRESSURE: 69 MMHG | SYSTOLIC BLOOD PRESSURE: 102 MMHG | RESPIRATION RATE: 17 BRPM | TEMPERATURE: 97.3 F | OXYGEN SATURATION: 98 % | HEART RATE: 66 BPM

## 2023-05-08 DIAGNOSIS — H57.11 EYE PAIN, RIGHT: Primary | ICD-10-CM

## 2023-05-08 NOTE — PROGRESS NOTES
Medical assistant intake:  Rakel Parish is a 77 year old female who presents to [facility] today for Eye Problem (Sore eye on the right side -- ongoing for about 4-5 days. No discharge but slightly painful. Hurts the most with eye drops. Started using a new mascara that has fibers in it -- wondering if this could be the cause.)        ASSESSMENT/PLAN:    Right eyelid irritation, likely from mascara.  Improving since mascara was stopped    Plan- reassured that her exam was normal with her pupils moving in all directions and a normal funduscopic exam.  Her vision was unchanged.    Encouraged her to avoid the mascara which precipitated this.  She will continue to use warm soapy washes to the eyebrows before bedtime.  Also use warm soaks as needed.    She has an optometrist who she sees should her vision started to be affected or symptoms worsen.    --Azael Rebollar MD      SUBJECTIVE:   Nancy is a 77-year-old who has noticed some pain in her right upper eyelid area for about 4 to 5 days.  This started a few days after using a new mascara that has fibers in it.  She then stopped using the mascara 3 days ago on May 5.  Had pain continuing on May 6 but then the pain has subsided over the past 2 days.  Denies any change in vision.  She does wear glasses.  She does not wear contacts.    Review Of Systems:    See subjective.   Has otherwise been in usual state of health, e.g. no fevers  Cardiovascular: negative  Respiratory: No shortness of breath, dyspnea on exertion, cough, or hemoptysis  Gastrointestinal: negative  Genitourinary: negative    Problem list per EMR:  Patient Active Problem List   Diagnosis     Bladder neck obstruction     Constipation     Transient global amnesia     Other hammer toe (acquired)     Ostium secundum type atrial septal defect     Senile osteoporosis     Syndrome affecting cervical region     Variants of migraine     Malignant neoplasm of skin     Persistent insomnia     Hypothyroidism      Family history of malignant neoplasm of ovary     Tear film insufficiency     Circulatory system disorder     PFO (patent foramen ovale)     Gastroesophageal reflux disease without esophagitis     ACP (advance care planning)     Amnesia     Chronic paroxysmal hemicrania, not intractable     Osteopenia of necks of both femurs     Hyperlipidemia LDL goal <100     Intractable migraine, unspecified migraine type     VLAD (generalized anxiety disorder)     Choking sensation     Localized swelling of lower leg       Current Outpatient Medications   Medication Sig Dispense Refill     aspirin 81 MG tablet Take 81 mg by mouth daily        Calcium-Vitamin D 600-200 MG-UNIT TABS Take 1 tablet by mouth At Bedtime        citalopram (CELEXA) 10 MG tablet Take 1 tablet (10 mg) by mouth daily 90 tablet 3     cyanocobalamin (VITAMIN B-12) 1000 MCG SUBL sublingual tablet Place 1,000 mcg under the tongue daily       fexofenadine (ALLEGRA) 180 MG tablet Take 180 mg by mouth daily       gabapentin (NEURONTIN) 300 MG capsule Take 1 capsule (300 mg) by mouth 3 times daily 270 capsule 3     galcanezumab-gnlm (EMGALITY) 120 MG/ML injection Inject 1 mL (120 mg) Subcutaneous every 28 days 1 mL 10     indomethacin (INDOCIN) 50 MG capsule Take 1 capsule (50 mg) by mouth as needed for moderate pain Take 1 tablet as needed 10 capsule 3     levothyroxine (SYNTHROID/LEVOTHROID) 88 MCG tablet Take 1 tablet (88 mcg) by mouth daily Except skip Sunday dose 90 tablet 3     magnesium oxide (MAG-OX) 400 MG tablet Take 800 mg by mouth At Bedtime       Multiple vitamin TABS Take 3 tablets by mouth daily        omeprazole (PRILOSEC) 20 MG capsule Take 40 mg by mouth daily (with dinner)        rosuvastatin (CRESTOR) 10 MG tablet Take 1 tablet (10 mg) by mouth daily 90 tablet 3     Vitamin D (Cholecalciferol) 50 MCG (2000 UT) CAPS Take 2,000 Units by mouth daily        zolpidem (AMBIEN) 10 MG tablet Take 1 tablet (10 mg) by mouth nightly as needed for sleep  30 tablet 0       Allergies   Allergen Reactions     Cyclobenzaprine Other (See Comments)     Levofloxacin Unknown     Tendon injury (torn)     Rofecoxib Nausea     Simvastatin Other (See Comments)     Trazodone Other (See Comments)     Other reaction(s): Nightmares     Vicodin [Hydrocodone-Acetaminophen] Itching     Latex Rash            OBJECTIVE    Vitals: /69 (BP Location: Left arm, Patient Position: Sitting, Cuff Size: Adult Regular)   Pulse 66   Temp 97.3  F (36.3  C) (Temporal)   Resp 17   SpO2 98%   BMI= There is no height or weight on file to calculate BMI.  Appears in no distress.  Her eyes appear normal other than very mild injection to the right side of the sclera of her right eye.  Her funduscopic exam of both eyes is normal.  There is no swelling redness or discharge noted to the skin surrounding the eye or eye socket.  No lymphadenopathy in the face or neck region.    SEE TOP OF NOTE FOR ASSESSMENT AND PLAN    --Azael Rebollar MD  [facility], Department of Family Medicine and Community Health

## 2023-05-08 NOTE — NURSING NOTE
77 year old  Chief Complaint   Patient presents with     Eye Problem     Sore eye on the right side -- ongoing for about 4-5 days. No discharge but slightly painful. Hurts the most with eye drops. Started using a new mascara that has fibers in it -- wondering if this could be the cause.       Blood pressure 102/69, pulse 66, temperature 97.3  F (36.3  C), temperature source Temporal, resp. rate 17, SpO2 98 %, not currently breastfeeding. There is no height or weight on file to calculate BMI.  Patient Active Problem List   Diagnosis     Bladder neck obstruction     Constipation     Transient global amnesia     Other hammer toe (acquired)     Ostium secundum type atrial septal defect     Senile osteoporosis     Syndrome affecting cervical region     Variants of migraine     Malignant neoplasm of skin     Persistent insomnia     Hypothyroidism     Family history of malignant neoplasm of ovary     Tear film insufficiency     Circulatory system disorder     PFO (patent foramen ovale)     Gastroesophageal reflux disease without esophagitis     ACP (advance care planning)     Amnesia     Chronic paroxysmal hemicrania, not intractable     Osteopenia of necks of both femurs     Hyperlipidemia LDL goal <100     Intractable migraine, unspecified migraine type     VLAD (generalized anxiety disorder)     Choking sensation     Localized swelling of lower leg       Wt Readings from Last 2 Encounters:   04/04/23 56.7 kg (125 lb)   03/22/23 56.7 kg (125 lb)     BP Readings from Last 3 Encounters:   05/08/23 102/69   04/04/23 109/69   03/22/23 118/77         Current Outpatient Medications   Medication     aspirin 81 MG tablet     Calcium-Vitamin D 600-200 MG-UNIT TABS     citalopram (CELEXA) 10 MG tablet     cyanocobalamin (VITAMIN B-12) 1000 MCG SUBL sublingual tablet     fexofenadine (ALLEGRA) 180 MG tablet     gabapentin (NEURONTIN) 300 MG capsule     galcanezumab-gnlm (EMGALITY) 120 MG/ML injection     indomethacin (INDOCIN) 50 MG  capsule     levothyroxine (SYNTHROID/LEVOTHROID) 88 MCG tablet     magnesium oxide (MAG-OX) 400 MG tablet     Multiple vitamin TABS     omeprazole (PRILOSEC) 20 MG capsule     rosuvastatin (CRESTOR) 10 MG tablet     Vitamin D (Cholecalciferol) 50 MCG (2000) CAPS     zolpidem (AMBIEN) 10 MG tablet     No current facility-administered medications for this visit.       Social History     Tobacco Use     Smoking status: Former     Packs/day: 0.50     Years: 10.00     Pack years: 5.00     Types: Cigarettes     Quit date: 1970     Years since quittin.3     Smokeless tobacco: Never   Substance Use Topics     Alcohol use: Yes     Alcohol/week: 2.0 standard drinks of alcohol     Types: 2 Cans of beer per week     Comment: rare     Drug use: No       Health Maintenance Due   Topic Date Due     ADVANCE CARE PLANNING  2022     FALL RISK ASSESSMENT  2022       No results found for: PAP      May 8, 2023 11:28 AM

## 2023-05-10 ENCOUNTER — OFFICE VISIT (OUTPATIENT)
Dept: ENDOCRINOLOGY | Facility: CLINIC | Age: 78
End: 2023-05-10
Attending: INTERNAL MEDICINE
Payer: COMMERCIAL

## 2023-05-10 VITALS
DIASTOLIC BLOOD PRESSURE: 61 MMHG | HEART RATE: 77 BPM | WEIGHT: 123.7 LBS | SYSTOLIC BLOOD PRESSURE: 91 MMHG | BODY MASS INDEX: 22.63 KG/M2 | OXYGEN SATURATION: 95 %

## 2023-05-10 DIAGNOSIS — M85.851 OSTEOPENIA OF NECKS OF BOTH FEMURS: Primary | ICD-10-CM

## 2023-05-10 DIAGNOSIS — M81.0 SENILE OSTEOPOROSIS: ICD-10-CM

## 2023-05-10 DIAGNOSIS — M85.852 OSTEOPENIA OF NECKS OF BOTH FEMURS: Primary | ICD-10-CM

## 2023-05-10 DIAGNOSIS — K21.9 GASTROESOPHAGEAL REFLUX DISEASE WITHOUT ESOPHAGITIS: ICD-10-CM

## 2023-05-10 PROCEDURE — 99203 OFFICE O/P NEW LOW 30 MIN: CPT | Performed by: INTERNAL MEDICINE

## 2023-05-10 RX ORDER — ACETAMINOPHEN 325 MG/1
325 TABLET ORAL ONCE
Status: CANCELLED | OUTPATIENT
Start: 2023-05-10

## 2023-05-10 RX ORDER — EPINEPHRINE 1 MG/ML
0.3 INJECTION, SOLUTION, CONCENTRATE INTRAVENOUS EVERY 5 MIN PRN
Status: CANCELLED | OUTPATIENT
Start: 2023-05-10

## 2023-05-10 RX ORDER — DIPHENHYDRAMINE HYDROCHLORIDE 50 MG/ML
50 INJECTION INTRAMUSCULAR; INTRAVENOUS
Status: CANCELLED
Start: 2023-05-10

## 2023-05-10 RX ORDER — ALBUTEROL SULFATE 90 UG/1
1-2 AEROSOL, METERED RESPIRATORY (INHALATION)
Status: CANCELLED
Start: 2023-05-10

## 2023-05-10 RX ORDER — MEPERIDINE HYDROCHLORIDE 25 MG/ML
25 INJECTION INTRAMUSCULAR; INTRAVENOUS; SUBCUTANEOUS EVERY 30 MIN PRN
Status: CANCELLED | OUTPATIENT
Start: 2023-05-10

## 2023-05-10 RX ORDER — HEPARIN SODIUM,PORCINE 10 UNIT/ML
5 VIAL (ML) INTRAVENOUS
Status: CANCELLED | OUTPATIENT
Start: 2023-05-10

## 2023-05-10 RX ORDER — METHYLPREDNISOLONE SODIUM SUCCINATE 125 MG/2ML
125 INJECTION, POWDER, LYOPHILIZED, FOR SOLUTION INTRAMUSCULAR; INTRAVENOUS
Status: CANCELLED
Start: 2023-05-10

## 2023-05-10 RX ORDER — HEPARIN SODIUM (PORCINE) LOCK FLUSH IV SOLN 100 UNIT/ML 100 UNIT/ML
5 SOLUTION INTRAVENOUS
Status: CANCELLED | OUTPATIENT
Start: 2023-05-10

## 2023-05-10 RX ORDER — ZOLEDRONIC ACID 5 MG/100ML
5 INJECTION, SOLUTION INTRAVENOUS ONCE
Status: CANCELLED
Start: 2023-05-10

## 2023-05-10 RX ORDER — ALBUTEROL SULFATE 0.83 MG/ML
2.5 SOLUTION RESPIRATORY (INHALATION)
Status: CANCELLED | OUTPATIENT
Start: 2023-05-10

## 2023-05-10 NOTE — NURSING NOTE
Chief Complaint   Patient presents with     New Patient     Osteopenia of necks of both femurs       Vitals:    05/10/23 1104   BP: 91/61   BP Location: Left arm   Patient Position: Sitting   Cuff Size: Adult Regular   Pulse: 77   SpO2: 95%   Weight: 56.1 kg (123 lb 11.2 oz)       Body mass index is 22.63 kg/m .    Efren Cohen MA

## 2023-05-10 NOTE — PATIENT INSTRUCTIONS
Problem: Falls - Risk of:  Goal: Will remain free from falls  Description  Will remain free from falls  Outcome: Ongoing  Goal: Absence of physical injury  Description  Absence of physical injury  Outcome: Ongoing     Problem: Risk for Impaired Skin Integrity  Goal: Tissue integrity - skin and mucous membranes  Description  Structural intactness and normal physiological function of skin and  mucous membranes.   Outcome: Ongoing Take 0600-3383 mg of elemental calcium each day.  This can be in the form of food or over the counter supplements     Take 2000 international units of vitamin D each day.

## 2023-05-10 NOTE — PROGRESS NOTES
Rakel Parish is a 77 year old yo female who presents today for evaluation of osteopenia. She was referred by Dr Foster her PCP. She also has PMHx of migraine, GERD, Hypothyroidism, HLD, ostium secundum atrial septal defect/PFO.     Chief Complaint   Patient presents with     New Patient     Osteopenia of necks of both femurs       1) Osteoporosis  Diagnosis: based on high FRAX on bone density test  Previous Fractures: none  Bone-specific therapy: none  Family History of Hip Fx: Mother broke hip in 90s, fall  Hormone History (Menopause, Testosterone): late 40s/early 50s, regular menses before that, notes still having hot flashes overnight. No sweats, last approx 5min  Steroids: nasal spray regularly, prednisone 2x annually for headaches  PPIs: GERD, omeprazole use daily  Antiepileptics: Neurontin    Anticoagulation: none  Autoimmune disease: none  CKD: none  Nephrolithiasis: none  Other risk factors:  none  Ca/D: Calcium 600mg daily, Vit D 2000u 4x weekly, MVI Vit D 400u , Calcium 150mg, yogurt daily (3/4c), almond milk 5days a week, ice cream 1-2x weekly, cheese 1x per week  Exercise: walking daily, elliptical activity  Smoking: previous  Alcohol: none      Relevant Family History:    Active diagnoses this visit:     Osteopenia of necks of both femurs  Gastroesophageal reflux disease without esophagitis  Senile osteoporosis     ROS: 10 point ROS neg other than the symptoms noted above in the HPI.      Objective:  BP 91/61 (BP Location: Left arm, Patient Position: Sitting, Cuff Size: Adult Regular)   Pulse 77   Wt 56.1 kg (123 lb 11.2 oz)   SpO2 95%   BMI 22.63 kg/m      Exam:  Constitutional: healthy, alert, no acute distress  Head: Normocephalic. No masses, lesions, no exophthalmos/proptosis  ENT: normal thyroid, no palpable nodules, no cervical lymph nodes  Respiratory: nonlabored  Gastrointestinal: Abdomen soft, non-tender.  Musculoskeletal: extremities normal- no gross deformities noted, gait normal and  normal muscle tone  Skin: no suspicious lesions or rashes  Neurologic: Gait normal. sensation grossly intact  Psychiatric: mentation appears normal, calm      Lab Results   Component Value Date/Time    TSH 0.32 04/04/2023 02:24 PM    TSH 2.20 05/10/2022 03:00 PM    TSH 1.34 04/16/2021 11:25 AM    T4 1.05 02/06/2019 02:00 PM    PTHI 54 06/13/2019 11:50 AM     Last Comprehensive Metabolic Panel:  Sodium   Date Value Ref Range Status   04/04/2023 136 136 - 145 mmol/L Final   04/16/2021 141.0 137.3 - 146.3 mmol/L Final     Potassium   Date Value Ref Range Status   04/04/2023 3.9 3.4 - 5.3 mmol/L Final   01/11/2022 4.4 3.4 - 5.3 mmol/L Final   04/16/2021 4.2 3.4 - 5.3 mmol/L Final     Chloride   Date Value Ref Range Status   04/04/2023 100 98 - 107 mmol/L Final   01/11/2022 107 94 - 109 mmol/L Final   04/16/2021 109.0 94.0 - 109.0 mmol/L Final     Carbon Dioxide   Date Value Ref Range Status   04/16/2021 28.0 20.0 - 32.0 mmol/L Final     Carbon Dioxide (CO2)   Date Value Ref Range Status   04/04/2023 24 22 - 29 mmol/L Final   01/11/2022 26 20 - 32 mmol/L Final     Anion Gap   Date Value Ref Range Status   04/04/2023 12 7 - 15 mmol/L Final   01/11/2022 4 3 - 14 mmol/L Final   06/13/2019 5 3 - 14 mmol/L Final     Glucose   Date Value Ref Range Status   04/04/2023 81 70 - 99 mg/dL Final   01/11/2022 89 70 - 99 mg/dL Final   04/16/2021 95.0 60.0 - 99.0 mg/dL Final     Urea Nitrogen   Date Value Ref Range Status   04/04/2023 18.2 8.0 - 23.0 mg/dL Final   01/11/2022 16 7 - 30 mg/dL Final   04/16/2021 20.0 7.0 - 30.0 mg/dL Final     Creatinine   Date Value Ref Range Status   04/04/2023 0.85 0.51 - 0.95 mg/dL Final   04/16/2021 0.8 0.6 - 1.3 mg/dL Final     GFR Estimate   Date Value Ref Range Status   04/04/2023 70 >60 mL/min/1.73m2 Final     Comment:     eGFR calculated using 2021 CKD-EPI equation.   06/13/2019 82 >60 mL/min/[1.73_m2] Final     Comment:     Non  GFR Calc  Starting 12/18/2018, serum creatinine  based estimated GFR (eGFR) will be   calculated using the Chronic Kidney Disease Epidemiology Collaboration   (CKD-EPI) equation.       Calcium   Date Value Ref Range Status   04/04/2023 9.2 8.8 - 10.2 mg/dL Final   04/16/2021 9.3 8.5 - 10.4 mg/dL Final         Alkaline Phosphatase   Date Value Ref Range Status   04/04/2023 69 35 - 104 U/L Final   04/16/2021 75.0 40.0 - 150.0 U/L Final           DXA (Nov 2022)- Images were personally reviewed today by me--                          BMD               T-score         Change from last   Lumbar Spine: L1-L4: BMD: 1.169 g/cm2. T-score: -0.2. Z-score: 1.9  RIGHT Hip Total: BMD: 0.827 g/cm2. T-score: -1.4. Z-score: 0.6  RIGHT Hip Femoral neck: BMD: 0.787 g/cm2. T-score: -1.8. Z-score: 0.4  LEFT Hip Total: BMD: 0.818 g/cm2. T-score: -1.5. Z-score: 0.6  LEFT Hip Femoral neck: BMD: 0.740 g/cm2. T-score: -2.1. Z-score: 0.1    ASSESSMENT / PLAN:  (M85.851,  M85.852) Osteopenia of necks of both femurs          1) Osteoporosis  Defined based on high frax score-- major osteoporotic fx 32, hip 23  - recommended Reclast Discussed 1/3 risk of flu symptoms (acute phase reaction) after treatment with IV zoledronic acid. Discussed risks of osteonecrosis of the jaw (1/200 after tooth extraction, 1/2500 spontaneous) and atypical femur fractures (1/1000) with chronic bisphosphonates. For every atypical femur fracture, 100 typical femur fractures are prevented.  - Check secondary causes of osteoporosis including SPEP, PTH, Calcium, Vitamin D,   - Check spine XR L/T        10-year probability of a hip fracture ? 3% or a 10-year probability of a major osteoporosis-related fracture ? 20% warrants treatment.     Orders Placed This Encounter   Procedures     XR Lumbar Spine 2/3 Views     XR Thoracic Spine 2 Views     Vitamin D Deficiency     Comprehensive metabolic panel     Parathyroid Hormone Intact     Protein electrophoresis       Return to clinic: 1 year    A total of 39 minutes were spent  today 05/10/23 on this visit including chart review, history and counseling, documentation and other activities as detailed above.   Answers for HPI/ROS submitted by the patient on 5/3/2023  General Symptoms: Yes  Skin Symptoms: No  HENT Symptoms: No  EYE SYMPTOMS: No  HEART SYMPTOMS: No  LUNG SYMPTOMS: No  INTESTINAL SYMPTOMS: No  URINARY SYMPTOMS: No  GYNECOLOGIC SYMPTOMS: No  BREAST SYMPTOMS: No  SKELETAL SYMPTOMS: No  BLOOD SYMPTOMS: No  NERVOUS SYSTEM SYMPTOMS: Yes  MENTAL HEALTH SYMPTOMS: Yes  Fever: No  Loss of appetite: No  Weight loss: No  Weight gain: No  Fatigue: Yes  Night sweats: No  Chills: No  Increased stress: Yes  Excessive hunger: No  Excessive thirst: No  Feeling hot or cold when others believe the temperature is normal: No  Loss of height: Yes  Post-operative complications: No  Surgical site pain: No  Hallucinations: No  Change in or Loss of Energy: Yes  Hyperactivity: No  Confusion: No  Trouble with coordination: No  Dizziness or trouble with balance: No  Fainting or black-out spells: No  Memory loss: Yes  Headache: Yes  Seizures: No  Speech problems: No  Tingling: No  Tremor: No  Weakness: No  Difficulty walking: No  Paralysis: No  Numbness: No  Nervous or Anxious: Yes  Depression: No  Trouble sleeping: Yes  Trouble thinking or concentrating: No  Mood changes: No  Panic attacks: No

## 2023-05-10 NOTE — LETTER
5/10/2023         RE: Rakel Parish  [address]  [place] [facility] [address]        Dear Colleague,    Thank you for referring your patient, Rakel Parish, to the [facility] [place]. Please see a copy of my visit note below.    Rakel Parish is a 77 year old yo female who presents today for evaluation of osteopenia. She was referred by Dr Foster her PCP. She also has PMHx of migraine, GERD, Hypothyroidism, HLD, ostium secundum atrial septal defect/PFO.     Chief Complaint   Patient presents with     New Patient     Osteopenia of necks of both femurs       1) Osteoporosis  Diagnosis: based on high FRAX on bone density test  Previous Fractures: none  Bone-specific therapy: none  Family History of Hip Fx: Mother broke hip in 90s, fall  Hormone History (Menopause, Testosterone): late 40s/early 50s, regular menses before that, notes still having hot flashes overnight. No sweats, last approx 5min  Steroids: nasal spray regularly, prednisone 2x annually for headaches  PPIs: GERD, omeprazole use daily  Antiepileptics: Neurontin    Anticoagulation: none  Autoimmune disease: none  CKD: none  Nephrolithiasis: none  Other risk factors:  none  Ca/D: Calcium 600mg daily, Vit D 2000u 4x weekly, MVI Vit D 400u , Calcium 150mg, yogurt daily (3/4c), almond milk 5days a week, ice cream 1-2x weekly, cheese 1x per week  Exercise: walking daily, elliptical activity  Smoking: previous  Alcohol: none      Relevant Family History:    Active diagnoses this visit:     Osteopenia of necks of both femurs  Gastroesophageal reflux disease without esophagitis  Senile osteoporosis     ROS: 10 point ROS neg other than the symptoms noted above in the HPI.      Objective:  BP 91/61 (BP Location: Left arm, Patient Position: Sitting, Cuff Size: Adult Regular)   Pulse 77   Wt 56.1 kg (123 lb 11.2 oz)   SpO2 95%   BMI 22.63 kg/m      Exam:  Constitutional: healthy, alert, no acute distress  Head: Normocephalic.  No masses, lesions, no exophthalmos/proptosis  ENT: normal thyroid, no palpable nodules, no cervical lymph nodes  Respiratory: nonlabored  Gastrointestinal: Abdomen soft, non-tender.  Musculoskeletal: extremities normal- no gross deformities noted, gait normal and normal muscle tone  Skin: no suspicious lesions or rashes  Neurologic: Gait normal. sensation grossly intact  Psychiatric: mentation appears normal, calm      Lab Results   Component Value Date/Time    TSH 0.32 04/04/2023 02:24 PM    TSH 2.20 05/10/2022 03:00 PM    TSH 1.34 04/16/2021 11:25 AM    T4 1.05 02/06/2019 02:00 PM    PTHI 54 06/13/2019 11:50 AM     Last Comprehensive Metabolic Panel:  Sodium   Date Value Ref Range Status   04/04/2023 136 136 - 145 mmol/L Final   04/16/2021 141.0 137.3 - 146.3 mmol/L Final     Potassium   Date Value Ref Range Status   04/04/2023 3.9 3.4 - 5.3 mmol/L Final   01/11/2022 4.4 3.4 - 5.3 mmol/L Final   04/16/2021 4.2 3.4 - 5.3 mmol/L Final     Chloride   Date Value Ref Range Status   04/04/2023 100 98 - 107 mmol/L Final   01/11/2022 107 94 - 109 mmol/L Final   04/16/2021 109.0 94.0 - 109.0 mmol/L Final     Carbon Dioxide   Date Value Ref Range Status   04/16/2021 28.0 20.0 - 32.0 mmol/L Final     Carbon Dioxide (CO2)   Date Value Ref Range Status   04/04/2023 24 22 - 29 mmol/L Final   01/11/2022 26 20 - 32 mmol/L Final     Anion Gap   Date Value Ref Range Status   04/04/2023 12 7 - 15 mmol/L Final   01/11/2022 4 3 - 14 mmol/L Final   06/13/2019 5 3 - 14 mmol/L Final     Glucose   Date Value Ref Range Status   04/04/2023 81 70 - 99 mg/dL Final   01/11/2022 89 70 - 99 mg/dL Final   04/16/2021 95.0 60.0 - 99.0 mg/dL Final     Urea Nitrogen   Date Value Ref Range Status   04/04/2023 18.2 8.0 - 23.0 mg/dL Final   01/11/2022 16 7 - 30 mg/dL Final   04/16/2021 20.0 7.0 - 30.0 mg/dL Final     Creatinine   Date Value Ref Range Status   04/04/2023 0.85 0.51 - 0.95 mg/dL Final   04/16/2021 0.8 0.6 - 1.3 mg/dL Final     GFR  Estimate   Date Value Ref Range Status   04/04/2023 70 >60 mL/min/1.73m2 Final     Comment:     eGFR calculated using 2021 CKD-EPI equation.   06/13/2019 82 >60 mL/min/[1.73_m2] Final     Comment:     Non  GFR Calc  Starting 12/18/2018, serum creatinine based estimated GFR (eGFR) will be   calculated using the Chronic Kidney Disease Epidemiology Collaboration   (CKD-EPI) equation.       Calcium   Date Value Ref Range Status   04/04/2023 9.2 8.8 - 10.2 mg/dL Final   04/16/2021 9.3 8.5 - 10.4 mg/dL Final         Alkaline Phosphatase   Date Value Ref Range Status   04/04/2023 69 35 - 104 U/L Final   04/16/2021 75.0 40.0 - 150.0 U/L Final           DXA (Nov 2022)- Images were personally reviewed today by me--                          BMD               T-score         Change from last   Lumbar Spine: L1-L4: BMD: 1.169 g/cm2. T-score: -0.2. Z-score: 1.9  RIGHT Hip Total: BMD: 0.827 g/cm2. T-score: -1.4. Z-score: 0.6  RIGHT Hip Femoral neck: BMD: 0.787 g/cm2. T-score: -1.8. Z-score: 0.4  LEFT Hip Total: BMD: 0.818 g/cm2. T-score: -1.5. Z-score: 0.6  LEFT Hip Femoral neck: BMD: 0.740 g/cm2. T-score: -2.1. Z-score: 0.1    ASSESSMENT / PLAN:  (M85.851,  M85.852) Osteopenia of necks of both femurs          1) Osteoporosis  Defined based on high frax score-- major osteoporotic fx 32, hip 23  - recommended Reclast Discussed 1/3 risk of flu symptoms (acute phase reaction) after treatment with IV zoledronic acid. Discussed risks of osteonecrosis of the jaw (1/200 after tooth extraction, 1/2500 spontaneous) and atypical femur fractures (1/1000) with chronic bisphosphonates. For every atypical femur fracture, 100 typical femur fractures are prevented.  - Check secondary causes of osteoporosis including SPEP, PTH, Calcium, Vitamin D,   - Check spine XR L/T        10-year probability of a hip fracture ? 3% or a 10-year probability of a major osteoporosis-related fracture ? 20% warrants treatment.     Orders Placed This  Encounter   Procedures     XR Lumbar Spine 2/3 Views     XR Thoracic Spine 2 Views     Vitamin D Deficiency     Comprehensive metabolic panel     Parathyroid Hormone Intact     Protein electrophoresis       Return to clinic: 1 year    A total of 39 minutes were spent today 05/10/23 on this visit including chart review, history and counseling, documentation and other activities as detailed above.   Answers for HPI/ROS submitted by the patient on 5/3/2023  General Symptoms: Yes  Skin Symptoms: No  HENT Symptoms: No  EYE SYMPTOMS: No  HEART SYMPTOMS: No  LUNG SYMPTOMS: No  INTESTINAL SYMPTOMS: No  URINARY SYMPTOMS: No  GYNECOLOGIC SYMPTOMS: No  BREAST SYMPTOMS: No  SKELETAL SYMPTOMS: No  BLOOD SYMPTOMS: No  NERVOUS SYSTEM SYMPTOMS: Yes  MENTAL HEALTH SYMPTOMS: Yes  Fever: No  Loss of appetite: No  Weight loss: No  Weight gain: No  Fatigue: Yes  Night sweats: No  Chills: No  Increased stress: Yes  Excessive hunger: No  Excessive thirst: No  Feeling hot or cold when others believe the temperature is normal: No  Loss of height: Yes  Post-operative complications: No  Surgical site pain: No  Hallucinations: No  Change in or Loss of Energy: Yes  Hyperactivity: No  Confusion: No  Trouble with coordination: No  Dizziness or trouble with balance: No  Fainting or black-out spells: No  Memory loss: Yes  Headache: Yes  Seizures: No  Speech problems: No  Tingling: No  Tremor: No  Weakness: No  Difficulty walking: No  Paralysis: No  Numbness: No  Nervous or Anxious: Yes  Depression: No  Trouble sleeping: Yes  Trouble thinking or concentrating: No  Mood changes: No  Panic attacks: No          Again, thank you for allowing me to participate in the care of your patient.        Sincerely,        Santa Sidhu MD

## 2023-05-15 ENCOUNTER — ANCILLARY PROCEDURE (OUTPATIENT)
Dept: GENERAL RADIOLOGY | Facility: CLINIC | Age: 78
End: 2023-05-15
Attending: INTERNAL MEDICINE
Payer: COMMERCIAL

## 2023-05-15 DIAGNOSIS — M85.852 OSTEOPENIA OF NECKS OF BOTH FEMURS: ICD-10-CM

## 2023-05-15 DIAGNOSIS — K21.9 GASTROESOPHAGEAL REFLUX DISEASE WITHOUT ESOPHAGITIS: ICD-10-CM

## 2023-05-15 DIAGNOSIS — M85.851 OSTEOPENIA OF NECKS OF BOTH FEMURS: ICD-10-CM

## 2023-05-15 DIAGNOSIS — M81.0 SENILE OSTEOPOROSIS: ICD-10-CM

## 2023-05-15 PROCEDURE — 72100 X-RAY EXAM L-S SPINE 2/3 VWS: CPT

## 2023-05-15 PROCEDURE — 72070 X-RAY EXAM THORAC SPINE 2VWS: CPT

## 2023-05-17 NOTE — RESULT ENCOUNTER NOTE
Hello -    Here are my comments about the recent results. Scoliosis, no fractures, some disc degeneration/endplate degeneration-- all consistent with arthritis    Please let us know if you have any questions or concerns.    Regards,  Santa Sidhu MD

## 2023-07-17 ENCOUNTER — MYC MEDICAL ADVICE (OUTPATIENT)
Dept: FAMILY MEDICINE | Facility: CLINIC | Age: 78
End: 2023-07-17

## 2023-07-17 DIAGNOSIS — G43.919 INTRACTABLE MIGRAINE, UNSPECIFIED MIGRAINE TYPE: ICD-10-CM

## 2023-07-17 RX ORDER — GABAPENTIN 100 MG/1
CAPSULE ORAL
Qty: 90 CAPSULE | Refills: 2 | Status: SHIPPED | OUTPATIENT
Start: 2023-07-17 | End: 2023-10-24

## 2023-07-20 ENCOUNTER — MYC MEDICAL ADVICE (OUTPATIENT)
Dept: NEUROLOGY | Facility: CLINIC | Age: 78
End: 2023-07-20
Payer: COMMERCIAL

## 2023-07-20 ENCOUNTER — MYC MEDICAL ADVICE (OUTPATIENT)
Dept: FAMILY MEDICINE | Facility: CLINIC | Age: 78
End: 2023-07-20

## 2023-07-20 DIAGNOSIS — G43.101 MIGRAINE WITH AURA AND WITH STATUS MIGRAINOSUS, NOT INTRACTABLE: Primary | ICD-10-CM

## 2023-07-20 RX ORDER — NARATRIPTAN 2.5 MG/1
2.5 TABLET ORAL
Status: CANCELLED | OUTPATIENT
Start: 2023-07-20

## 2023-07-20 NOTE — TELEPHONE ENCOUNTER
Provider messaged that pt has not followed up since January 2023, he recommends making a follow up appt.  He recommends for pt to go to primary care provider for refill of the naratriptan at this time.     Provider message relayed to pt via [employer].     Galina ALVARADO RN, BSN  [facility]

## 2023-07-21 NOTE — TELEPHONE ENCOUNTER
Pt called to see if Dr. Foster will refill her Naratriptan prescription. Explained that Dr. Foster has been in clinic all day long with patients and has not had a chance to read her messages yet.    SHIREEN HernandezN, RN  07/21/23, 3:18 PM

## 2023-07-27 ENCOUNTER — OFFICE VISIT (OUTPATIENT)
Dept: FAMILY MEDICINE | Facility: CLINIC | Age: 78
End: 2023-07-27
Payer: COMMERCIAL

## 2023-07-27 VITALS
HEART RATE: 65 BPM | TEMPERATURE: 98 F | OXYGEN SATURATION: 97 % | WEIGHT: 123 LBS | RESPIRATION RATE: 15 BRPM | SYSTOLIC BLOOD PRESSURE: 97 MMHG | DIASTOLIC BLOOD PRESSURE: 63 MMHG | BODY MASS INDEX: 22.63 KG/M2 | HEIGHT: 62 IN

## 2023-07-27 DIAGNOSIS — R05.1 ACUTE COUGH: Primary | ICD-10-CM

## 2023-07-27 DIAGNOSIS — G43.109 MIGRAINE WITH AURA AND WITHOUT STATUS MIGRAINOSUS, NOT INTRACTABLE: ICD-10-CM

## 2023-07-27 RX ORDER — INHALER, ASSIST DEVICES
SPACER (EA) MISCELLANEOUS
Start: 2023-07-27

## 2023-07-27 RX ORDER — NARATRIPTAN 2.5 MG/1
2.5 TABLET ORAL
Qty: 9 TABLET | Refills: 1 | Status: SHIPPED | OUTPATIENT
Start: 2023-07-27 | End: 2023-08-11

## 2023-07-27 RX ORDER — ALBUTEROL SULFATE 90 UG/1
2 AEROSOL, METERED RESPIRATORY (INHALATION) EVERY 6 HOURS PRN
Qty: 18 G | Refills: 1 | Status: SHIPPED | OUTPATIENT
Start: 2023-07-27 | End: 2023-10-11

## 2023-07-27 RX ORDER — NARATRIPTAN 2.5 MG/1
TABLET ORAL
COMMUNITY
End: 2023-12-13

## 2023-07-27 NOTE — NURSING NOTE
"77 year old  Chief Complaint   Patient presents with    Cough     Pt reports having a dry cough for a couple weeks. Refill for naratriptan.       Blood pressure 97/63, pulse 65, temperature 98  F (36.7  C), temperature source Skin, resp. rate 15, height 1.575 m (5' 2\"), weight 55.8 kg (123 lb), SpO2 97 %, not currently breastfeeding. Body mass index is 22.5 kg/m .  Patient Active Problem List   Diagnosis    Bladder neck obstruction    Constipation    Transient global amnesia    Other hammer toe (acquired)    Ostium secundum type atrial septal defect    Senile osteoporosis    Syndrome affecting cervical region    Variants of migraine    Malignant neoplasm of skin    Persistent insomnia    Hypothyroidism    Family history of malignant neoplasm of ovary    Tear film insufficiency    Circulatory system disorder    PFO (patent foramen ovale)    Gastroesophageal reflux disease without esophagitis    ACP (advance care planning)    Amnesia    Chronic paroxysmal hemicrania, not intractable    Osteopenia of necks of both femurs    Hyperlipidemia LDL goal <100    Intractable migraine, unspecified migraine type    VLAD (generalized anxiety disorder)    Choking sensation    Localized swelling of lower leg       Wt Readings from Last 2 Encounters:   07/27/23 55.8 kg (123 lb)   05/10/23 56.1 kg (123 lb 11.2 oz)     BP Readings from Last 3 Encounters:   07/27/23 97/63   05/10/23 91/61   05/08/23 102/69         Current Outpatient Medications   Medication    aspirin 81 MG tablet    Calcium-Vitamin D 600-200 MG-UNIT TABS    citalopram (CELEXA) 10 MG tablet    cyanocobalamin (VITAMIN B-12) 1000 MCG SUBL sublingual tablet    fexofenadine (ALLEGRA) 180 MG tablet    gabapentin (NEURONTIN) 100 MG capsule    gabapentin (NEURONTIN) 300 MG capsule    galcanezumab-gnlm (EMGALITY) 120 MG/ML injection    indomethacin (INDOCIN) 50 MG capsule    levothyroxine (SYNTHROID/LEVOTHROID) 88 MCG tablet    magnesium oxide (MAG-OX) 400 MG tablet    Multiple " vitamin TABS    naratriptan (AMERGE) 2.5 MG tablet    omeprazole (PRILOSEC) 20 MG capsule    rosuvastatin (CRESTOR) 10 MG tablet    Vitamin D (Cholecalciferol) 50 MCG (2000) CAPS    zolpidem (AMBIEN) 10 MG tablet     No current facility-administered medications for this visit.       Social History     Tobacco Use    Smoking status: Former     Packs/day: 0.50     Years: 10.00     Pack years: 5.00     Types: Cigarettes     Quit date: 1970     Years since quittin.6    Smokeless tobacco: Never   Vaping Use    Vaping Use: Never used   Substance Use Topics    Alcohol use: Yes     Alcohol/week: 2.0 standard drinks of alcohol     Types: 2 Cans of beer per week     Comment: rare    Drug use: No       Health Maintenance Due   Topic Date Due    ADVANCE CARE PLANNING  2022    FALL RISK ASSESSMENT  2022       No results found for: PAP      2023 2:52 PM

## 2023-07-27 NOTE — PROGRESS NOTES
"Rakel Parish is a 77 year old female here for the following issues:    Cough  Nancy is a 78 yo woman who presents with 2 week history of an intermittent dry cough. No associated fever, sore throat, or myalgias. Symptoms occurred after a family reunion. She has a previous hx of asthma,  wheezing in winter months. Last night, she had a \"choking spasm cough\" during the night. She states \"I am a choker\" and she will occasionally choke when trying to cough something up . She wonders if the recent weather or poor air quality has contributed to her cough. She has a history of allergies and takes fexofenadine 180 mg daily, no other medications. Does not use inhalers. No contacts with similar cough.      Migraines  Nancy follows with neurology for migraine headaches. She is requesting a refill of naratriptan 2.5 mg until she is able to follow up with Dr. Villa in neurology, scheduled for 8/17/2023. She uses her naratriptan about 3x/month at onset of migraines with a \"squeezing neck ache,\" aura, and photosensitivity. She typically gets these headaches during the night and it will feel better for her to get up and move around. She uses indomethacin at onset of \"icepick headaches.\" She is also on Emgality once a month for prevention of migraine headaches.  She also uses gabapentin and has tapered from 400mg TID to 300mg twice daily and 200mg once daily. No change in frequency or severity of headaches with the taper.     Patient Active Problem List   Diagnosis    Bladder neck obstruction    Constipation    Transient global amnesia    Other hammer toe (acquired)    Ostium secundum type atrial septal defect    Senile osteoporosis    Syndrome affecting cervical region    Variants of migraine    Malignant neoplasm of skin    Persistent insomnia    Hypothyroidism    Family history of malignant neoplasm of ovary    Tear film insufficiency    Circulatory system disorder    PFO (patent foramen ovale)    Gastroesophageal reflux " disease without esophagitis    ACP (advance care planning)    Amnesia    Chronic paroxysmal hemicrania, not intractable    Osteopenia of necks of both femurs    Hyperlipidemia LDL goal <100    Intractable migraine, unspecified migraine type    VLAD (generalized anxiety disorder)    Choking sensation    Localized swelling of lower leg       Current Outpatient Medications   Medication Sig Dispense Refill    aspirin 81 MG tablet Take 81 mg by mouth daily       Calcium-Vitamin D 600-200 MG-UNIT TABS Take 1 tablet by mouth At Bedtime       citalopram (CELEXA) 10 MG tablet Take 1 tablet (10 mg) by mouth daily 90 tablet 3    cyanocobalamin (VITAMIN B-12) 1000 MCG SUBL sublingual tablet Place 1,000 mcg under the tongue daily      fexofenadine (ALLEGRA) 180 MG tablet Take 180 mg by mouth daily      gabapentin (NEURONTIN) 100 MG capsule Take 3 daily 90 capsule 2    gabapentin (NEURONTIN) 300 MG capsule Take 1 capsule (300 mg) by mouth 3 times daily 270 capsule 3    galcanezumab-gnlm (EMGALITY) 120 MG/ML injection Inject 1 mL (120 mg) Subcutaneous every 28 days 1 mL 10    indomethacin (INDOCIN) 50 MG capsule Take 1 capsule (50 mg) by mouth as needed for moderate pain Take 1 tablet as needed 10 capsule 3    levothyroxine (SYNTHROID/LEVOTHROID) 88 MCG tablet Take 1 tablet (88 mcg) by mouth daily Except skip Sunday dose 90 tablet 3    magnesium oxide (MAG-OX) 400 MG tablet Take 800 mg by mouth At Bedtime      Multiple vitamin TABS Take 3 tablets by mouth daily       naratriptan (AMERGE) 2.5 MG tablet       omeprazole (PRILOSEC) 20 MG capsule Take 40 mg by mouth daily (with dinner)       rosuvastatin (CRESTOR) 10 MG tablet Take 1 tablet (10 mg) by mouth daily 90 tablet 3    Vitamin D (Cholecalciferol) 50 MCG (2000 UT) CAPS Take 2,000 Units by mouth daily       zolpidem (AMBIEN) 10 MG tablet Take 1 tablet (10 mg) by mouth nightly as needed for sleep 30 tablet 0       Allergies   Allergen Reactions    Cyclobenzaprine Other (See  "Comments)    Levofloxacin Unknown     Tendon injury (torn)    Rofecoxib Nausea    Simvastatin Other (See Comments)    Trazodone Other (See Comments)     Other reaction(s): Nightmares    Vicodin [Hydrocodone-Acetaminophen] Itching    Latex Rash        EXAM  BP 97/63 (BP Location: Right arm, Patient Position: Sitting, Cuff Size: Adult Small)   Pulse 65   Temp 98  F (36.7  C) (Skin)   Resp 15   Ht 1.575 m (5' 2\")   Wt 55.8 kg (123 lb)   SpO2 97%   BMI 22.50 kg/m    Gen: Alert, pleasant, NAD  Neck: no LAD  COR: S1,S2, no murmur  Lungs: CTA bilaterally, no rhonchi, wheezes or rales      Assessment:  (R05.1) Acute cough  (primary encounter diagnosis)  Comment: Persistent dry cough for several weeks, no associated fevers, sore throat. Suspect reactive cough, recent poor air quality, allergies?  Plan: albuterol (PROAIR HFA/PROVENTIL HFA/VENTOLIN         HFA) 108 (90 Base) MCG/ACT inhaler, spacer         (OPTICHAMBER CHIVO) holding chamber        Discussed starting albuterol MDI, use with spacer. May use 2 puff every 20min if needed for spastic cough. Discussed adding mucinex to loosen cough.  If not improving over next 2 wks, obtain CXR    (G43.109) Migraine with aura and without status migrainosus, not intractable  Comment: Longstanding history of migraines, for which she uses Emgality once a month. She has breakthrough migraines 3x/month. Has slowly tapered gabapentin to 300 mg BID and 200 mg once a day. No flare but she has run out of her rescue naratriptan. She has an appointment with her neurologist in the next month but is asking for a medication refill to bridge her to the appointment.   Plan: naratriptan (AMERGE) 2.5 MG tablet        Medication refilled today. Future refills should be managed by her neurologist.       I have reviewed, edited and approved the above scribed note.    Alondra Foster MD  Internal Medicine/Pediatrics      I, Susy Wolf, am serving as a scribe to document services personally " performed by Dr. Alondra Foster, based on data collection and the provider's statements to me.

## 2023-08-01 ENCOUNTER — LAB (OUTPATIENT)
Dept: INFUSION THERAPY | Facility: CLINIC | Age: 78
End: 2023-08-01
Attending: INTERNAL MEDICINE
Payer: COMMERCIAL

## 2023-08-01 ENCOUNTER — TELEPHONE (OUTPATIENT)
Dept: NEUROLOGY | Facility: CLINIC | Age: 78
End: 2023-08-01

## 2023-08-01 VITALS
DIASTOLIC BLOOD PRESSURE: 70 MMHG | TEMPERATURE: 97.4 F | HEART RATE: 59 BPM | SYSTOLIC BLOOD PRESSURE: 110 MMHG | OXYGEN SATURATION: 98 % | RESPIRATION RATE: 16 BRPM

## 2023-08-01 DIAGNOSIS — K21.9 GASTROESOPHAGEAL REFLUX DISEASE WITHOUT ESOPHAGITIS: ICD-10-CM

## 2023-08-01 DIAGNOSIS — M85.852 OSTEOPENIA OF NECKS OF BOTH FEMURS: ICD-10-CM

## 2023-08-01 DIAGNOSIS — M81.0 SENILE OSTEOPOROSIS: Primary | ICD-10-CM

## 2023-08-01 DIAGNOSIS — M85.851 OSTEOPENIA OF NECKS OF BOTH FEMURS: ICD-10-CM

## 2023-08-01 LAB
ALBUMIN SERPL BCG-MCNC: 4.3 G/DL (ref 3.5–5.2)
ALP SERPL-CCNC: 73 U/L (ref 35–104)
ALT SERPL W P-5'-P-CCNC: 28 U/L (ref 0–50)
ANION GAP SERPL CALCULATED.3IONS-SCNC: 12 MMOL/L (ref 7–15)
AST SERPL W P-5'-P-CCNC: 38 U/L (ref 0–45)
BILIRUB SERPL-MCNC: 0.2 MG/DL
BUN SERPL-MCNC: 14.7 MG/DL (ref 8–23)
CALCIUM SERPL-MCNC: 9.1 MG/DL (ref 8.8–10.2)
CHLORIDE SERPL-SCNC: 103 MMOL/L (ref 98–107)
CREAT SERPL-MCNC: 0.81 MG/DL (ref 0.51–0.95)
DEPRECATED HCO3 PLAS-SCNC: 25 MMOL/L (ref 22–29)
GFR SERPL CREATININE-BSD FRML MDRD: 74 ML/MIN/1.73M2
GLUCOSE SERPL-MCNC: 83 MG/DL (ref 70–99)
POTASSIUM SERPL-SCNC: 4.2 MMOL/L (ref 3.4–5.3)
PROT SERPL-MCNC: 6.8 G/DL (ref 6.4–8.3)
PTH-INTACT SERPL-MCNC: 43 PG/ML (ref 15–65)
SODIUM SERPL-SCNC: 140 MMOL/L (ref 136–145)
TOTAL PROTEIN SERUM FOR ELP: 6.5 G/DL (ref 6.4–8.3)

## 2023-08-01 PROCEDURE — 82306 VITAMIN D 25 HYDROXY: CPT | Performed by: INTERNAL MEDICINE

## 2023-08-01 PROCEDURE — 84155 ASSAY OF PROTEIN SERUM: CPT | Mod: 91 | Performed by: INTERNAL MEDICINE

## 2023-08-01 PROCEDURE — 80053 COMPREHEN METABOLIC PANEL: CPT | Performed by: INTERNAL MEDICINE

## 2023-08-01 PROCEDURE — 250N000013 HC RX MED GY IP 250 OP 250 PS 637: Performed by: INTERNAL MEDICINE

## 2023-08-01 PROCEDURE — 258N000003 HC RX IP 258 OP 636: Performed by: INTERNAL MEDICINE

## 2023-08-01 PROCEDURE — 84165 PROTEIN E-PHORESIS SERUM: CPT | Mod: 26

## 2023-08-01 PROCEDURE — 84165 PROTEIN E-PHORESIS SERUM: CPT | Mod: TC | Performed by: PATHOLOGY

## 2023-08-01 PROCEDURE — 250N000011 HC RX IP 250 OP 636: Mod: JZ | Performed by: INTERNAL MEDICINE

## 2023-08-01 PROCEDURE — 96365 THER/PROPH/DIAG IV INF INIT: CPT

## 2023-08-01 PROCEDURE — 83970 ASSAY OF PARATHORMONE: CPT | Performed by: INTERNAL MEDICINE

## 2023-08-01 PROCEDURE — 36415 COLL VENOUS BLD VENIPUNCTURE: CPT

## 2023-08-01 RX ORDER — MEPERIDINE HYDROCHLORIDE 25 MG/ML
25 INJECTION INTRAMUSCULAR; INTRAVENOUS; SUBCUTANEOUS EVERY 30 MIN PRN
Status: CANCELLED | OUTPATIENT
Start: 2023-08-01

## 2023-08-01 RX ORDER — ALBUTEROL SULFATE 90 UG/1
1-2 AEROSOL, METERED RESPIRATORY (INHALATION)
Status: CANCELLED
Start: 2023-08-01

## 2023-08-01 RX ORDER — ZOLEDRONIC ACID 5 MG/100ML
5 INJECTION, SOLUTION INTRAVENOUS ONCE
Status: CANCELLED
Start: 2023-08-01

## 2023-08-01 RX ORDER — EPINEPHRINE 1 MG/ML
0.3 INJECTION, SOLUTION INTRAMUSCULAR; SUBCUTANEOUS EVERY 5 MIN PRN
Status: CANCELLED | OUTPATIENT
Start: 2023-08-01

## 2023-08-01 RX ORDER — HEPARIN SODIUM (PORCINE) LOCK FLUSH IV SOLN 100 UNIT/ML 100 UNIT/ML
5 SOLUTION INTRAVENOUS
Status: CANCELLED | OUTPATIENT
Start: 2023-08-01

## 2023-08-01 RX ORDER — METHYLPREDNISOLONE SODIUM SUCCINATE 125 MG/2ML
125 INJECTION, POWDER, LYOPHILIZED, FOR SOLUTION INTRAMUSCULAR; INTRAVENOUS
Status: CANCELLED
Start: 2023-08-01

## 2023-08-01 RX ORDER — DIPHENHYDRAMINE HYDROCHLORIDE 50 MG/ML
50 INJECTION INTRAMUSCULAR; INTRAVENOUS
Status: CANCELLED
Start: 2023-08-01

## 2023-08-01 RX ORDER — ACETAMINOPHEN 325 MG/1
325 TABLET ORAL ONCE
Status: COMPLETED | OUTPATIENT
Start: 2023-08-01 | End: 2023-08-01

## 2023-08-01 RX ORDER — ALBUTEROL SULFATE 0.83 MG/ML
2.5 SOLUTION RESPIRATORY (INHALATION)
Status: CANCELLED | OUTPATIENT
Start: 2023-08-01

## 2023-08-01 RX ORDER — HEPARIN SODIUM,PORCINE 10 UNIT/ML
5 VIAL (ML) INTRAVENOUS
Status: CANCELLED | OUTPATIENT
Start: 2023-08-01

## 2023-08-01 RX ORDER — ACETAMINOPHEN 325 MG/1
325 TABLET ORAL ONCE
Status: CANCELLED | OUTPATIENT
Start: 2023-08-01

## 2023-08-01 RX ORDER — ZOLEDRONIC ACID 5 MG/100ML
5 INJECTION, SOLUTION INTRAVENOUS ONCE
Status: COMPLETED | OUTPATIENT
Start: 2023-08-01 | End: 2023-08-01

## 2023-08-01 RX ADMIN — SODIUM CHLORIDE 250 ML: 9 INJECTION, SOLUTION INTRAVENOUS at 15:18

## 2023-08-01 RX ADMIN — ACETAMINOPHEN 325 MG: 325 TABLET, FILM COATED ORAL at 15:14

## 2023-08-01 RX ADMIN — ZOLEDRONIC ACID 5 MG: 0.05 INJECTION, SOLUTION INTRAVENOUS at 15:20

## 2023-08-01 NOTE — PROGRESS NOTES
Medical Assistant Note:  Rakel Parish presents today for blood draw.    Patient seen by provider today: No.   present during visit today: Not Applicable.    Concerns: No Concerns.    Procedure:  Lab draw site: lac, Needle type: bf, Gauge: 23.    Post Assessment:  Labs drawn without difficulty: Yes.    Discharge Plan:  Departure Mode: Ambulatory.    Face to Face Time: 5 min.    Rocío Ravi, CMA

## 2023-08-01 NOTE — PROGRESS NOTES
Infusion Nursing Note:  Rakel Parish presents today for reclast.    Patient seen by provider today: No   present during visit today: Not Applicable.    Note: First reclast. Written info given and reviewed with patient.      Intravenous Access:  Peripheral IV placed.    Treatment Conditions:  Lab Results   Component Value Date     08/01/2023    POTASSIUM 4.2 08/01/2023    CR 0.81 08/01/2023    ERIC 9.1 08/01/2023    BILITOTAL 0.2 08/01/2023    ALBUMIN 4.3 08/01/2023    ALT 28 08/01/2023    AST 38 08/01/2023       Results reviewed, labs MET treatment parameters, ok to proceed with treatment.      Post Infusion Assessment:  Patient tolerated infusion without incident.  Blood return noted pre and post infusion.  Site patent and intact, free from redness, edema or discomfort.  No evidence of extravasations.  Access discontinued per protocol.       Discharge Plan:   AVS to patient via MYCHART.  Patient will return prn for next appointment.   Patient discharged in stable condition accompanied by: self.  Departure Mode: Ambulatory.      Tu Puentes RN

## 2023-08-01 NOTE — TELEPHONE ENCOUNTER
Prior Authorization Specialty Medication Request    Medication/Dose: emgality  ICD code (if different than what is on RX):    Previously Tried and Failed:      Important Lab Values:   Rationale:     Insurance Name:   Insurance ID:   Insurance Phone Number:     Pharmacy Information (if different than what is on RX)  Name:    Phone:

## 2023-08-02 LAB
ALBUMIN SERPL ELPH-MCNC: 4.2 G/DL (ref 3.7–5.1)
ALPHA1 GLOB SERPL ELPH-MCNC: 0.2 G/DL (ref 0.2–0.4)
ALPHA2 GLOB SERPL ELPH-MCNC: 0.7 G/DL (ref 0.5–0.9)
B-GLOBULIN SERPL ELPH-MCNC: 0.6 G/DL (ref 0.6–1)
DEPRECATED CALCIDIOL+CALCIFEROL SERPL-MC: 67 UG/L (ref 20–75)
GAMMA GLOB SERPL ELPH-MCNC: 0.8 G/DL (ref 0.7–1.6)
M PROTEIN SERPL ELPH-MCNC: 0.1 G/DL
PROT PATTERN SERPL ELPH-IMP: ABNORMAL

## 2023-08-03 NOTE — TELEPHONE ENCOUNTER
Prior Authorization Approval    Medication: EMGALITY 120 MG/ML SC SOAJ  Authorization Effective Date: 5/5/2023  Authorization Expiration Date: 8/3/2024  Insurance Company: [employer] [place] - Phone [phone] Fax [phone]  Which Pharmacy is filling the prescription: [facility] #[id number] - [place], [place] - [address]  Pharmacy Notified: Yes  Patient Notified: Yes (pharmacy will notify patient when ready)

## 2023-08-03 NOTE — TELEPHONE ENCOUNTER
Central Prior Authorization Team   Phone: [phone]      PA Initiation    Medication: EMGALITY 120 MG/ML SC SOAJ  Insurance Company: [facility] - Phone [phone] Fax [phone]  Pharmacy Filling the Rx: [id number] - [place], [place] - [address] AT [address] [place] [address] & [facility]  Filling Pharmacy Phone: [phone]  Filling Pharmacy Fax:    Start Date: 8/3/2023

## 2023-08-10 NOTE — PROGRESS NOTES
ESTABLISHED PATIENT NEUROLOGY NOTE    DATE OF VISIT: 8/11/2023  CLINIC LOCATION: [facility]  MRN: [id number]  PATIENT NAME: Rakel Parish  YOB: 1945    REASON FOR VISIT: No chief complaint on file.    SUBJECTIVE:                                                      HISTORY OF PRESENT ILLNESS: Patient is here to follow up regarding multifactorial headache disorder.  During the last visit on 1/17/2023 ***.  Please refer to my initial/other prior notes for further information.    Since the last visit, the patient reports ***.  For headache prevention she is on Emgality.  For acute therapy she takes naratriptan.  Also has as needed indomethacin for paroxysmal hemicrania.  Denies new neurological symptoms.  EXAM:                                                    Physical Exam:   Vitals: There were no vitals taken for this visit.    General: pt is in NAD, cooperative.  Skin: normal turgor, moist mucous membranes, no lesions/rashes noticed.  HEENT: ATNC, white sclera, normal conjunctiva.  Respiratory: Symmetric lung excursion, no accessory respiratory muscle use.  Abdomen: Non distended.  Neurological: awake, cooperative, follows commands, no exam changes compared to the last visit.  ASSESSMENT AND PLAN:                                                    Assessment: 77 year old female patient presents for follow-up of migraine, left occipital neuralgia and chronic paroxysmal hemicrania.    Diagnoses:    ICD-10-CM    1. Migraine with aura and with status migrainosus, not intractable  G43.101       2. Chronic paroxysmal hemicrania, not intractable  G44.049       3. Cervico-occipital neuralgia of left side  M54.81         Plan:  There are no Patient Instructions on file for this visit.    Total Time: *** minutes spent on the date of the encounter doing chart review, history and exam, documentation and further activities per the note.    Lazarus Villa MD  [facility]  Neurology  (Chart documentation was completed in part with Dragon voice-recognition software. Even though reviewed, some grammatical, spelling, and word errors may remain.)

## 2023-08-11 ENCOUNTER — OFFICE VISIT (OUTPATIENT)
Dept: NEUROLOGY | Facility: CLINIC | Age: 78
End: 2023-08-11
Payer: COMMERCIAL

## 2023-08-11 VITALS
SYSTOLIC BLOOD PRESSURE: 103 MMHG | DIASTOLIC BLOOD PRESSURE: 70 MMHG | HEIGHT: 62 IN | HEART RATE: 60 BPM | BODY MASS INDEX: 22.5 KG/M2 | OXYGEN SATURATION: 95 %

## 2023-08-11 DIAGNOSIS — G47.00 PERSISTENT INSOMNIA: ICD-10-CM

## 2023-08-11 DIAGNOSIS — G44.049 CHRONIC PAROXYSMAL HEMICRANIA, NOT INTRACTABLE: ICD-10-CM

## 2023-08-11 DIAGNOSIS — G43.109 MIGRAINE WITH AURA AND WITHOUT STATUS MIGRAINOSUS, NOT INTRACTABLE: Primary | ICD-10-CM

## 2023-08-11 DIAGNOSIS — M54.81 CERVICO-OCCIPITAL NEURALGIA OF LEFT SIDE: ICD-10-CM

## 2023-08-11 PROCEDURE — 99214 OFFICE O/P EST MOD 30 MIN: CPT | Performed by: PSYCHIATRY & NEUROLOGY

## 2023-08-11 RX ORDER — NARATRIPTAN 2.5 MG/1
2.5 TABLET ORAL
Qty: 9 TABLET | Refills: 4 | Status: SHIPPED | OUTPATIENT
Start: 2023-08-11 | End: 2024-01-11

## 2023-08-11 RX ORDER — ZOLPIDEM TARTRATE 10 MG/1
10 TABLET ORAL
Qty: 30 TABLET | Refills: 0 | Status: SHIPPED | OUTPATIENT
Start: 2023-08-11 | End: 2023-10-24

## 2023-08-11 NOTE — TELEPHONE ENCOUNTER
Last visit 7/27/23, no future appt  Last refill zolpidem 10 mg 4/6/23 - checked   Routing refill request to provider for review/approval because:  Drug not on the [facility] refill protocol   Agustina James RN  [facility]

## 2023-08-11 NOTE — NURSING NOTE
"Rakel Parish is a 77 year old female who presents for:  Chief Complaint   Patient presents with    Headache     Gabapentin reducing having some insomnia.           Initial Vitals:  /70 (BP Location: Left arm, Patient Position: Sitting, Cuff Size: Adult Small)   Pulse 60   Ht 1.575 m (5' 2\")   SpO2 95%   BMI 22.50 kg/m   Estimated body mass index is 22.5 kg/m  as calculated from the following:    Height as of this encounter: 1.575 m (5' 2\").    Weight as of 7/27/23: 55.8 kg (123 lb).. Body surface area is 1.56 meters squared. BP completed using cuff size: small regular    Nicole Ayon   "

## 2023-08-11 NOTE — PROGRESS NOTES
"ESTABLISHED PATIENT NEUROLOGY NOTE    DATE OF VISIT: 8/11/2023  CLINIC LOCATION: [facility]  MRN: [id number]  PATIENT NAME: Rakel Parish  YOB: 1945    REASON FOR VISIT:   Chief Complaint   Patient presents with    Headache     Gabapentin reducing having some insomnia.        SUBJECTIVE:                                                      HISTORY OF PRESENT ILLNESS: Patient is here to follow up regarding multifactorial headache disorder.  During the last visit on 1/17/2023 no medication changes were made.  Please refer to my initial/other prior notes for further information.    Since the last visit, the patient reports that she decided to reduce/taper off gabapentin.  She used to be on 300 mg 4 times per day, but currently takes 300/100/300 mg/day.  She has trouble with sleep.  She reports that her headaches mildly increased in frequency from 0 to 1/month to 3 to 4/month, but are tolerable and responsive to rescue therapy.  For headache prevention, she is on Emgality.  For acute therapy, she takes naratriptan, which works well.  Also has as needed indomethacin for paroxysmal hemicrania.  These headaches are well-controlled and occur once every 2 months on average.  No recurrence of occipital neuralgia.  Denies new neurological symptoms.  EXAM:                                                    Physical Exam:   Vitals: /70 (BP Location: Left arm, Patient Position: Sitting, Cuff Size: Adult Small)   Pulse 60   Ht 1.575 m (5' 2\")   SpO2 95%   BMI 22.50 kg/m      General: pt is in NAD, cooperative.  Skin: normal turgor, moist mucous membranes, no lesions/rashes noticed.  HEENT: ATNC, white sclera, normal conjunctiva.  Respiratory: Symmetric lung excursion, no accessory respiratory muscle use.  Abdomen: Non distended.  Neurological: awake, cooperative, follows commands, no exam changes compared to the last visit.  ASSESSMENT AND PLAN:                                        "             Assessment: 77 year old female patient presents for follow-up of migraine, left occipital neuralgia and chronic paroxysmal hemicrania.  She reports mild increase in headache frequency following reduction of gabapentin, but most concerned about insomnia.      We discussed that she could consider trial of melatonin to help with sleep (she is concerned about taking Ambien every night), but should continue to very gradually taper off gabapentin.  If headaches become more intense or frequent in the future, preventive options include adding riboflavin (she is already on magnesium supplement), versus trial of different CGRP antagonist (Aimovig, Ajovy, Nurtec or Qulipta).  However, for now we decided not to make additional medication changes.  I refilled her Emgality and naratriptan.    Regarding her left occipital neuralgia and chronic paroxysmal hemicrania, they appear to be stable at the present time.  No changes in management.    Diagnoses:    ICD-10-CM    1. Migraine with aura and without status migrainosus, not intractable  G43.109 galcanezumab-gnlm (EMGALITY) 120 MG/ML injection     naratriptan (AMERGE) 2.5 MG tablet      2. Chronic paroxysmal hemicrania, not intractable  G44.049       3. Cervico-occipital neuralgia of left side  M54.81         Plan: At today's visit we thoroughly discussed current symptoms, available treatment options, and the plan.    We decided to continue gradual taper of gabapentin.  I recommended reducing her morning and midday doses prior to reducing her evening dose of gabapentin by 100 mg every few weeks.  Meanwhile, would suggest trial of melatonin at 3 to 5 mg every evening.  We discussed that the dose could be further increased to 10 mg if not effective, but tolerated.    I renewed Emgality and naratriptan prescriptions.  We also discussed option of adding riboflavin later on for headache prevention if headaches become more frequent.    I advised the patient to keep the  headache diary and bring it to the next follow-up visit or upload via My Chart.     Next follow-up appointment is in the next 4 months or earlier if needed.    Total Time: 32 minutes spent on the date of the encounter doing chart review, history and exam, documentation and further activities per the note.    Lazarus Villa MD  [facility] Neurology  (Chart documentation was completed in part with Dragon voice-recognition software. Even though reviewed, some grammatical, spelling, and word errors may remain.)

## 2023-08-11 NOTE — LETTER
"    8/11/2023         RE: Rakel Parish  [address]  [facility] [address]        Dear Colleague,    Thank you for referring your patient, Rakel Parish, to the [facility] NEUROLOGY CLINICS [facility]. Please see a copy of my visit note below.    ESTABLISHED PATIENT NEUROLOGY NOTE    DATE OF VISIT: 8/11/2023  CLINIC LOCATION: [facility]  MRN: [id number]  PATIENT NAME: Rakel Parish  YOB: 1945    REASON FOR VISIT:   Chief Complaint   Patient presents with     Headache     Gabapentin reducing having some insomnia.        SUBJECTIVE:                                                      HISTORY OF PRESENT ILLNESS: Patient is here to follow up regarding multifactorial headache disorder.  During the last visit on 1/17/2023 no medication changes were made.  Please refer to my initial/other prior notes for further information.    Since the last visit, the patient reports that she decided to reduce/taper off gabapentin.  She used to be on 300 mg 4 times per day, but currently takes 300/100/300 mg/day.  She has trouble with sleep.  She reports that her headaches mildly increased in frequency from 0 to 1/month to 3 to 4/month, but are tolerable and responsive to rescue therapy.  For headache prevention, she is on Emgality.  For acute therapy, she takes naratriptan, which works well.  Also has as needed indomethacin for paroxysmal hemicrania.  These headaches are well-controlled and occur once every 2 months on average.  No recurrence of occipital neuralgia.  Denies new neurological symptoms.  EXAM:                                                    Physical Exam:   Vitals: /70 (BP Location: Left arm, Patient Position: Sitting, Cuff Size: Adult Small)   Pulse 60   Ht 1.575 m (5' 2\")   SpO2 95%   BMI 22.50 kg/m      General: pt is in NAD, cooperative.  Skin: normal turgor, moist mucous membranes, no lesions/rashes noticed.  HEENT: ATNC, white sclera, normal " conjunctiva.  Respiratory: Symmetric lung excursion, no accessory respiratory muscle use.  Abdomen: Non distended.  Neurological: awake, cooperative, follows commands, no exam changes compared to the last visit.  ASSESSMENT AND PLAN:                                                    Assessment: 77 year old female patient presents for follow-up of migraine, left occipital neuralgia and chronic paroxysmal hemicrania.  She reports mild increase in headache frequency following reduction of gabapentin, but most concerned about insomnia.      We discussed that she could consider trial of melatonin to help with sleep (she is concerned about taking Ambien every night), but should continue to very gradually taper off gabapentin.  If headaches become more intense or frequent in the future, preventive options include adding riboflavin (she is already on magnesium supplement), versus trial of different CGRP antagonist (Aimovig, Ajovy, Nurtec or Qulipta).  However, for now we decided not to make additional medication changes.  I refilled her Emgality and naratriptan.    Regarding her left occipital neuralgia and chronic paroxysmal hemicrania, they appear to be stable at the present time.  No changes in management.    Diagnoses:    ICD-10-CM    1. Migraine with aura and without status migrainosus, not intractable  G43.109 galcanezumab-gnlm (EMGALITY) 120 MG/ML injection     naratriptan (AMERGE) 2.5 MG tablet      2. Chronic paroxysmal hemicrania, not intractable  G44.049       3. Cervico-occipital neuralgia of left side  M54.81         Plan: At today's visit we thoroughly discussed current symptoms, available treatment options, and the plan.    We decided to continue gradual taper of gabapentin.  I recommended reducing her morning and midday doses prior to reducing her evening dose of gabapentin by 100 mg every few weeks.  Meanwhile, would suggest trial of melatonin at 3 to 5 mg every evening.  We discussed that the dose could  be further increased to 10 mg if not effective, but tolerated.    I renewed Emgality and naratriptan prescriptions.  We also discussed option of adding riboflavin later on for headache prevention if headaches become more frequent.    I advised the patient to keep the headache diary and bring it to the next follow-up visit or upload via My Chart.     Next follow-up appointment is in the next 4 months or earlier if needed.    Total Time: 32 minutes spent on the date of the encounter doing chart review, history and exam, documentation and further activities per the note.    Lazarus Villa MD  [facility] Neurology  (Chart documentation was completed in part with Dragon voice-recognition software. Even though reviewed, some grammatical, spelling, and word errors may remain.)      Again, thank you for allowing me to participate in the care of your patient.        Sincerely,        Lazarus Villa MD

## 2023-08-11 NOTE — PATIENT INSTRUCTIONS
AFTER VISIT SUMMARY (AVS):    At today's visit we thoroughly discussed current symptoms, available treatment options, and the plan.    We decided to continue gradual taper of gabapentin.  I would suggest reducing your morning and midday doses prior to reducing your evening dose of gabapentin by 100 mg every few weeks.  Meanwhile, would suggest trial of melatonin at 3 to 5 mg every evening.  We discussed that the dose could be further increased to 10 mg if not effective, but tolerated.    I renewed your Emgality and naratriptan prescriptions.  We also discussed option of adding riboflavin later on for headache prevention if headaches become more frequent.    Please keep the headache diary and bring it to the next follow-up visit or upload via My Chart.     Next follow-up appointment is in the next 4 months or earlier if needed.    Please do not hesitate to call me with any questions or concerns.    Thanks.

## 2023-08-16 ENCOUNTER — MYC MEDICAL ADVICE (OUTPATIENT)
Dept: ENDOCRINOLOGY | Facility: CLINIC | Age: 78
End: 2023-08-16
Payer: COMMERCIAL

## 2023-08-16 DIAGNOSIS — M85.851 OSTEOPENIA OF NECKS OF BOTH FEMURS: Primary | ICD-10-CM

## 2023-08-16 DIAGNOSIS — D47.2 MONOCLONAL GAMMOPATHY PRESENT ON SERUM PROTEIN ELECTROPHORESIS: ICD-10-CM

## 2023-08-16 DIAGNOSIS — M85.852 OSTEOPENIA OF NECKS OF BOTH FEMURS: Primary | ICD-10-CM

## 2023-08-16 NOTE — TELEPHONE ENCOUNTER
"Per Dr Sidhu's notes dated 8/1/23:   Here are my comments about the recent results.  Small amount of excess protein, complete additional check to be more specific.  If additional check is positive, see a blood doctor (hematologist), if it is negative no further evaluation to be done at this time.  He can complete these at your earliest convenience.  I have placed the orders now     Please let us know if you have any questions or concerns.     Regards,  Santa Sidhu MD\"    Patient called and aware.     Rubi Caballero RN    "

## 2023-08-18 ENCOUNTER — LAB (OUTPATIENT)
Dept: LAB | Facility: CLINIC | Age: 78
End: 2023-08-18
Payer: COMMERCIAL

## 2023-08-18 DIAGNOSIS — M85.852 OSTEOPENIA OF NECKS OF BOTH FEMURS: ICD-10-CM

## 2023-08-18 DIAGNOSIS — D47.2 MONOCLONAL GAMMOPATHY PRESENT ON SERUM PROTEIN ELECTROPHORESIS: ICD-10-CM

## 2023-08-18 DIAGNOSIS — M85.851 OSTEOPENIA OF NECKS OF BOTH FEMURS: ICD-10-CM

## 2023-08-18 PROCEDURE — 86335 IMMUNFIX E-PHORSIS/URINE/CSF: CPT | Performed by: PATHOLOGY

## 2023-08-18 PROCEDURE — 86334 IMMUNOFIX E-PHORESIS SERUM: CPT | Performed by: PATHOLOGY

## 2023-08-21 LAB — PROT ELPH PNL UR ELPH: NORMAL

## 2023-08-22 LAB — PROT PATTERN SERPL IFE-IMP: NORMAL

## 2023-08-23 ENCOUNTER — TELEPHONE (OUTPATIENT)
Dept: FAMILY MEDICINE | Facility: CLINIC | Age: 78
End: 2023-08-23

## 2023-08-23 NOTE — RESULT ENCOUNTER NOTE
Hello -    Here are my comments about the recent results. Given this serum result (which hadn't resulted yesterday), I do recommend seeing hematology. May amount to nothing, but definitely worthwhile to chat with them given everything.    Please let us know if you have any questions or concerns.    Regards,  Santa Sidhu MD

## 2023-08-23 NOTE — TELEPHONE ENCOUNTER
Pt wanting to know if Dr. Foster has an specific recommendations of who she should see in hematology department.    Daxa

## 2023-08-24 ENCOUNTER — PATIENT OUTREACH (OUTPATIENT)
Dept: ONCOLOGY | Facility: CLINIC | Age: 78
End: 2023-08-24
Payer: COMMERCIAL

## 2023-08-24 NOTE — PROGRESS NOTES
New Patient Oncology Nurse Navigator Note     Referring provider: Santa Sidhu MD     Referring Clinic/Organization: [facility] LABORATORY     Referred to (specialty:) Hematology/Oncology     Requested provider (if applicable): NA     Date Referral Received: August 23, 2023     Evaluation for:  D47.2 (ICD-10-CM) - Monoclonal gammopathy present on serum protein electrophoresis      Clinical History (per Nurse review of records provided):        Component Ref Range & Units 3 wk ago     Albumin 3.7 - 5.1 g/dL 4.2    Alpha 1 0.2 - 0.4 g/dL 0.2    Alpha 2 0.5 - 0.9 g/dL 0.7    Beta Globulin 0.6 - 1.0 g/dL 0.6    Gamma Globulin 0.7 - 1.6 g/dL 0.8    Monoclonal Peak <=0.0 g/dL 0.1 High     ELP Interpretation  Very small monoclonal protein (about 0.1 g/dL) seen in the gamma fraction, not previously characterized in our laboratory. Consider a serum and urine immunofixation for confirmation and further characterization if clinically indicated and not previously performed elsewhere. Pathologic significance requires clinical correlation. DEEP Aguilar M.D., Ph.D., Pathologist ().      8/22/2023  3:37 PM - Katharina Lozada    Component   Immunofixation ELP   Faint monoclonal IgG immunoglobulin of lambda light chain type.  Pathologic significance requires clinical correlation. SIVAKUMAR Epps M.D., Ph.D., Pathologist     Immunofixation ELP Urine No monoclonal protein seen on immunofixation. Pathologic significance requires clinical correlation. SIVAKUMAR Epps M.D., Ph.D., Pathologist     Records Location: See Bookmarked material     Records Needed: NA     Additional testing needed prior to consult: NA    Payor: [employer] / Plan: [facility] MEDICARE ADVANTAGE / Product Type: Medicare /     August 24, 2023    Called patient to introduced myself and role as nurse navigator with [facility] Hematology/Oncology department and to inform them that we have received the referral for a diagnosis of Monoclonal gammopathy  from Dr. Santa Sidhu. Patient confirms they are aware of the referral and ready to schedule. Provided them with contact information for NPS and encourage them to call with any questions. Patient verbalized understanding of plan.     Stacia Kyle RN, BSN  [facility] Hematology/Oncology Nurse Navigator  [phone]

## 2023-09-08 ENCOUNTER — TELEPHONE (OUTPATIENT)
Dept: FAMILY MEDICINE | Facility: CLINIC | Age: 78
End: 2023-09-08

## 2023-09-08 NOTE — TELEPHONE ENCOUNTER
"Pt reports \"heaviness\" in chest which radiates to her back, also reporting some rib cage pain. She reports that she aspirated a piece of corn on Monday 9/4. She had difficulty catching her breath at that time but had no problems since then until today. She has no fever, and does not feel acutely ill at this time. Advised pt to go to urgent care for evaluation due to lack of appointment availability at clinic today. She confirms understanding and agrees with this plan.    Hans METZ, RN  09/08/23 8:09 AM    "

## 2023-09-12 ENCOUNTER — OFFICE VISIT (OUTPATIENT)
Dept: FAMILY MEDICINE | Facility: CLINIC | Age: 78
End: 2023-09-12
Payer: COMMERCIAL

## 2023-09-12 VITALS
HEIGHT: 62 IN | OXYGEN SATURATION: 97 % | RESPIRATION RATE: 15 BRPM | TEMPERATURE: 97.2 F | WEIGHT: 123 LBS | BODY MASS INDEX: 22.63 KG/M2

## 2023-09-12 DIAGNOSIS — R42 DIZZINESS: Primary | ICD-10-CM

## 2023-09-12 DIAGNOSIS — R41.89 BRAIN FOG: ICD-10-CM

## 2023-09-12 DIAGNOSIS — T17.908D ASPIRATION INTO AIRWAY, SUBSEQUENT ENCOUNTER: ICD-10-CM

## 2023-09-12 DIAGNOSIS — H81.11 BENIGN PAROXYSMAL POSITIONAL VERTIGO, RIGHT: ICD-10-CM

## 2023-09-12 DIAGNOSIS — D47.2 MGUS (MONOCLONAL GAMMOPATHY OF UNKNOWN SIGNIFICANCE): ICD-10-CM

## 2023-09-12 LAB
BASO+EOS+MONOS # BLD AUTO: 0.5 10E3/UL (ref 0–2.2)
BASO+EOS+MONOS NFR BLD AUTO: 8 %
ERYTHROCYTE [DISTWIDTH] IN BLOOD BY AUTOMATED COUNT: 12.3 % (ref 10–15)
HCT VFR BLD AUTO: 41.1 % (ref 35–47)
HGB BLD-MCNC: 13.3 G/DL (ref 11.7–15.7)
LYMPHOCYTES # BLD AUTO: 1.7 10E3/UL (ref 0.8–5.3)
LYMPHOCYTES NFR BLD AUTO: 30 %
MCH RBC QN AUTO: 31.2 PG (ref 26.5–33)
MCHC RBC AUTO-ENTMCNC: 32.4 G/DL (ref 31.5–36.5)
MCV RBC AUTO: 97 FL (ref 78–100)
NEUTROPHILS # BLD AUTO: 3.7 10E3/UL (ref 1.6–8.3)
NEUTROPHILS NFR BLD AUTO: 62 %
PLATELET # BLD AUTO: 295 10E3/UL (ref 150–450)
RBC # BLD AUTO: 4.26 10E6/UL (ref 3.8–5.2)
WBC # BLD AUTO: 5.9 10E3/UL (ref 4–11)

## 2023-09-12 NOTE — NURSING NOTE
"77 year old  Chief Complaint   Patient presents with    Hospital F/U     Pt was seen at urgent care for tightness in their chest after a choking incident. Pt reports some dizziness has worsened along with brain fog since then (last Friday).       Temperature 97.2  F (36.2  C), temperature source Skin, resp. rate 15, height 1.575 m (5' 2\"), weight 55.8 kg (123 lb), SpO2 97 %, not currently breastfeeding. Body mass index is 22.5 kg/m .  Patient Active Problem List   Diagnosis    Bladder neck obstruction    Constipation    Transient global amnesia    Other hammer toe (acquired)    Ostium secundum type atrial septal defect    Senile osteoporosis    Syndrome affecting cervical region    Variants of migraine    Malignant neoplasm of skin    Persistent insomnia    Hypothyroidism    Family history of malignant neoplasm of ovary    Tear film insufficiency    Circulatory system disorder    PFO (patent foramen ovale)    Gastroesophageal reflux disease without esophagitis    ACP (advance care planning)    Amnesia    Chronic paroxysmal hemicrania, not intractable    Osteopenia of necks of both femurs    Hyperlipidemia LDL goal <100    Intractable migraine, unspecified migraine type    VLAD (generalized anxiety disorder)    Choking sensation    Localized swelling of lower leg       Wt Readings from Last 2 Encounters:   09/12/23 55.8 kg (123 lb)   07/27/23 55.8 kg (123 lb)     BP Readings from Last 3 Encounters:   08/11/23 103/70   08/01/23 110/70   07/27/23 97/63         Current Outpatient Medications   Medication    albuterol (PROAIR HFA/PROVENTIL HFA/VENTOLIN HFA) 108 (90 Base) MCG/ACT inhaler    aspirin 81 MG tablet    Calcium-Vitamin D 600-200 MG-UNIT TABS    citalopram (CELEXA) 10 MG tablet    cyanocobalamin (VITAMIN B-12) 1000 MCG SUBL sublingual tablet    fexofenadine (ALLEGRA) 180 MG tablet    gabapentin (NEURONTIN) 100 MG capsule    gabapentin (NEURONTIN) 300 MG capsule    galcanezumab-gnlm (EMGALITY) 120 MG/ML injection "    indomethacin (INDOCIN) 50 MG capsule    levothyroxine (SYNTHROID/LEVOTHROID) 88 MCG tablet    magnesium oxide (MAG-OX) 400 MG tablet    Multiple vitamin TABS    naratriptan (AMERGE) 2.5 MG tablet    naratriptan (AMERGE) 2.5 MG tablet    omeprazole (PRILOSEC) 20 MG capsule    rosuvastatin (CRESTOR) 10 MG tablet    spacer (OPTICHAMBER CHIVO) holding chamber    Vitamin D (Cholecalciferol) 50 MCG ( UT) CAPS    zolpidem (AMBIEN) 10 MG tablet     No current facility-administered medications for this visit.       Social History     Tobacco Use    Smoking status: Former     Packs/day: 0.50     Years: 10.00     Pack years: 5.00     Types: Cigarettes     Quit date: 1970     Years since quittin.7    Smokeless tobacco: Never   Vaping Use    Vaping Use: Never used   Substance Use Topics    Alcohol use: Yes     Alcohol/week: 2.0 standard drinks of alcohol     Types: 2 Cans of beer per week     Comment: rare    Drug use: No       Health Maintenance Due   Topic Date Due    ADVANCE CARE PLANNING  2022    FALL RISK ASSESSMENT  2022    INFLUENZA VACCINE (1) 2023       No results found for: PAP      2023 9:59 AM

## 2023-09-12 NOTE — PROGRESS NOTES
"Rakel Parish is a 77 year old female here for the following issues:    Urgent Care Follow Up - Chest Tightness  Nancy reports she choked on a kernel of corn on 2023. This was \"the worst choking experience\" she has had. She never saw the kernel reappear and believes she aspirated it. She then had chest and neck tightness starting on  and followed with Urgent Care. She describes tightness from her neck down to the bottom of her ribs. No abdominal pain, but some back pain. Her EKG was not concerning. She did not have chest pain, shortness of breath, or wheezing. She was was referred on to ED for further evaluation but she did not go. She did not feel it was necessary and had a  the next day which she did not want to miss. Her chest tightness had improved by the next day.     Dizziness  Nancy reports dizziness and brain fog since  after her urgent care visit or , but definitely by . This can be triggered by sitting from a lying down position. She describes this as feeling like \"I'm not quite awake.\" She feels unsteady with less confidence walking and driving. It is tight to turn her head and causes a little bit of dizziness. Her appetite is diminished. She feels a little better today, but not back to baseline. Denies nausea, headache, or palpitations. No diarrhea or constipation. No changes in sleep. No history of similar symptoms, no history of vertigo.     BP Readings from Last 6 Encounters:   23 103/70   23 110/70   23 97/63   05/10/23 91/61   23 102/69   23 109/69      Gammopathy  Nancy was referred to hematology for a small monoclonal spike on SPEP.  She has normal hemoglobin, calcium and serum protein.  She notes she is \"preoccupied\" about this as a brother has multiple myeloma. She has an upcoming virtual visit with hematology.     Patient Active Problem List   Diagnosis    Bladder neck obstruction    Constipation    Transient global amnesia    Other hammer " toe (acquired)    Ostium secundum type atrial septal defect    Senile osteoporosis    Syndrome affecting cervical region    Variants of migraine    Malignant neoplasm of skin    Persistent insomnia    Hypothyroidism    Family history of malignant neoplasm of ovary    Tear film insufficiency    Circulatory system disorder    PFO (patent foramen ovale)    Gastroesophageal reflux disease without esophagitis    ACP (advance care planning)    Amnesia    Chronic paroxysmal hemicrania, not intractable    Osteopenia of necks of both femurs    Hyperlipidemia LDL goal <100    Intractable migraine, unspecified migraine type    VLAD (generalized anxiety disorder)    Choking sensation    Localized swelling of lower leg       Current Outpatient Medications   Medication Sig Dispense Refill    albuterol (PROAIR HFA/PROVENTIL HFA/VENTOLIN HFA) 108 (90 Base) MCG/ACT inhaler Inhale 2 puffs into the lungs every 6 hours as needed for shortness of breath, wheezing or cough 18 g 1    aspirin 81 MG tablet Take 81 mg by mouth daily       Calcium-Vitamin D 600-200 MG-UNIT TABS Take 1 tablet by mouth At Bedtime       citalopram (CELEXA) 10 MG tablet Take 1 tablet (10 mg) by mouth daily 90 tablet 3    cyanocobalamin (VITAMIN B-12) 1000 MCG SUBL sublingual tablet Place 1,000 mcg under the tongue daily      fexofenadine (ALLEGRA) 180 MG tablet Take 180 mg by mouth daily      gabapentin (NEURONTIN) 100 MG capsule Take 3 daily 90 capsule 2    gabapentin (NEURONTIN) 300 MG capsule Take 1 capsule (300 mg) by mouth 3 times daily 270 capsule 3    galcanezumab-gnlm (EMGALITY) 120 MG/ML injection Inject 1 mL (120 mg) Subcutaneous every 28 days 1 mL 10    indomethacin (INDOCIN) 50 MG capsule Take 1 capsule (50 mg) by mouth as needed for moderate pain Take 1 tablet as needed 10 capsule 3    levothyroxine (SYNTHROID/LEVOTHROID) 88 MCG tablet Take 1 tablet (88 mcg) by mouth daily Except skip Sunday dose 90 tablet 3    magnesium oxide (MAG-OX) 400 MG tablet  "Take 800 mg by mouth At Bedtime      Multiple vitamin TABS Take 3 tablets by mouth daily       naratriptan (AMERGE) 2.5 MG tablet Take 1 tablet (2.5 mg) by mouth at onset of headache for migraine May repeat in 4 hours. Max 2 tablets/24 hours. 9 tablet 4    naratriptan (AMERGE) 2.5 MG tablet       omeprazole (PRILOSEC) 20 MG capsule Take 40 mg by mouth daily (with dinner)       rosuvastatin (CRESTOR) 10 MG tablet Take 1 tablet (10 mg) by mouth daily 90 tablet 3    spacer (OPTICHAMBER CHIVO) holding chamber Use with MDI      Vitamin D (Cholecalciferol) 50 MCG (2000 UT) CAPS Take 2,000 Units by mouth daily       zolpidem (AMBIEN) 10 MG tablet Take 1 tablet (10 mg) by mouth nightly as needed for sleep 30 tablet 0       Allergies   Allergen Reactions    Cyclobenzaprine Other (See Comments)    Levofloxacin Unknown     Tendon injury (torn)    Rofecoxib Nausea    Simvastatin Other (See Comments)    Trazodone Other (See Comments)     Other reaction(s): Nightmares    Vicodin [Hydrocodone-Acetaminophen] Itching    Latex Rash        EXAM  Temp 97.2  F (36.2  C) (Skin)   Resp 15   Ht 1.575 m (5' 2\")   Wt 55.8 kg (123 lb)   SpO2 97%   BMI 22.50 kg/m    Gen: Alert, pleasant, NAD  COR: S1,S2, no murmur  Lungs: CTA bilaterally, no rhonchi, wheezes or rales   Neuro: Some wobbling with tandem gait. No nystagmus. YAZMIN, FNF normal.     Orthostatic Vitals from 09/10/23 10:12 AM to 09/12/23 10:12 AM  Date and Time Orthostatic BP Orthostatic Pulse Patient Position BP Location Cuff Size   09/12/23 0959      104/68                   61                         --                      --               --   09/12/23 0958      112/76                   68                   Standing           Left arm   Adult Regular   09/12/23 0954      103/68                   62                     Sitting             Left arm   Adult Regular      Peak Flow from 09/10/23 1012 to 09/12/23 10:12 AM  Date and Time PF Resp   09/12/23 0954 -- 15     "     Assessment:  (R42) Dizziness  (primary encounter diagnosis)  (R41.89) Brain fog  Comment: 4-5 day history of brain fog, feeling dizzy, vertigo suspected, she had difficulty with tandem gait  Plan: CBC with platelets differential        Declines referral to vestibular PT at this time. Contact MD for acute changes.   ADDENDUM  (H81.11) Benign paroxysmal positional vertigo, right  Comment: Pt had been referred to vestibular PT after our visit as she changed her mind. They identified right sided vertigo  Plan: Physical Therapy Referral        Order placed for vestibular PT    (T17.908D) Aspiration into airway, subsequent encounter  Comment: Chest tightness improved 1 day after urgent care visit, now back to baseline. No current fever or cough  Plan: monitor for symptoms of aspiration pneumonia, none present today    (D47.2) MGUS (monoclonal gammopathy of unknown significance)  Comment: Monoclonal gammopathy found through labs ordered by endocrinology. Calcium levels normal.   Plan:  Follow with hematology as planned.  Discuss suspected MGUS diagnosis.     30 minutes spend on the date of this encounter doing chart review, history and exam, documentation and further activities as noted above.     I have reviewed, edited and approved the above scribed note.    Alondra Foster MD  Internal Medicine/Pediatrics    I, Susy Wolf, am serving as a scribe to document services personally performed by Dr. Alondra Foster, based on data collection and the provider's statements to me.

## 2023-09-14 ENCOUNTER — MYC MEDICAL ADVICE (OUTPATIENT)
Dept: FAMILY MEDICINE | Facility: CLINIC | Age: 78
End: 2023-09-14

## 2023-09-14 ENCOUNTER — THERAPY VISIT (OUTPATIENT)
Dept: PHYSICAL THERAPY | Facility: CLINIC | Age: 78
End: 2023-09-14
Attending: INTERNAL MEDICINE
Payer: COMMERCIAL

## 2023-09-14 DIAGNOSIS — R42 DIZZINESS: Primary | ICD-10-CM

## 2023-09-14 DIAGNOSIS — R42 DIZZINESS: ICD-10-CM

## 2023-09-14 DIAGNOSIS — H81.10 BENIGN PAROXYSMAL POSITIONAL VERTIGO, UNSPECIFIED LATERALITY: Primary | ICD-10-CM

## 2023-09-14 PROCEDURE — 97161 PT EVAL LOW COMPLEX 20 MIN: CPT | Mod: GP

## 2023-09-14 PROCEDURE — 95992 CANALITH REPOSITIONING PROC: CPT | Mod: GP

## 2023-09-14 NOTE — PROGRESS NOTES
PHYSICAL THERAPY EVALUATION  Type of Visit: Evaluation    See electronic medical record for Abuse and Falls Screening details.    Subjective   Pt presents to OP PT for vestibular evaluation. Pt reports on 9/4/24 that she choked on a kernel of corn and believed she aspirated on it and afterwards experienced chest tightness that started on 9/7 which she went into Urgent Care for. Had an EKG that was normal. On 9/8 her chest tightness had dissipated but then her dizziness started. She describes her dizziness as a constant feeling of  being in a fog  and has not felt comfortable driving and has felt disoriented. She notices an increase in symptoms when moving her head fast or when turning in bed which she describes as  being in motion . She has also noticed an increase in blurry vision and has been difficult to read. Reports her prescription is up to date. Generally [place], chronic tinnitus, h/o migraines that are currently managed with 1x/month injections although last migraine was right around the start of her dizziness, R neck still stiff since choking incident. Has not been sleeping well and generally feels very fatigued.       VESTIBULAR EVALUATION  ADDITIONAL HISTORY:  Description of symptoms: Off balance; Attacks of dizziness; Light-headedness  Dizzy attacks:   Start: About 1 week ago   Last attack: During night   Frequency of occurrences: Varies   Length of attack: Several seconds at least but i feel constantly  mildly dizzy and fatigued  Difficulty hearing: Both ears  Noise in ears? Yes Ringing  Alleviates symptoms: Sitting still, actually walking helps.  Worsens symptoms: Lying down  Activities that bring on symptoms: Riding in an elevator; Bending over; Reaching up; Walking up or down stairs; Walking in the dark       Pertinent visual history: has noticed increased blurriness of vision - difficulty reading  Pertinent history of current vestibular problem: Hearing loss, Migraines, prior neck injury when 30 years  old  DHI:  64/100        Presenting condition or subjective complaint: Vertigo  Date of onset: 09/08/23    Relevant medical history: Arthritis; Dizziness; Hearing problems; Migraines or headaches; Osteoporosis; Severe dizziness; Severe headaches; Vision problems   Dates & types of surgery: Childhood appy, tonsillectomy; bunionectomies, tendon repair left leg, 2 Mohs surgeries    Prior diagnostic imaging/testing results:       Prior therapy history for the same diagnosis, illness or injury: No      Prior Level of Function  Transfers: Independent  Ambulation: Independent  ADL: Independent  IADL:  Independent    Living Environment  Social support: With a significant other or spouse   Type of home: Apartment/condo   Stairs to enter the home: No       Ramp: No   Stairs inside the home: No       Help at home: None  Equipment owned:       Employment: No    Hobbies/Interests:      Patient goals for therapy: Proceed with life without dizziness and fatigue    Pain assessment: Pain denied     Objective   Cognitive Status Examination  Orientation: Oriented to person, place and time   Level of Consciousness: Lethargic/somnolent, has experienced increased fatigue since onset    OBSERVATION: pt appears very fatigued t/o session  POSTURE: WNL  PALPATION:   RANGE OF MOTION:  not formally assessed  STRENGTH:  not formally assessed    BED MOBILITY: WNL    TRANSFERS: WNL    GAIT:   Gait Description: Pt ambulates with slow guarded gait    BALANCE: NOT TESTED d/t time    SPECIAL TESTS  Functional Gait Assessment (FGA)    (<22/30 indicates fall risk)     Dynamic Gait Index (DGI)    (<19/24 indicates fall risk)   Modified CTSIB Conditions (sec) Cond 1:   Cond 2:   Cond 4:   Cond 5 :          SENSATION:  denies changes in sensation  COORDINATION:     VESTIBULAR  Cervicogenic Screen    Neck ROM Normal and Pt has noted increased stiffness in R side of neck since onset     Oculomotor Screen    Ocular ROM Normal   Smooth Pursuit Normal    Saccades Normal   VOR Normal and although increases dizziness to 2/10   VOR Cancellation Normal and although increased dizziness   Head Impulse Test Normal and although increases dizziness to 2/10   Convergence Testing Abnormal and ~10 inches with R eye abducting        Infrared Goggle Exam Vestibular Suppressant in Last 24 Hours? No  Exam Completed With: Infrared goggles   Spontaneous Nystagmus Negative   Gaze Evoked Nystagmus Negative   Head Shake Horizontal Nystagmus Horizontal R, 5x beats without large increase in dizziness    Left Right   Joel-Hallpike Not tested Upbeating R torsional, 1 sec latency with nystagmus and symptoms of spinning lasting ~25 sec   [facility] Supine Roll Test Not tested Not tested      BPPV Canal(s): R Posterior  BPPV Type: Canalithasis    Dynamic Visual Acuity (DVA)    Static Acuity (LogMar)    Horizontal Head Movement at 1 Hz (LogMar)    Horizontal Head Movement at 2 Hz (LogMar)    NOT TESTED         Assessment & Plan   CLINICAL IMPRESSIONS  Medical Diagnosis: BPPV, unspecified laterality    Treatment Diagnosis: Dizziness   Impression/Assessment: Patient is a 77 year old female with dizziness and balance complaints.  The following significant findings have been identified: Impaired balance, Instability, and Dizziness. These impairments interfere with their ability to perform self care tasks, recreational activities, household chores, driving , household mobility, and community mobility as compared to previous level of function.     Clinical Decision Making (Complexity):  Clinical Presentation: Stable/Uncomplicated  Clinical Presentation Rationale: based on medical and personal factors listed in PT evaluation  Clinical Decision Making (Complexity): Low complexity    PLAN OF CARE  Treatment Interventions:  Modalities: E-stim  Interventions: Gait Training, Manual Therapy, Neuromuscular Re-education, Therapeutic Activity, Therapeutic Exercise, Canalith Repositioning    Long Term Goals     PT  Goal 1  Goal Identifier: DHI  Goal Description: Pt to report significant decline in dizziness per DHI scoring </= 46/100 to be considered low perception of handicap.  Goal Progress: eval: 64/100  Target Date: 11/12/23  PT Goal 2  Goal Identifier: BPPV  Goal Description: Pt will demonstrate (-) s/s of BPPV throughout positional testing of all canals without limitations in bed mobility and transfers d/t dizziness  Goal Progress: eval: R posterior canalithiasis  Target Date: 11/12/23  PT Goal 3  Goal Identifier: 4-item DGI  Goal Description: Patient to improve score on 4 item DGI to 12/12 indicating reduced fall risk for greater safety with community ambulation.  Goal Progress: eval: not tested  Target Date: 11/12/23      Frequency of Treatment: 1x/week  Duration of Treatment: 60 days    Recommended Referrals to Other Professionals:   Education Assessment:   Learner/Method: Patient    Risks and benefits of evaluation/treatment have been explained.   Patient/Family/caregiver agrees with Plan of Care.     Evaluation Time:     PT Eval, Low Complexity Minutes (49164): 25       Signing Clinician: Meghan Chavira PT      [facility]                                                                                   OUTPATIENT PHYSICAL THERAPY      PLAN OF TREATMENT FOR OUTPATIENT REHABILITATION   Patient's Last Name, First Name, Rakel Haynes YOB: 1945   Provider's Name   [facility]   Medical Record No.  [id number]     Onset Date: 09/08/23  Start of Care Date: 09/14/23     Medical Diagnosis:  BPPV, unspecified laterality      PT Treatment Diagnosis:  Dizziness Plan of Treatment  Frequency/Duration: 1x/week/ 60 days    Certification date from 09/14/23 to 11/12/23         See note for plan of treatment details and functional goals     Meghan Chavira, PT                         I CERTIFY THE NEED FOR THESE SERVICES FURNISHED UNDER         THIS PLAN OF TREATMENT AND WHILE UNDER MY CARE     (Physician attestation of this document indicates review and certification of the therapy plan).                Referring Provider:  Alondra Foster      Initial Assessment  See Epic Evaluation- Start of Care Date: 09/14/23

## 2023-09-21 ENCOUNTER — THERAPY VISIT (OUTPATIENT)
Dept: PHYSICAL THERAPY | Facility: CLINIC | Age: 78
End: 2023-09-21
Attending: INTERNAL MEDICINE
Payer: COMMERCIAL

## 2023-09-21 DIAGNOSIS — H81.10 BENIGN PAROXYSMAL POSITIONAL VERTIGO, UNSPECIFIED LATERALITY: Primary | ICD-10-CM

## 2023-09-21 DIAGNOSIS — R42 DIZZINESS: ICD-10-CM

## 2023-09-21 PROCEDURE — 97750 PHYSICAL PERFORMANCE TEST: CPT | Mod: GP | Performed by: PHYSICAL THERAPIST

## 2023-09-21 PROCEDURE — 95992 CANALITH REPOSITIONING PROC: CPT | Mod: GP | Performed by: PHYSICAL THERAPIST

## 2023-09-21 NOTE — PROGRESS NOTES
09/21/23 0500   Appointment Info   Signing clinician's name / credentials Dov Benjamin DPT   Visits Used 2/10   Medical Diagnosis BPPV, unspecified laterality   PT Tx Diagnosis Dizziness   Precautions/Limitations fall precautions   Quick Adds Certification   Progress Note/Certification   Start of Care Date 09/14/23   Onset of illness/injury or Date of Surgery 09/08/23   Therapy Frequency 1x/week   Predicted Duration 60 days   Certification date from 09/14/23   Certification date to 11/12/23   GOALS   PT Goals 2;3   PT Goal 1   Goal Identifier DHI   Goal Description Pt to report significant decline in dizziness per DHI scoring </= 46/100 to be considered low perception of handicap.   Goal Progress eval: 64/100. Goal met 9/21/23: DHI score significant improvement 0/100   Target Date 11/12/23   Date Met 09/21/23   PT Goal 2   Goal Identifier BPPV   Goal Description Pt will demonstrate (-) s/s of BPPV throughout positional testing of all canals without limitations in bed mobility and transfers d/t dizziness   Goal Progress eval: R posterior canalithiasis. Goal met 9/21/23, BPPV resolved, responded well to canalith repositioning.   Target Date 11/12/23   Date Met 09/21/23   PT Goal 3   Goal Identifier 4-item DGI   Goal Description Patient to improve score on 4 item DGI to 12/12 indicating reduced fall risk for greater safety with community ambulation.   Goal Progress eval: not tested. Goal met, DGI score 22/24, low fall risk, back to baseline status.   Target Date 11/12/23   Date Met 09/21/23   Subjective Report   Subjective Report Ongoing fatigue, but feels her balance and dizziness condition has improved since eval/first treatment.  Denies positional vertigo now. Feels back to baseline status.  Of note, she does endorse mild chronic balance condition with hx of falls.   Oculomotor Exam   Oculomotor ROM Normal   Smooth Pursuit Normal   VOR Normal   VOR Cancellation Normal   Head Impulse Test Normal   Infrared  Goggle Exam or Frenzel Lense Exam   Exam completed with Infrared Goggles   BPPV Canal(s) R Posterior   BPPV Type Canalithasis   Vestibular Suppressant in Last 24 Hours? No   Redfield-Hallpike (Left) Negative   [facility] Supine Roll Test (Left) Negative   Spontaneous Nystagmus Negative   Gaze Evoked Nystagmus Negative   Joel-Hallpike (Right) Upbeating R torsional   [facility] Supine Roll Test (Right) Negative   Redfield-Hallpike (Right) Comments very subtle/questionable upbeating R torsional nystagmus x few beats, rather asymptomatic   Standard Canalith Repositioning   Standard canalith repositioning procedure minutes (52842) 20   Canalith Repositioning - Details Reassessment indication significant improvement from eval, very subtle/questionable nystagmus with R DHP. Treated with R Epley x 2, noting resolution 2nd rep and instructed in self CRM options/techique.   Skilled Intervention positional testing, assessment, CRM, patient education for HEP technique   Patient Response/Progress tolerating well, minimally symptomatic and significant improvement from eval. All goals met, BPPV appears resolved, gait/balance stable and back to baseline status.   Physical Performance Test/measures   Physical Performance Test/Measurement, Minutes (76286) 10   Physical Performance Test/Measurement Details DGI score of 22/24 indicates improvement in dynamic gait stability and balance. Has responded well to canalith repositioning techniques addressing BPPV.  Mild impairment noted during gait with head motions, although mild in nature and patient does endorse chronic underlying balance condition.  Gait and balance status appears to have stabilized and back to baseline status.  No indication for assistive device and patient is comfortable with continuing to manage condition with walking program/HEP.   Skilled Intervention DGI and interpretation of score, patient education   Patient Response/Progress tolerated well, feels stable and back to baseline status    Progress minimally symptomatic and significant improvement from eval. All goals met, BPPV appears resolved, gait/balance stable and back to baseline status.   Education   Learner/Method Patient;Listening;Reading;No Barriers to Learning;Demonstration;Pictures/Video   Education Comments self CRM technique, dc plan, BPPV, DGI   Plan   Plan for next session dc   Total Session Time   Timed Code Treatment Minutes 10   Total Treatment Time (sum of timed and untimed services) 30         DISCHARGE  Reason for Discharge: Patient has met all goals.    Equipment Issued: none    Discharge Plan: self canalith repositioning maneuvers for any BPPV recurrence and/or reach out to vestibular PT clinic    Referring Provider:  Alondra Foster

## 2023-09-21 NOTE — PROGRESS NOTES
09/21/23 1300   Signing Clinician's Name / Credentials   Signing clinician's name / credentials Dov Benjamin DPT   Dynamic Gait Index (Keshav and Mas [place], 1995)   Gait Level Surface 3   Change in Gait Speed 3   Gait and Horizontal Head Turns 2   Gait with Vertical Head Turns 2   Gait and Pivot Turns 3   Step Over Obstacle 3   Step Around Obstacles 3   Steps 3   Total Dynamic Gait Index Score  (A score of 19 or less has been correlated to an increased risk of falls in community dwelling older adults, patients with vestibular disorders, and patients with MS.)   Total Score (out of 24) 22     Dynamic Gait Index (DGI):The DGI is a measure of balance during gait that is reliable and valid for the elderly and individuals with Parkinson's disease, MS, vestibular disorders, or s/p stroke. Gait assistive device used: none     Patient score: 22/24  Scores ?19/24 indicate an increased risk for falls according to Michaelle et al 2000  Minimal Detectable Change = 2.9 in community dwelling elderly according to Farmer et al 2011    Assessment (rationale for performing, application to patient s function & care plan): DGI score of 22/24 indicates improvement in dynamic gait stability and balance. Has responded well to canalith repositioning techniques addressing BPPV.  Mild impairment noted during gait with head motions, although mild in nature and patient does endorse chronic underlying balance condition.  Gait and balance status appears to have stabilized and back to baseline status.  No indication for assistive device and patient is comfortable with continuing to manage condition with walking program/HEP.  Minutes billed as physical performance test: 10

## 2023-10-04 NOTE — TELEPHONE ENCOUNTER
RECORDS STATUS - ALL OTHER DIAGNOSIS      RECORDS RECEIVED FROM: Epic   DATE RECEIVED:    NOTES STATUS DETAILS   OFFICE NOTE from referring provider Epic 5/10/23: Dr. Santa Sidhu   MEDICATION LIST [facility]    LABS     ANYTHING RELATED TO DIAGNOSIS Epic 9/12/23

## 2023-10-10 ENCOUNTER — VIRTUAL VISIT (OUTPATIENT)
Dept: ONCOLOGY | Facility: CLINIC | Age: 78
End: 2023-10-10
Attending: INTERNAL MEDICINE
Payer: COMMERCIAL

## 2023-10-10 ENCOUNTER — PRE VISIT (OUTPATIENT)
Dept: ONCOLOGY | Facility: CLINIC | Age: 78
End: 2023-10-10
Payer: COMMERCIAL

## 2023-10-10 VITALS — HEIGHT: 62 IN | BODY MASS INDEX: 22.08 KG/M2 | WEIGHT: 120 LBS

## 2023-10-10 DIAGNOSIS — D47.2 MONOCLONAL GAMMOPATHY PRESENT ON SERUM PROTEIN ELECTROPHORESIS: ICD-10-CM

## 2023-10-10 PROCEDURE — 99205 OFFICE O/P NEW HI 60 MIN: CPT | Mod: 95 | Performed by: INTERNAL MEDICINE

## 2023-10-10 ASSESSMENT — PAIN SCALES - GENERAL: PAINLEVEL: NO PAIN (0)

## 2023-10-10 NOTE — NURSING NOTE
Is the patient currently in the state of [place]? YES    Visit mode:VIDEO    If the visit is dropped, the patient can be reconnected by: VIDEO VISIT: Send to e-mail at: [email or web address]    Will anyone else be joining the visit? NO  (If patient encounters technical issues they should call [phone]    How would you like to obtain your AVS? MyChart    Are changes needed to the allergy or medication list? Pt stated no changes to allergies and Pt stated no med changes    Reason for visit: Consult    Kianna LOZA

## 2023-10-10 NOTE — PROGRESS NOTES
Virtual Visit Details    Type of service:  Video Visit   Video Start Time: 1:47 PM  Video End Time:1:47 PM    Originating Location (pt. Location): Home    Distant Location (provider location):  On-site  Platform used for Video Visit: Rafael

## 2023-10-10 NOTE — LETTER
10/10/2023         RE: Rakel Parish  [address]  [facility] [place] [address]        Dear Colleague,    Thank you for referring your patient, Rakel Parish, to the [facility]. Please see a copy of my visit note below.    Virtual Visit Details    Type of service:  Video Visit   Video Start Time: 1:47 PM  Video End Time:1:47 PM    Originating Location (pt. Location): Home    Distant Location (provider location):  On-site  Platform used for Video Visit: [facility]    Hematology/Oncology New Patient Note virtual      Today's Date: 10/10/2023    Reason for Consult: MGUS      HISTORY OF PRESENT ILLNESS:Nancy is a very pleasant 77-year-old female with the following hematologic history  1.  Incidental lab SPEP drawn for secondary causes of osteoporosis  2.  SPEP showed a small monoclonal proteinOf 0.1 g/dL.  3. Immunofixation showed faint monoclonal IgG immunoglobulin of lambda chain type  4.  CBC normal    Nancy feels well.  She is on Reclast yearly.  She denies any night sweats, weight loss, fever, chills, chest pain, shortness of breath, nausea vomiting diarrhea or constipation    REVIEW OF SYSTEMS:   14 point ROS was reviewed and is negative other than as noted above in HPI.       HOME MEDICATIONS:  Current Outpatient Medications   Medication Sig Dispense Refill     aspirin 81 MG tablet Take 81 mg by mouth daily        Calcium-Vitamin D 600-200 MG-UNIT TABS Take 1 tablet by mouth At Bedtime        citalopram (CELEXA) 10 MG tablet Take 1 tablet (10 mg) by mouth daily 90 tablet 3     cyanocobalamin (VITAMIN B-12) 1000 MCG SUBL sublingual tablet Place 1,000 mcg under the tongue daily       fexofenadine (ALLEGRA) 180 MG tablet Take 180 mg by mouth daily       gabapentin (NEURONTIN) 100 MG capsule Take 3 daily (Patient not taking: Reported on 10/11/2023) 90 capsule 2     gabapentin (NEURONTIN) 300 MG capsule Take 1 capsule (300 mg) by mouth 3 times  daily 270 capsule 3     galcanezumab-gnlm (EMGALITY) 120 MG/ML injection Inject 1 mL (120 mg) Subcutaneous every 28 days 1 mL 10     indomethacin (INDOCIN) 50 MG capsule Take 1 capsule (50 mg) by mouth as needed for moderate pain Take 1 tablet as needed 10 capsule 3     levothyroxine (SYNTHROID/LEVOTHROID) 88 MCG tablet Take 1 tablet (88 mcg) by mouth daily Except skip Sunday dose 90 tablet 3     magnesium oxide (MAG-OX) 400 MG tablet Take 800 mg by mouth At Bedtime       Multiple vitamin TABS Take 3 tablets by mouth daily        naratriptan (AMERGE) 2.5 MG tablet Take 1 tablet (2.5 mg) by mouth at onset of headache for migraine May repeat in 4 hours. Max 2 tablets/24 hours. 9 tablet 4     omeprazole (PRILOSEC) 20 MG capsule Take 40 mg by mouth daily (with dinner)        rosuvastatin (CRESTOR) 10 MG tablet Take 1 tablet (10 mg) by mouth daily 90 tablet 3     spacer (OPTICHAMBER CHIVO) holding chamber Use with MDI       Vitamin D (Cholecalciferol) 50 MCG (2000 UT) CAPS Take 2,000 Units by mouth daily        zolpidem (AMBIEN) 10 MG tablet Take 1 tablet (10 mg) by mouth nightly as needed for sleep 30 tablet 0     albuterol (PROAIR HFA/PROVENTIL HFA/VENTOLIN HFA) 108 (90 Base) MCG/ACT inhaler Inhale 2 puffs into the lungs every 6 hours as needed for shortness of breath, wheezing or cough 18 g 3     naratriptan (AMERGE) 2.5 MG tablet            ALLERGIES:  Allergies   Allergen Reactions     Cyclobenzaprine Other (See Comments)     Levofloxacin Unknown     Tendon injury (torn)     Rofecoxib Nausea     Simvastatin Other (See Comments)     Trazodone Other (See Comments)     Other reaction(s): Nightmares     Vicodin [Hydrocodone-Acetaminophen] Itching     Latex Rash         PAST MEDICAL HISTORY:  Past Medical History:   Diagnosis Date     Asthma      Global amnesia     Hypothyroidism     Hyperlipidaemia LDL goal < 130      Hypothyroidism      Insomnia      Migraine, unspecified, without mention of intractable migraine  without mention of status migrainosus      Osteopenia      Osteoporosis      Raynaud's disease      Unspecified asthma(493.90)          PAST SURGICAL HISTORY:  Past Surgical History:   Procedure Laterality Date     APPENDECTOMY       BUNIONECTOMY Right 2010     COLONOSCOPY  11/16/10    Every 7 years     COLONOSCOPY  2018    hemorrhoids, [place] [facility]     FOOT SURGERY Left     tendon rupture     HC REPAIR OF HAMMERTOE,ONE       TONSILLECTOMY & ADENOIDECTOMY       TUBAL LIGATION           SOCIAL HISTORY:  Social History     Socioeconomic History     Marital status:      Spouse name: Not on file     Number of children: Not on file     Years of education: Not on file     Highest education level: Not on file   Occupational History     Not on file   Tobacco Use     Smoking status: Former     Packs/day: 0.50     Years: 10.00     Additional pack years: 0.00     Total pack years: 5.00     Types: Cigarettes     Quit date: 1970     Years since quittin.8     Smokeless tobacco: Never   Vaping Use     Vaping Use: Never used   Substance and Sexual Activity     Alcohol use: Yes     Alcohol/week: 2.0 standard drinks of alcohol     Types: 2 Cans of beer per week     Comment: rare     Drug use: No     Sexual activity: Yes     Partners: Male   Other Topics Concern     Parent/sibling w/ CABG, MI or angioplasty before 65F 55M? Not Asked   Social History Narrative     Not on file     Social Determinants of Health     Financial Resource Strain: Not on file   Food Insecurity: Not on file   Transportation Needs: Not on file   Physical Activity: Not on file   Stress: Not on file   Social Connections: Not on file   Interpersonal Safety: Low Risk  (10/11/2023)    Interpersonal Safety      Do you feel physically and emotionally safe where you currently live?: Yes      Within the past 12 months, have you been hit, slapped, kicked or otherwise physically hurt by someone?: No      Within the past 12 months, have you been  "humiliated or emotionally abused in other ways by your partner or ex-partner?: No   Housing Stability: Not on file         FAMILY HISTORY:  Family History   Problem Relation Age of Onset     Osteoporosis Mother 94         at 94 after hip fracture     Dementia Mother      Hypertension Mother      C.A.D. Father 85         of renal failure. ASCVD, MI x 2     Prostate Cancer Father 80     Cerebrovascular Disease Father      Ovarian Cancer Sister 82     Neurologic Disorder Sister         \"unable to grab thoughts\"     Gout Sister      Fibromyalgia Sister      Cancer Brother         CLL     Prostate Cancer Brother 68     Multiple myeloma Brother 93        new onset     Asthma Brother      Breast Cancer Niece 40        Maternal     Colon Cancer No family hx of          PHYSICAL EXAM:  Vital signs:  Ht 1.575 m (5' 2\")   Wt 54.4 kg (120 lb)   BMI 21.95 kg/m     ECO  ECO  GENERAL/CONSTITUTIONAL: No acute distress. Healthy, alert.  EYES: No scleral icterus.  No redness or discharge.    RESPIRATORY: No audible wheeze, cough, or visible cyanosis.  No visible retractions or increased work of breathing.  Able to speak fully in complete sentences.  MUSCULOSKELETAL: Normal range of motion.  NEUROLOGIC: Alert, oriented, answers questions appropriately. No tremor. Mentation intact and speech normal  INTEGUMENTARY: No jaundice.  No obvious rash or skin lesions.  PSYCHIATRIC:  Mentation appears normal, affect normal/bright, judgement and insight intact, normal speech and appearance well-groomed.    The rest of a comprehensive physical exam is deferred due to public health emergency video visit restrictions.       LABS:  Labs extensively noted and reviewed with the patient in [facility]    PATHOLOGY:  N/A    IMAGING:  Noted x-rays of the lumbar spine    ASSESSMENT/PLAN: Nancy is a very pleasant 77-year-old female with a new diagnosis of IgG lambda related MGUS    She has low risk MGUS based on the IgG subtype and level on the " SPEP.  No evidence of endorgan damage      Discussed with the patient of 0.5 %/year risk of conversion to myeloma.  I would like to repeat her labs in 6 months to make sure they are stable and if stable at that time I will see her back once a year with follow-up      1.  MGUS: Low risk.  Labs and MD visit in 6 months if stable I will move her surveillance to once a year    Total time spent on day of visit, including review of tests, obtaining/reviewing separately obtained history, ordering medications/tests/procedures, communicating with PCP/consultants, and documenting in electronic medical record: 60 minutes       Miguel De MD  Hematology/Oncology  [facility]       Again, thank you for allowing me to participate in the care of your patient.        Sincerely,        Miguel De MD

## 2023-10-10 NOTE — LETTER
10/10/2023         RE: Rakel Parish  [address]  [facility] [facility] [address]        Dear Colleague,    Thank you for referring your patient, Rakel Parish, to the [facility]. Please see a copy of my visit note below.    Virtual Visit Details    Type of service:  Video Visit   Video Start Time: 1:47 PM  Video End Time:1:47 PM    Originating Location (pt. Location): Home    Distant Location (provider location):  On-site  Platform used for Video Visit: [facility]    Hematology/Oncology New Patient Note virtual      Today's Date: 10/10/2023    Reason for Consult: MGUS      HISTORY OF PRESENT ILLNESS:Nancy is a very pleasant 77-year-old female with the following hematologic history  1.  Incidental lab SPEP drawn for secondary causes of osteoporosis  2.  SPEP showed a small monoclonal proteinOf 0.1 g/dL.  3. Immunofixation showed faint monoclonal IgG immunoglobulin of lambda chain type  4.  CBC normal    Nancy feels well.  She is on Reclast yearly.  She denies any night sweats, weight loss, fever, chills, chest pain, shortness of breath, nausea vomiting diarrhea or constipation    REVIEW OF SYSTEMS:   14 point ROS was reviewed and is negative other than as noted above in HPI.       HOME MEDICATIONS:  Current Outpatient Medications   Medication Sig Dispense Refill     aspirin 81 MG tablet Take 81 mg by mouth daily        Calcium-Vitamin D 600-200 MG-UNIT TABS Take 1 tablet by mouth At Bedtime        citalopram (CELEXA) 10 MG tablet Take 1 tablet (10 mg) by mouth daily 90 tablet 3     cyanocobalamin (VITAMIN B-12) 1000 MCG SUBL sublingual tablet Place 1,000 mcg under the tongue daily       fexofenadine (ALLEGRA) 180 MG tablet Take 180 mg by mouth daily       gabapentin (NEURONTIN) 100 MG capsule Take 3 daily (Patient not taking: Reported on 10/11/2023) 90 capsule 2     gabapentin (NEURONTIN) 300 MG capsule Take 1 capsule (300 mg) by mouth 3 times  daily 270 capsule 3     galcanezumab-gnlm (EMGALITY) 120 MG/ML injection Inject 1 mL (120 mg) Subcutaneous every 28 days 1 mL 10     indomethacin (INDOCIN) 50 MG capsule Take 1 capsule (50 mg) by mouth as needed for moderate pain Take 1 tablet as needed 10 capsule 3     levothyroxine (SYNTHROID/LEVOTHROID) 88 MCG tablet Take 1 tablet (88 mcg) by mouth daily Except skip Sunday dose 90 tablet 3     magnesium oxide (MAG-OX) 400 MG tablet Take 800 mg by mouth At Bedtime       Multiple vitamin TABS Take 3 tablets by mouth daily        naratriptan (AMERGE) 2.5 MG tablet Take 1 tablet (2.5 mg) by mouth at onset of headache for migraine May repeat in 4 hours. Max 2 tablets/24 hours. 9 tablet 4     omeprazole (PRILOSEC) 20 MG capsule Take 40 mg by mouth daily (with dinner)        rosuvastatin (CRESTOR) 10 MG tablet Take 1 tablet (10 mg) by mouth daily 90 tablet 3     spacer (OPTICHAMBER CHIVO) holding chamber Use with MDI       Vitamin D (Cholecalciferol) 50 MCG (2000 UT) CAPS Take 2,000 Units by mouth daily        zolpidem (AMBIEN) 10 MG tablet Take 1 tablet (10 mg) by mouth nightly as needed for sleep 30 tablet 0     albuterol (PROAIR HFA/PROVENTIL HFA/VENTOLIN HFA) 108 (90 Base) MCG/ACT inhaler Inhale 2 puffs into the lungs every 6 hours as needed for shortness of breath, wheezing or cough 18 g 3     naratriptan (AMERGE) 2.5 MG tablet            ALLERGIES:  Allergies   Allergen Reactions     Cyclobenzaprine Other (See Comments)     Levofloxacin Unknown     Tendon injury (torn)     Rofecoxib Nausea     Simvastatin Other (See Comments)     Trazodone Other (See Comments)     Other reaction(s): Nightmares     Vicodin [Hydrocodone-Acetaminophen] Itching     Latex Rash         PAST MEDICAL HISTORY:  Past Medical History:   Diagnosis Date     Asthma      Global amnesia     Hypothyroidism     Hyperlipidaemia LDL goal < 130      Hypothyroidism      Insomnia      Migraine, unspecified, without mention of intractable migraine  without mention of status migrainosus      Osteopenia      Osteoporosis      Raynaud's disease      Unspecified asthma(493.90)          PAST SURGICAL HISTORY:  Past Surgical History:   Procedure Laterality Date     APPENDECTOMY       BUNIONECTOMY Right 2010     COLONOSCOPY  11/16/10    Every 7 years     COLONOSCOPY  2018    hemorrhoids, [place] [facility]     FOOT SURGERY Left     tendon rupture     HC REPAIR OF HAMMERTOE,ONE       TONSILLECTOMY & ADENOIDECTOMY       TUBAL LIGATION           SOCIAL HISTORY:  Social History     Socioeconomic History     Marital status:      Spouse name: Not on file     Number of children: Not on file     Years of education: Not on file     Highest education level: Not on file   Occupational History     Not on file   Tobacco Use     Smoking status: Former     Packs/day: 0.50     Years: 10.00     Additional pack years: 0.00     Total pack years: 5.00     Types: Cigarettes     Quit date: 1970     Years since quittin.8     Smokeless tobacco: Never   Vaping Use     Vaping Use: Never used   Substance and Sexual Activity     Alcohol use: Yes     Alcohol/week: 2.0 standard drinks of alcohol     Types: 2 Cans of beer per week     Comment: rare     Drug use: No     Sexual activity: Yes     Partners: Male   Other Topics Concern     Parent/sibling w/ CABG, MI or angioplasty before 65F 55M? Not Asked   Social History Narrative     Not on file     Social Determinants of Health     Financial Resource Strain: Not on file   Food Insecurity: Not on file   Transportation Needs: Not on file   Physical Activity: Not on file   Stress: Not on file   Social Connections: Not on file   Interpersonal Safety: Low Risk  (10/11/2023)    Interpersonal Safety      Do you feel physically and emotionally safe where you currently live?: Yes      Within the past 12 months, have you been hit, slapped, kicked or otherwise physically hurt by someone?: No      Within the past 12 months, have you been  "humiliated or emotionally abused in other ways by your partner or ex-partner?: No   Housing Stability: Not on file         FAMILY HISTORY:  Family History   Problem Relation Age of Onset     Osteoporosis Mother 94         at 94 after hip fracture     Dementia Mother      Hypertension Mother      C.A.D. Father 85         of renal failure. ASCVD, MI x 2     Prostate Cancer Father 80     Cerebrovascular Disease Father      Ovarian Cancer Sister 82     Neurologic Disorder Sister         \"unable to grab thoughts\"     Gout Sister      Fibromyalgia Sister      Cancer Brother         CLL     Prostate Cancer Brother 68     Multiple myeloma Brother 93        new onset     Asthma Brother      Breast Cancer Niece 40        Maternal     Colon Cancer No family hx of          PHYSICAL EXAM:  Vital signs:  Ht 1.575 m (5' 2\")   Wt 54.4 kg (120 lb)   BMI 21.95 kg/m     ECO  ECO  GENERAL/CONSTITUTIONAL: No acute distress. Healthy, alert.  EYES: No scleral icterus.  No redness or discharge.    RESPIRATORY: No audible wheeze, cough, or visible cyanosis.  No visible retractions or increased work of breathing.  Able to speak fully in complete sentences.  MUSCULOSKELETAL: Normal range of motion.  NEUROLOGIC: Alert, oriented, answers questions appropriately. No tremor. Mentation intact and speech normal  INTEGUMENTARY: No jaundice.  No obvious rash or skin lesions.  PSYCHIATRIC:  Mentation appears normal, affect normal/bright, judgement and insight intact, normal speech and appearance well-groomed.    The rest of a comprehensive physical exam is deferred due to public health emergency video visit restrictions.       LABS:  Labs extensively noted and reviewed with the patient in [facility]    PATHOLOGY:  N/A    IMAGING:  Noted x-rays of the lumbar spine    ASSESSMENT/PLAN: Nancy is a very pleasant 77-year-old female with a new diagnosis of IgG lambda related MGUS    She has low risk MGUS based on the IgG subtype and level on the " SPEP.  No evidence of endorgan damage      Discussed with the patient of 0.5 %/year risk of conversion to myeloma.  I would like to repeat her labs in 6 months to make sure they are stable and if stable at that time I will see her back once a year with follow-up      1.  MGUS: Low risk.  Labs and MD visit in 6 months if stable I will move her surveillance to once a year    Total time spent on day of visit, including review of tests, obtaining/reviewing separately obtained history, ordering medications/tests/procedures, communicating with PCP/consultants, and documenting in electronic medical record: 60 minutes       Miguel De MD  Hematology/Oncology  [facility]       Again, thank you for allowing me to participate in the care of your patient.        Sincerely,        Miguel De MD

## 2023-10-11 ENCOUNTER — OFFICE VISIT (OUTPATIENT)
Dept: FAMILY MEDICINE | Facility: CLINIC | Age: 78
End: 2023-10-11
Payer: COMMERCIAL

## 2023-10-11 VITALS
OXYGEN SATURATION: 96 % | TEMPERATURE: 97.2 F | HEIGHT: 62 IN | RESPIRATION RATE: 15 BRPM | DIASTOLIC BLOOD PRESSURE: 70 MMHG | BODY MASS INDEX: 22.08 KG/M2 | SYSTOLIC BLOOD PRESSURE: 103 MMHG | HEART RATE: 64 BPM | WEIGHT: 120 LBS

## 2023-10-11 DIAGNOSIS — J45.20 MILD INTERMITTENT ASTHMA WITHOUT COMPLICATION: ICD-10-CM

## 2023-10-11 DIAGNOSIS — M25.561 ACUTE PAIN OF RIGHT KNEE: Primary | ICD-10-CM

## 2023-10-11 DIAGNOSIS — S86.899A SHIN SPLINT, UNSPECIFIED LATERALITY, INITIAL ENCOUNTER: ICD-10-CM

## 2023-10-11 RX ORDER — ALBUTEROL SULFATE 90 UG/1
2 AEROSOL, METERED RESPIRATORY (INHALATION) EVERY 6 HOURS PRN
Qty: 18 G | Refills: 3 | Status: SHIPPED | OUTPATIENT
Start: 2023-10-11

## 2023-10-11 ASSESSMENT — ASTHMA QUESTIONNAIRES: ACT_TOTALSCORE: 20

## 2023-10-11 NOTE — PROGRESS NOTES
"Rakel Parish is a 77 year old female here for the following issues:    Right leg pain  Longstanding hx of right knee pain, (years),   New right knee pain, Gradual in onset, over the past week  Pain mainly at knees, pain with getting up from seated position.   Appt scheduled at [facility] in 2 wks  Walking triggers stiffness from knee to mid calf.   Right knee swelling, no redness, + mild warmth.   Able to sleep, no use of analgesics  Currently limping, denies numbness    Sleep  Nancy has hx of insomnia  Took 10 mg of melatonin and did not help  Difficulty initiating sleep, with travel uses 1/3 of ambien pill.     Asthma  Albuterol inhaler -using once daily  Seasonal allergies, triggers wheezing , allergies, taking fexofenadine  No ER visits/  no steroids this past year.       EXAM  /70 (BP Location: Right arm, Patient Position: Sitting, Cuff Size: Adult Regular)   Pulse 64   Temp 97.2  F (36.2  C) (Skin)   Resp 15   Ht 1.575 m (5' 2\")   Wt 54.4 kg (120 lb)   SpO2 96%   BMI 21.95 kg/m    Back : no pain with palpation over bony C,T, LS spine  + tender gluteal m on right   + tenderness over Trochanteric bursa, L>A, no IT band pain    Knees:  quad muscles neg, no tenderness to palpation.   + Medial and lateral collateral ligaments bilaterally but R>L  +tenderness at area inferior to patella, meniscal area  No  redness , warmth or swelling.       Calf  Point tenderness with palpation over lateral aspect of tibial bone, right leg  Alisa, gastroc, ankles negative      Assessment:(M25.561) Acute pain of right knee  (primary encounter diagnosis)  Comment: suspect intrinsic knee pain, reviewed previous XRAYs  Plan: Physical Therapy Referral        Suspect she has MCL and LCL strain    (O33.228W) Shin splint, unspecified laterality, initial encounter  Comment: lateral right calf  Plan: Physical Therapy Referral            (J45.20) Mild intermittent asthma without complication  Comment: well controlled  Plan: " albuterol (PROAIR HFA/PROVENTIL HFA/VENTOLIN         HFA) 108 (90 Base) MCG/ACT inhaler          30 minutes spend on the date of this encounter doing chart review, history and exam, documentation and further activities as noted above.     Alondra Foster MD  Internal Medicine/Pediatrics

## 2023-10-11 NOTE — NURSING NOTE
"77 year old  Chief Complaint   Patient presents with    Musculoskeletal Problem     Pt reports worsening stiffening and pain in their R leg for the past two months. They have trouble putting weight on their R leg.        Blood pressure 103/70, pulse 64, temperature 97.2  F (36.2  C), temperature source Skin, resp. rate 15, height 1.575 m (5' 2\"), weight 54.4 kg (120 lb), SpO2 96%, not currently breastfeeding. Body mass index is 21.95 kg/m .  Patient Active Problem List   Diagnosis    Bladder neck obstruction    Constipation    Transient global amnesia    Other hammer toe (acquired)    Ostium secundum type atrial septal defect    Senile osteoporosis    Syndrome affecting cervical region    Variants of migraine    Malignant neoplasm of skin    Persistent insomnia    Hypothyroidism    Family history of malignant neoplasm of ovary    Tear film insufficiency    Circulatory system disorder    PFO (patent foramen ovale)    Gastroesophageal reflux disease without esophagitis    ACP (advance care planning)    Amnesia    Chronic paroxysmal hemicrania, not intractable    Osteopenia of necks of both femurs    Hyperlipidemia LDL goal <100    Intractable migraine, unspecified migraine type    VLAD (generalized anxiety disorder)    Choking sensation    Localized swelling of lower leg       Wt Readings from Last 2 Encounters:   10/11/23 54.4 kg (120 lb)   10/10/23 54.4 kg (120 lb)     BP Readings from Last 3 Encounters:   10/11/23 103/70   08/11/23 103/70   08/01/23 110/70         Current Outpatient Medications   Medication    albuterol (PROAIR HFA/PROVENTIL HFA/VENTOLIN HFA) 108 (90 Base) MCG/ACT inhaler    aspirin 81 MG tablet    Calcium-Vitamin D 600-200 MG-UNIT TABS    citalopram (CELEXA) 10 MG tablet    cyanocobalamin (VITAMIN B-12) 1000 MCG SUBL sublingual tablet    fexofenadine (ALLEGRA) 180 MG tablet    gabapentin (NEURONTIN) 300 MG capsule    galcanezumab-gnlm (EMGALITY) 120 MG/ML injection    indomethacin (INDOCIN) 50 MG " "capsule    levothyroxine (SYNTHROID/LEVOTHROID) 88 MCG tablet    magnesium oxide (MAG-OX) 400 MG tablet    Multiple vitamin TABS    naratriptan (AMERGE) 2.5 MG tablet    omeprazole (PRILOSEC) 20 MG capsule    rosuvastatin (CRESTOR) 10 MG tablet    spacer (OPTICHAMBER CHIVO) holding chamber    Vitamin D (Cholecalciferol) 50 MCG ( UT) CAPS    zolpidem (AMBIEN) 10 MG tablet    gabapentin (NEURONTIN) 100 MG capsule    naratriptan (AMERGE) 2.5 MG tablet     No current facility-administered medications for this visit.       Social History     Tobacco Use    Smoking status: Former     Packs/day: 0.50     Years: 10.00     Additional pack years: 0.00     Total pack years: 5.00     Types: Cigarettes     Quit date: 1970     Years since quittin.8    Smokeless tobacco: Never   Vaping Use    Vaping Use: Never used   Substance Use Topics    Alcohol use: Yes     Alcohol/week: 2.0 standard drinks of alcohol     Types: 2 Cans of beer per week     Comment: rare    Drug use: No       Health Maintenance Due   Topic Date Due    RSV VACCINE 60+ (1 - 1-dose 60+ series) Never done    ADVANCE CARE PLANNING  2022    FALL RISK ASSESSMENT  2022       No results found for: \"PAP\"      2023 3:48 PM    "

## 2023-10-11 NOTE — PATIENT INSTRUCTIONS
REST  Ice 15- 20 min with gel pack  Topical Voltaren cream, 3x per day    Calf muscle painful along the shin  Gentle massage , cool packs , voltaren cream    Physical therapy

## 2023-10-15 NOTE — PROGRESS NOTES
[facility]    Hematology/Oncology New Patient Note virtual      Today's Date: 10/10/2023    Reason for Consult: MGUS      HISTORY OF PRESENT ILLNESS:Nancy is a very pleasant 77-year-old female with the following hematologic history  1.  Incidental lab SPEP drawn for secondary causes of osteoporosis  2.  SPEP showed a small monoclonal proteinOf 0.1 g/dL.  3. Immunofixation showed faint monoclonal IgG immunoglobulin of lambda chain type  4.  CBC normal    Nancy feels well.  She is on Reclast yearly.  She denies any night sweats, weight loss, fever, chills, chest pain, shortness of breath, nausea vomiting diarrhea or constipation    REVIEW OF SYSTEMS:   14 point ROS was reviewed and is negative other than as noted above in HPI.       HOME MEDICATIONS:  Current Outpatient Medications   Medication Sig Dispense Refill    aspirin 81 MG tablet Take 81 mg by mouth daily       Calcium-Vitamin D 600-200 MG-UNIT TABS Take 1 tablet by mouth At Bedtime       citalopram (CELEXA) 10 MG tablet Take 1 tablet (10 mg) by mouth daily 90 tablet 3    cyanocobalamin (VITAMIN B-12) 1000 MCG SUBL sublingual tablet Place 1,000 mcg under the tongue daily      fexofenadine (ALLEGRA) 180 MG tablet Take 180 mg by mouth daily      gabapentin (NEURONTIN) 100 MG capsule Take 3 daily (Patient not taking: Reported on 10/11/2023) 90 capsule 2    gabapentin (NEURONTIN) 300 MG capsule Take 1 capsule (300 mg) by mouth 3 times daily 270 capsule 3    galcanezumab-gnlm (EMGALITY) 120 MG/ML injection Inject 1 mL (120 mg) Subcutaneous every 28 days 1 mL 10    indomethacin (INDOCIN) 50 MG capsule Take 1 capsule (50 mg) by mouth as needed for moderate pain Take 1 tablet as needed 10 capsule 3    levothyroxine (SYNTHROID/LEVOTHROID) 88 MCG tablet Take 1 tablet (88 mcg) by mouth daily Except skip Sunday dose 90 tablet 3    magnesium oxide (MAG-OX) 400 MG tablet Take 800 mg by mouth At Bedtime      Multiple vitamin TABS Take 3 tablets by  mouth daily       naratriptan (AMERGE) 2.5 MG tablet Take 1 tablet (2.5 mg) by mouth at onset of headache for migraine May repeat in 4 hours. Max 2 tablets/24 hours. 9 tablet 4    omeprazole (PRILOSEC) 20 MG capsule Take 40 mg by mouth daily (with dinner)       rosuvastatin (CRESTOR) 10 MG tablet Take 1 tablet (10 mg) by mouth daily 90 tablet 3    spacer (OPTICHAMBER CHIVO) holding chamber Use with MDI      Vitamin D (Cholecalciferol) 50 MCG (2000 UT) CAPS Take 2,000 Units by mouth daily       zolpidem (AMBIEN) 10 MG tablet Take 1 tablet (10 mg) by mouth nightly as needed for sleep 30 tablet 0    albuterol (PROAIR HFA/PROVENTIL HFA/VENTOLIN HFA) 108 (90 Base) MCG/ACT inhaler Inhale 2 puffs into the lungs every 6 hours as needed for shortness of breath, wheezing or cough 18 g 3    naratriptan (AMERGE) 2.5 MG tablet            ALLERGIES:  Allergies   Allergen Reactions    Cyclobenzaprine Other (See Comments)    Levofloxacin Unknown     Tendon injury (torn)    Rofecoxib Nausea    Simvastatin Other (See Comments)    Trazodone Other (See Comments)     Other reaction(s): Nightmares    Vicodin [Hydrocodone-Acetaminophen] Itching    Latex Rash         PAST MEDICAL HISTORY:  Past Medical History:   Diagnosis Date    Asthma     Global amnesia     Hypothyroidism    Hyperlipidaemia LDL goal < 130     Hypothyroidism     Insomnia     Migraine, unspecified, without mention of intractable migraine without mention of status migrainosus     Osteopenia     Osteoporosis     Raynaud's disease     Unspecified asthma(493.90)          PAST SURGICAL HISTORY:  Past Surgical History:   Procedure Laterality Date    APPENDECTOMY      BUNIONECTOMY Right 11/02/2010    COLONOSCOPY  11/16/10    Every 7 years    COLONOSCOPY  02/2018    hemorrhoids, [place] [facility]    FOOT SURGERY Left     tendon rupture    HC REPAIR OF HAMMERTOE,ONE      TONSILLECTOMY & ADENOIDECTOMY      TUBAL LIGATION  1965         SOCIAL HISTORY:  Social History     Socioeconomic  "History    Marital status:      Spouse name: Not on file    Number of children: Not on file    Years of education: Not on file    Highest education level: Not on file   Occupational History    Not on file   Tobacco Use    Smoking status: Former     Packs/day: 0.50     Years: 10.00     Additional pack years: 0.00     Total pack years: 5.00     Types: Cigarettes     Quit date: 1970     Years since quittin.8    Smokeless tobacco: Never   Vaping Use    Vaping Use: Never used   Substance and Sexual Activity    Alcohol use: Yes     Alcohol/week: 2.0 standard drinks of alcohol     Types: 2 Cans of beer per week     Comment: rare    Drug use: No    Sexual activity: Yes     Partners: Male   Other Topics Concern    Parent/sibling w/ CABG, MI or angioplasty before 65F 55M? Not Asked   Social History Narrative    Not on file     Social Determinants of Health     Financial Resource Strain: Not on file   Food Insecurity: Not on file   Transportation Needs: Not on file   Physical Activity: Not on file   Stress: Not on file   Social Connections: Not on file   Interpersonal Safety: Low Risk  (10/11/2023)    Interpersonal Safety     Do you feel physically and emotionally safe where you currently live?: Yes     Within the past 12 months, have you been hit, slapped, kicked or otherwise physically hurt by someone?: No     Within the past 12 months, have you been humiliated or emotionally abused in other ways by your partner or ex-partner?: No   Housing Stability: Not on file         FAMILY HISTORY:  Family History   Problem Relation Age of Onset    Osteoporosis Mother 94         at 94 after hip fracture    Dementia Mother     Hypertension Mother     C.A.D. Father 85         of renal failure. ASCVD, MI x 2    Prostate Cancer Father 80    Cerebrovascular Disease Father     Ovarian Cancer Sister 82    Neurologic Disorder Sister         \"unable to grab thoughts\"    Gout Sister     Fibromyalgia Sister     Cancer " "Brother         CLL    Prostate Cancer Brother 68    Multiple myeloma Brother 93        new onset    Asthma Brother     Breast Cancer Niece 40        Maternal    Colon Cancer No family hx of          PHYSICAL EXAM:  Vital signs:  Ht 1.575 m (5' 2\")   Wt 54.4 kg (120 lb)   BMI 21.95 kg/m     ECO  ECO  GENERAL/CONSTITUTIONAL: No acute distress. Healthy, alert.  EYES: No scleral icterus.  No redness or discharge.    RESPIRATORY: No audible wheeze, cough, or visible cyanosis.  No visible retractions or increased work of breathing.  Able to speak fully in complete sentences.  MUSCULOSKELETAL: Normal range of motion.  NEUROLOGIC: Alert, oriented, answers questions appropriately. No tremor. Mentation intact and speech normal  INTEGUMENTARY: No jaundice.  No obvious rash or skin lesions.  PSYCHIATRIC:  Mentation appears normal, affect normal/bright, judgement and insight intact, normal speech and appearance well-groomed.    The rest of a comprehensive physical exam is deferred due to public health emergency video visit restrictions.       LABS:  Labs extensively noted and reviewed with the patient in [facility]    PATHOLOGY:  N/A    IMAGING:  Noted x-rays of the lumbar spine    ASSESSMENT/PLAN: Nancy is a very pleasant 77-year-old female with a new diagnosis of IgG lambda related MGUS    She has low risk MGUS based on the IgG subtype and level on the SPEP.  No evidence of endorgan damage      Discussed with the patient of 0.5 %/year risk of conversion to myeloma.  I would like to repeat her labs in 6 months to make sure they are stable and if stable at that time I will see her back once a year with follow-up      1.  MGUS: Low risk.  Labs and MD visit in 6 months if stable I will move her surveillance to once a year    Total time spent on day of visit, including review of tests, obtaining/reviewing separately obtained history, ordering medications/tests/procedures, communicating with PCP/consultants, and documenting in " electronic medical record: 60 minutes       Miguel De MD  Hematology/Oncology  [facility]

## 2023-10-24 ENCOUNTER — TRANSFERRED RECORDS (OUTPATIENT)
Dept: HEALTH INFORMATION MANAGEMENT | Facility: CLINIC | Age: 78
End: 2023-10-24
Payer: COMMERCIAL

## 2023-10-24 DIAGNOSIS — G47.00 PERSISTENT INSOMNIA: ICD-10-CM

## 2023-10-24 RX ORDER — ZOLPIDEM TARTRATE 10 MG/1
10 TABLET ORAL
Qty: 30 TABLET | Refills: 0 | Status: SHIPPED | OUTPATIENT
Start: 2023-10-24 | End: 2024-02-16

## 2023-10-24 NOTE — TELEPHONE ENCOUNTER
Zolpidem (Ambien) 10 mg    Last Office Visit: 10/11/23  Future [facility] Appointments: None  Medication last refilled: 8/11/23 #30 with 0 refill(s)     verified - last fill date: 8/13/23 #30    Routing refill request to provider for review/approval because:  Drug not on the [facility] refill protocol     SHIREEN SethN, RN, CCM

## 2023-11-16 NOTE — TELEPHONE ENCOUNTER
Re-faxed appeal to [phone] to insurance as it was not received.    Hx of hypothyroidism on Synthroid 0.137mg at home    Plan  - TSH  - c/w synthroid 0.137mg

## 2023-12-01 ENCOUNTER — TRANSFERRED RECORDS (OUTPATIENT)
Dept: HEALTH INFORMATION MANAGEMENT | Facility: CLINIC | Age: 78
End: 2023-12-01
Payer: COMMERCIAL

## 2023-12-04 ENCOUNTER — HOSPITAL ENCOUNTER (OUTPATIENT)
Dept: MAMMOGRAPHY | Facility: CLINIC | Age: 78
Discharge: HOME | End: 2023-12-04
Attending: INTERNAL MEDICINE | Admitting: INTERNAL MEDICINE
Payer: COMMERCIAL

## 2023-12-04 DIAGNOSIS — Z12.31 VISIT FOR SCREENING MAMMOGRAM: ICD-10-CM

## 2023-12-04 PROCEDURE — 77067 SCR MAMMO BI INCL CAD: CPT

## 2023-12-13 ENCOUNTER — OFFICE VISIT (OUTPATIENT)
Dept: FAMILY MEDICINE | Facility: CLINIC | Age: 78
End: 2023-12-13
Payer: COMMERCIAL

## 2023-12-13 VITALS
DIASTOLIC BLOOD PRESSURE: 78 MMHG | TEMPERATURE: 98.3 F | OXYGEN SATURATION: 99 % | SYSTOLIC BLOOD PRESSURE: 117 MMHG | HEART RATE: 62 BPM

## 2023-12-13 DIAGNOSIS — R05.2 SUBACUTE COUGH: Primary | ICD-10-CM

## 2023-12-13 DIAGNOSIS — J45.40 MODERATE PERSISTENT ASTHMA WITHOUT COMPLICATION: ICD-10-CM

## 2023-12-13 RX ORDER — PREDNISONE 20 MG/1
TABLET ORAL
Qty: 10 TABLET | Refills: 1 | Status: SHIPPED | OUTPATIENT
Start: 2023-12-13 | End: 2024-06-04

## 2023-12-13 ASSESSMENT — ASTHMA QUESTIONNAIRES: ACT_TOTALSCORE: 14

## 2023-12-13 NOTE — NURSING NOTE
07-Mar-2018 10:37 78 year old  Chief Complaint   Patient presents with    Cough     Cough since last visit in October, taking albuterol a few times a day and hasn't seemed to help.        Blood pressure 117/78, pulse 62, temperature 98.3  F (36.8  C), temperature source Skin, SpO2 99%, not currently breastfeeding. There is no height or weight on file to calculate BMI.  Patient Active Problem List   Diagnosis    Bladder neck obstruction    Constipation    Transient global amnesia    Other hammer toe (acquired)    Ostium secundum type atrial septal defect    Senile osteoporosis    Syndrome affecting cervical region    Variants of migraine    Malignant neoplasm of skin    Persistent insomnia    Hypothyroidism    Family history of malignant neoplasm of ovary    Tear film insufficiency    Circulatory system disorder    PFO (patent foramen ovale)    Gastroesophageal reflux disease without esophagitis    ACP (advance care planning)    Amnesia    Chronic paroxysmal hemicrania, not intractable    Osteopenia of necks of both femurs    Hyperlipidemia LDL goal <100    Intractable migraine, unspecified migraine type    VLAD (generalized anxiety disorder)    Choking sensation    Localized swelling of lower leg       Wt Readings from Last 2 Encounters:   10/11/23 54.4 kg (120 lb)   10/10/23 54.4 kg (120 lb)     BP Readings from Last 3 Encounters:   12/13/23 117/78   10/11/23 103/70   08/11/23 103/70         Current Outpatient Medications   Medication    albuterol (PROAIR HFA/PROVENTIL HFA/VENTOLIN HFA) 108 (90 Base) MCG/ACT inhaler    aspirin 81 MG tablet    Calcium-Vitamin D 600-200 MG-UNIT TABS    citalopram (CELEXA) 10 MG tablet    cyanocobalamin (VITAMIN B-12) 1000 MCG SUBL sublingual tablet    fexofenadine (ALLEGRA) 180 MG tablet    gabapentin (NEURONTIN) 300 MG capsule    galcanezumab-gnlm (EMGALITY) 120 MG/ML injection    indomethacin (INDOCIN) 50 MG capsule    levothyroxine (SYNTHROID/LEVOTHROID) 88 MCG tablet    magnesium oxide (MAG-OX) 400  "MG tablet    Multiple vitamin TABS    naratriptan (AMERGE) 2.5 MG tablet    omeprazole (PRILOSEC) 20 MG capsule    rosuvastatin (CRESTOR) 10 MG tablet    spacer (OPTICHAMBER CHIVO) holding chamber    Vitamin D (Cholecalciferol) 50 MCG ( UT) CAPS    zolpidem (AMBIEN) 10 MG tablet    naratriptan (AMERGE) 2.5 MG tablet     No current facility-administered medications for this visit.       Social History     Tobacco Use    Smoking status: Former     Packs/day: 0.50     Years: 10.00     Additional pack years: 0.00     Total pack years: 5.00     Types: Cigarettes     Quit date: 1970     Years since quittin.9    Smokeless tobacco: Never   Vaping Use    Vaping Use: Never used   Substance Use Topics    Alcohol use: Yes     Alcohol/week: 2.0 standard drinks of alcohol     Types: 2 Cans of beer per week     Comment: rare    Drug use: No       Health Maintenance Due   Topic Date Due    ASTHMA ACTION PLAN  Never done    IPV IMMUNIZATION (2 of 3 - Adult catch-up series) 1995    RSV VACCINE (Pregnancy & 60+) (1 - 1-dose 60+ series) Never done    ADVANCE CARE PLANNING  2022    FALL RISK ASSESSMENT  2022       No results found for: \"PAP\"      2023 4:16 PM   " 07-Mar-2018 11:12

## 2023-12-13 NOTE — PROGRESS NOTES
Rakel Parish is a 78 year old female here for the following issues:    Cough  6-8 coughing spells a day which started October 11 2023  No tightness or pressure in chest  Eating will trigger coughing  Coughs at night , awakens from sleep  Denies shortness of breath  No fever, chills  Uses albuterol inhaler 3-4 x a day with spacer        10/11/2023     3:45 PM 12/13/2023     4:20 PM   ACT Total Scores   ACT TOTAL SCORE (Goal Greater than or Equal to 20) 20 14   In the past 12 months, how many times did you visit the emergency room for your asthma without being admitted to the hospital? 0 0   In the past 12 months, how many times were you hospitalized overnight because of your asthma? 0 0         Patient Active Problem List   Diagnosis    Bladder neck obstruction    Constipation    Transient global amnesia    Other hammer toe (acquired)    Ostium secundum type atrial septal defect    Senile osteoporosis    Syndrome affecting cervical region    Variants of migraine    Malignant neoplasm of skin    Persistent insomnia    Hypothyroidism    Family history of malignant neoplasm of ovary    Tear film insufficiency    Circulatory system disorder    PFO (patent foramen ovale)    Gastroesophageal reflux disease without esophagitis    ACP (advance care planning)    Amnesia    Chronic paroxysmal hemicrania, not intractable    Osteopenia of necks of both femurs    Hyperlipidemia LDL goal <100    Intractable migraine, unspecified migraine type    VLAD (generalized anxiety disorder)    Choking sensation    Localized swelling of lower leg       Current Outpatient Medications   Medication Sig Dispense Refill    albuterol (PROAIR HFA/PROVENTIL HFA/VENTOLIN HFA) 108 (90 Base) MCG/ACT inhaler Inhale 2 puffs into the lungs every 6 hours as needed for shortness of breath, wheezing or cough 18 g 3    aspirin 81 MG tablet Take 81 mg by mouth daily       Calcium-Vitamin D 600-200 MG-UNIT TABS Take 1 tablet by mouth At Bedtime        citalopram (CELEXA) 10 MG tablet Take 1 tablet (10 mg) by mouth daily 90 tablet 3    cyanocobalamin (VITAMIN B-12) 1000 MCG SUBL sublingual tablet Place 1,000 mcg under the tongue daily      fexofenadine (ALLEGRA) 180 MG tablet Take 180 mg by mouth daily      gabapentin (NEURONTIN) 300 MG capsule Take 1 capsule (300 mg) by mouth 3 times daily 270 capsule 3    galcanezumab-gnlm (EMGALITY) 120 MG/ML injection Inject 1 mL (120 mg) Subcutaneous every 28 days 1 mL 10    indomethacin (INDOCIN) 50 MG capsule Take 1 capsule (50 mg) by mouth as needed for moderate pain Take 1 tablet as needed 10 capsule 3    levothyroxine (SYNTHROID/LEVOTHROID) 88 MCG tablet Take 1 tablet (88 mcg) by mouth daily Except skip Sunday dose 90 tablet 3    magnesium oxide (MAG-OX) 400 MG tablet Take 800 mg by mouth At Bedtime      Multiple vitamin TABS Take 3 tablets by mouth daily       naratriptan (AMERGE) 2.5 MG tablet Take 1 tablet (2.5 mg) by mouth at onset of headache for migraine May repeat in 4 hours. Max 2 tablets/24 hours. 9 tablet 4    omeprazole (PRILOSEC) 20 MG capsule Take 40 mg by mouth daily (with dinner)       rosuvastatin (CRESTOR) 10 MG tablet Take 1 tablet (10 mg) by mouth daily 90 tablet 3    spacer (OPTICHAMBER CHIVO) holding chamber Use with MDI      Vitamin D (Cholecalciferol) 50 MCG (2000 UT) CAPS Take 2,000 Units by mouth daily       zolpidem (AMBIEN) 10 MG tablet Take 1 tablet (10 mg) by mouth nightly as needed for sleep 30 tablet 0    naratriptan (AMERGE) 2.5 MG tablet          Allergies   Allergen Reactions    Cyclobenzaprine Other (See Comments)    Levofloxacin Unknown     Tendon injury (torn)    Rofecoxib Nausea    Simvastatin Other (See Comments)    Trazodone Other (See Comments)     Other reaction(s): Nightmares    Vicodin [Hydrocodone-Acetaminophen] Itching    Latex Rash        EXAM  /78 (BP Location: Left arm, Patient Position: Sitting, Cuff Size: Adult Regular)   Pulse 62   Temp 98.3  F (36.8  C)  (Skin)   SpO2 99%   Gen: Alert, pleasant, NAD, speaking in full sentences  HEENT:  Conjunctiva nl, TM normal bilaterally, OP clear, no posterior erythema  COR: S1,S2, no murmur  Lungs: CTA bilaterally, no rhonchi, wheezes or rales      Assessment:  (R05.2) Subacute cough  (primary encounter diagnosis)  Comment: suspect bronchospasm, vs underlying pneunomia  Plan: beclomethasone HFA (QVAR REDIHALER) 40 MCG/ACT         inhaler, X-ray Chest 2 vws*        Recommend initiating Qvar inhaler, continue albuterol.   Addendum  CXR unremarkable 12/15/23    (J45.40) Moderate persistent asthma without complication  Comment: persistent cough, normal CXR  Plan: predniSONE (DELTASONE) 20 MG tablet        Discussed starting steroid inhaler. She wishes to hold on oral prednisone as she has underlying osteoporosis.   Recommend she use steroid inhaler for 2-4 wks and taper slowly when symptoms are improving.     Alondra Foster MD  Internal Medicine/Pediatrics

## 2023-12-13 NOTE — LETTER
My Asthma Action Plan    Name: Rakel Parish   YOB: 1945  Date: 12/13/2023   My doctor: Alondra Foster MD   My clinic: [facility]        My Control Medicine: Beclomethasone dipropionate (Qvar Redihaler) -  40 mcg 2 puff twice daily with flares, 1 puff twice daily otherwise  My Rescue Medicine: Albuterol (Proair/Ventolin/Proventil HFA) 2-4 puffs EVERY 4 HOURS as needed. Use a spacer if recommended by your provider.  My Oral Steroid Medicine: prednisone 40mg daily x 5 days My Asthma Severity:   Mild Persistent  Know your asthma triggers: dust mites, mold, and cold air               GREEN ZONE   Good Control  I feel good  No cough or wheeze  Can work, sleep and play without asthma symptoms       Take your asthma control medicine every day.     If exercise triggers your asthma, take your rescue medication  15 minutes before exercise or sports, and  During exercise if you have asthma symptoms  Spacer to use with inhaler: If you have a spacer, make sure to use it with your inhaler             YELLOW ZONE Getting Worse  I have ANY of these:  I do not feel good  Cough or wheeze  Chest feels tight  Wake up at night   Keep taking your Green Zone medications  Start taking your rescue medicine:  every 20 minutes for up to 1 hour. Then every 4 hours for 24-48 hours.  If you stay in the Yellow Zone for more than 12-24 hours, contact your doctor.  If you do not return to the Green Zone in 12-24 hours or you get worse, start taking your oral steroid medicine if prescribed by your provider.           RED ZONE Medical Alert - Get Help  I have ANY of these:  I feel awful  Medicine is not helping  Breathing getting harder  Trouble walking or talking  Nose opens wide to breathe       Take your rescue medicine NOW  If your provider has prescribed an oral steroid medicine, start taking it NOW  Call your doctor NOW  If you are still in the Red Zone after 20 minutes and you have not reached your  doctor:  Take your rescue medicine again and  Call 911 or go to the emergency room right away    See your regular doctor within 2 weeks of an Emergency Room or Urgent Care visit for follow-up treatment.          Annual Reminders:  Meet with Asthma Educator,  Flu Shot in the Fall, consider Pneumonia Vaccination for patients with asthma (aged 19 and older).    Pharmacy:    [id number] - [place], [place] - [address] AT [address] [place] [id number] [facility] [employer] - [place], [place] - [address]    Electronically signed by Alondra Foster MD   Date: 12/13/23                      Asthma Triggers  How To Control Things That Make Your Asthma Worse    Triggers are things that make your asthma worse.  Look at the list below to help you find your triggers and what you can do about them.  You can help prevent asthma flare-ups by staying away from your triggers.      Trigger                                                          What you can do   Cigarette Smoke  Tobacco smoke can make asthma worse. Do not allow smoking in your home, car or around you.  Be sure no one smokes at a child s day care or school.  If you smoke, ask your health care provider for ways to help you quit.  Ask family members to quit too.  Ask your health care provider for a referral to Quit Plan to help you quit smoking, or call 2-496-988-PLAN.     Colds, Flu, Bronchitis  These are common triggers of asthma. Wash your hands often.  Don t touch your eyes, nose or mouth.  Get a flu shot every year.     Dust Mites  These are tiny bugs that live in cloth or carpet. They are too small to see. Wash sheets and blankets in hot water every week.   Encase pillows and mattress in dust mite proof covers.  Avoid having carpet if you can. If you have carpet, vacuum weekly.   Use a dust mask and HEPA vacuum.   Pollen and Outdoor Mold  Some people are allergic to trees, grass, or weed pollen, or molds. Try to keep your windows  closed.  Limit time out doors when pollen count is high.   Ask you health care provider about taking medicine during allergy season.     Animal Dander  Some people are allergic to skin flakes, urine or saliva from pets with fur or feathers. Keep pets with fur or feathers out of your home.    If you can t keep the pet outdoors, then keep the pet out of your bedroom.  Keep the bedroom door closed.  Keep pets off cloth furniture and away from stuffed toys.     Mice, Rats, and Cockroaches   Some people are allergic to the waste from these pests.   Cover food and garbage.  Clean up spills and food crumbs.  Store grease in the refrigerator.   Keep food out of the bedroom.   Indoor Mold  This can be a trigger if your home has high moisture. Fix leaking faucets, pipes, or other sources of water.   Clean moldy surfaces.  Dehumidify basement if it is damp and smelly.   Smoke, Strong Odors, and Sprays  These can reduce air quality. Stay away from strong odors and sprays, such as perfume, powder, hair spray, paints, smoke incense, paint, cleaning products, candles and new carpet.   Exercise or Sports  Some people with asthma have this trigger. Be active!  Ask your doctor about taking medicine before sports or exercise to prevent symptoms.    Warm up for 5-10 minutes before and after sports or exercise.     Other Triggers of Asthma  Cold air:  Cover your nose and mouth with a scarf.  Sometimes laughing or crying can be a trigger.  Some medicines and food can trigger asthma.

## 2023-12-15 ENCOUNTER — ANCILLARY PROCEDURE (OUTPATIENT)
Dept: GENERAL RADIOLOGY | Facility: CLINIC | Age: 78
End: 2023-12-15
Attending: INTERNAL MEDICINE
Payer: COMMERCIAL

## 2023-12-15 PROCEDURE — 71046 X-RAY EXAM CHEST 2 VIEWS: CPT | Mod: TC | Performed by: RADIOLOGY

## 2023-12-19 DIAGNOSIS — G43.109 MIGRAINE WITH AURA AND WITHOUT STATUS MIGRAINOSUS, NOT INTRACTABLE: Primary | ICD-10-CM

## 2023-12-19 NOTE — TELEPHONE ENCOUNTER
Called [facility] pharmacy and emgality auto injectors are on back order due to manufacturing shortage.  They advised ordering pre-filled syringes instead of the auto-injector.     Attempted to call pt and see if she is okay using the pre-filled syringe (she would need to inject self).     If she does not want to do the pre-filled syringe, we could either send to [facility] (they have emgality auto-injectors in stock), or pt would need to try a different medication.     Will await call back to discuss further.     Galina ALVARADO RN, BSN  [facility]

## 2023-12-19 NOTE — TELEPHONE ENCOUNTER
[facility] Call Center    Phone Message    May a detailed message be left on voicemail: yes     Reason for Call: Medication Refill Request    Has the patient contacted the pharmacy for the refill? Yes   Name of medication being requested:   galcanezumab-gnlm (EMGALITY) 120 MG/ML injection    Provider who prescribed the medication:   Lazarus Villa    Pharmacy:   [facility] #[id number] - [place], [place] - [address]       Date medication is needed: ASAP    Pt requesting Auto Injector for this refill only       Action Taken: Other: Neurology    Travel Screening: Not Applicable

## 2023-12-20 NOTE — TELEPHONE ENCOUNTER
[facility] Call Center    Phone Message    May a detailed message be left on voicemail: yes     Reason for Call: Other: Pt is returning a call from the care team. Please call back to advise at # [phone].     Action Taken: Message routed to:  Other: [facility] neurology     Travel Screening: Not Applicable

## 2023-12-20 NOTE — TELEPHONE ENCOUNTER
Spoke with patient, she prefers the prefilled syringes instead but will take whichever is available this time. Ok to proceed with prefilled syringe order.    Informed patient of RN's message:    [facility]ed [facility] and emgality auto injectors are on back order due to manufacturing shortage.  They advised ordering pre-filled syringes instead of the auto-injector.      Attempted to call pt and see if she is okay using the pre-filled syringe (she would need to inject self).      If she does not want to do the pre-filled syringe, we could either send to [facility] (they have emgality auto-injectors in stock), or pt would need to try a different medication.     [facility] [place] [facility] [place]  [facility]

## 2024-01-10 ENCOUNTER — TELEPHONE (OUTPATIENT)
Dept: NEUROLOGY | Facility: CLINIC | Age: 79
End: 2024-01-10
Payer: COMMERCIAL

## 2024-01-10 NOTE — PROGRESS NOTES
ESTABLISHED PATIENT NEUROLOGY NOTE    DATE OF VISIT: 1/11/2024  CLINIC LOCATION: [facility]  MRN: [id number]  PATIENT NAME: Rakel Parish  YOB: 1945    REASON FOR VISIT: No chief complaint on file.    SUBJECTIVE:                                                      HISTORY OF PRESENT ILLNESS: Patient is here to follow up regarding multifactorial headaches.  She was last seen on 8/11/2023.  Please refer to my initial/other prior notes for further information.    Since the last visit, the patient reports *** after she tapered off the gabapentin.  For headache prevention she is on Emgality.  For acute therapy she uses naratriptan, which works well.  She also has as needed indomethacin for paroxysmal hemicrania.  No recent flares of occipital neuralgia.  No interval development of new neurological symptoms.  EXAM:                                                    Physical Exam:   Vitals: There were no vitals taken for this visit.    General: pt is in NAD, cooperative.  Skin: normal turgor, moist mucous membranes, no lesions/rashes noticed.  HEENT: ATNC, white sclera, normal conjunctiva.  Respiratory: Symmetric lung excursion, no accessory respiratory muscle use.  Abdomen: Non distended.  Neurological: awake, cooperative, follows commands, no aphasia or dysarthria noted, cranial nerves II-XII: no ptosis, extraocular motility is full, face is symmetric, tongue is midline, equally moves all extremities, normal tone in upper and lower extremities, sensation to the light touch is intact bilaterally, pronator drift is negative, finger to nose intact bilaterally, casual gait is normal.  ASSESSMENT AND PLAN:                                                    Assessment: 78 year old female patient presents for follow-up of migraine, left occipital neuralgia, and paroxysmal hemicrania.  She reports *** after tapering of gabapentin.    Previously we discussed that if her migraines become more  frequent or intense, we could consider adding riboflavin versus trial of different CGRP antagonist (Aimovig, Ajovy, or Qulipta).  Nurtec might also be considered at preventive dosing.    Regarding her left occipital neuralgia and chronic paroxysmal hemicrania, we reviewed additional treatment options, but they appear to be stable currently.  No medication changes are needed.    Diagnoses:    ICD-10-CM    1. Migraine with aura and without status migrainosus, not intractable  G43.109       2. Chronic paroxysmal hemicrania, not intractable  G44.049       3. Cervico-occipital neuralgia of left side  M54.81         Plan:  There are no Patient Instructions on file for this visit.    Total Time: *** minutes spent on the date of the encounter doing chart review, history and exam, documentation and further activities per the note.    Lazarus Villa MD  [facility]  (Chart documentation was completed in part with Dragon voice-recognition software. Even though reviewed, some grammatical, spelling, and word errors may remain.)

## 2024-01-11 ENCOUNTER — OFFICE VISIT (OUTPATIENT)
Dept: NEUROLOGY | Facility: CLINIC | Age: 79
End: 2024-01-11
Payer: COMMERCIAL

## 2024-01-11 VITALS — SYSTOLIC BLOOD PRESSURE: 109 MMHG | OXYGEN SATURATION: 98 % | DIASTOLIC BLOOD PRESSURE: 72 MMHG | HEART RATE: 62 BPM

## 2024-01-11 DIAGNOSIS — G44.049 CHRONIC PAROXYSMAL HEMICRANIA, NOT INTRACTABLE: ICD-10-CM

## 2024-01-11 DIAGNOSIS — G43.109 MIGRAINE WITH AURA AND WITHOUT STATUS MIGRAINOSUS, NOT INTRACTABLE: Primary | ICD-10-CM

## 2024-01-11 DIAGNOSIS — M54.81 CERVICO-OCCIPITAL NEURALGIA OF LEFT SIDE: ICD-10-CM

## 2024-01-11 PROCEDURE — 99214 OFFICE O/P EST MOD 30 MIN: CPT | Performed by: PSYCHIATRY & NEUROLOGY

## 2024-01-11 RX ORDER — RIZATRIPTAN BENZOATE 10 MG/1
10 TABLET ORAL
Qty: 12 TABLET | Refills: 4 | Status: SHIPPED | OUTPATIENT
Start: 2024-01-11

## 2024-01-11 NOTE — LETTER
1/11/2024         RE: Rakel Parish  [address]  [place] [address]        Dear Colleague,    Thank you for referring your patient, Rakel Parish, to the [facility]. Please see a copy of my visit note below.    ESTABLISHED PATIENT NEUROLOGY NOTE    DATE OF VISIT: 1/11/2024  CLINIC LOCATION: [facility]  MRN: [id number]  PATIENT NAME: Rakel Parish  YOB: 1945    REASON FOR VISIT:   Chief Complaint   Patient presents with     Headache     Stable.  Feels that naratriptan does not work as good as it used to be.     SUBJECTIVE:                                                      HISTORY OF PRESENT ILLNESS: Patient is here to follow up regarding multifactorial headaches.  She was last seen on 8/11/2023.  Please refer to my initial/other prior notes for further information.    Since the last visit, the patient reports good control of her headaches.  She averages 2-3 migraines per month, which is tolerable for her.  She was not able to wean off gabapentin yet, but plans in the future (after upcoming vacation).  For headache prevention, she is on Emgality.  For acute therapy, she uses naratriptan, which does not work well lately.  She wonders about different treatment options for acute therapy.  She also has as needed indomethacin for paroxysmal hemicrania, but does not use it much, may be once in 2 months or less.  No recent flares of occipital neuralgia.  No interval development of new neurological symptoms.  EXAM:                                                    Physical Exam:   Vitals: /72 (BP Location: Left arm, Patient Position: Sitting, Cuff Size: Adult Regular)   Pulse 62   SpO2 98%     General: pt is in NAD, cooperative.  Skin: normal turgor, moist mucous membranes, no lesions/rashes noticed.  HEENT: ATNC, white sclera, normal conjunctiva.  Respiratory: Symmetric lung excursion, no accessory respiratory muscle  use.  Abdomen: Non distended.  Neurological: awake, cooperative, follows commands, no aphasia or dysarthria noted, cranial nerves II-XII: Intact, equally moves all extremities, no dysmetria bilaterally, gait is not checked today.  ASSESSMENT AND PLAN:                                                    Assessment: 78 year old female patient presents for follow-up of migraine, left occipital neuralgia, and paroxysmal hemicrania.  She reports inadequate control from naratriptan, but overall feels that her headaches are stable on current therapy.    Previously we discussed that if her migraines become more frequent or intense, we could consider adding riboflavin versus trial of different CGRP antagonist (Aimovig, Ajovy, or Qulipta).  Nurtec might also be considered at preventive dosing.  However, at the present time Emgality seems to be working well.    At the same time, she feels that naratriptan is not providing adequate relief and open to try other triptans or newer medications.  She feels that all triptans that she tried in the past work about the same.  It appears that Relpax is not covered by her insurance, but Maxalt is.  We decided to try this next, but also discussed that we could consider Nurtec, Ubrelvy, or Reyvow if Maxalt does not work.    Regarding her left occipital neuralgia and chronic paroxysmal hemicrania, we reviewed additional treatment options, but they appear to be stable currently.  No medication changes are needed.    Diagnoses:    ICD-10-CM    1. Migraine with aura and without status migrainosus, not intractable  G43.109       2. Chronic paroxysmal hemicrania, not intractable  G44.049       3. Cervico-occipital neuralgia of left side  M54.81         Plan: At today's visit we thoroughly discussed current symptoms, available treatment options, and the plan.    We decided to switch naratriptan to Maxalt (rizatriptan).  No other medication changes.  She should have enough Emgality for now.  I asked  "her to reach out if she needs refill of indomethacin.    I advised the patient to keep the headache diary and bring it to the next follow-up visit or upload via My Chart.     Next follow-up appointment is in the next 6 months or earlier if needed.    Total Time: 21 minutes spent on the date of the encounter doing chart review, history and exam, documentation and further activities per the note.    Lazarus Villa MD  [facility]  (Chart documentation was completed in part with Dragon voice-recognition software. Even though reviewed, some grammatical, spelling, and word errors may remain.)    Rakel Parish is a 78 year old female who presents for:  Chief Complaint   Patient presents with     Headache     Stable.  Feels that naratriptan does not work as good as it used to be.        Initial Vitals:  /72 (BP Location: Left arm, Patient Position: Sitting, Cuff Size: Adult Regular)   Pulse 62   SpO2 98%  Estimated body mass index is 21.95 kg/m  as calculated from the following:    Height as of 10/11/23: 1.575 m (5' 2\").    Weight as of 10/11/23: 54.4 kg (120 lb).. There is no height or weight on file to calculate BSA. BP completed using cuff size: regular    Ana Maria Lind       Again, thank you for allowing me to participate in the care of your patient.        Sincerely,        Lazarus Villa MD  "

## 2024-01-11 NOTE — PROGRESS NOTES
"Rakel Parish is a 78 year old female who presents for:  Chief Complaint   Patient presents with    Headache     Stable.  Feels that naratriptan does not work as good as it used to be.        Initial Vitals:  /72 (BP Location: Left arm, Patient Position: Sitting, Cuff Size: Adult Regular)   Pulse 62   SpO2 98%  Estimated body mass index is 21.95 kg/m  as calculated from the following:    Height as of 10/11/23: 1.575 m (5' 2\").    Weight as of 10/11/23: 54.4 kg (120 lb).. There is no height or weight on file to calculate BSA. BP completed using cuff size: regular    Ana Maria Lind   "

## 2024-01-11 NOTE — PATIENT INSTRUCTIONS
AFTER VISIT SUMMARY (AVS):    At today's visit we thoroughly discussed current symptoms, available treatment options, and the plan.    We decided to switch naratriptan to Maxalt (rizatriptan).  No other medication changes.  You should have enough Emgality.  Please reach out if you need refill of indomethacin.    Please keep the headache diary and bring it to the next follow-up visit or upload via My Chart.     Next follow-up appointment is in the next 6 months or earlier if needed.    Please do not hesitate to call me with any questions or concerns.    Thanks.

## 2024-01-11 NOTE — PROGRESS NOTES
ESTABLISHED PATIENT NEUROLOGY NOTE    DATE OF VISIT: 1/11/2024  CLINIC LOCATION: [facility]  MRN: [id number]  PATIENT NAME: Rakel Parish  YOB: 1945    REASON FOR VISIT:   Chief Complaint   Patient presents with    Headache     Stable.  Feels that naratriptan does not work as good as it used to be.     SUBJECTIVE:                                                      HISTORY OF PRESENT ILLNESS: Patient is here to follow up regarding multifactorial headaches.  She was last seen on 8/11/2023.  Please refer to my initial/other prior notes for further information.    Since the last visit, the patient reports good control of her headaches.  She averages 2-3 migraines per month, which is tolerable for her.  She was not able to wean off gabapentin yet, but plans in the future (after upcoming vacation).  For headache prevention, she is on Emgality.  For acute therapy, she uses naratriptan, which does not work well lately.  She wonders about different treatment options for acute therapy.  She also has as needed indomethacin for paroxysmal hemicrania, but does not use it much, may be once in 2 months or less.  No recent flares of occipital neuralgia.  No interval development of new neurological symptoms.  EXAM:                                                    Physical Exam:   Vitals: /72 (BP Location: Left arm, Patient Position: Sitting, Cuff Size: Adult Regular)   Pulse 62   SpO2 98%     General: pt is in NAD, cooperative.  Skin: normal turgor, moist mucous membranes, no lesions/rashes noticed.  HEENT: ATNC, white sclera, normal conjunctiva.  Respiratory: Symmetric lung excursion, no accessory respiratory muscle use.  Abdomen: Non distended.  Neurological: awake, cooperative, follows commands, no aphasia or dysarthria noted, cranial nerves II-XII: Intact, equally moves all extremities, no dysmetria bilaterally, gait is not checked today.  ASSESSMENT AND PLAN:                                                     Assessment: 78 year old female patient presents for follow-up of migraine, left occipital neuralgia, and paroxysmal hemicrania.  She reports inadequate control from naratriptan, but overall feels that her headaches are stable on current therapy.    Previously we discussed that if her migraines become more frequent or intense, we could consider adding riboflavin versus trial of different CGRP antagonist (Aimovig, Ajovy, or Qulipta).  Nurtec might also be considered at preventive dosing.  However, at the present time Emgality seems to be working well.    At the same time, she feels that naratriptan is not providing adequate relief and open to try other triptans or newer medications.  She feels that all triptans that she tried in the past work about the same.  It appears that Relpax is not covered by her insurance, but Maxalt is.  We decided to try this next, but also discussed that we could consider Nurtec, Ubrelvy, or Reyvow if Maxalt does not work.    Regarding her left occipital neuralgia and chronic paroxysmal hemicrania, we reviewed additional treatment options, but they appear to be stable currently.  No medication changes are needed.    Diagnoses:    ICD-10-CM    1. Migraine with aura and without status migrainosus, not intractable  G43.109       2. Chronic paroxysmal hemicrania, not intractable  G44.049       3. Cervico-occipital neuralgia of left side  M54.81         Plan: At today's visit we thoroughly discussed current symptoms, available treatment options, and the plan.    We decided to switch naratriptan to Maxalt (rizatriptan).  No other medication changes.  She should have enough Emgality for now.  I asked her to reach out if she needs refill of indomethacin.    I advised the patient to keep the headache diary and bring it to the next follow-up visit or upload via My Chart.     Next follow-up appointment is in the next 6 months or earlier if needed.    Total Time: 21  minutes spent on the date of the encounter doing chart review, history and exam, documentation and further activities per the note.    Lazarus Villa MD  [facility]  (Chart documentation was completed in part with Dragon voice-recognition software. Even though reviewed, some grammatical, spelling, and word errors may remain.)

## 2024-01-15 DIAGNOSIS — R69 TRAVEL-RELATED ILLNESS: Primary | ICD-10-CM

## 2024-01-15 RX ORDER — AZITHROMYCIN 500 MG/1
TABLET, FILM COATED ORAL
Qty: 3 TABLET | Refills: 0 | Status: SHIPPED | OUTPATIENT
Start: 2024-01-15 | End: 2024-04-18

## 2024-02-06 ENCOUNTER — TRANSFERRED RECORDS (OUTPATIENT)
Dept: HEALTH INFORMATION MANAGEMENT | Facility: CLINIC | Age: 79
End: 2024-02-06
Payer: COMMERCIAL

## 2024-02-16 DIAGNOSIS — G47.00 PERSISTENT INSOMNIA: ICD-10-CM

## 2024-02-16 RX ORDER — ZOLPIDEM TARTRATE 10 MG/1
10 TABLET ORAL
Qty: 30 TABLET | Refills: 0 | Status: SHIPPED | OUTPATIENT
Start: 2024-02-16 | End: 2024-05-20

## 2024-02-16 NOTE — TELEPHONE ENCOUNTER
Medication requested: zolpidem (AMBIEN) 10 MG tablet   Last office visit: 12/13/23  [facility] appointments: none  Medication last refilled: 10/24/23; 30 + 0 refills, last sold 10/28/23 per   Last qualifying labs: N/A    Routing refill request to provider for review/approval because:  Drug not on the [facility] refill protocol     Hans METZ, RN  02/16/24 3:34 PM

## 2024-02-20 ENCOUNTER — LAB (OUTPATIENT)
Dept: INFUSION THERAPY | Facility: CLINIC | Age: 79
End: 2024-02-20
Payer: COMMERCIAL

## 2024-02-20 DIAGNOSIS — D47.2 MONOCLONAL GAMMOPATHY PRESENT ON SERUM PROTEIN ELECTROPHORESIS: ICD-10-CM

## 2024-02-20 LAB
ALBUMIN SERPL BCG-MCNC: 4.3 G/DL (ref 3.5–5.2)
ALP SERPL-CCNC: 66 U/L (ref 40–150)
ALT SERPL W P-5'-P-CCNC: 35 U/L (ref 0–50)
ANION GAP SERPL CALCULATED.3IONS-SCNC: 9 MMOL/L (ref 7–15)
AST SERPL W P-5'-P-CCNC: 32 U/L (ref 0–45)
BILIRUB SERPL-MCNC: 0.2 MG/DL
BUN SERPL-MCNC: 16.2 MG/DL (ref 8–23)
CALCIUM SERPL-MCNC: 9 MG/DL (ref 8.8–10.2)
CHLORIDE SERPL-SCNC: 103 MMOL/L (ref 98–107)
CREAT SERPL-MCNC: 0.82 MG/DL (ref 0.51–0.95)
DEPRECATED HCO3 PLAS-SCNC: 28 MMOL/L (ref 22–29)
EGFRCR SERPLBLD CKD-EPI 2021: 73 ML/MIN/1.73M2
GLUCOSE SERPL-MCNC: 81 MG/DL (ref 70–99)
POTASSIUM SERPL-SCNC: 4.9 MMOL/L (ref 3.4–5.3)
PROT SERPL-MCNC: 6.9 G/DL (ref 6.4–8.3)
SODIUM SERPL-SCNC: 140 MMOL/L (ref 135–145)

## 2024-02-20 PROCEDURE — 83521 IG LIGHT CHAINS FREE EACH: CPT | Performed by: INTERNAL MEDICINE

## 2024-02-20 PROCEDURE — 36415 COLL VENOUS BLD VENIPUNCTURE: CPT

## 2024-02-20 PROCEDURE — 82247 BILIRUBIN TOTAL: CPT | Performed by: INTERNAL MEDICINE

## 2024-02-20 PROCEDURE — 86334 IMMUNOFIX E-PHORESIS SERUM: CPT | Performed by: PATHOLOGY

## 2024-02-20 PROCEDURE — 86334 IMMUNOFIX E-PHORESIS SERUM: CPT | Mod: 26 | Performed by: PATHOLOGY

## 2024-02-20 NOTE — PROGRESS NOTES
Medical Assistant Note:  Rakel Parish presents today for lab draw.    Patient seen by provider today: No.   present during visit today: Not Applicable.    Concerns: No Concerns.    Procedure:  Lab draw site: LAC, Needle type: Butterfly, Gauge: 23.    Post Assessment:  Labs drawn without difficulty: Yes.    Discharge Plan:  Departure Mode: Ambulatory.    Face to Face Time: 4 min.    Shari Schoenberger, CMA

## 2024-02-21 LAB
KAPPA LC FREE SER-MCNC: 2.32 MG/DL (ref 0.33–1.94)
KAPPA LC FREE/LAMBDA FREE SER NEPH: 1.3 {RATIO} (ref 0.26–1.65)
LAMBDA LC FREE SERPL-MCNC: 1.79 MG/DL (ref 0.57–2.63)
LOCATION OF TASK: NORMAL
PROT PATTERN SERPL IFE-IMP: NORMAL

## 2024-03-01 ENCOUNTER — OFFICE VISIT (OUTPATIENT)
Dept: FAMILY MEDICINE | Facility: CLINIC | Age: 79
End: 2024-03-01
Payer: COMMERCIAL

## 2024-03-01 VITALS
RESPIRATION RATE: 15 BRPM | SYSTOLIC BLOOD PRESSURE: 119 MMHG | BODY MASS INDEX: 22.08 KG/M2 | OXYGEN SATURATION: 99 % | HEART RATE: 74 BPM | HEIGHT: 62 IN | DIASTOLIC BLOOD PRESSURE: 80 MMHG | WEIGHT: 120 LBS | TEMPERATURE: 96.5 F

## 2024-03-01 DIAGNOSIS — R19.5 LOOSE STOOLS: Primary | ICD-10-CM

## 2024-03-01 DIAGNOSIS — G43.919 INTRACTABLE MIGRAINE, UNSPECIFIED MIGRAINE TYPE: ICD-10-CM

## 2024-03-01 LAB
ADV 40+41 DNA STL QL NAA+NON-PROBE: NEGATIVE
ASTRO TYP 1-8 RNA STL QL NAA+NON-PROBE: NEGATIVE
C CAYETANENSIS DNA STL QL NAA+NON-PROBE: NEGATIVE
CAMPYLOBACTER DNA SPEC NAA+PROBE: NEGATIVE
CRYPTOSP DNA STL QL NAA+NON-PROBE: NEGATIVE
E COLI O157 DNA STL QL NAA+NON-PROBE: NORMAL
E HISTOLYT DNA STL QL NAA+NON-PROBE: NEGATIVE
EAEC ASTA GENE ISLT QL NAA+PROBE: NEGATIVE
EC STX1+STX2 GENES STL QL NAA+NON-PROBE: NEGATIVE
EPEC EAE GENE STL QL NAA+NON-PROBE: NEGATIVE
ETEC LTA+ST1A+ST1B TOX ST NAA+NON-PROBE: NEGATIVE
G LAMBLIA DNA STL QL NAA+NON-PROBE: NEGATIVE
NOROVIRUS GI+II RNA STL QL NAA+NON-PROBE: NEGATIVE
P SHIGELLOIDES DNA STL QL NAA+NON-PROBE: NEGATIVE
RVA RNA STL QL NAA+NON-PROBE: NEGATIVE
SALMONELLA SP RPOD STL QL NAA+PROBE: NEGATIVE
SAPO I+II+IV+V RNA STL QL NAA+NON-PROBE: NEGATIVE
SHIGELLA SP+EIEC IPAH ST NAA+NON-PROBE: NEGATIVE
V CHOLERAE DNA SPEC QL NAA+PROBE: NEGATIVE
VIBRIO DNA SPEC NAA+PROBE: NEGATIVE
Y ENTEROCOL DNA STL QL NAA+PROBE: NEGATIVE

## 2024-03-01 PROCEDURE — 87507 IADNA-DNA/RNA PROBE TQ 12-25: CPT | Performed by: INTERNAL MEDICINE

## 2024-03-01 RX ORDER — GABAPENTIN 300 MG/1
300 CAPSULE ORAL 2 TIMES DAILY
Status: SHIPPED
Start: 2024-03-01

## 2024-03-01 ASSESSMENT — ASTHMA QUESTIONNAIRES
QUESTION_5 LAST FOUR WEEKS HOW WOULD YOU RATE YOUR ASTHMA CONTROL: WELL CONTROLLED
QUESTION_1 LAST FOUR WEEKS HOW MUCH OF THE TIME DID YOUR ASTHMA KEEP YOU FROM GETTING AS MUCH DONE AT WORK, SCHOOL OR AT HOME: NONE OF THE TIME
QUESTION_3 LAST FOUR WEEKS HOW OFTEN DID YOUR ASTHMA SYMPTOMS (WHEEZING, COUGHING, SHORTNESS OF BREATH, CHEST TIGHTNESS OR PAIN) WAKE YOU UP AT NIGHT OR EARLIER THAN USUAL IN THE MORNING: NOT AT ALL
ACT_TOTALSCORE: 24
QUESTION_4 LAST FOUR WEEKS HOW OFTEN HAVE YOU USED YOUR RESCUE INHALER OR NEBULIZER MEDICATION (SUCH AS ALBUTEROL): NOT AT ALL
ACT_TOTALSCORE: 24
QUESTION_2 LAST FOUR WEEKS HOW OFTEN HAVE YOU HAD SHORTNESS OF BREATH: NOT AT ALL

## 2024-03-01 NOTE — PROGRESS NOTES
"Rakel Parish is a 78 year old female here for the following issues:    Diarrhea  Recent trip to [place], returned 2/2/2024  Developed soft stools while in [place], took azithromycin at that time and noted resolution of symptoms.   Now with 1-2 d history of more frequent, bad smelling stools, still soft/mushy.  No blood , + chills   Had one BM this morning.  No URI symptoms,conjunctivitis or nausea/vomiting  Mild skin rash over abdomen \"often happens with travel to [place]\", healing    Patient Active Problem List   Diagnosis    Bladder neck obstruction    Constipation    Transient global amnesia    Other hammer toe (acquired)    Ostium secundum type atrial septal defect    Senile osteoporosis    Syndrome affecting cervical region    Variants of migraine    Malignant neoplasm of skin    Persistent insomnia    Hypothyroidism    Family history of malignant neoplasm of ovary    Tear film insufficiency    Circulatory system disorder    PFO (patent foramen ovale)    Gastroesophageal reflux disease without esophagitis    ACP (advance care planning)    Amnesia    Chronic paroxysmal hemicrania, not intractable    Osteopenia of necks of both femurs    Hyperlipidemia LDL goal <100    Intractable migraine, unspecified migraine type    VLDA (generalized anxiety disorder)    Choking sensation    Localized swelling of lower leg       Current Outpatient Medications   Medication Sig Dispense Refill    albuterol (PROAIR HFA/PROVENTIL HFA/VENTOLIN HFA) 108 (90 Base) MCG/ACT inhaler Inhale 2 puffs into the lungs every 6 hours as needed for shortness of breath, wheezing or cough 18 g 3    aspirin 81 MG tablet Take 81 mg by mouth daily       azithromycin (ZITHROMAX) 500 MG tablet Take one daily x 3 days for treatment of traveler's diarrhea 3 tablet 0    beclomethasone HFA (QVAR REDIHALER) 40 MCG/ACT inhaler Inhale 1 puff into the lungs 2 times daily 10.6 g 11    Calcium-Vitamin D 600-200 MG-UNIT TABS Take 1 tablet by mouth At Bedtime       " citalopram (CELEXA) 10 MG tablet Take 1 tablet (10 mg) by mouth daily 90 tablet 3    cyanocobalamin (VITAMIN B-12) 1000 MCG SUBL sublingual tablet Place 1,000 mcg under the tongue daily      fexofenadine (ALLEGRA) 180 MG tablet Take 180 mg by mouth daily      gabapentin (NEURONTIN) 300 MG capsule Take 1 capsule (300 mg) by mouth 3 times daily (Patient taking differently: Take 300 mg by mouth 2 times daily Take 1 cap (300 mg) twice daily) 270 capsule 3    galcanezumab-gnlm (EMGALITY) 120 MG/ML injection Inject 1 mL (120 mg) Subcutaneous every 28 days 1 mL 5    indomethacin (INDOCIN) 50 MG capsule Take 1 capsule (50 mg) by mouth as needed for moderate pain Take 1 tablet as needed 10 capsule 3    levothyroxine (SYNTHROID/LEVOTHROID) 88 MCG tablet Take 1 tablet (88 mcg) by mouth daily Except skip Sunday dose 90 tablet 3    magnesium oxide (MAG-OX) 400 MG tablet Take 800 mg by mouth At Bedtime      Multiple vitamin TABS Take 3 tablets by mouth daily       omeprazole (PRILOSEC) 20 MG capsule Take 40 mg by mouth daily (with dinner)       predniSONE (DELTASONE) 20 MG tablet Take 2 po q am with food x 5 days for asthma flare 10 tablet 1    rizatriptan (MAXALT) 10 MG tablet Take 1 tablet (10 mg) by mouth at onset of headache for migraine May repeat in 2 hours. Max 3 tablets/24 hours. 12 tablet 4    rosuvastatin (CRESTOR) 10 MG tablet Take 1 tablet (10 mg) by mouth daily 90 tablet 3    spacer (OPTICHAMBER CHIVO) holding chamber Use with MDI      Vitamin D (Cholecalciferol) 50 MCG (2000 UT) CAPS Take 2,000 Units by mouth daily       zolpidem (AMBIEN) 10 MG tablet Take 1 tablet (10 mg) by mouth nightly as needed for sleep 30 tablet 0    galcanezumab-gnlm (EMGALITY) 120 MG/ML injection Inject 1 mL (120 mg) Subcutaneous every 28 days 1 mL 10       Allergies   Allergen Reactions    Cyclobenzaprine Other (See Comments)    Levofloxacin Unknown     Tendon injury (torn)    Rofecoxib Nausea    Simvastatin Other (See Comments)     "Trazodone Other (See Comments)     Other reaction(s): Nightmares    Vicodin [Hydrocodone-Acetaminophen] Itching    Latex Rash        EXAM  /80 (BP Location: Left arm, Patient Position: Sitting, Cuff Size: Adult Regular)   Pulse 74   Temp (!) 96.5  F (35.8  C) (Skin)   Resp 15   Ht 1.575 m (5' 2\")   Wt 54.4 kg (120 lb)   SpO2 99%   BMI 21.95 kg/m    Gen: Alert, pleasant, NAD  COR: S1,S2, no murmur  Lungs: CTA bilaterally, no rhonchi, wheezes or rales  Abdomen: Soft, non tender, normal bowel sounds, no HSM or mass  Skin: mild papular eruption, 4 lesions on abdomen, healing, no surrounding redness      Assessment:  (R19.5) Loose stools  (primary encounter diagnosis)  Comment: suspect traveler's diarrhea  Plan: Enteric Bacteria and Virus Panel by [facility]        She brought stool sample to clinic, will check for enteric pathogens.   Fluids. Will contact her with results. Reports tendonitis with previous fluoroquinolone use    (G43.919) Intractable migraine, unspecified migraine type  Comment: currently on gabapentin, managed by neurologist. Takes medication BID, not TID  Plan: gabapentin (NEURONTIN) 300 MG capsule        Dose adjustment made in chart.    Alondra Foster MD  Internal Medicine/Pediatrics      "

## 2024-03-01 NOTE — NURSING NOTE
"78 year old  Chief Complaint   Patient presents with    Diarrhea     Pt reports loose stools (on and off since their trip to [place], 02/02) and chills (for the past day). They also reports loss of appetite.        Blood pressure 119/80, pulse 74, temperature (!) 96.5  F (35.8  C), temperature source Skin, resp. rate 15, height 1.575 m (5' 2\"), weight 54.4 kg (120 lb), SpO2 99%, not currently breastfeeding. Body mass index is 21.95 kg/m .  Patient Active Problem List   Diagnosis    Bladder neck obstruction    Constipation    Transient global amnesia    Other hammer toe (acquired)    Ostium secundum type atrial septal defect    Senile osteoporosis    Syndrome affecting cervical region    Variants of migraine    Malignant neoplasm of skin    Persistent insomnia    Hypothyroidism    Family history of malignant neoplasm of ovary    Tear film insufficiency    Circulatory system disorder    PFO (patent foramen ovale)    Gastroesophageal reflux disease without esophagitis    ACP (advance care planning)    Amnesia    Chronic paroxysmal hemicrania, not intractable    Osteopenia of necks of both femurs    Hyperlipidemia LDL goal <100    Intractable migraine, unspecified migraine type    VLAD (generalized anxiety disorder)    Choking sensation    Localized swelling of lower leg       Wt Readings from Last 2 Encounters:   03/01/24 54.4 kg (120 lb)   10/11/23 54.4 kg (120 lb)     BP Readings from Last 3 Encounters:   03/01/24 119/80   01/11/24 109/72   12/13/23 117/78         Current Outpatient Medications   Medication    albuterol (PROAIR HFA/PROVENTIL HFA/VENTOLIN HFA) 108 (90 Base) MCG/ACT inhaler    aspirin 81 MG tablet    azithromycin (ZITHROMAX) 500 MG tablet    beclomethasone HFA (QVAR REDIHALER) 40 MCG/ACT inhaler    Calcium-Vitamin D 600-200 MG-UNIT TABS    citalopram (CELEXA) 10 MG tablet    cyanocobalamin (VITAMIN B-12) 1000 MCG SUBL sublingual tablet    fexofenadine (ALLEGRA) 180 MG tablet    gabapentin (NEURONTIN) 300 " "MG capsule    galcanezumab-gnlm (EMGALITY) 120 MG/ML injection    indomethacin (INDOCIN) 50 MG capsule    levothyroxine (SYNTHROID/LEVOTHROID) 88 MCG tablet    magnesium oxide (MAG-OX) 400 MG tablet    Multiple vitamin TABS    omeprazole (PRILOSEC) 20 MG capsule    predniSONE (DELTASONE) 20 MG tablet    rizatriptan (MAXALT) 10 MG tablet    rosuvastatin (CRESTOR) 10 MG tablet    spacer (OPTICHAMBER CHIVO) holding chamber    Vitamin D (Cholecalciferol) 50 MCG (2000) CAPS    zolpidem (AMBIEN) 10 MG tablet    galcanezumab-gnlm (EMGALITY) 120 MG/ML injection     No current facility-administered medications for this visit.       Social History     Tobacco Use    Smoking status: Former     Packs/day: 0.50     Years: 10.00     Additional pack years: 0.00     Total pack years: 5.00     Types: Cigarettes     Quit date: 1970     Years since quittin.2    Smokeless tobacco: Never   Vaping Use    Vaping Use: Never used   Substance Use Topics    Alcohol use: Yes     Alcohol/week: 2.0 standard drinks of alcohol     Types: 2 Cans of beer per week     Comment: rare    Drug use: No       Health Maintenance Due   Topic Date Due    IPV IMMUNIZATION (2 of 3 - Adult catch-up series) 1995    RSV VACCINE (Pregnancy & 60+) (1 - 1-dose 60+ series) Never done    ADVANCE CARE PLANNING  2022       No results found for: \"PAP\"      2024 11:11 AM    "

## 2024-04-09 ENCOUNTER — LAB (OUTPATIENT)
Dept: INFUSION THERAPY | Facility: CLINIC | Age: 79
End: 2024-04-09
Payer: COMMERCIAL

## 2024-04-09 DIAGNOSIS — D47.2 MONOCLONAL GAMMOPATHY PRESENT ON SERUM PROTEIN ELECTROPHORESIS: ICD-10-CM

## 2024-04-09 LAB
BASOPHILS # BLD AUTO: 0 10E3/UL (ref 0–0.2)
BASOPHILS NFR BLD AUTO: 0 %
EOSINOPHIL # BLD AUTO: 0 10E3/UL (ref 0–0.7)
EOSINOPHIL NFR BLD AUTO: 1 %
ERYTHROCYTE [DISTWIDTH] IN BLOOD BY AUTOMATED COUNT: 12.5 % (ref 10–15)
HCT VFR BLD AUTO: 39.9 % (ref 35–47)
HGB BLD-MCNC: 12.9 G/DL (ref 11.7–15.7)
IMM GRANULOCYTES # BLD: 0 10E3/UL
IMM GRANULOCYTES NFR BLD: 0 %
LYMPHOCYTES # BLD AUTO: 1.7 10E3/UL (ref 0.8–5.3)
LYMPHOCYTES NFR BLD AUTO: 25 %
MCH RBC QN AUTO: 31.3 PG (ref 26.5–33)
MCHC RBC AUTO-ENTMCNC: 32.3 G/DL (ref 31.5–36.5)
MCV RBC AUTO: 97 FL (ref 78–100)
MONOCYTES # BLD AUTO: 0.5 10E3/UL (ref 0–1.3)
MONOCYTES NFR BLD AUTO: 8 %
NEUTROPHILS # BLD AUTO: 4.6 10E3/UL (ref 1.6–8.3)
NEUTROPHILS NFR BLD AUTO: 66 %
NRBC # BLD AUTO: 0 10E3/UL
NRBC BLD AUTO-RTO: 0 /100
PLATELET # BLD AUTO: 311 10E3/UL (ref 150–450)
RBC # BLD AUTO: 4.12 10E6/UL (ref 3.8–5.2)
WBC # BLD AUTO: 6.9 10E3/UL (ref 4–11)

## 2024-04-09 PROCEDURE — 84155 ASSAY OF PROTEIN SERUM: CPT | Performed by: INTERNAL MEDICINE

## 2024-04-09 PROCEDURE — 36415 COLL VENOUS BLD VENIPUNCTURE: CPT

## 2024-04-09 PROCEDURE — 85025 COMPLETE CBC W/AUTO DIFF WBC: CPT | Performed by: INTERNAL MEDICINE

## 2024-04-09 PROCEDURE — 84165 PROTEIN E-PHORESIS SERUM: CPT | Mod: 26 | Performed by: PATHOLOGY

## 2024-04-09 PROCEDURE — 84165 PROTEIN E-PHORESIS SERUM: CPT | Mod: TC | Performed by: PATHOLOGY

## 2024-04-09 NOTE — PROGRESS NOTES
Medical Assistant Note:  Rakel Parish presents today for blood draw.    Patient seen by provider today: No.   present during visit today: Not Applicable.    Concerns: No Concerns.    Procedure:  Lab draw site: rac, Needle type: bf, Gauge: 23.    Post Assessment:  Labs drawn without difficulty: Yes.    Discharge Plan:  Departure Mode: Ambulatory.    Face to Face Time: 5 min.    Rocío Ravi, CMA

## 2024-04-10 ENCOUNTER — TRANSFERRED RECORDS (OUTPATIENT)
Dept: HEALTH INFORMATION MANAGEMENT | Facility: CLINIC | Age: 79
End: 2024-04-10
Payer: COMMERCIAL

## 2024-04-10 LAB
ALBUMIN SERPL ELPH-MCNC: 4 G/DL (ref 3.7–5.1)
ALPHA1 GLOB SERPL ELPH-MCNC: 0.3 G/DL (ref 0.2–0.4)
ALPHA2 GLOB SERPL ELPH-MCNC: 0.7 G/DL (ref 0.5–0.9)
B-GLOBULIN SERPL ELPH-MCNC: 0.6 G/DL (ref 0.6–1)
GAMMA GLOB SERPL ELPH-MCNC: 0.7 G/DL (ref 0.7–1.6)
M PROTEIN SERPL ELPH-MCNC: 0.1 G/DL
PATH REPORT.COMMENTS IMP SPEC: ABNORMAL
PROT PATTERN SERPL ELPH-IMP: ABNORMAL
TOTAL PROTEIN SERUM FOR ELP: 6.4 G/DL (ref 6.4–8.3)

## 2024-04-15 ENCOUNTER — VIRTUAL VISIT (OUTPATIENT)
Dept: ONCOLOGY | Facility: CLINIC | Age: 79
End: 2024-04-15
Attending: INTERNAL MEDICINE
Payer: COMMERCIAL

## 2024-04-15 DIAGNOSIS — D47.2 MONOCLONAL GAMMOPATHY PRESENT ON SERUM PROTEIN ELECTROPHORESIS: Primary | ICD-10-CM

## 2024-04-15 PROCEDURE — 99214 OFFICE O/P EST MOD 30 MIN: CPT | Mod: 95 | Performed by: INTERNAL MEDICINE

## 2024-04-15 NOTE — LETTER
4/15/2024         RE: Rakel Parish  [address]  [facility] [id number]        Dear Colleague,    Thank you for referring your patient, Rakel Parish, to the [facility]. Please see a copy of my visit note below.    Virtual Visit Details    Type of service:  Video Visit        Originating Location (pt. Location): Home    Distant Location (provider location):  On-site  Platform used for Video Visit: [employer]    [facility]    Hematology/Oncology New Patient Note virtual      Today's Date: 04/15/2024    Reason for Consult: MGUS      HISTORY OF PRESENT ILLNESS:Nancy is a very pleasant 77-year-old female with the following hematologic history  1.  Incidental lab SPEP drawn for secondary causes of osteoporosis  2.  SPEP showed a small monoclonal proteinOf 0.1 g/dL.  3. Immunofixation showed faint monoclonal IgG immunoglobulin of lambda chain type  4.  CBC normal    Nancy feels well.  She is on Reclast yearly.  She denies any night sweats,  has had unintentional weight loss over the last 2 weeks and has had bowel changes and bloating she can't get in to see Dr Rodriguez in [facility] till August. Labs stable. Lack of appetite    REVIEW OF SYSTEMS:   14 point ROS was reviewed and is negative other than as noted above in HPI.       HOME MEDICATIONS:  Current Outpatient Medications   Medication Sig Dispense Refill     albuterol (PROAIR HFA/PROVENTIL HFA/VENTOLIN HFA) 108 (90 Base) MCG/ACT inhaler Inhale 2 puffs into the lungs every 6 hours as needed for shortness of breath, wheezing or cough 18 g 3     aspirin 81 MG tablet Take 81 mg by mouth daily        azithromycin (ZITHROMAX) 500 MG tablet Take one daily x 3 days for treatment of traveler's diarrhea 3 tablet 0     beclomethasone HFA (QVAR REDIHALER) 40 MCG/ACT inhaler Inhale 1 puff into the lungs 2 times daily 10.6 g 11     Calcium-Vitamin D 600-200 MG-UNIT TABS Take 1 tablet by mouth At Bedtime        citalopram  (CELEXA) 10 MG tablet Take 1 tablet (10 mg) by mouth daily 90 tablet 3     cyanocobalamin (VITAMIN B-12) 1000 MCG SUBL sublingual tablet Place 1,000 mcg under the tongue daily       fexofenadine (ALLEGRA) 180 MG tablet Take 180 mg by mouth daily       gabapentin (NEURONTIN) 300 MG capsule Take 1 capsule (300 mg) by mouth 2 times daily Take 1 cap (300 mg) twice daily       galcanezumab-gnlm (EMGALITY) 120 MG/ML injection Inject 1 mL (120 mg) Subcutaneous every 28 days 1 mL 5     indomethacin (INDOCIN) 50 MG capsule Take 1 capsule (50 mg) by mouth as needed for moderate pain Take 1 tablet as needed 10 capsule 3     levothyroxine (SYNTHROID/LEVOTHROID) 88 MCG tablet Take 1 tablet (88 mcg) by mouth daily Except skip Sunday dose 90 tablet 3     magnesium oxide (MAG-OX) 400 MG tablet Take 800 mg by mouth At Bedtime       Multiple vitamin TABS Take 3 tablets by mouth daily        omeprazole (PRILOSEC) 20 MG capsule Take 40 mg by mouth daily (with dinner)        predniSONE (DELTASONE) 20 MG tablet Take 2 po q am with food x 5 days for asthma flare 10 tablet 1     rizatriptan (MAXALT) 10 MG tablet Take 1 tablet (10 mg) by mouth at onset of headache for migraine May repeat in 2 hours. Max 3 tablets/24 hours. 12 tablet 4     rosuvastatin (CRESTOR) 10 MG tablet Take 1 tablet (10 mg) by mouth daily 90 tablet 3     spacer (OPTICHAMBER CHIVO) holding chamber Use with MDI       Vitamin D (Cholecalciferol) 50 MCG (2000 UT) CAPS Take 2,000 Units by mouth daily        zolpidem (AMBIEN) 10 MG tablet Take 1 tablet (10 mg) by mouth nightly as needed for sleep 30 tablet 0         ALLERGIES:  Allergies   Allergen Reactions     Cyclobenzaprine Other (See Comments)     Levofloxacin Unknown     Tendon injury (torn)     Rofecoxib Nausea     Simvastatin Other (See Comments)     Trazodone Other (See Comments)     Other reaction(s): Nightmares     Vicodin [Hydrocodone-Acetaminophen] Itching     Latex Rash         PAST MEDICAL HISTORY:  Past  Medical History:   Diagnosis Date     Asthma      Global amnesia     Hypothyroidism     Hyperlipidaemia LDL goal < 130      Hypothyroidism      Insomnia      Migraine, unspecified, without mention of intractable migraine without mention of status migrainosus      Osteopenia      Osteoporosis      Raynaud's disease      Unspecified asthma(493.90)          PAST SURGICAL HISTORY:  Past Surgical History:   Procedure Laterality Date     APPENDECTOMY       BUNIONECTOMY Right 2010     COLONOSCOPY  11/16/10    Every 7 years     COLONOSCOPY  2018    hemorrhoids, [place] [facility]     FOOT SURGERY Left     tendon rupture     HC REPAIR OF HAMMERTOE,ONE       TONSILLECTOMY & ADENOIDECTOMY       TUBAL LIGATION           SOCIAL HISTORY:  Social History     Socioeconomic History     Marital status:      Spouse name: Not on file     Number of children: Not on file     Years of education: Not on file     Highest education level: Not on file   Occupational History     Not on file   Tobacco Use     Smoking status: Former     Current packs/day: 0.00     Average packs/day: 0.5 packs/day for 10.0 years (5.0 ttl pk-yrs)     Types: Cigarettes     Start date: 1960     Quit date: 1970     Years since quittin.3     Smokeless tobacco: Never   Vaping Use     Vaping status: Never Used   Substance and Sexual Activity     Alcohol use: Yes     Alcohol/week: 2.0 standard drinks of alcohol     Types: 2 Cans of beer per week     Comment: rare     Drug use: No     Sexual activity: Yes     Partners: Male   Other Topics Concern     Parent/sibling w/ CABG, MI or angioplasty before 65F 55M? Not Asked   Social History Narrative     Not on file     Social Determinants of Health     Financial Resource Strain: High Risk (2023)    Financial Resource Strain      Within the past 12 months, have you or your family members you live with been unable to get utilities (heat, electricity) when it was really needed?: Yes   Food Insecurity:  "Low Risk  (2023)    Food Insecurity      Within the past 12 months, did you worry that your food would run out before you got money to buy more?: No      Within the past 12 months, did the food you bought just not last and you didn t have money to get more?: No   Transportation Needs: Low Risk  (2023)    Transportation Needs      Within the past 12 months, has lack of transportation kept you from medical appointments, getting your medicines, non-medical meetings or appointments, work, or from getting things that you need?: No   Physical Activity: Not on file   Stress: Not on file   Social Connections: Not on file   Interpersonal Safety: Low Risk  (10/11/2023)    Interpersonal Safety      Do you feel physically and emotionally safe where you currently live?: Yes      Within the past 12 months, have you been hit, slapped, kicked or otherwise physically hurt by someone?: No      Within the past 12 months, have you been humiliated or emotionally abused in other ways by your partner or ex-partner?: No   Housing Stability: Low Risk  (2023)    Housing Stability      Do you have housing? : Yes      Are you worried about losing your housing?: No         FAMILY HISTORY:  Family History   Problem Relation Age of Onset     Osteoporosis Mother 94         at 94 after hip fracture     Dementia Mother      Hypertension Mother      C.A.D. Father 85         of renal failure. ASCVD, MI x 2     Prostate Cancer Father 80     Cerebrovascular Disease Father      Ovarian Cancer Sister 82     Neurologic Disorder Sister         \"unable to grab thoughts\"     Gout Sister      Fibromyalgia Sister      Cancer Brother         CLL     Prostate Cancer Brother 68     Multiple myeloma Brother 93        new onset     Asthma Brother      Breast Cancer Niece 40        Maternal     Colon Cancer No family hx of          PHYSICAL EXAM:  Vital signs:  There were no vitals taken for this visit.   ECO  ECOG: " 0  GENERAL/CONSTITUTIONAL: No acute distress. Healthy, alert.  EYES: No scleral icterus.  No redness or discharge.    RESPIRATORY: No audible wheeze, cough, or visible cyanosis.  No visible retractions or increased work of breathing.  Able to speak fully in complete sentences.  MUSCULOSKELETAL: Normal range of motion.  NEUROLOGIC: Alert, oriented, answers questions appropriately. No tremor. Mentation intact and speech normal  INTEGUMENTARY: No jaundice.  No obvious rash or skin lesions.  PSYCHIATRIC:  Mentation appears normal, affect normal/bright, judgement and insight intact, normal speech and appearance well-groomed.    The rest of a comprehensive physical exam is deferred due to public health emergency video visit restrictions.       LABS:  Labs extensively noted and reviewed with the patient in [facility]    PATHOLOGY:  N/A    IMAGING:  Noted x-rays of the lumbar spine    ASSESSMENT/PLAN: aNncy is a very pleasant 78-year-old female with a new diagnosis of IgG lambda related MGUS    She has low risk MGUS based on the IgG subtype and level on the SPEP.  No evidence of endorgan damage      Discussed with the patient of 0.5 %/year risk of conversion to myeloma.  See me in a year      1.  MGUS: Low risk. See me in a year  2 Unintentional weight loss and bowel changes : GI follow up, will call Dr Rodriguez's office to see if she can get in sooner    Total time spent on day of visit, including review of tests, obtaining/reviewing separately obtained history, ordering medications/tests/procedures, communicating with PCP/consultants, and documenting in electronic medical record: 30 minutes       Miguel De MD  Hematology/Oncology  [facility]       Again, thank you for allowing me to participate in the care of your patient.        Sincerely,        Miguel De MD

## 2024-04-15 NOTE — PROGRESS NOTES
Virtual Visit Details    Type of service:  Video Visit        Originating Location (pt. Location): Home    Distant Location (provider location):  On-site  Platform used for Video Visit: [employer]

## 2024-04-15 NOTE — NURSING NOTE
Unable to complete any rooming or QRNS due to time restraint.       Is the patient currently in the state of [place]? YES    Visit mode:VIDEO    If the visit is dropped, the patient can be reconnected by: VIDEO VISIT: Text to cell phone:   Telephone Information:   Mobile [phone]       Will anyone else be joining the visit? NO  (If patient encounters technical issues they should call [phone]    How would you like to obtain your AVS? MyChart    Are changes needed to the allergy or medication list?  Unable to review due to time    Are refills needed on medications prescribed by this physician? NO    Reason for visit: SARWAT Pickard [facility]

## 2024-04-15 NOTE — LETTER
4/15/2024         RE: Rakel Parish  [address]  [facility] [address]        Dear Colleague,    Thank you for referring your patient, Rakel Parish, to the [facility]. Please see a copy of my visit note below.    Virtual Visit Details    Type of service:  Video Visit        Originating Location (pt. Location): Home    Distant Location (provider location):  On-site  Platform used for Video Visit: Frontstart    [facility]    Hematology/Oncology New Patient Note virtual      Today's Date: 04/15/2024    Reason for Consult: MGUS      HISTORY OF PRESENT ILLNESS:Nancy is a very pleasant 77-year-old female with the following hematologic history  1.  Incidental lab SPEP drawn for secondary causes of osteoporosis  2.  SPEP showed a small monoclonal proteinOf 0.1 g/dL.  3. Immunofixation showed faint monoclonal IgG immunoglobulin of lambda chain type  4.  CBC normal    Nancy feels well.  She is on Reclast yearly.  She denies any night sweats,  has had unintentional weight loss over the last 2 weeks and has had bowel changes and bloating she can't get in to see Dr Rodriguez in [facility] till August. Labs stable. Lack of appetite    REVIEW OF SYSTEMS:   14 point ROS was reviewed and is negative other than as noted above in HPI.       HOME MEDICATIONS:  Current Outpatient Medications   Medication Sig Dispense Refill     albuterol (PROAIR HFA/PROVENTIL HFA/VENTOLIN HFA) 108 (90 Base) MCG/ACT inhaler Inhale 2 puffs into the lungs every 6 hours as needed for shortness of breath, wheezing or cough 18 g 3     aspirin 81 MG tablet Take 81 mg by mouth daily        azithromycin (ZITHROMAX) 500 MG tablet Take one daily x 3 days for treatment of traveler's diarrhea 3 tablet 0     beclomethasone HFA (QVAR REDIHALER) 40 MCG/ACT inhaler Inhale 1 puff into the lungs 2 times daily 10.6 g 11     Calcium-Vitamin D 600-200 MG-UNIT TABS Take 1 tablet by mouth At Bedtime        citalopram  (CELEXA) 10 MG tablet Take 1 tablet (10 mg) by mouth daily 90 tablet 3     cyanocobalamin (VITAMIN B-12) 1000 MCG SUBL sublingual tablet Place 1,000 mcg under the tongue daily       fexofenadine (ALLEGRA) 180 MG tablet Take 180 mg by mouth daily       gabapentin (NEURONTIN) 300 MG capsule Take 1 capsule (300 mg) by mouth 2 times daily Take 1 cap (300 mg) twice daily       galcanezumab-gnlm (EMGALITY) 120 MG/ML injection Inject 1 mL (120 mg) Subcutaneous every 28 days 1 mL 5     indomethacin (INDOCIN) 50 MG capsule Take 1 capsule (50 mg) by mouth as needed for moderate pain Take 1 tablet as needed 10 capsule 3     levothyroxine (SYNTHROID/LEVOTHROID) 88 MCG tablet Take 1 tablet (88 mcg) by mouth daily Except skip Sunday dose 90 tablet 3     magnesium oxide (MAG-OX) 400 MG tablet Take 800 mg by mouth At Bedtime       Multiple vitamin TABS Take 3 tablets by mouth daily        omeprazole (PRILOSEC) 20 MG capsule Take 40 mg by mouth daily (with dinner)        predniSONE (DELTASONE) 20 MG tablet Take 2 po q am with food x 5 days for asthma flare 10 tablet 1     rizatriptan (MAXALT) 10 MG tablet Take 1 tablet (10 mg) by mouth at onset of headache for migraine May repeat in 2 hours. Max 3 tablets/24 hours. 12 tablet 4     rosuvastatin (CRESTOR) 10 MG tablet Take 1 tablet (10 mg) by mouth daily 90 tablet 3     spacer (OPTICHAMBER CHIVO) holding chamber Use with MDI       Vitamin D (Cholecalciferol) 50 MCG (2000 UT) CAPS Take 2,000 Units by mouth daily        zolpidem (AMBIEN) 10 MG tablet Take 1 tablet (10 mg) by mouth nightly as needed for sleep 30 tablet 0         ALLERGIES:  Allergies   Allergen Reactions     Cyclobenzaprine Other (See Comments)     Levofloxacin Unknown     Tendon injury (torn)     Rofecoxib Nausea     Simvastatin Other (See Comments)     Trazodone Other (See Comments)     Other reaction(s): Nightmares     Vicodin [Hydrocodone-Acetaminophen] Itching     Latex Rash         PAST MEDICAL HISTORY:  Past  Medical History:   Diagnosis Date     Asthma      Global amnesia     Hypothyroidism     Hyperlipidaemia LDL goal < 130      Hypothyroidism      Insomnia      Migraine, unspecified, without mention of intractable migraine without mention of status migrainosus      Osteopenia      Osteoporosis      Raynaud's disease      Unspecified asthma(493.90)          PAST SURGICAL HISTORY:  Past Surgical History:   Procedure Laterality Date     APPENDECTOMY       BUNIONECTOMY Right 2010     COLONOSCOPY  11/16/10    Every 7 years     COLONOSCOPY  2018    hemorrhoids, [place] [facility]     FOOT SURGERY Left     tendon rupture     HC REPAIR OF HAMMERTOE,ONE       TONSILLECTOMY & ADENOIDECTOMY       TUBAL LIGATION           SOCIAL HISTORY:  Social History     Socioeconomic History     Marital status:      Spouse name: Not on file     Number of children: Not on file     Years of education: Not on file     Highest education level: Not on file   Occupational History     Not on file   Tobacco Use     Smoking status: Former     Current packs/day: 0.00     Average packs/day: 0.5 packs/day for 10.0 years (5.0 ttl pk-yrs)     Types: Cigarettes     Start date: 1960     Quit date: 1970     Years since quittin.3     Smokeless tobacco: Never   Vaping Use     Vaping status: Never Used   Substance and Sexual Activity     Alcohol use: Yes     Alcohol/week: 2.0 standard drinks of alcohol     Types: 2 Cans of beer per week     Comment: rare     Drug use: No     Sexual activity: Yes     Partners: Male   Other Topics Concern     Parent/sibling w/ CABG, MI or angioplasty before 65F 55M? Not Asked   Social History Narrative     Not on file     Social Determinants of Health     Financial Resource Strain: High Risk (2023)    Financial Resource Strain      Within the past 12 months, have you or your family members you live with been unable to get utilities (heat, electricity) when it was really needed?: Yes   Food Insecurity:  "Low Risk  (2023)    Food Insecurity      Within the past 12 months, did you worry that your food would run out before you got money to buy more?: No      Within the past 12 months, did the food you bought just not last and you didn t have money to get more?: No   Transportation Needs: Low Risk  (2023)    Transportation Needs      Within the past 12 months, has lack of transportation kept you from medical appointments, getting your medicines, non-medical meetings or appointments, work, or from getting things that you need?: No   Physical Activity: Not on file   Stress: Not on file   Social Connections: Not on file   Interpersonal Safety: Low Risk  (10/11/2023)    Interpersonal Safety      Do you feel physically and emotionally safe where you currently live?: Yes      Within the past 12 months, have you been hit, slapped, kicked or otherwise physically hurt by someone?: No      Within the past 12 months, have you been humiliated or emotionally abused in other ways by your partner or ex-partner?: No   Housing Stability: Low Risk  (2023)    Housing Stability      Do you have housing? : Yes      Are you worried about losing your housing?: No         FAMILY HISTORY:  Family History   Problem Relation Age of Onset     Osteoporosis Mother 94         at 94 after hip fracture     Dementia Mother      Hypertension Mother      C.A.D. Father 85         of renal failure. ASCVD, MI x 2     Prostate Cancer Father 80     Cerebrovascular Disease Father      Ovarian Cancer Sister 82     Neurologic Disorder Sister         \"unable to grab thoughts\"     Gout Sister      Fibromyalgia Sister      Cancer Brother         CLL     Prostate Cancer Brother 68     Multiple myeloma Brother 93        new onset     Asthma Brother      Breast Cancer Niece 40        Maternal     Colon Cancer No family hx of          PHYSICAL EXAM:  Vital signs:  There were no vitals taken for this visit.   ECO  ECOG: " 0  GENERAL/CONSTITUTIONAL: No acute distress. Healthy, alert.  EYES: No scleral icterus.  No redness or discharge.    RESPIRATORY: No audible wheeze, cough, or visible cyanosis.  No visible retractions or increased work of breathing.  Able to speak fully in complete sentences.  MUSCULOSKELETAL: Normal range of motion.  NEUROLOGIC: Alert, oriented, answers questions appropriately. No tremor. Mentation intact and speech normal  INTEGUMENTARY: No jaundice.  No obvious rash or skin lesions.  PSYCHIATRIC:  Mentation appears normal, affect normal/bright, judgement and insight intact, normal speech and appearance well-groomed.    The rest of a comprehensive physical exam is deferred due to public health emergency video visit restrictions.       LABS:  Labs extensively noted and reviewed with the patient in [facility]    PATHOLOGY:  N/A    IMAGING:  Noted x-rays of the lumbar spine    ASSESSMENT/PLAN: Nancy is a very pleasant 78-year-old female with a new diagnosis of IgG lambda related MGUS    She has low risk MGUS based on the IgG subtype and level on the SPEP.  No evidence of endorgan damage      Discussed with the patient of 0.5 %/year risk of conversion to myeloma.  See me in a year      1.  MGUS: Low risk. See me in a year  2 Unintentional weight loss and bowel changes : GI follow up, will call Dr Rodriguez's office to see if she can get in sooner    Total time spent on day of visit, including review of tests, obtaining/reviewing separately obtained history, ordering medications/tests/procedures, communicating with PCP/consultants, and documenting in electronic medical record: 30 minutes       Miguel De MD  Hematology/Oncology  [facility]       Again, thank you for allowing me to participate in the care of your patient.        Sincerely,        Miguel De MD

## 2024-04-15 NOTE — PROGRESS NOTES
[facility]    Hematology/Oncology New Patient Note virtual      Today's Date: 04/15/2024    Reason for Consult: MGUS      HISTORY OF PRESENT ILLNESS:Nancy is a very pleasant 77-year-old female with the following hematologic history  1.  Incidental lab SPEP drawn for secondary causes of osteoporosis  2.  SPEP showed a small monoclonal proteinOf 0.1 g/dL.  3. Immunofixation showed faint monoclonal IgG immunoglobulin of lambda chain type  4.  CBC normal    Nancy feels well.  She is on Reclast yearly.  She denies any night sweats,  has had unintentional weight loss over the last 2 weeks and has had bowel changes and bloating she can't get in to see Dr Rodriguez in [facility] till August. Labs stable. Lack of appetite    REVIEW OF SYSTEMS:   14 point ROS was reviewed and is negative other than as noted above in HPI.       HOME MEDICATIONS:  Current Outpatient Medications   Medication Sig Dispense Refill    albuterol (PROAIR HFA/PROVENTIL HFA/VENTOLIN HFA) 108 (90 Base) MCG/ACT inhaler Inhale 2 puffs into the lungs every 6 hours as needed for shortness of breath, wheezing or cough 18 g 3    aspirin 81 MG tablet Take 81 mg by mouth daily       azithromycin (ZITHROMAX) 500 MG tablet Take one daily x 3 days for treatment of traveler's diarrhea 3 tablet 0    beclomethasone HFA (QVAR REDIHALER) 40 MCG/ACT inhaler Inhale 1 puff into the lungs 2 times daily 10.6 g 11    Calcium-Vitamin D 600-200 MG-UNIT TABS Take 1 tablet by mouth At Bedtime       citalopram (CELEXA) 10 MG tablet Take 1 tablet (10 mg) by mouth daily 90 tablet 3    cyanocobalamin (VITAMIN B-12) 1000 MCG SUBL sublingual tablet Place 1,000 mcg under the tongue daily      fexofenadine (ALLEGRA) 180 MG tablet Take 180 mg by mouth daily      gabapentin (NEURONTIN) 300 MG capsule Take 1 capsule (300 mg) by mouth 2 times daily Take 1 cap (300 mg) twice daily      galcanezumab-gnlm (EMGALITY) 120 MG/ML injection Inject 1 mL (120 mg) Subcutaneous every 28  days 1 mL 5    indomethacin (INDOCIN) 50 MG capsule Take 1 capsule (50 mg) by mouth as needed for moderate pain Take 1 tablet as needed 10 capsule 3    levothyroxine (SYNTHROID/LEVOTHROID) 88 MCG tablet Take 1 tablet (88 mcg) by mouth daily Except skip Sunday dose 90 tablet 3    magnesium oxide (MAG-OX) 400 MG tablet Take 800 mg by mouth At Bedtime      Multiple vitamin TABS Take 3 tablets by mouth daily       omeprazole (PRILOSEC) 20 MG capsule Take 40 mg by mouth daily (with dinner)       predniSONE (DELTASONE) 20 MG tablet Take 2 po q am with food x 5 days for asthma flare 10 tablet 1    rizatriptan (MAXALT) 10 MG tablet Take 1 tablet (10 mg) by mouth at onset of headache for migraine May repeat in 2 hours. Max 3 tablets/24 hours. 12 tablet 4    rosuvastatin (CRESTOR) 10 MG tablet Take 1 tablet (10 mg) by mouth daily 90 tablet 3    spacer (OPTICHAMBER CHIVO) holding chamber Use with MDI      Vitamin D (Cholecalciferol) 50 MCG (2000 UT) CAPS Take 2,000 Units by mouth daily       zolpidem (AMBIEN) 10 MG tablet Take 1 tablet (10 mg) by mouth nightly as needed for sleep 30 tablet 0         ALLERGIES:  Allergies   Allergen Reactions    Cyclobenzaprine Other (See Comments)    Levofloxacin Unknown     Tendon injury (torn)    Rofecoxib Nausea    Simvastatin Other (See Comments)    Trazodone Other (See Comments)     Other reaction(s): Nightmares    Vicodin [Hydrocodone-Acetaminophen] Itching    Latex Rash         PAST MEDICAL HISTORY:  Past Medical History:   Diagnosis Date    Asthma     Global amnesia     Hypothyroidism    Hyperlipidaemia LDL goal < 130     Hypothyroidism     Insomnia     Migraine, unspecified, without mention of intractable migraine without mention of status migrainosus     Osteopenia     Osteoporosis     Raynaud's disease     Unspecified asthma(493.90)          PAST SURGICAL HISTORY:  Past Surgical History:   Procedure Laterality Date    APPENDECTOMY      BUNIONECTOMY Right 11/02/2010    COLONOSCOPY   11/16/10    Every 7 years    COLONOSCOPY  2018    hemorrhoids, [place] [facility]    FOOT SURGERY Left     tendon rupture    HC REPAIR OF HAMMERTOE,ONE      TONSILLECTOMY & ADENOIDECTOMY      TUBAL LIGATION           SOCIAL HISTORY:  Social History     Socioeconomic History    Marital status:      Spouse name: Not on file    Number of children: Not on file    Years of education: Not on file    Highest education level: Not on file   Occupational History    Not on file   Tobacco Use    Smoking status: Former     Current packs/day: 0.00     Average packs/day: 0.5 packs/day for 10.0 years (5.0 ttl pk-yrs)     Types: Cigarettes     Start date: 1960     Quit date: 1970     Years since quittin.3    Smokeless tobacco: Never   Vaping Use    Vaping status: Never Used   Substance and Sexual Activity    Alcohol use: Yes     Alcohol/week: 2.0 standard drinks of alcohol     Types: 2 Cans of beer per week     Comment: rare    Drug use: No    Sexual activity: Yes     Partners: Male   Other Topics Concern    Parent/sibling w/ CABG, MI or angioplasty before 65F 55M? Not Asked   Social History Narrative    Not on file     Social Determinants of Health     Financial Resource Strain: High Risk (2023)    Financial Resource Strain     Within the past 12 months, have you or your family members you live with been unable to get utilities (heat, electricity) when it was really needed?: Yes   Food Insecurity: Low Risk  (2023)    Food Insecurity     Within the past 12 months, did you worry that your food would run out before you got money to buy more?: No     Within the past 12 months, did the food you bought just not last and you didn t have money to get more?: No   Transportation Needs: Low Risk  (2023)    Transportation Needs     Within the past 12 months, has lack of transportation kept you from medical appointments, getting your medicines, non-medical meetings or appointments, work, or from getting  "things that you need?: No   Physical Activity: Not on file   Stress: Not on file   Social Connections: Not on file   Interpersonal Safety: Low Risk  (10/11/2023)    Interpersonal Safety     Do you feel physically and emotionally safe where you currently live?: Yes     Within the past 12 months, have you been hit, slapped, kicked or otherwise physically hurt by someone?: No     Within the past 12 months, have you been humiliated or emotionally abused in other ways by your partner or ex-partner?: No   Housing Stability: Low Risk  (2023)    Housing Stability     Do you have housing? : Yes     Are you worried about losing your housing?: No         FAMILY HISTORY:  Family History   Problem Relation Age of Onset    Osteoporosis Mother 94         at 94 after hip fracture    Dementia Mother     Hypertension Mother     C.A.D. Father 85         of renal failure. ASCVD, MI x 2    Prostate Cancer Father 80    Cerebrovascular Disease Father     Ovarian Cancer Sister 82    Neurologic Disorder Sister         \"unable to grab thoughts\"    Gout Sister     Fibromyalgia Sister     Cancer Brother         CLL    Prostate Cancer Brother 68    Multiple myeloma Brother 93        new onset    Asthma Brother     Breast Cancer Niece 40        Maternal    Colon Cancer No family hx of          PHYSICAL EXAM:  Vital signs:  There were no vitals taken for this visit.   ECO  ECO  GENERAL/CONSTITUTIONAL: No acute distress. Healthy, alert.  EYES: No scleral icterus.  No redness or discharge.    RESPIRATORY: No audible wheeze, cough, or visible cyanosis.  No visible retractions or increased work of breathing.  Able to speak fully in complete sentences.  MUSCULOSKELETAL: Normal range of motion.  NEUROLOGIC: Alert, oriented, answers questions appropriately. No tremor. Mentation intact and speech normal  INTEGUMENTARY: No jaundice.  No obvious rash or skin lesions.  PSYCHIATRIC:  Mentation appears normal, affect normal/bright, " judgement and insight intact, normal speech and appearance well-groomed.    The rest of a comprehensive physical exam is deferred due to public health emergency video visit restrictions.       LABS:  Labs extensively noted and reviewed with the patient in [facility]    PATHOLOGY:  N/A    IMAGING:  Noted x-rays of the lumbar spine    ASSESSMENT/PLAN: Nancy is a very pleasant 78-year-old female with a new diagnosis of IgG lambda related MGUS    She has low risk MGUS based on the IgG subtype and level on the SPEP.  No evidence of endorgan damage      Discussed with the patient of 0.5 %/year risk of conversion to myeloma.  See me in a year      1.  MGUS: Low risk. See me in a year  2 Unintentional weight loss and bowel changes : GI follow up, will call Dr Rodriguez's office to see if she can get in sooner    Total time spent on day of visit, including review of tests, obtaining/reviewing separately obtained history, ordering medications/tests/procedures, communicating with PCP/consultants, and documenting in electronic medical record: 30 minutes       Miguel De MD  Hematology/Oncology  [facility]

## 2024-04-17 ENCOUNTER — OFFICE VISIT (OUTPATIENT)
Dept: FAMILY MEDICINE | Facility: CLINIC | Age: 79
End: 2024-04-17
Payer: COMMERCIAL

## 2024-04-17 VITALS
TEMPERATURE: 97.1 F | OXYGEN SATURATION: 98 % | DIASTOLIC BLOOD PRESSURE: 61 MMHG | HEART RATE: 63 BPM | SYSTOLIC BLOOD PRESSURE: 95 MMHG

## 2024-04-17 DIAGNOSIS — R13.10 DYSPHAGIA, UNSPECIFIED TYPE: ICD-10-CM

## 2024-04-17 DIAGNOSIS — J45.20 MILD INTERMITTENT ASTHMA WITHOUT COMPLICATION: Primary | ICD-10-CM

## 2024-04-17 DIAGNOSIS — T17.320A CHOKING DUE TO FOOD IN LARYNX, INITIAL ENCOUNTER: ICD-10-CM
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
## 2024-04-17 RX ORDER — ALBUTEROL SULFATE 90 UG/1
2 AEROSOL, METERED RESPIRATORY (INHALATION) EVERY 6 HOURS PRN
Qty: 18 G | Refills: 3 | Status: CANCELLED | OUTPATIENT
Start: 2024-04-17

## 2024-04-17 ASSESSMENT — ASTHMA QUESTIONNAIRES
ACT_TOTALSCORE: 19
QUESTION_5 LAST FOUR WEEKS HOW WOULD YOU RATE YOUR ASTHMA CONTROL: SOMEWHAT CONTROLLED
QUESTION_1 LAST FOUR WEEKS HOW MUCH OF THE TIME DID YOUR ASTHMA KEEP YOU FROM GETTING AS MUCH DONE AT WORK, SCHOOL OR AT HOME: NONE OF THE TIME
ACT_TOTALSCORE: 19
QUESTION_4 LAST FOUR WEEKS HOW OFTEN HAVE YOU USED YOUR RESCUE INHALER OR NEBULIZER MEDICATION (SUCH AS ALBUTEROL): ONE OR TWO TIMES PER DAY
QUESTION_3 LAST FOUR WEEKS HOW OFTEN DID YOUR ASTHMA SYMPTOMS (WHEEZING, COUGHING, SHORTNESS OF BREATH, CHEST TIGHTNESS OR PAIN) WAKE YOU UP AT NIGHT OR EARLIER THAN USUAL IN THE MORNING: ONCE OR TWICE
QUESTION_2 LAST FOUR WEEKS HOW OFTEN HAVE YOU HAD SHORTNESS OF BREATH: NOT AT ALL

## 2024-04-17 NOTE — PROGRESS NOTES
"Rakel Parish is a 78 year old female with hx of mild persistent asthma, constipation, osteoporosis, migraine headache, insomnia, hypothyroidism, GERD, hyperlipidemia, anxiety. She is here for the following issues:      Asthma  Prescribed Qvar inhaler 40mcg/act 1 puff twice daily in 12/2023  Stopped 3 wks ago, and notes increase in coughing spells since stopping med  Albuterol MDI---using once a day  Current cough minimally productive  Sometimes coughing a lot after breakfast, irritates her throat  Denies acid reflux  \"Chokes easily\" worse with vinegar, lemon intake  Allergy season, worse in spring, fexofenadine bid  Night time stuffy, post nasal drip  Some chest tightness        12/13/2023     4:20 PM 3/1/2024    11:07 AM 4/17/2024    10:20 AM   ACT Total Scores   ACT TOTAL SCORE (Goal Greater than or Equal to 20) 14 24 19   In the past 12 months, how many times did you visit the emergency room for your asthma without being admitted to the hospital? 0 0 0   In the past 12 months, how many times were you hospitalized overnight because of your asthma? 0 0 0       Osteoarthritis  Right knee , advanced, \"bone on bone\"  Follows with ortho at [facility]  Set for TKA in August      GI concerns  Appetite is ok, but last week, appetite was dampened  Will see IRAJ Shanks about change in stools--in May  Lots of gas, thinner caliber stools  Home scale 120 lb, weight unchanged  Denies dysphagia  Sometimes choking \"can't catch my breath\"  Wt Readings from Last 4 Encounters:   03/01/24 54.4 kg (120 lb)   10/11/23 54.4 kg (120 lb)   10/10/23 54.4 kg (120 lb)   09/12/23 55.8 kg (123 lb)     EXAM  BP 95/61 (BP Location: Left arm, Patient Position: Sitting, Cuff Size: Adult Small)   Pulse 63   Temp 97.1  F (36.2  C) (Skin)   SpO2 98%   Gen: Alert, pleasant, NAD  COR: S1,S2, no murmur  Lungs: CTA bilaterally, no rhonchi, wheezes or rales  Abdomen: Soft, non tender, normal bowel sounds, no HSM or mass      Assessment:  (J45.20) Mild " intermittent asthma without complication  (primary encounter diagnosis)  Comment: flare of coughing spells when she tried to wean off Qvar  Plan: DISCONTINUED: beclomethasone HFA (QVAR         REDIHALER) 40 MCG/ACT inhaler  Patient Instructions   Qvar inhaler  40mcg/puff  2 puffs twice daily    When you feel back to baseline,  Try    1 puff twice daily  If symptoms flare, tightness, then increase back to 2 puffs twice daily      (T17.320A,  W44.F3XA) Choking due to food in larynx, initial encounter  (R13.10) Dysphagia, unspecified type  Comment: persistent symptoms, suspect she may have some aspiration  Plan: Speech Therapy  Referral        Refer to speech therapy for swallow study    30 minutes spend on the date of this encounter doing chart review, history and exam, documentation and further activities as noted above.         Alondra Fotser MD  Internal Medicine/Pediatrics

## 2024-04-17 NOTE — NURSING NOTE
78 year old  Chief Complaint   Patient presents with    Asthma     Follow-up on asthma, coughing more lately. Ran out of steroid and says it seems to be worse since then, would prefer not to take.        Blood pressure 95/61, pulse 63, temperature 97.1  F (36.2  C), temperature source Skin, SpO2 98%, not currently breastfeeding. There is no height or weight on file to calculate BMI.  Patient Active Problem List   Diagnosis    Bladder neck obstruction    Constipation    Transient global amnesia    Other hammer toe (acquired)    Ostium secundum type atrial septal defect    Senile osteoporosis    Syndrome affecting cervical region    Variants of migraine    Malignant neoplasm of skin    Persistent insomnia    Hypothyroidism    Family history of malignant neoplasm of ovary    Tear film insufficiency    Circulatory system disorder    PFO (patent foramen ovale)    Gastroesophageal reflux disease without esophagitis    ACP (advance care planning)    Amnesia    Chronic paroxysmal hemicrania, not intractable    Osteopenia of necks of both femurs    Hyperlipidemia LDL goal <100    Intractable migraine, unspecified migraine type    VLAD (generalized anxiety disorder)    Choking sensation    Localized swelling of lower leg       Wt Readings from Last 2 Encounters:   03/01/24 54.4 kg (120 lb)   10/11/23 54.4 kg (120 lb)     BP Readings from Last 3 Encounters:   04/17/24 95/61   03/01/24 119/80   01/11/24 109/72         Current Outpatient Medications   Medication Sig Dispense Refill    albuterol (PROAIR HFA/PROVENTIL HFA/VENTOLIN HFA) 108 (90 Base) MCG/ACT inhaler Inhale 2 puffs into the lungs every 6 hours as needed for shortness of breath, wheezing or cough 18 g 3    aspirin 81 MG tablet Take 81 mg by mouth daily       Calcium-Vitamin D 600-200 MG-UNIT TABS Take 1 tablet by mouth At Bedtime       citalopram (CELEXA) 10 MG tablet Take 1 tablet (10 mg) by mouth daily 90 tablet 3    cyanocobalamin (VITAMIN B-12) 1000 MCG SUBL  sublingual tablet Place 1,000 mcg under the tongue daily      fexofenadine (ALLEGRA) 180 MG tablet Take 180 mg by mouth daily      gabapentin (NEURONTIN) 300 MG capsule Take 1 capsule (300 mg) by mouth 2 times daily Take 1 cap (300 mg) twice daily      galcanezumab-gnlm (EMGALITY) 120 MG/ML injection Inject 1 mL (120 mg) Subcutaneous every 28 days 1 mL 5    indomethacin (INDOCIN) 50 MG capsule Take 1 capsule (50 mg) by mouth as needed for moderate pain Take 1 tablet as needed 10 capsule 3    levothyroxine (SYNTHROID/LEVOTHROID) 88 MCG tablet Take 1 tablet (88 mcg) by mouth daily Except skip Sunday dose 90 tablet 3    magnesium oxide (MAG-OX) 400 MG tablet Take 800 mg by mouth At Bedtime      Multiple vitamin TABS Take 3 tablets by mouth daily       omeprazole (PRILOSEC) 20 MG capsule Take 40 mg by mouth daily (with dinner)       rizatriptan (MAXALT) 10 MG tablet Take 1 tablet (10 mg) by mouth at onset of headache for migraine May repeat in 2 hours. Max 3 tablets/24 hours. 12 tablet 4    rosuvastatin (CRESTOR) 10 MG tablet Take 1 tablet (10 mg) by mouth daily 90 tablet 3    spacer (OPTICHAMBER CHIVO) holding chamber Use with MDI      Vitamin D (Cholecalciferol) 50 MCG (2000 UT) CAPS Take 2,000 Units by mouth daily       zolpidem (AMBIEN) 10 MG tablet Take 1 tablet (10 mg) by mouth nightly as needed for sleep 30 tablet 0    azithromycin (ZITHROMAX) 500 MG tablet Take one daily x 3 days for treatment of traveler's diarrhea (Patient not taking: Reported on 4/17/2024) 3 tablet 0    beclomethasone HFA (QVAR REDIHALER) 40 MCG/ACT inhaler Inhale 1 puff into the lungs 2 times daily (Patient not taking: Reported on 4/17/2024) 10.6 g 11    predniSONE (DELTASONE) 20 MG tablet Take 2 po q am with food x 5 days for asthma flare (Patient not taking: Reported on 4/17/2024) 10 tablet 1     No current facility-administered medications for this visit.       Social History     Tobacco Use    Smoking status: Former     Current  "packs/day: 0.00     Average packs/day: 0.5 packs/day for 10.0 years (5.0 ttl pk-yrs)     Types: Cigarettes     Start date: 1960     Quit date: 1970     Years since quittin.3    Smokeless tobacco: Never   Vaping Use    Vaping status: Never Used   Substance Use Topics    Alcohol use: Yes     Alcohol/week: 2.0 standard drinks of alcohol     Types: 2 Cans of beer per week     Comment: rare    Drug use: No       Health Maintenance Due   Topic Date Due    IPV IMMUNIZATION (2 of 3 - Adult catch-up series) 1995    RSV VACCINE (Pregnancy & 60+) (1 - 1-dose 60+ series) Never done    ADVANCE CARE PLANNING  2022    LIPID  2024    MEDICARE ANNUAL WELLNESS VISIT  2024    TSH W/FREE T4 REFLEX  2024       No results found for: \"PAP\"      2024 10:33 AM   "

## 2024-04-17 NOTE — PATIENT INSTRUCTIONS
Qvar inhaler  40mcg/puff  2 puffs twice daily    When you feel back to baseline,  Try    1 puff twice daily  If symptoms flare, tightness, then increase back to 2 puffs twice daily

## 2024-04-18 ENCOUNTER — TELEPHONE (OUTPATIENT)
Dept: FAMILY MEDICINE | Facility: CLINIC | Age: 79
End: 2024-04-18

## 2024-04-18 DIAGNOSIS — J45.20 MILD INTERMITTENT ASTHMA WITHOUT COMPLICATION: ICD-10-CM

## 2024-04-18 NOTE — TELEPHONE ENCOUNTER
General (use ONLY if request does not fit into other dot phrase categories)    Who is calling - Patient  Reason for call - beclomethasone HFA (QVAR REDIHALER) 40 MCG/ACT inhaler  sent to wrong pharmacy   Action desired - Send to [facility]  P: [phone]  F: [phone]  Return call needed? Yes if needed  Advised patient that response may take up to 1-2 business days? Yes  Ok to leave a message on VM? Yes

## 2024-04-18 NOTE — TELEPHONE ENCOUNTER
Rx sent to [facility] and Bandar message sent to Nancy letting her know.  SHIREEN SethN, RN, CCM  RN Care Coordinator  [facility]  04/18/24  9:36 AM  Phone: [phone]

## 2024-04-21 PROBLEM — T17.320A CHOKING DUE TO FOOD IN LARYNX, INITIAL ENCOUNTER: Status: ACTIVE | Noted: 2024-04-21
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
## 2024-04-21 PROBLEM — J45.20 MILD INTERMITTENT ASTHMA WITHOUT COMPLICATION: Status: ACTIVE | Noted: 2024-04-21

## 2024-04-29 ENCOUNTER — OFFICE VISIT (OUTPATIENT)
Dept: NEUROLOGY | Facility: CLINIC | Age: 79
End: 2024-04-29
Payer: COMMERCIAL

## 2024-04-29 VITALS — HEART RATE: 61 BPM | DIASTOLIC BLOOD PRESSURE: 73 MMHG | SYSTOLIC BLOOD PRESSURE: 111 MMHG | OXYGEN SATURATION: 97 %

## 2024-04-29 DIAGNOSIS — M54.81 CERVICO-OCCIPITAL NEURALGIA OF LEFT SIDE: ICD-10-CM

## 2024-04-29 DIAGNOSIS — G47.00 PERSISTENT INSOMNIA: ICD-10-CM

## 2024-04-29 DIAGNOSIS — G43.109 MIGRAINE WITH AURA AND WITHOUT STATUS MIGRAINOSUS, NOT INTRACTABLE: Primary | ICD-10-CM

## 2024-04-29 DIAGNOSIS — G44.049 CHRONIC PAROXYSMAL HEMICRANIA, NOT INTRACTABLE: ICD-10-CM

## 2024-04-29 PROCEDURE — G2211 COMPLEX E/M VISIT ADD ON: HCPCS | Performed by: PSYCHIATRY & NEUROLOGY

## 2024-04-29 PROCEDURE — 99214 OFFICE O/P EST MOD 30 MIN: CPT | Performed by: PSYCHIATRY & NEUROLOGY

## 2024-04-29 NOTE — PROGRESS NOTES
"Rakel Parish is a 78 year old female who presents for:  Chief Complaint   Patient presents with    Follow Up     Migraines- new medication is causing her to be unable to sleep         Initial Vitals:  /73 (BP Location: Right arm, Patient Position: Sitting, Cuff Size: Adult Small)   Pulse 61   SpO2 97%  Estimated body mass index is 21.95 kg/m  as calculated from the following:    Height as of 3/1/24: 1.575 m (5' 2\").    Weight as of 3/1/24: 54.4 kg (120 lb).. There is no height or weight on file to calculate BSA. BP completed using cuff size: small xiomara Lind   "

## 2024-04-29 NOTE — LETTER
4/29/2024         RE: Rakel Parish  [address]  [place] [address]        Dear Colleague,    Thank you for referring your patient, Rakel Parish, to the [facility]. Please see a copy of my visit note below.    ESTABLISHED PATIENT NEUROLOGY NOTE    DATE OF VISIT: 4/29/2024  CLINIC LOCATION: [facility]  MRN: [id number]  PATIENT NAME: Rakel Parish  YOB: 1945    REASON FOR VISIT:   Chief Complaint   Patient presents with     Follow Up     Migraines- new medication is causing her to be unable to sleep      SUBJECTIVE:                                                      HISTORY OF PRESENT ILLNESS: Patient is here to follow up regarding side effects of the medications. Please refer to my initial/other prior notes for further information.    Since the last visit, the patient contacted the clinic questioning side effects of rizatriptan due to difficulty sleeping.  She had previous difficulty with sleep while taking naratriptan, but now it seems more severe.  She wondered if rizatriptan causes it.  She feels that Emgality works well for headache prevention.  This visit was set up to discuss her questions.    Today, the patient reports that every time she takes rizatriptan, she is not able to sleep.  She had similar reaction with naratriptan, but it was milder.  She wonders if she needs to go back to naratriptan, or we have additional treatment options.  Otherwise, she has no additional questions.  EXAM:                                                    Physical Exam:   Vitals: /73 (BP Location: Right arm, Patient Position: Sitting, Cuff Size: Adult Small)   Pulse 61   SpO2 97%     General: pt is in NAD, cooperative.  Skin: normal turgor, moist mucous membranes, no lesions/rashes noticed.  HEENT: ATNC, white sclera, normal conjunctiva.  Respiratory: Symmetric lung excursion, no accessory respiratory muscle use.  Abdomen:  Non distended.  Neurological: awake, cooperative, follows commands, no exam changes compared to the previous visits.  ASSESSMENT AND PLAN:                                                    Assessment: 78 year old female patient presents for follow-up to discuss possible medication side effects in context of migraine, left occipital neuralgia, and chronic paroxysmal hemicrania.  At the last visit, her naratriptan was switched to rizatriptan.  She wonders if it has side effect of insomnia, because it became worse.  We discussed that it would be very rare (less than 1%) that rizatriptan causes insomnia, but possible.  In the past, she tried Imitrex and Relpax in addition to mentioned above triptans, but they were eventually switched.  We reviewed additional treatment options, including Nurtec, Ubrelvy, and Reyvow.  We decided to stop rizatriptan and try Nurtec.  Reviewed side effects and recommendations regarding not to repeat dose for 24 hours.    Diagnoses:    ICD-10-CM    1. Migraine with aura and without status migrainosus, not intractable  G43.109       2. Chronic paroxysmal hemicrania, not intractable  G44.049       3. Cervico-occipital neuralgia of left side  M54.81       4. Persistent insomnia  G47.00         Plan: At today's visit we thoroughly discussed current symptoms, available treatment options, and the plan.    We decided to stop rizatriptan and try Nurtec.  Please take 75 mg at onset of migraine and do not repeat the dose in 24 hours.  Limit use to less than 9 days/month.  No other medication changes.    Advised the patient to keep the headache diary and bring it to the next follow-up visit or upload via My Chart.     Next follow-up appointment is on 7/9/2024 or sooner if needed.    Total Time: 21 minutes spent on the date of the encounter doing chart review, history and exam, documentation and further activities per the note.    Lazarus Villa MD  [facility] Neurology  (Chart  "documentation was completed in part with Dragon voice-recognition software. Even though reviewed, some grammatical, spelling, and word errors may remain.)    Rakel Parish is a 78 year old female who presents for:  Chief Complaint   Patient presents with     Follow Up     Migraines- new medication is causing her to be unable to sleep         Initial Vitals:  /73 (BP Location: Right arm, Patient Position: Sitting, Cuff Size: Adult Small)   Pulse 61   SpO2 97%  Estimated body mass index is 21.95 kg/m  as calculated from the following:    Height as of 3/1/24: 1.575 m (5' 2\").    Weight as of 3/1/24: 54.4 kg (120 lb).. There is no height or weight on file to calculate BSA. BP completed using cuff size: small xiomara Lind       Again, thank you for allowing me to participate in the care of your patient.        Sincerely,        Lazarus Villa MD  "

## 2024-04-29 NOTE — PROGRESS NOTES
ESTABLISHED PATIENT NEUROLOGY NOTE    DATE OF VISIT: 4/29/2024  CLINIC LOCATION: [facility]  MRN: [id number]  PATIENT NAME: Rakel Parish  YOB: 1945    REASON FOR VISIT:   Chief Complaint   Patient presents with    Follow Up     Migraines- new medication is causing her to be unable to sleep      SUBJECTIVE:                                                      HISTORY OF PRESENT ILLNESS: Patient is here to follow up regarding side effects of the medications. Please refer to my initial/other prior notes for further information.    Since the last visit, the patient contacted the clinic questioning side effects of rizatriptan due to difficulty sleeping.  She had previous difficulty with sleep while taking naratriptan, but now it seems more severe.  She wondered if rizatriptan causes it.  She feels that Emgality works well for headache prevention.  This visit was set up to discuss her questions.    Today, the patient reports that every time she takes rizatriptan, she is not able to sleep.  She had similar reaction with naratriptan, but it was milder.  She wonders if she needs to go back to naratriptan, or we have additional treatment options.  Otherwise, she has no additional questions.  EXAM:                                                    Physical Exam:   Vitals: /73 (BP Location: Right arm, Patient Position: Sitting, Cuff Size: Adult Small)   Pulse 61   SpO2 97%     General: pt is in NAD, cooperative.  Skin: normal turgor, moist mucous membranes, no lesions/rashes noticed.  HEENT: ATNC, white sclera, normal conjunctiva.  Respiratory: Symmetric lung excursion, no accessory respiratory muscle use.  Abdomen: Non distended.  Neurological: awake, cooperative, follows commands, no exam changes compared to the previous visits.  ASSESSMENT AND PLAN:                                                    Assessment: 78 year old female patient presents for follow-up to discuss  possible medication side effects in context of migraine, left occipital neuralgia, and chronic paroxysmal hemicrania.  At the last visit, her naratriptan was switched to rizatriptan.  She wonders if it has side effect of insomnia, because it became worse.  We discussed that it would be very rare (less than 1%) that rizatriptan causes insomnia, but possible.  In the past, she tried Imitrex and Relpax in addition to mentioned above triptans, but they were eventually switched.  We reviewed additional treatment options, including Nurtec, Ubrelvy, and Reyvow.  We decided to stop rizatriptan and try Nurtec.  Reviewed side effects and recommendations regarding not to repeat dose for 24 hours.    Diagnoses:    ICD-10-CM    1. Migraine with aura and without status migrainosus, not intractable  G43.109       2. Chronic paroxysmal hemicrania, not intractable  G44.049       3. Cervico-occipital neuralgia of left side  M54.81       4. Persistent insomnia  G47.00         Plan: At today's visit we thoroughly discussed current symptoms, available treatment options, and the plan.    We decided to stop rizatriptan and try Nurtec.  Please take 75 mg at onset of migraine and do not repeat the dose in 24 hours.  Limit use to less than 9 days/month.  No other medication changes.    Advised the patient to keep the headache diary and bring it to the next follow-up visit or upload via My Chart.     Next follow-up appointment is on 7/9/2024 or sooner if needed.    Total Time: 21 minutes spent on the date of the encounter doing chart review, history and exam, documentation and further activities per the note.    Lazarus Villa MD  [facility] Neurology  (Chart documentation was completed in part with Dragon voice-recognition software. Even though reviewed, some grammatical, spelling, and word errors may remain.)

## 2024-04-29 NOTE — PATIENT INSTRUCTIONS
AFTER VISIT SUMMARY (AVS):    At today's visit we thoroughly discussed current symptoms, available treatment options, and the plan.    We decided to stop rizatriptan and try Nurtec.  Please take 75 mg at onset of migraine and do not repeat the dose in 24 hours.  Limit use to less than 9 days/month.  No other medication changes.    Please keep the headache diary and bring it to the next follow-up visit or upload via My Chart.     Next follow-up appointment is on 7/9/2024 or sooner if needed.    Please do not hesitate to call me with any questions or concerns.    Thanks.

## 2024-05-01 ENCOUNTER — TELEPHONE (OUTPATIENT)
Dept: NEUROLOGY | Facility: CLINIC | Age: 79
End: 2024-05-01
Payer: COMMERCIAL

## 2024-05-01 NOTE — TELEPHONE ENCOUNTER
Prior Authorization Specialty Medication Request    Medication/Dose: Nurtec 75mg ODT tabs   Diagnosis and ICD code (if different than what is on RX):    New/renewal/insurance change: NEW  PA/secondary ins. PA:  Previously Tried and Failed:      Important Lab Values:   Rationale:     Insurance   Primary:   Insurance ID:      Secondary (if applicable):  Insurance ID:      Pharmacy Information (if different than what is on RX)  Name:    Phone:    Fax:

## 2024-05-06 ENCOUNTER — THERAPY VISIT (OUTPATIENT)
Dept: SPEECH THERAPY | Facility: CLINIC | Age: 79
End: 2024-05-06
Attending: INTERNAL MEDICINE
Payer: COMMERCIAL

## 2024-05-06 DIAGNOSIS — R13.10 DYSPHAGIA, UNSPECIFIED TYPE: ICD-10-CM

## 2024-05-06 DIAGNOSIS — R13.10 DYSPHAGIA, UNSPECIFIED TYPE: Primary | ICD-10-CM

## 2024-05-06 PROCEDURE — 92610 EVALUATE SWALLOWING FUNCTION: CPT | Mod: GN

## 2024-05-06 PROCEDURE — 92526 ORAL FUNCTION THERAPY: CPT | Mod: GN

## 2024-05-06 NOTE — PROGRESS NOTES
"SPEECH LANGUAGE PATHOLOGY EVALUATION    See electronic medical record for Abuse and Falls Screening details.    Subjective   Presenting condition or subjective complaint: Choking, coughing, usually when eating or with saliva  Date of onset:   04/18/2024  Relevant medical history: Arthritis; Asthma; Bladder or bowel problems; Depression; Hearing problems; Migraines or headaches; Non-healing wounds; Pain at night or rest; Severe headaches   Dates & types of surgery: Bunionectomies, tendon repair left leg, tubal ligation, appendectomy, tonsillectomy    Prior diagnostic imaging/testing results:       Prior therapy history for the same diagnosis, illness or injury: No      Living Environment  Social support: With a significant other or spouse   Help at home: Home and Yard maintenance tasks  Equipment owned:       Employment: No    Hobbies/Interests: Write poetry, read, walk, cook, friends and family    Patient goals for therapy: Eat peacefully    Pain assessment: Pain denied     Objective     SWALLOW EVALUTION  Dysphagia history: Pt reports a hx of choking often, sometimes severely where she can't breathe, specifically with toast, granola, peppery foods, and citrus food and drinks. Pt reports no known allergies. Pt also reports she coughs a lot and sometimes regurgitates when not able to breathe. Pt reports this has been occuring for a few years but she has never had her swallowing assessed. Pt also reports nasal drainage in the spring and fall that causes more coughing. 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 Pt states \"the day I find out I need thickened liquids, just shoot me\". Pt also reports she was under the impression that her PCP referred her here today for a video swallow study, not a clinical swallow evaluation and was hoping she was getting that done to know for sure what is going on.   Current Diet/Method of Nutritional Intake: thin liquids (level 0), " regular diet        CLINICAL SWALLOW EVALUATION  Oral Motor Function: generally intact  Dentition: natural dentition, adequate dentition  Secretion management: WFL  Mucosal quality: good  Mandibular function: intact  Oral labial function: WFL  Lingual function: WFL  Velar function: intact   Buccal function: intact  Laryngeal function: cough, voicing WFL     Level of assist required for feeding: no assistance needed   Textures Trialed:   Clinical Swallow Eval: Thin Liquids  Mode of presentation: cup, self-fed   Volume presented: 4 oz water  Preparatory Phase: WFL  Oral Phase: WFL  Pharyngeal phase of swallow: intact   Strategies trialed during procedure: JANET SLP CLINICAL EVAL STRATEGIES: hard swallow, small bolus size    Diagnostic statement: no overt s/sx of penetration/aspiration    Clinical Swallow Eval: Purees  Mode of presentation: spoon, self-fed   Volume presented: 2 bites of applesauce  Preparatory Phase: WFL  Oral Phase: WFL  Pharyngeal phase of swallow: intact   Strategies trialed during procedure: JANET SLP CLINICAL EVAL STRATEGIES: none    Diagnostic statement: No overt s/sx of penetration/aspiration    Clinical Swallow Eval: Solids  Mode of presentation: self-fed   Volume presented: 2 bites of Connie Doone cookie  Preparatory Phase: WFL  Oral Phase: residue in oral cavity  Pharyngeal phase of swallow: intact   Strategies trialed during procedure: JANET SLP CLINICAL EVAL STRATEGIES: small bolus size, liquid wash    Diagnostic statement: No overt s/sx of penetration/aspiration     ADDITIONAL EVAL COMPLETED TODAY : none    ESOPHAGEAL PHASE OF SWALLOW  no observed or reported concerns related to esophageal function     SWALLOW ASSESSMENT CLINICAL IMPRESSIONS AND RATIONALE  Diet Consistency Recommendations: thin liquids (level 0), regular diet    Recommended Feeding/Eating Techniques: small bolus size, slow rate of intake, alternate food and liquid intake, double swallow, effortful (hard) swallow, monitor for  cough or change in vocal quality with intake, maintain upright sitting position for eating, maintain upright posture during/after eating for 30 minutes, minimize distractions during oral intake   Medication Administration Recommendations: whole as tolerated  Instrumental Assessment Recommendations: VFSS (videofluoroscopic swallowing study)     Assessment & Plan   CLINICAL IMPRESSIONS   Medical Diagnosis:    Dysphagia, unspecified type [R13.10]   Treatment Diagnosis:   dysphagia  Impression/Assessment: Pt is a 78 year old female with swallowing and choking complaints. The following significant findings have been identified: impaired swallowing, characterized by functional swallow observed on textures trialed this date, however Pt reports she chokes on certain dry foods and liquids that are citrus, further assessment recommended via objective video swallow study per Pt request and to assess risk of silent aspiration. Identified deficits interfere with their ability to consume an oral diet as compared to previous level of function.    PLAN OF CARE  Treatment Interventions: Swallowing dysfunction and/or oral function for feeding    Prognosis to achieve stated therapy goals is good   Rehab potential is impacted by: current level of function, family/caregiver support, patient motivation    Long Term Goals:   Goal Identifier: Strategies/HEP  Goal Description: Patient will verbalize and/or demonstrate understanding of compensatory strategies, HEP, and recommendations with 100% accuracy given min-no cues across 2 consecutive sessions.   Rationale: To maximize safety, ease and/or independence of oral intake;   Target Date: 8/4/2024     Goal Identifier: VFSS   Goal Description: Patient will complete video swallow study to objectively assess swallow function, aspiration risk, guide treatment recommendations and diet recommendations within 1 month and will demonstrate understanding of study outcomes in order to generate/modify  goals as needed.   Rationale: To maximize safety, ease and/or independence of oral intake;   Target Date: 8/4/2024     Frequency of Treatment:  1x follow up  Duration of Treatment:   90 days pending VFSS results    Recommended Referrals to Other Professionals: Speech Language Pathology, video swallow study  Education Assessment: Patient;Listening;Reading;Demonstration;Pictures/Video;No Barriers to Learning;    Results of assessment, POC and role of SLP in OP setting     Risks and benefits of evaluation/treatment have been explained.   Patient/Family/caregiver agrees with Plan of Care.     Evaluation Time: Oral/Pharyngeal Swallow Function; 15 minutes        Present: Not applicable     Signing Clinician: JASMINE Varghese    [facility]                                                                                   OUTPATIENT SPEECH LANGUAGE PATHOLOGY    PLAN OF TREATMENT FOR OUTPATIENT REHABILITATION   Patient's Last Name, First Name, Rakel Haynes YOB: 1945   Provider's Name   [facility]   Medical Record No.  [id number]     Onset Date:  04/18/24 Start of Care Date:  05/06/24     Medical Diagnosis:   Dysphagia, unspecified type (R13.10)     SLP Treatment Diagnosis:   dysphagia Plan of Treatment  Frequency/Duration:  1x follow up  /   90 days pending VFSS results    Certification date from  5/6/24   To     8/4/24       See note for plan of treatment details and functional goals     JASMINE Varghese                         I CERTIFY THE NEED FOR THESE SERVICES FURNISHED UNDER        THIS PLAN OF TREATMENT AND WHILE UNDER MY CARE .             Physician Signature               Date    X_____________________________________________________                  Referring Provider:  Alondra Foster    Initial Assessment  See Epic Evaluation-

## 2024-05-15 NOTE — TELEPHONE ENCOUNTER
Prior Authorization Not Needed per Insurance            Medication: rimegepant (NURTEC) 75 MG ODT tablet-PA NOT NEEDED   Insurance Company: [employer] [place] - Phone [phone] Fax [phone]  Expected CoPay:      Pharmacy Filling the Rx: [id number] - [place], [place] - [address] [facility] AND [address]  Pharmacy Notified:  Yes  Patient Notified:  No    Pharmacy stated that PA is Not Needed at This Time. Pharmacy stated that they were able to process medication on 4/30/2024 quantity 2 and on 5/3/2024 quantity 6 and patient has picked up medication on both fills. Advised pharmacy that PA is Not Needed and medication is covered. Unsure why pharmacy process quantities as previously noted. Requested pharmacy to process medication quantity 8 for next available fill.

## 2024-05-17 ENCOUNTER — TRANSFERRED RECORDS (OUTPATIENT)
Dept: HEALTH INFORMATION MANAGEMENT | Facility: CLINIC | Age: 79
End: 2024-05-17
Payer: COMMERCIAL

## 2024-05-20 DIAGNOSIS — G47.00 PERSISTENT INSOMNIA: ICD-10-CM

## 2024-05-20 NOTE — TELEPHONE ENCOUNTER
Zolpidem (Ambien) 10 mg    Last Office Visit: 4/17/24  Future [facility] Appointments: None  Medication last refilled: 2/16/24 #30 with 0 refill(s)     verified - last fill date: 2/19/24 #30    CSA on File - None    Routing refill request to provider for review/approval because:  Drug not on the [facility] refill protocol     LASHAY Seth, RN, CCM

## 2024-05-21 DIAGNOSIS — F51.01 PRIMARY INSOMNIA: Primary | ICD-10-CM

## 2024-05-21 RX ORDER — ZOLPIDEM TARTRATE 10 MG/1
10 TABLET ORAL
Qty: 30 TABLET | Refills: 0 | Status: SHIPPED | OUTPATIENT
Start: 2024-05-21

## 2024-05-27 DIAGNOSIS — G43.109 MIGRAINE WITH AURA AND WITHOUT STATUS MIGRAINOSUS, NOT INTRACTABLE: ICD-10-CM

## 2024-05-28 DIAGNOSIS — E78.5 HYPERLIPIDEMIA LDL GOAL <100: ICD-10-CM

## 2024-05-29 RX ORDER — GALCANEZUMAB 120 MG/ML
INJECTION, SOLUTION SUBCUTANEOUS
Qty: 1 ML | Refills: 11 | Status: SHIPPED | OUTPATIENT
Start: 2024-05-29

## 2024-05-29 RX ORDER — ROSUVASTATIN CALCIUM 10 MG/1
10 TABLET, COATED ORAL DAILY
Qty: 90 TABLET | Refills: 0 | Status: SHIPPED | OUTPATIENT
Start: 2024-05-29 | End: 2024-06-03

## 2024-05-29 NOTE — TELEPHONE ENCOUNTER
Refill Request    Pending Prescriptions:                       Disp   Refills    EMGALITY 120 MG/ML injection [Pharmacy Me*1 mL   5            Sig: INJECT 1ML UNDER THE SKIN EVERY 28 DAYS      Last Office Visit: 04/29/24    Next Office Visit: 07/09/24    Prescribing Provider: Dr. Daisy    Last Medication Refill Date: 12/20/23    Prior Auth Date (if applicable): 05/01/24 Not needed per Insurance    Chart Notes:  She feels that Emgality works well for headache prevention.      Action(s):  Routing to RN.

## 2024-05-29 NOTE — TELEPHONE ENCOUNTER
Rosuvastatin (Crestor) 10 mg    Last Office Visit: 4/17/24  Future [facility] Appointments: 6/4/24  Medication last refilled: 4/4/23 #90 with 3 refill(s)    Required labs per protocol:    LAB REF RANGE 10/24/22 4/4/23   LDL < 100 mg/dL 104 High 89     Prescription approved per [facility] Refill Protocol.    SHIREEN SethN, RN, CCM

## 2024-06-02 ENCOUNTER — HEALTH MAINTENANCE LETTER (OUTPATIENT)
Age: 79
End: 2024-06-02

## 2024-06-04 ENCOUNTER — OFFICE VISIT (OUTPATIENT)
Dept: FAMILY MEDICINE | Facility: CLINIC | Age: 79
End: 2024-06-04
Payer: COMMERCIAL

## 2024-06-04 VITALS
HEART RATE: 67 BPM | DIASTOLIC BLOOD PRESSURE: 71 MMHG | SYSTOLIC BLOOD PRESSURE: 106 MMHG | TEMPERATURE: 97.8 F | OXYGEN SATURATION: 98 %

## 2024-06-04 DIAGNOSIS — E78.5 HYPERLIPIDEMIA LDL GOAL <100: ICD-10-CM

## 2024-06-04 DIAGNOSIS — Z09 HOSPITAL DISCHARGE FOLLOW-UP: Primary | ICD-10-CM

## 2024-06-04 DIAGNOSIS — N39.3 FEMALE STRESS INCONTINENCE: ICD-10-CM

## 2024-06-04 DIAGNOSIS — F51.01 PRIMARY INSOMNIA: ICD-10-CM

## 2024-06-04 DIAGNOSIS — R06.02 SHORTNESS OF BREATH: ICD-10-CM

## 2024-06-04 DIAGNOSIS — J45.41 MODERATE PERSISTENT ASTHMA WITH ACUTE EXACERBATION: ICD-10-CM

## 2024-06-04 RX ORDER — BENZONATATE 100 MG/1
100 CAPSULE ORAL
COMMUNITY
Start: 2024-05-31 | End: 2024-06-06

## 2024-06-04 RX ORDER — PREDNISONE 20 MG/1
TABLET ORAL
Qty: 10 TABLET | Refills: 1 | Status: SHIPPED | OUTPATIENT
Start: 2024-06-04

## 2024-06-04 RX ORDER — ROSUVASTATIN CALCIUM 10 MG/1
10 TABLET, COATED ORAL DAILY
Qty: 90 TABLET | Refills: 3 | Status: SHIPPED | OUTPATIENT
Start: 2024-06-04 | End: 2024-09-10

## 2024-06-04 ASSESSMENT — ASTHMA QUESTIONNAIRES
QUESTION_4 LAST FOUR WEEKS HOW OFTEN HAVE YOU USED YOUR RESCUE INHALER OR NEBULIZER MEDICATION (SUCH AS ALBUTEROL): ONE OR TWO TIMES PER DAY
QUESTION_5 LAST FOUR WEEKS HOW WOULD YOU RATE YOUR ASTHMA CONTROL: POORLY CONTROLLED
QUESTION_2 LAST FOUR WEEKS HOW OFTEN HAVE YOU HAD SHORTNESS OF BREATH: THREE TO SIX TIMES A WEEK
QUESTION_1 LAST FOUR WEEKS HOW MUCH OF THE TIME DID YOUR ASTHMA KEEP YOU FROM GETTING AS MUCH DONE AT WORK, SCHOOL OR AT HOME: SOME OF THE TIME
QUESTION_3 LAST FOUR WEEKS HOW OFTEN DID YOUR ASTHMA SYMPTOMS (WHEEZING, COUGHING, SHORTNESS OF BREATH, CHEST TIGHTNESS OR PAIN) WAKE YOU UP AT NIGHT OR EARLIER THAN USUAL IN THE MORNING: ONCE OR TWICE
ACT_TOTALSCORE: 14
EMERGENCY_ROOM_LAST_YEAR_TOTAL: ONE
ACT_TOTALSCORE: 14

## 2024-06-04 ASSESSMENT — ANXIETY QUESTIONNAIRES
8. IF YOU CHECKED OFF ANY PROBLEMS, HOW DIFFICULT HAVE THESE MADE IT FOR YOU TO DO YOUR WORK, TAKE CARE OF THINGS AT HOME, OR GET ALONG WITH OTHER PEOPLE?: SOMEWHAT DIFFICULT
1. FEELING NERVOUS, ANXIOUS, OR ON EDGE: SEVERAL DAYS
6. BECOMING EASILY ANNOYED OR IRRITABLE: NOT AT ALL
3. WORRYING TOO MUCH ABOUT DIFFERENT THINGS: NOT AT ALL
4. TROUBLE RELAXING: NOT AT ALL
5. BEING SO RESTLESS THAT IT IS HARD TO SIT STILL: NOT AT ALL
7. FEELING AFRAID AS IF SOMETHING AWFUL MIGHT HAPPEN: SEVERAL DAYS
GAD7 TOTAL SCORE: 2
IF YOU CHECKED OFF ANY PROBLEMS ON THIS QUESTIONNAIRE, HOW DIFFICULT HAVE THESE PROBLEMS MADE IT FOR YOU TO DO YOUR WORK, TAKE CARE OF THINGS AT HOME, OR GET ALONG WITH OTHER PEOPLE: SOMEWHAT DIFFICULT
2. NOT BEING ABLE TO STOP OR CONTROL WORRYING: NOT AT ALL
GAD7 TOTAL SCORE: 2
GAD7 TOTAL SCORE: 2
7. FEELING AFRAID AS IF SOMETHING AWFUL MIGHT HAPPEN: SEVERAL DAYS

## 2024-06-04 ASSESSMENT — PATIENT HEALTH QUESTIONNAIRE - PHQ9
SUM OF ALL RESPONSES TO PHQ QUESTIONS 1-9: 10
10. IF YOU CHECKED OFF ANY PROBLEMS, HOW DIFFICULT HAVE THESE PROBLEMS MADE IT FOR YOU TO DO YOUR WORK, TAKE CARE OF THINGS AT HOME, OR GET ALONG WITH OTHER PEOPLE: SOMEWHAT DIFFICULT
SUM OF ALL RESPONSES TO PHQ QUESTIONS 1-9: 10

## 2024-06-04 NOTE — NURSING NOTE
78 year old  Chief Complaint   Patient presents with    Cough     Cough and bruising on rib muscles.        Blood pressure 106/71, pulse 67, temperature 97.8  F (36.6  C), temperature source Skin, SpO2 98%, not currently breastfeeding. There is no height or weight on file to calculate BMI.  Patient Active Problem List   Diagnosis    Bladder neck obstruction    Constipation    Transient global amnesia    Other hammer toe (acquired)    Ostium secundum type atrial septal defect    Senile osteoporosis    Syndrome affecting cervical region    Variants of migraine    Malignant neoplasm of skin    Persistent insomnia    Hypothyroidism    Family history of malignant neoplasm of ovary    Tear film insufficiency    Circulatory system disorder    PFO (patent foramen ovale)    Gastroesophageal reflux disease without esophagitis    ACP (advance care planning)    Amnesia    Chronic paroxysmal hemicrania, not intractable    Osteopenia of necks of both femurs    Hyperlipidemia LDL goal <100    Intractable migraine, unspecified migraine type    VLAD (generalized anxiety disorder)    Choking sensation    Localized swelling of lower leg    Choking due to food in larynx, initial encounter    Mild intermittent asthma without complication       Wt Readings from Last 2 Encounters:   03/01/24 54.4 kg (120 lb)   10/11/23 54.4 kg (120 lb)     BP Readings from Last 3 Encounters:   06/04/24 106/71   04/29/24 111/73   04/17/24 95/61         Current Outpatient Medications   Medication Sig Dispense Refill    albuterol (PROAIR HFA/PROVENTIL HFA/VENTOLIN HFA) 108 (90 Base) MCG/ACT inhaler Inhale 2 puffs into the lungs every 6 hours as needed for shortness of breath, wheezing or cough 18 g 3    aspirin 81 MG tablet Take 81 mg by mouth daily       beclomethasone HFA (QVAR REDIHALER) 40 MCG/ACT inhaler 2 puffs twice daily 10.6 g 11    benzonatate (TESSALON) 100 MG capsule Take 100 mg by mouth      Calcium-Vitamin D 600-200 MG-UNIT TABS Take 1 tablet by  mouth At Bedtime       citalopram (CELEXA) 10 MG tablet Take 1 tablet (10 mg) by mouth daily 90 tablet 3    cyanocobalamin (VITAMIN B-12) 1000 MCG SUBL sublingual tablet Place 1,000 mcg under the tongue daily      fexofenadine (ALLEGRA) 180 MG tablet Take 180 mg by mouth daily      gabapentin (NEURONTIN) 300 MG capsule Take 1 capsule (300 mg) by mouth 2 times daily Take 1 cap (300 mg) twice daily      galcanezumab-gnlm (EMGALITY) 120 MG/ML injection INJECT 1ML UNDER THE SKIN EVERY 28 DAYS 1 mL 11    indomethacin (INDOCIN) 50 MG capsule Take 1 capsule (50 mg) by mouth as needed for moderate pain Take 1 tablet as needed 10 capsule 3    levothyroxine (SYNTHROID/LEVOTHROID) 88 MCG tablet Take 1 tablet (88 mcg) by mouth daily Except skip Sunday dose 90 tablet 3    magnesium oxide (MAG-OX) 400 MG tablet Take 800 mg by mouth At Bedtime      Multiple vitamin TABS Take 3 tablets by mouth daily       omeprazole (PRILOSEC) 20 MG capsule Take 40 mg by mouth daily (with dinner)       rimegepant (NURTEC) 75 MG ODT tablet Place 1 tablet (75 mg) under the tongue daily as needed for migraine Maximum of 1 tablet every 24 hours. 8 tablet 3    rizatriptan (MAXALT) 10 MG tablet Take 1 tablet (10 mg) by mouth at onset of headache for migraine May repeat in 2 hours. Max 3 tablets/24 hours. 12 tablet 4    rosuvastatin (CRESTOR) 10 MG tablet Take 1 tablet (10 mg) by mouth daily 90 tablet 0    spacer (OPTICHAMBER CHIVO) holding chamber Use with MDI      Vitamin D (Cholecalciferol) 50 MCG (2000 UT) CAPS Take 2,000 Units by mouth daily       zolpidem (AMBIEN) 10 MG tablet Take 1 tablet (10 mg) by mouth nightly as needed for sleep 30 tablet 0    predniSONE (DELTASONE) 20 MG tablet Take 2 po q am with food x 5 days for asthma flare (Patient not taking: Reported on 4/17/2024) 10 tablet 1     No current facility-administered medications for this visit.       Social History     Tobacco Use    Smoking status: Former     Current packs/day: 0.00      "Average packs/day: 0.5 packs/day for 10.0 years (5.0 ttl pk-yrs)     Types: Cigarettes     Start date: 1960     Quit date: 1970     Years since quittin.4    Smokeless tobacco: Never   Vaping Use    Vaping status: Never Used   Substance Use Topics    Alcohol use: Yes     Alcohol/week: 2.0 standard drinks of alcohol     Types: 2 Cans of beer per week     Comment: rare    Drug use: No       Health Maintenance Due   Topic Date Due    IPV IMMUNIZATION (2 of 3 - Adult catch-up series) 1995    RSV VACCINE (Pregnancy & 60+) (1 - 1-dose 60+ series) Never done    ADVANCE CARE PLANNING  2022    COVID-19 Vaccine ( season) 2024    MEDICARE ANNUAL WELLNESS VISIT  2024    LIPID  2024    TSH W/FREE T4 REFLEX  2024       No results found for: \"PAP\"      2024 11:15 AM   "

## 2024-06-04 NOTE — PATIENT INSTRUCTIONS
QVAR inhaler  Use 2 puffs twice daily until you have your knee surgery  Use albuterol for spastic cough or chest tightness, no need to wait for wheezing   Rx for prednisone tablets to have on hand if your symptoms flare    Knee surgery  Preop must be within 30 days of the surgery    Call for appt with urologist  [facility]  [address] [place] [id number]    Get Directions    Phone: [phone]   Fax: [phone]

## 2024-06-04 NOTE — PROGRESS NOTES
"Rakel Parish is a 78 year old female with hx of mild persistent asthma, constipation, osteoporosis, migraine headache, insomnia, hypothyroidism, GERD, hyperlipidemia, anxiety. She is here for the following issues:     Follow up ER visit/ respiratory distress  Nancy sent My Chart message 5/31 indicating she had one week hx of chest cold  \"Coughing so hard that ribs hurt\", incontinent, \"my back is killing me\".   Covid Test negative x 2, fatigue, listless, low appetite, weight loss, loss of taste, forceful coughing, headache  Went to ER 5/31 for evaluation  Xray normal, influenza and covid test neg  She had expiratory wheezing on exam  Given nebulizer treatment and started on prednisone   Today   Was using Qvar inhaler at onset of illness 10d ago 1 puff twice daily  When illness started using albuterol,  2-3 times a day  Spouse brought her to hospital  ER visit and returned home with tessalon pearls   Oral prednisone burst,  20 mg ipills 2 daily x 4 days, yesteday took 1.5 pills daily   Prednisone was interfering with sleep.   Still feeling wiped out   Bruising over mid epigastrium from forceful coughing  Reports no wheezing at this time, not using albuterol   Currently using Qvar 1 puff twice daily  Reports she is scheduled to have R TKA in August and hoping illness will be improved by then    Incontinence with cough  Would like to see a urologist in [place] to evaluate symptoms  Not wearing a pad  Not emptying completely--several months ao  Has to strain to empty  Nocturia x 1-2      Hyperlipidemia LDL goal <100  Last seen in clinic 4/17/24  Crestor 10mg daily, due for lab, med refill    Sleep issues/insomnia  Was using ambien for sleep  My chart messaging 5/21/24 recommending referral to sleep clinic and single 30d supply of ambien sent to pharmacy.  She has sleep appt for October  Indicating she was weaning off neurontin     Patient Active Problem List   Diagnosis    Bladder neck obstruction    Constipation    " Transient global amnesia    Other hammer toe (acquired)    Ostium secundum type atrial septal defect    Senile osteoporosis    Syndrome affecting cervical region    Variants of migraine    Malignant neoplasm of skin    Persistent insomnia    Hypothyroidism    Family history of malignant neoplasm of ovary    Tear film insufficiency    Circulatory system disorder    PFO (patent foramen ovale)    Gastroesophageal reflux disease without esophagitis    ACP (advance care planning)    Amnesia    Chronic paroxysmal hemicrania, not intractable    Osteopenia of necks of both femurs    Hyperlipidemia LDL goal <100    Intractable migraine, unspecified migraine type    VLAD (generalized anxiety disorder)    Choking sensation    Localized swelling of lower leg    Choking due to food in larynx, initial encounter    Mild intermittent asthma without complication       Current Outpatient Medications   Medication Sig Dispense Refill    albuterol (PROAIR HFA/PROVENTIL HFA/VENTOLIN HFA) 108 (90 Base) MCG/ACT inhaler Inhale 2 puffs into the lungs every 6 hours as needed for shortness of breath, wheezing or cough 18 g 3    aspirin 81 MG tablet Take 81 mg by mouth daily       beclomethasone HFA (QVAR REDIHALER) 40 MCG/ACT inhaler 2 puffs twice daily 10.6 g 11    Calcium-Vitamin D 600-200 MG-UNIT TABS Take 1 tablet by mouth At Bedtime       citalopram (CELEXA) 10 MG tablet Take 1 tablet (10 mg) by mouth daily 90 tablet 3    cyanocobalamin (VITAMIN B-12) 1000 MCG SUBL sublingual tablet Place 1,000 mcg under the tongue daily      fexofenadine (ALLEGRA) 180 MG tablet Take 180 mg by mouth daily      gabapentin (NEURONTIN) 300 MG capsule Take 1 capsule (300 mg) by mouth 2 times daily Take 1 cap (300 mg) twice daily      galcanezumab-gnlm (EMGALITY) 120 MG/ML injection INJECT 1ML UNDER THE SKIN EVERY 28 DAYS 1 mL 11    indomethacin (INDOCIN) 50 MG capsule Take 1 capsule (50 mg) by mouth as needed for moderate pain Take 1 tablet as needed 10  capsule 3    levothyroxine (SYNTHROID/LEVOTHROID) 88 MCG tablet Take 1 tablet (88 mcg) by mouth daily Except skip Sunday dose 90 tablet 3    magnesium oxide (MAG-OX) 400 MG tablet Take 800 mg by mouth At Bedtime      Multiple vitamin TABS Take 3 tablets by mouth daily       omeprazole (PRILOSEC) 20 MG capsule Take 40 mg by mouth daily (with dinner)       predniSONE (DELTASONE) 20 MG tablet Take 2 po q am with food x 5 days for asthma flare (Patient not taking: Reported on 4/17/2024) 10 tablet 1    rimegepant (NURTEC) 75 MG ODT tablet Place 1 tablet (75 mg) under the tongue daily as needed for migraine Maximum of 1 tablet every 24 hours. 8 tablet 3    rizatriptan (MAXALT) 10 MG tablet Take 1 tablet (10 mg) by mouth at onset of headache for migraine May repeat in 2 hours. Max 3 tablets/24 hours. 12 tablet 4    rosuvastatin (CRESTOR) 10 MG tablet Take 1 tablet (10 mg) by mouth daily 90 tablet 0    spacer (OPTICHAMBER CHIVO) holding chamber Use with MDI      Vitamin D (Cholecalciferol) 50 MCG (2000 UT) CAPS Take 2,000 Units by mouth daily       zolpidem (AMBIEN) 10 MG tablet Take 1 tablet (10 mg) by mouth nightly as needed for sleep 30 tablet 0       Allergies   Allergen Reactions    Cyclobenzaprine Other (See Comments)    Levofloxacin Unknown     Tendon injury (torn)    Rofecoxib Nausea    Simvastatin Other (See Comments)    Trazodone Other (See Comments)     Other reaction(s): Nightmares    Vicodin [Hydrocodone-Acetaminophen] Itching    Latex Rash        EXAM  /71 (BP Location: Right arm, Patient Position: Sitting, Cuff Size: Adult Regular)   Pulse 67   Temp 97.8  F (36.6  C) (Skin)   SpO2 98%   Gen: appears fatigued, some coughing, able to speak in full sentences  COR: S1,S2, no murmur  Lungs: CTA bilaterally, no rhonchi, wheezes or rales  Skin: ecchymoses at upper abdomen and just inferior to lower rib margins  Ext: no peripheral edema, pulses full      Assessment:  (Z09) Hospital discharge follow-up   (primary encounter diagnosis)  (R06.02) Shortness of breath  (J45.41) Moderate persistent asthma with exacerbation  Comment: recent viral illness that triggered asthma exacerbation, slowly improving  Plan: predniSONE (DELTASONE) 20 MG tablet        Reviewed AAP from December 2023, printed again for Nancy    Patient Instructions   QVAR inhaler  Use 2 puffs twice daily until you have your knee surgery  Use albuterol for spastic cough or chest tightness, no need to wait for wheezing   Rx for prednisone tablets to have on hand if your symptoms flare    (E78.5) Hyperlipidemia LDL goal <100  Comment: due for medication refill, LDL in target range  Plan: rosuvastatin (CRESTOR) 10 MG tablet, Lipid         Profile,\  Refilled medication for one year          Recent Labs   Lab Test 06/04/24  1213 04/04/23  1424 09/27/21  1015 04/16/21  1117 08/14/20  1400 02/06/19  1337   CHOL 197 203*   < > 234.0*   < > 235.0*   HDL 71 75   < > 71.0   < > 87.0   LDL 72 89   < > 134.0*   < > 101.0   TRIG 271* 196*   < > 144.0   < > 234.0*   CHOLHDLRATIO  --   --   --  3.3  --  2.7    < > = values in this interval not displayed.     (N39.3) Female stress incontinence  Comment: persistent problem, wishes to see urologist in [place]  Plan: Adult Urology  Referral          Patient Instructions   Call for appt with urologist  [facility]  [address] [place] [address]    Get Directions    Phone: [phone]   Fax: [phone]    (K05.01) Primary insomnia  Comment: currently on 10mg ambien, I have recommended weaning off medication  Plan: zolpidem (AMBIEN) 5 MG tablet        5mg dose sent to pharmacy      32 minutes spend on the date of this encounter doing chart review, history and exam, documentation and further activities as noted above.     Alondra Foster MD  Internal Medicine/Pediatrics      Answers submitted by the patient for this visit:  Patient Health Questionnaire (Submitted on 6/4/2024)  If you checked  off any problems, how difficult have these problems made it for you to do your work, take care of things at home, or get along with other people?: Somewhat difficult  PHQ9 TOTAL SCORE: 10  VLAD-7 (Submitted on 6/4/2024)  VLAD 7 TOTAL SCORE: 2

## 2024-06-05 LAB
CHOLEST SERPL-MCNC: 197 MG/DL
FASTING STATUS PATIENT QL REPORTED: ABNORMAL
HDLC SERPL-MCNC: 71 MG/DL
LDLC SERPL CALC-MCNC: 72 MG/DL
NONHDLC SERPL-MCNC: 126 MG/DL
TRIGL SERPL-MCNC: 271 MG/DL

## 2024-06-06 DIAGNOSIS — R05.1 ACUTE COUGH: Primary | ICD-10-CM

## 2024-06-06 RX ORDER — BENZONATATE 100 MG/1
100 CAPSULE ORAL 3 TIMES DAILY PRN
Qty: 30 CAPSULE | Refills: 1 | Status: SHIPPED | OUTPATIENT
Start: 2024-06-06 | End: 2024-07-12

## 2024-06-08 RX ORDER — ZOLPIDEM TARTRATE 5 MG/1
TABLET ORAL
Qty: 30 TABLET | Refills: 3 | Status: SHIPPED | OUTPATIENT
Start: 2024-06-08 | End: 2024-09-05

## 2024-07-08 DIAGNOSIS — F41.1 GAD (GENERALIZED ANXIETY DISORDER): ICD-10-CM

## 2024-07-08 DIAGNOSIS — E03.9 HYPOTHYROIDISM, UNSPECIFIED TYPE: ICD-10-CM

## 2024-07-08 RX ORDER — LEVOTHYROXINE SODIUM 88 UG/1
88 TABLET ORAL DAILY
Qty: 90 TABLET | Refills: 3 | Status: CANCELLED | OUTPATIENT
Start: 2024-07-08

## 2024-07-08 RX ORDER — CITALOPRAM HYDROBROMIDE 10 MG/1
10 TABLET ORAL DAILY
Qty: 90 TABLET | Refills: 3 | Status: CANCELLED | OUTPATIENT
Start: 2024-07-08

## 2024-07-09 NOTE — TELEPHONE ENCOUNTER
Citalopram (Celexa) 10 mg + Levothyroxine (Synthroid) 88 mcg    Last Office Visit: 6/4/24  Future [facility] Appointments: 7/12/24    Medication last refilled:   4/4/23 #90 with 3 refill(s) - Citalopram  4/4/23 #90 with 3 refill(s) - Levothyroxine    PHQ-9 / VLAD-7 Scores 10/24/22 6/4/24   VLAD-7 Score DocFlow 2 2   PHQ-9 Score DocFlow 3 10     Required labs per protocol:    LAB REF RANGE 4/4/23 7/12/24   TSH 0.40-4.0 mU/L 0.32      *Hold for labs on 7/12/24    Aida Sher, SHIREENN, RN, CCM

## 2024-07-11 NOTE — PROGRESS NOTES
Preoperative Evaluation  [facility] [address], [address]  [facility] [address]  Phone: [phone]  Fax: [phone]  Primary Provider: Alondra Foster MD  Pre-op Performing Provider: Alondra Foster MD  Jul 12, 2024 7/7/2024   Surgical Information   What procedure is being done? R knee replacement   [facility]ility [facility] Hospital [facility]  [address] [place] [address]   Who is doing the procedure / surgery? Dr Antony Lr   Date of surgery / procedure: August 5   Time of surgery / procedure: TBD   Where do you plan to recover after surgery? Overnight then home with family      Fax number for surgical facility: [phone]    Assessment & Plan     The proposed surgical procedure is considered INTERMEDIATE risk.  (Z01.818) Preop general physical exam  (primary encounter diagnosis)  Comment: 78 year old woman with advanced osteoarthritis of RIGHT knee, planning to undergo elective TKA  Plan: EKG 12-lead complete w/read - Clinics, CBC with        platelets, Basic metabolic panel        No contraindication to procedure, proceed as planned    (J45.20) Mild intermittent asthma without complication  Comment: recent viral illness that triggered exacerbation of asthma, doing much better but has persistent cough, post nasal drip  Plan: montelukast (SINGULAIR) 10 MG tablet        Continue using Qvar 40mcg/act 2 puffs twice daily as you are doing . Use albuterol rescue inhaler for any acute symptoms. Adding singulair daily dose to help improve allergy symptoms and to help reduce the severity and frequency of asthma flares    (F41.1) VLAD (generalized anxiety disorder)  Comment: doing very well with citalopram  Plan: citalopram (CELEXA) 10 MG tablet        Medication filled for one year    (E03.9) Hypothyroidism, unspecified type  Comment: currently consistent with use of levothyroxine, 88mcg on 6 days  "of the week.  Plan: levothyroxine (SYNTHROID/LEVOTHROID) 88 MCG         tablet, TSH with free T4 reflex        Recheck TSH today.     - No identified additional risk factors other than previously addressed    Preoperative Medication Instructions    How to Take Your Medication Before Surgery  Hold any vitamins, herbs, supplements, aspirin, ibuprofen, naproxen for 10 days prior to surgery.   Ok to take prescription medications on the morning of your surgery  You may use your inhalers on the morning of surgery if needed.  You may take acetaminophen (Tylenol) in the 10 days prior to surgery.     Recommendation  Approval given to proceed with proposed procedure, without further diagnostic evaluation.      Alondra Foster MD  Internal Medicine/Pediatrics      Subjective   Nancy is a 78 year old, presenting for the following:  Pre-Op Exam (RIGHT knee replacement on 8/5/24)    HPI related to upcoming procedure:   Rakel Parish is a 78 year old female with hx of advanced \"bone on bone\" osteoarthritis of her RIGHT knee. Previously treated with corticosteroid and hyaluronic acid injections. She  most recently had Euflexxa injections in January 2024 without sustained relief. She has participated in PT. She wishes to proceed with elective RIGHT total knee arthroplasty.         7/7/2024   Pre-Op Questionnaire   Have you ever had a heart attack or stroke? No   Have you ever had surgery on your heart or blood vessels, such as a stent placement, a coronary artery bypass, or surgery on an artery in your head, neck, heart, or legs? No   Do you have chest pain with activity? No   Do you have a history of heart failure? No   Do you currently have a cold, bronchitis or symptoms of other infection? No   Do you have a cough, shortness of breath, or wheezing? (!) YES recovered from recent URI with wheezing   Do you or anyone in your family have previous history of blood clots? No   Do you or does anyone in your family have a serious " bleeding problem such as prolonged bleeding following surgeries or cuts? No   Have you ever had problems with anemia or been told to take iron pills? No   Have you had any abnormal blood loss such as black, tarry or bloody stools, or abnormal vaginal bleeding? No   Have you ever had a blood transfusion? No   Are you willing to have a blood transfusion if it is medically needed before, during, or after your surgery? Yes   Have you or any of your relatives ever had problems with anesthesia? No   Do you have sleep apnea, excessive snoring or daytime drowsiness? No   Do you have any artifical heart valves or other implanted medical devices like a pacemaker, defibrillator, or continuous glucose monitor? No   Do you have artificial joints? No   Are you allergic to latex? (!) YES      Health Care Directive  Patient does have a Health Care Directive or Living Will: Advance Directive received and scanned. Click on Code in the patient header to view.    Preoperative Review of    reviewed - controlled substances reflected in medication list.         Mild intermittent asthma without complication  ER visit 5/31/24 for asthma exacerbation due to viral URI  CXR and viral testing negative.   Given burst of steroids, and continued Qvar 2 puffs twice daily and albuterol inhalers as needed  Doing much better today  Still coughing, (dry), no sleep disturbance      VLAD (generalized anxiety disorder)  Citalopram 10mg daily  Current symptoms --doing well  Therapist none        10/24/2022     1:39 PM 6/4/2024    11:02 AM 7/12/2024     9:56 AM   PHQ   PHQ-9 Total Score 3 10 1   Q9: Thoughts of better off dead/self-harm past 2 weeks Not at all Not at all Not at all         10/24/2022     1:39 PM 6/4/2024    11:05 AM 7/12/2024     9:57 AM   VLAD-7 SCORE   Total Score  2 (minimal anxiety) 0 (minimal anxiety)   Total Score 2 2 0         Hypothyroidism, unspecified type  Levothyroxine 88mcg daily on Monday through Sat, none on  Sunday  Palpitations none  Constipation no/Diarrhea no  Due for TSH level      Patient Active Problem List    Diagnosis Date Noted    Choking due to food in larynx, initial encounter 04/21/2024     Priority: Medium    Mild intermittent asthma without complication 04/21/2024     Priority: Medium    Choking sensation 04/04/2023     Priority: Medium    Localized swelling of lower leg 04/04/2023     Priority: Medium     local swelling at left lateral calf at region of previous wound      Osteopenia of necks of both femurs 10/24/2022     Priority: Medium    Hyperlipidemia LDL goal <100 10/24/2022     Priority: Medium    Intractable migraine, unspecified migraine type 10/24/2022     Priority: Medium    VLAD (generalized anxiety disorder) 10/24/2022     Priority: Medium    Chronic paroxysmal hemicrania, not intractable 10/19/2021     Priority: Medium    Amnesia 09/27/2021     Priority: Medium    ACP (advance care planning) 02/09/2017     Priority: Medium     Advance Care Planning 2/9/2017: Receipt of ACP document:  Received: Health Care Directive which was witnessed or notarized on 9-10-07.  Document previously scanned on 12-5-16.  Validation form completed and sent to be scanned.  Code Status needs to be updated to reflect choices in most recent ACP document.   Confirmed/documented designated decision maker(s).  Added by Stephanie Garcia RN Advance Care Planning Liaison with [employer]              PFO (patent foramen ovale) 06/30/2016     Priority: Medium     Overview:   Dr. Grzegorz Vargas,Dr. Alex Colorado: evaluation 3-14-07 for ? of embolic disease to UE.  Cardiac ECHO(SHAHRZAD) showed widely patent PFO(4 mm) with bidirectional shunt . Mild MVP, faint MI. NO thrombus  Doppler of Legs: Negative for DVT  4-2-07 Neg Upper extremity angiogram  Cardiology consult(4-9-07): Dr. Rodriguez: No closure of PFO indicated(critera not met). Felt hand cyanosis most likely secondary to spasm. Neg hypercoag work-up. Given Norvasc 5 mg to use  prn.  Neg Stress ECHO 4-11-07  Heme Consult 5-15-07(Dr. Perry): Anticardiolipin, recommends ASA        Gastroesophageal reflux disease without esophagitis 06/30/2016     Priority: Medium    Bladder neck obstruction 04/20/2015     Priority: Medium     Overview:   Dilation(last 8-03). Urology (Dr. Solis)  1-2014 consult with Dr. Hernández (Urology) regarding Nocturia.  2-8-15 Reconsult with Dr. Donaldson:  Cystoscopy negative, but empiric dilatation and trial of flomax , then ditropan       Senile osteoporosis 04/20/2015     Priority: Medium     Overview:   Started on Actonel 2003  DEXA: 5-15-03 T scpres)0.00 spine; -2.58 Hip(?? hip).  DXA SCAN report from [facility]   DEXA: 8-3-06: T scores (+.3 spine; -1.3 Right hip (Fem neck); -1.7 Left Hip (Fem neck)  9-06 Vitamin D level normal at 60  DEXA: 10-28-10:T scores().3 spine;-1.2 Right hip(Fem neck); -1.9 Left hip(Fem neck)... Will take Holiday from the actonel for 6 months.   2-12 Vitamin D level 54   2-13 Vitamin D level 51  DEXA: 3-25-13:Tscores: -0.1 spine; -1.8 Right hip(Fem neck); -2.2 Left hip (Fem neck) .   10-13 FRAX score of 12% (major fracture/ Hip 2.4 %)  Will hold off the fosamax and repeat DEXA in 1-2 years   Start fosamax (actonel not covered by insurance). Dr. Gamboa requests delay start for several months, OK       Variants of migraine 04/20/2015     Priority: Medium     Overview:   Has visual aura.  Neurology (Dr. Yarbrough, with [facility], but seen Dr. Whitehead(in hospital), [place] as outpatient)  now working with [facility] nutritionist elimination diet  Using prophylactic Neurontin and Nortrytiline, with Maxalt rescue.  [id number] occipital nerve blocks and propofol infusion.(Dr. Jennings)    1-16-14: Will taper off Depatkote, trial of tizadine. ?? botox injections (Dr. Lake)       Persistent insomnia 04/20/2015     Priority: Medium    Constipation 11/01/2011     Priority: Medium    Tear  "film insufficiency 11/01/2011     Priority: Medium    Syndrome affecting cervical region 12/11/2009     Priority: Medium     Overview:   Per pt, was diagnosed with \"Occiptial Neuritis\" ~2000 by Dr. Camara at [facility].  Reports good relief of sx with Medrol Dosepak in past.  Has had ~10 episodes since diagnosis.      Family history of malignant neoplasm of ovary 09/11/2008     Priority: Medium     Overview:   5-11 Consult: (Dr. Ximena Gilmore): review MRI finding of pelvic fluid/repeat PUS in 8 wk.  ? ?? Oophorectomy  7-14-11 Repeat PUS/ normal .  Doesn't need to repeat. No surgery.      Malignant neoplasm of skin 08/24/2007     Priority: Medium     Overview:   Dr. Stroud, Derm      Ostium secundum type atrial septal defect 08/07/2007     Priority: Medium     Overview:   Work up 4-7-07 and 11-20-07 with Cardiology(Dr. Hayward): Not candidate for closure at this time  Heme work up 5-15-07: Negative hypercoaguable work up (Dr. Perry)  Last card F/U 12-1-09 : ? Statin. Repeat Lipids 6 mo/with CTA if elevated to assist in decision about statin therapy.      Circulatory system disorder 03/15/2007     Priority: Medium     Overview:   Dr. Grzegorz Vargas,Dr. Alex Colorado: evaluation 3-14-07 for ? of embolic disease to UE.  Cardiac ECHO(SHAHRZAD) showed widely patent PFO(4 mm) with bidirectional shunt . Mild MVP, faint MI. NO thrombus  Doppler of Legs: Negative for DVT  4-2-07 Neg Upper extremity angiogram  Cardiology consult(4-9-07): Dr. Rodriguez: No closure of PFO indicated(critera not met). Felt hand cyanosis most likely secondary to spasm. Neg hypercoag work-up. Given Norvasc 5 mg to use prn.  Neg Stress ECHO 4-11-07  Heme Consult 5-15-07(Dr. Perry): Anticardiolipin, recommends ASA      Other hammer toe (acquired) 11/06/2006     Priority: Medium     Overview:   Fractured hammertoe repair site with non healing laceration, 2nd digit left foot.      Hypothyroidism 07/25/2006     Priority: Medium     Overview:     2/01/2005  " TSH 4.22*                      07/26/2006         6.17*   Started on 50 micrograms of replacement      2/15/2012 0.30 On 88 micrograms      2/19/2013 1.17      2/20/2014 0.88        Transient global amnesia 06/16/2006     Priority: Medium     Overview:   [facility] ER evaluation/CDART stay 6-16-06: Negative MRI/MRA; Neuro consult with Dr. Chris Whitehead(TRANSIENT GLOBAL AMNESIA/MIGRAINE Phenomenaon).  Will follow-up with Dr. Gould.    Second event(again after exercise) , [facility] ER eval 7-25-07: no imaging.  Resolved quickly, telephone consult (Dr. Whitehead, DX atypical migraine).   Repeat MRI/MRA 8-10-07 no change. Neuro follow-up with Dr. Taylor/Kwaku        Past Medical History:   Diagnosis Date    Asthma     Global amnesia     Hypothyroidism    Hyperlipidaemia LDL goal < 130     Hypothyroidism     Insomnia     Migraine, unspecified, without mention of intractable migraine without mention of status migrainosus     Osteopenia     Osteoporosis     Raynaud's disease     Unspecified asthma(493.90)      Past Surgical History:   Procedure Laterality Date    APPENDECTOMY      BUNIONECTOMY Right 11/02/2010    COLONOSCOPY  11/16/10    Every 7 years    COLONOSCOPY  02/2018    hemorrhoids, [facility]    FOOT SURGERY Left     tendon rupture    HC REPAIR OF HAMMERTOE,ONE      TONSILLECTOMY & ADENOIDECTOMY      TUBAL LIGATION  1965     Current Outpatient Medications   Medication Sig Dispense Refill    albuterol (PROAIR HFA/PROVENTIL HFA/VENTOLIN HFA) 108 (90 Base) MCG/ACT inhaler Inhale 2 puffs into the lungs every 6 hours as needed for shortness of breath, wheezing or cough 18 g 3    aspirin 81 MG tablet Take 81 mg by mouth daily       beclomethasone HFA (QVAR REDIHALER) 40 MCG/ACT inhaler 2 puffs twice daily 10.6 g 11    benzonatate (TESSALON) 100 MG capsule Take 1 capsule (100 mg) by mouth 3 times daily as needed for cough 30 capsule 1    Calcium-Vitamin D 600-200 MG-UNIT TABS Take 1 tablet by mouth At Bedtime       citalopram  (CELEXA) 10 MG tablet Take 1 tablet (10 mg) by mouth daily 90 tablet 3    cyanocobalamin (VITAMIN B-12) 1000 MCG SUBL sublingual tablet Place 1,000 mcg under the tongue daily      fexofenadine (ALLEGRA) 180 MG tablet Take 180 mg by mouth daily      gabapentin (NEURONTIN) 300 MG capsule Take 1 capsule (300 mg) by mouth 2 times daily Take 1 cap (300 mg) twice daily      galcanezumab-gnlm (EMGALITY) 120 MG/ML injection INJECT 1ML UNDER THE SKIN EVERY 28 DAYS 1 mL 11    indomethacin (INDOCIN) 50 MG capsule Take 1 capsule (50 mg) by mouth as needed for moderate pain Take 1 tablet as needed 10 capsule 3    levothyroxine (SYNTHROID/LEVOTHROID) 88 MCG tablet Take 1 tablet (88 mcg) by mouth daily Except skip Sunday dose 90 tablet 3    magnesium oxide (MAG-OX) 400 MG tablet Take 800 mg by mouth At Bedtime      Multiple vitamin TABS Take 3 tablets by mouth daily       omeprazole (PRILOSEC) 20 MG capsule Take 40 mg by mouth daily (with dinner)       predniSONE (DELTASONE) 20 MG tablet Take 2 po q am with food x 5 days for asthma flare 10 tablet 1    rimegepant (NURTEC) 75 MG ODT tablet Place 1 tablet (75 mg) under the tongue daily as needed for migraine Maximum of 1 tablet every 24 hours. 8 tablet 3    rizatriptan (MAXALT) 10 MG tablet Take 1 tablet (10 mg) by mouth at onset of headache for migraine May repeat in 2 hours. Max 3 tablets/24 hours. 12 tablet 4    rosuvastatin (CRESTOR) 10 MG tablet Take 1 tablet (10 mg) by mouth daily 90 tablet 3    spacer (OPTICHAMBER CHIVO) holding chamber Use with MDI      Vitamin D (Cholecalciferol) 50 MCG (2000 UT) CAPS Take 2,000 Units by mouth daily       zolpidem (AMBIEN) 10 MG tablet Take 1 tablet (10 mg) by mouth nightly as needed for sleep 30 tablet 0    zolpidem (AMBIEN) 5 MG tablet Take one po q hs, note lower weaning dose 30 tablet 3       Allergies   Allergen Reactions    Cyclobenzaprine Other (See Comments)    Hydrocodone Itching    Levofloxacin Unknown     Tendon injury (torn)  "   Rofecoxib Nausea    Simvastatin Other (See Comments)    Trazodone Other (See Comments)     Other reaction(s): Nightmares    Latex Rash        Social History     Tobacco Use    Smoking status: Former     Current packs/day: 0.00     Average packs/day: 0.5 packs/day for 10.0 years (5.0 ttl pk-yrs)     Types: Cigarettes     Start date: 1960     Quit date: 1970     Years since quittin.5    Smokeless tobacco: Never   Substance Use Topics    Alcohol use: Yes     Alcohol/week: 2.0 standard drinks of alcohol     Types: 2 Cans of beer per week     Comment: rare     Family History   Problem Relation Age of Onset    Osteoporosis Mother 94         at 94 after hip fracture    Dementia Mother     Hypertension Mother     C.A.D. Father 85         of renal failure. ASCVD, MI x 2    Prostate Cancer Father 80    Cerebrovascular Disease Father     Ovarian Cancer Sister 82    Neurologic Disorder Sister         \"unable to grab thoughts\"    Gout Sister     Fibromyalgia Sister     Cancer Brother         CLL    Prostate Cancer Brother 68    Multiple myeloma Brother 93        new onset    Asthma Brother     Breast Cancer Niece 40        Maternal    Colon Cancer No family hx of      History   Drug Use No       Review of Systems  Constitutional, neuro, ENT, endocrine, pulmonary, cardiac, gastrointestinal, genitourinary, musculoskeletal, integument and psychiatric systems are negative, except as otherwise noted.    Objective    BP 91/69 (BP Location: Right arm, Patient Position: Sitting, Cuff Size: Adult Regular)   Pulse 63   Temp 97.4  F (36.3  C) (Skin)   Resp 12   Wt 55.8 kg (123 lb)   SpO2 97%   BMI 22.50 kg/m     Estimated body mass index is 22.5 kg/m  as calculated from the following:    Height as of 3/1/24: 1.575 m (5' 2\").    Weight as of this encounter: 55.8 kg (123 lb).  Physical Exam  GENERAL: alert and no distress  EYES: Eyes grossly normal to inspection, PERRL and conjunctivae and sclerae normal  HENT: " ear canals and TM's normal, nose and mouth without ulcers or lesions  NECK: no adenopathy, no asymmetry, masses, or scars  RESP: lungs clear to auscultation - no rales, rhonchi or wheezes  CV: regular rate and rhythm, normal S1 S2, no S3 or S4, no murmur, click or rub, no peripheral edema  ABDOMEN: soft, nontender, no hepatosplenomegaly, no masses and bowel sounds normal  MS: no gross musculoskeletal defects noted, no edema, mild tenderness with palpation over the medial and lateral joint lines of the right knee  SKIN: no suspicious lesions or rashes, small healing abrasion left posterior calf  NEURO: Normal strength and tone, mentation intact and speech normal  PSYCH: mentation appears normal, affect normal/bright  EXT no edema, pulses full    Recent Labs   Lab Test 04/09/24  1412 02/20/24  1445 09/12/23  1036 08/01/23  1417   HGB 12.9  --  13.3  --      --  295  --    NA  --  140  --  140   POTASSIUM  --  4.9  --  4.2   CR  --  0.82  --  0.81        Diagnostics  Results for orders placed or performed in visit on 07/12/24   CBC with platelets     Status: Normal   Result Value Ref Range    WBC Count 7.2 4.0 - 11.0 10e3/uL    RBC Count 4.26 3.80 - 5.20 10e6/uL    Hemoglobin 13.2 11.7 - 15.7 g/dL    Hematocrit 40.9 35.0 - 47.0 %    MCV 96 78 - 100 fL    MCH 31.0 26.5 - 33.0 pg    MCHC 32.3 31.5 - 36.5 g/dL    RDW 12.9 10.0 - 15.0 %    Platelet Count 320 150 - 450 10e3/uL   Basic metabolic panel     Status: Normal   Result Value Ref Range    Sodium 137 135 - 145 mmol/L    Potassium 4.6 3.4 - 5.3 mmol/L    Chloride 103 98 - 107 mmol/L    Carbon Dioxide (CO2) 26 22 - 29 mmol/L    Anion Gap 8 7 - 15 mmol/L    Urea Nitrogen 13.4 8.0 - 23.0 mg/dL    Creatinine 0.84 0.51 - 0.95 mg/dL    GFR Estimate 71 >60 mL/min/1.73m2    Calcium 8.8 8.8 - 10.2 mg/dL    Glucose 89 70 - 99 mg/dL   TSH with free T4 reflex     Status: Normal   Result Value Ref Range    TSH 1.08 0.30 - 4.20 uIU/mL        EKG  July 12, 2024: Sinus  bradycardia, rate 56, low voltage with rightward axis. Nonspecific T wave abnormality. EKG is unchanged since 9/26/2021 Read by Alondra Foster MD  Internal Medicine/Pediatrics      Revised Cardiac Risk Index (RCRI)  The patient has the following serious cardiovascular risks for perioperative complications:   - No serious cardiac risks = 0 points     RCRI Interpretation: 0 points: Class I (very low risk - 0.4% complication rate)         Signed Electronically by: Alondra Foster MD  Copy of this evaluation report is provided to requesting physician.

## 2024-07-12 ENCOUNTER — OFFICE VISIT (OUTPATIENT)
Dept: FAMILY MEDICINE | Facility: CLINIC | Age: 79
End: 2024-07-12
Payer: COMMERCIAL

## 2024-07-12 VITALS
OXYGEN SATURATION: 97 % | TEMPERATURE: 97.4 F | WEIGHT: 123 LBS | HEART RATE: 63 BPM | SYSTOLIC BLOOD PRESSURE: 91 MMHG | DIASTOLIC BLOOD PRESSURE: 69 MMHG | BODY MASS INDEX: 22.5 KG/M2 | RESPIRATION RATE: 12 BRPM

## 2024-07-12 DIAGNOSIS — J45.20 MILD INTERMITTENT ASTHMA WITHOUT COMPLICATION: ICD-10-CM

## 2024-07-12 DIAGNOSIS — E03.9 HYPOTHYROIDISM, UNSPECIFIED TYPE: ICD-10-CM

## 2024-07-12 DIAGNOSIS — Z01.818 PREOP GENERAL PHYSICAL EXAM: Primary | ICD-10-CM

## 2024-07-12 DIAGNOSIS — F41.1 GAD (GENERALIZED ANXIETY DISORDER): ICD-10-CM

## 2024-07-12 LAB
ERYTHROCYTE [DISTWIDTH] IN BLOOD BY AUTOMATED COUNT: 12.9 % (ref 10–15)
HCT VFR BLD AUTO: 40.9 % (ref 35–47)
HGB BLD-MCNC: 13.2 G/DL (ref 11.7–15.7)
MCH RBC QN AUTO: 31 PG (ref 26.5–33)
MCHC RBC AUTO-ENTMCNC: 32.3 G/DL (ref 31.5–36.5)
MCV RBC AUTO: 96 FL (ref 78–100)
PLATELET # BLD AUTO: 320 10E3/UL (ref 150–450)
RBC # BLD AUTO: 4.26 10E6/UL (ref 3.8–5.2)
WBC # BLD AUTO: 7.2 10E3/UL (ref 4–11)

## 2024-07-12 RX ORDER — LEVOTHYROXINE SODIUM 88 UG/1
88 TABLET ORAL DAILY
Qty: 90 TABLET | Refills: 3 | Status: SHIPPED | OUTPATIENT
Start: 2024-07-12

## 2024-07-12 RX ORDER — MONTELUKAST SODIUM 10 MG/1
10 TABLET ORAL AT BEDTIME
Qty: 90 TABLET | Refills: 3 | Status: SHIPPED | OUTPATIENT
Start: 2024-07-12

## 2024-07-12 RX ORDER — CITALOPRAM HYDROBROMIDE 10 MG/1
10 TABLET ORAL DAILY
Qty: 90 TABLET | Refills: 3 | Status: SHIPPED | OUTPATIENT
Start: 2024-07-12

## 2024-07-12 ASSESSMENT — ANXIETY QUESTIONNAIRES
2. NOT BEING ABLE TO STOP OR CONTROL WORRYING: NOT AT ALL
5. BEING SO RESTLESS THAT IT IS HARD TO SIT STILL: NOT AT ALL
GAD7 TOTAL SCORE: 0
3. WORRYING TOO MUCH ABOUT DIFFERENT THINGS: NOT AT ALL
4. TROUBLE RELAXING: NOT AT ALL
8. IF YOU CHECKED OFF ANY PROBLEMS, HOW DIFFICULT HAVE THESE MADE IT FOR YOU TO DO YOUR WORK, TAKE CARE OF THINGS AT HOME, OR GET ALONG WITH OTHER PEOPLE?: NOT DIFFICULT AT ALL
GAD7 TOTAL SCORE: 0
GAD7 TOTAL SCORE: 0
7. FEELING AFRAID AS IF SOMETHING AWFUL MIGHT HAPPEN: NOT AT ALL
7. FEELING AFRAID AS IF SOMETHING AWFUL MIGHT HAPPEN: NOT AT ALL
IF YOU CHECKED OFF ANY PROBLEMS ON THIS QUESTIONNAIRE, HOW DIFFICULT HAVE THESE PROBLEMS MADE IT FOR YOU TO DO YOUR WORK, TAKE CARE OF THINGS AT HOME, OR GET ALONG WITH OTHER PEOPLE: NOT DIFFICULT AT ALL
6. BECOMING EASILY ANNOYED OR IRRITABLE: NOT AT ALL
1. FEELING NERVOUS, ANXIOUS, OR ON EDGE: NOT AT ALL

## 2024-07-12 ASSESSMENT — PATIENT HEALTH QUESTIONNAIRE - PHQ9
SUM OF ALL RESPONSES TO PHQ QUESTIONS 1-9: 1
SUM OF ALL RESPONSES TO PHQ QUESTIONS 1-9: 1
10. IF YOU CHECKED OFF ANY PROBLEMS, HOW DIFFICULT HAVE THESE PROBLEMS MADE IT FOR YOU TO DO YOUR WORK, TAKE CARE OF THINGS AT HOME, OR GET ALONG WITH OTHER PEOPLE: NOT DIFFICULT AT ALL

## 2024-07-12 NOTE — PATIENT INSTRUCTIONS
How to Take Your Medication Before Surgery  Preoperative Medication Instructions     How to Take Your Medication Before Surgery  Hold any vitamins, herbs, supplements, aspirin, ibuprofen, naproxen for 10 days prior to surgery.   Ok to take prescription medications on the morning of your surgery  You may use your inhalers on the morning of surgery if needed.  You may take acetaminophen (Tylenol) in the 10 days prior to surgery.          Patient Education   Preparing for Your Surgery  Getting started  A nurse will call you to review your health history and instructions. They will give you an arrival time based on your scheduled surgery time. Please be ready to share:  Your doctor's clinic name and phone number  Your medical, surgical, and anesthesia history  A list of allergies and sensitivities  A list of medicines, including herbal treatments and over-the-counter drugs  Whether the patient has a legal guardian (ask how to send us the papers in advance)  Please tell us if you're pregnant--or if there's any chance you might be pregnant. Some surgeries may injure a fetus (unborn baby), so they require a pregnancy test. Surgeries that are safe for a fetus don't always need a test, and you can choose whether to have one.   If you have a child who's having surgery, please ask for a copy of Preparing for Your Child's Surgery.    Preparing for surgery  Within 10 to 30 days of surgery: Have a pre-op exam (sometimes called an H&P, or History and Physical). This can be done at a clinic or pre-operative center.  If you're having a , you may not need this exam. Talk to your care team.  At your pre-op exam, talk to your care team about all medicines you take. If you need to stop any medicines before surgery, ask when to start taking them again.  We do this for your safety. Many medicines can make you bleed too much during surgery. Some change how well surgery (anesthesia) drugs work.  Call your insurance company to let  them know you're having surgery. (If you don't have insurance, call [phone].)  Call your clinic if there's any change in your health. This includes signs of a cold or flu (sore throat, runny nose, cough, rash, fever). It also includes a scrape or scratch near the surgery site.  If you have questions on the day of surgery, call your hospital or surgery center.  Eating and drinking guidelines  For your safety: Unless your surgeon tells you otherwise, follow the guidelines below.  Eat and drink as usual until 8 hours before you arrive for surgery. After that, no food or milk.  Drink clear liquids until 2 hours before you arrive. These are liquids you can see through, like water, Gatorade, and Propel Water. They also include plain black coffee and tea (no cream or milk), candy, and breath mints. You can spit out gum when you arrive.  If you drink alcohol: Stop drinking it the night before surgery.  If your care team tells you to take medicine on the morning of surgery, it's okay to take it with a sip of water.  Preventing infection  Shower or bathe the night before and morning of your surgery. Follow the instructions your clinic gave you. (If no instructions, use regular soap.)  Don't shave or clip hair near your surgery site. We'll remove the hair if needed.  Don't smoke or vape the morning of surgery. You may chew nicotine gum up to 2 hours before surgery. A nicotine patch is okay.  Note: Some surgeries require you to completely quit smoking and nicotine. Check with your surgeon.  Your care team will make every effort to keep you safe from infection. We will:  Clean our hands often with soap and water (or an alcohol-based hand rub).  Clean the skin at your surgery site with a special soap that kills germs.  Give you a special gown to keep you warm. (Cold raises the risk of infection.)  Wear special hair covers, masks, gowns and gloves during surgery.  Give antibiotic medicine, if prescribed. Not all surgeries  need antibiotics.  What to bring on the day of surgery  Photo ID and insurance card  Copy of your health care directive, if you have one  Glasses and hearing aids (bring cases)  You can't wear contacts during surgery  Inhaler and eye drops, if you use them (tell us about these when you arrive)  CPAP machine or breathing device, if you use them  A few personal items, if spending the night  If you have . . .  A pacemaker, ICD (cardiac defibrillator) or other implant: Bring the ID card.  An implanted stimulator: Bring the remote control.  A legal guardian: Bring a copy of the certified (court-stamped) guardianship papers.  Please remove any jewelry, including body piercings. Leave jewelry and other valuables at home.  If you're going home the day of surgery  You must have a responsible adult drive you home. They should stay with you overnight as well.  If you don't have someone to stay with you, and you aren't safe to go home alone, we may keep you overnight. Insurance often won't pay for this.  After surgery  If it's hard to control your pain or you need more pain medicine, please call your surgeon's office.  Questions?   If you have any questions for your care team, list them here: _________________________________________________________________________________________________________________________________________________________________________ ____________________________________ ____________________________________ ____________________________________  For informational purposes only. Not to replace the advice of your health care provider. Copyright   2003, 2019 [facility]. All rights reserved. Clinically reviewed by Parul Ruiz MD. [employer] [id number] - REV 12/22.

## 2024-07-12 NOTE — NURSING NOTE
Nancy  78 year old    Chief Complaint   Patient presents with    Pre-Op Exam     RIGHT knee replacement on 8/5/24            Blood pressure 91/69, pulse 63, temperature 97.4  F (36.3  C), temperature source Skin, resp. rate 12, weight 55.8 kg (123 lb), SpO2 97%, not currently breastfeeding. Body mass index is 22.5 kg/m .    Patient Active Problem List   Diagnosis    Bladder neck obstruction    Constipation    Transient global amnesia    Other hammer toe (acquired)    Ostium secundum type atrial septal defect    Senile osteoporosis    Syndrome affecting cervical region    Variants of migraine    Malignant neoplasm of skin    Persistent insomnia    Hypothyroidism    Family history of malignant neoplasm of ovary    Tear film insufficiency    Circulatory system disorder    PFO (patent foramen ovale)    Gastroesophageal reflux disease without esophagitis    ACP (advance care planning)    Amnesia    Chronic paroxysmal hemicrania, not intractable    Osteopenia of necks of both femurs    Hyperlipidemia LDL goal <100    Intractable migraine, unspecified migraine type    VLAD (generalized anxiety disorder)    Choking sensation    Localized swelling of lower leg    Choking due to food in larynx, initial encounter    Mild intermittent asthma without complication              Wt Readings from Last 2 Encounters:   07/12/24 55.8 kg (123 lb)   03/01/24 54.4 kg (120 lb)       BP Readings from Last 3 Encounters:   07/12/24 91/69   06/04/24 106/71   04/29/24 111/73                Current Outpatient Medications   Medication Sig Dispense Refill    albuterol (PROAIR HFA/PROVENTIL HFA/VENTOLIN HFA) 108 (90 Base) MCG/ACT inhaler Inhale 2 puffs into the lungs every 6 hours as needed for shortness of breath, wheezing or cough 18 g 3    aspirin 81 MG tablet Take 81 mg by mouth daily       beclomethasone HFA (QVAR REDIHALER) 40 MCG/ACT inhaler 2 puffs twice daily 10.6 g 11    benzonatate (TESSALON) 100 MG capsule Take 1 capsule (100 mg) by  mouth 3 times daily as needed for cough 30 capsule 1    Calcium-Vitamin D 600-200 MG-UNIT TABS Take 1 tablet by mouth At Bedtime       citalopram (CELEXA) 10 MG tablet Take 1 tablet (10 mg) by mouth daily 90 tablet 3    cyanocobalamin (VITAMIN B-12) 1000 MCG SUBL sublingual tablet Place 1,000 mcg under the tongue daily      fexofenadine (ALLEGRA) 180 MG tablet Take 180 mg by mouth daily      gabapentin (NEURONTIN) 300 MG capsule Take 1 capsule (300 mg) by mouth 2 times daily Take 1 cap (300 mg) twice daily      galcanezumab-gnlm (EMGALITY) 120 MG/ML injection INJECT 1ML UNDER THE SKIN EVERY 28 DAYS 1 mL 11    indomethacin (INDOCIN) 50 MG capsule Take 1 capsule (50 mg) by mouth as needed for moderate pain Take 1 tablet as needed 10 capsule 3    levothyroxine (SYNTHROID/LEVOTHROID) 88 MCG tablet Take 1 tablet (88 mcg) by mouth daily Except skip Sunday dose 90 tablet 3    magnesium oxide (MAG-OX) 400 MG tablet Take 800 mg by mouth At Bedtime      Multiple vitamin TABS Take 3 tablets by mouth daily       omeprazole (PRILOSEC) 20 MG capsule Take 40 mg by mouth daily (with dinner)       predniSONE (DELTASONE) 20 MG tablet Take 2 po q am with food x 5 days for asthma flare 10 tablet 1    rimegepant (NURTEC) 75 MG ODT tablet Place 1 tablet (75 mg) under the tongue daily as needed for migraine Maximum of 1 tablet every 24 hours. 8 tablet 3    rizatriptan (MAXALT) 10 MG tablet Take 1 tablet (10 mg) by mouth at onset of headache for migraine May repeat in 2 hours. Max 3 tablets/24 hours. 12 tablet 4    rosuvastatin (CRESTOR) 10 MG tablet Take 1 tablet (10 mg) by mouth daily 90 tablet 3    spacer (OPTICHAMBER CHIVO) holding chamber Use with MDI      Vitamin D (Cholecalciferol) 50 MCG (2000 UT) CAPS Take 2,000 Units by mouth daily       zolpidem (AMBIEN) 10 MG tablet Take 1 tablet (10 mg) by mouth nightly as needed for sleep 30 tablet 0    zolpidem (AMBIEN) 5 MG tablet Take one po q hs, note lower weaning dose 30 tablet 3  "    No current facility-administered medications for this visit.              Social History     Tobacco Use    Smoking status: Former     Current packs/day: 0.00     Average packs/day: 0.5 packs/day for 10.0 years (5.0 ttl pk-yrs)     Types: Cigarettes     Start date: 1960     Quit date: 1970     Years since quittin.5    Smokeless tobacco: Never   Vaping Use    Vaping status: Never Used   Substance Use Topics    Alcohol use: Yes     Alcohol/week: 2.0 standard drinks of alcohol     Types: 2 Cans of beer per week     Comment: rare    Drug use: No              Health Maintenance Due   Topic Date Due    IPV IMMUNIZATION (2 of 3 - Adult catch-up series) 1995    RSV VACCINE (Pregnancy & 60+) (1 - 1-dose 60+ series) Never done    MEDICARE ANNUAL WELLNESS VISIT  2024    TSH W/FREE T4 REFLEX  2024            No results found for: \"PAP\"           2024 10:09 AM    "

## 2024-07-13 LAB
ANION GAP SERPL CALCULATED.3IONS-SCNC: 8 MMOL/L (ref 7–15)
BUN SERPL-MCNC: 13.4 MG/DL (ref 8–23)
CALCIUM SERPL-MCNC: 8.8 MG/DL (ref 8.8–10.2)
CHLORIDE SERPL-SCNC: 103 MMOL/L (ref 98–107)
CREAT SERPL-MCNC: 0.84 MG/DL (ref 0.51–0.95)
DEPRECATED HCO3 PLAS-SCNC: 26 MMOL/L (ref 22–29)
EGFRCR SERPLBLD CKD-EPI 2021: 71 ML/MIN/1.73M2
GLUCOSE SERPL-MCNC: 89 MG/DL (ref 70–99)
POTASSIUM SERPL-SCNC: 4.6 MMOL/L (ref 3.4–5.3)
SODIUM SERPL-SCNC: 137 MMOL/L (ref 135–145)
TSH SERPL DL<=0.005 MIU/L-ACNC: 1.08 UIU/ML (ref 0.3–4.2)

## 2024-07-18 ENCOUNTER — TRANSFERRED RECORDS (OUTPATIENT)
Dept: HEALTH INFORMATION MANAGEMENT | Facility: CLINIC | Age: 79
End: 2024-07-18
Payer: COMMERCIAL

## 2024-07-18 ENCOUNTER — TELEPHONE (OUTPATIENT)
Dept: NEUROLOGY | Facility: CLINIC | Age: 79
End: 2024-07-18
Payer: COMMERCIAL

## 2024-07-18 NOTE — TELEPHONE ENCOUNTER
Prior Authorization Specialty Medication Request    Medication/Dose: galcanezumab-gnlm (EMGALITY) 120 MG/ML injection  Diagnosis and ICD code (if different than what is on RX):    New/renewal/insurance change PA/secondary ins. PA:  Previously Tried and Failed:      Important Lab Values:   Rationale:     Insurance   Primary:   Insurance ID:      Secondary (if applicable):  Insurance ID:      Pharmacy Information (if different than what is on RX)  Name:    Phone:    Fax

## 2024-07-23 NOTE — TELEPHONE ENCOUNTER
Prior Authorization Approval    Authorization Effective Date: 4/24/2024  Authorization Expiration Date: 7/23/2025  Medication: galcanezumab-gnlm (EMGALITY) 120 MG/ML injection-PA APPROVED  Approved Dose/Quantity:   Reference #:     Insurance Company: [employer] [place] - Phone [phone] Fax [phone]  Expected CoPay:       CoPay Card Available:      Foundation Assistance Needed:    Which Pharmacy is filling the prescription (Not needed for infusion/clinic administered): [id number] - [place], [place] - [address]  Pharmacy Notified:  Yes- **Instructed pharmacy to notify patient when script is ready to /ship.**  Patient Notified:  Yes

## 2024-07-23 NOTE — TELEPHONE ENCOUNTER
Central Prior Authorization Team   Phone: [phone]    PA Initiation    Medication: galcanezumab-gnlm (EMGALITY) 120 MG/ML injection  Insurance Company: [employer] [place] - Phone [phone] Fax [phone]  Pharmacy Filling the Rx: [id number] - [place], [place] - [address] AT [address] [place] [address] & [facility]  Filling Pharmacy Phone: [phone]  Filling Pharmacy Fax:    Start Date: 7/23/2024

## 2024-08-21 ENCOUNTER — TRANSFERRED RECORDS (OUTPATIENT)
Dept: HEALTH INFORMATION MANAGEMENT | Facility: CLINIC | Age: 79
End: 2024-08-21
Payer: COMMERCIAL

## 2024-09-05 ENCOUNTER — MYC REFILL (OUTPATIENT)
Dept: FAMILY MEDICINE | Facility: CLINIC | Age: 79
End: 2024-09-05

## 2024-09-05 DIAGNOSIS — F51.01 PRIMARY INSOMNIA: ICD-10-CM

## 2024-09-05 RX ORDER — ZOLPIDEM TARTRATE 5 MG/1
TABLET ORAL
Qty: 30 TABLET | Refills: 3 | Status: SHIPPED | OUTPATIENT
Start: 2024-09-05

## 2024-09-05 NOTE — TELEPHONE ENCOUNTER
Zolpidem (Ambien) 5 mg    Last Office Visit: 7/12/24  Future [facility] Appointments: None  Medication last refilled: 5/21/24 #30 with 0 refill(s)     verified - last fill date: 6/11/24 #30    CSA on File - None    Routing refill request to provider for review/approval because:  Drug not on the [facility] refill protocol     LASHAY Seth, RN, CCM

## 2024-09-06 ENCOUNTER — MYC MEDICAL ADVICE (OUTPATIENT)
Dept: FAMILY MEDICINE | Facility: CLINIC | Age: 79
End: 2024-09-06

## 2024-09-06 DIAGNOSIS — E78.5 HYPERLIPIDEMIA LDL GOAL <100: ICD-10-CM

## 2024-09-06 RX ORDER — ROSUVASTATIN CALCIUM 10 MG/1
10 TABLET, COATED ORAL DAILY
Qty: 90 TABLET | Refills: 3 | Status: CANCELLED | OUTPATIENT
Start: 2024-09-06

## 2024-09-06 NOTE — TELEPHONE ENCOUNTER
Sent [facility] message to pt to see if she wants rx transferred to mail pharmacy.    LASHAY Hernandez, RN  09/06/24, 2:09 PM

## 2024-09-09 NOTE — PROGRESS NOTES
DISCHARGE  Reason for Discharge: Patient has failed to schedule further appointments.  Recommended Video swallow study, Pt has not made appt.     Equipment Issued: none    Discharge Plan: NA    Referring Provider:  Alondra Foster    05/06/24 0500   Appointment Info   Medical Diagnosis Dysphagia, unspecified type (R13.10)   SLP Tx Diagnosis dysphagia   Quick Adds Certification   Progress Note/Certification   Start Of Care Date 05/06/24   Onset Of Illness/injury Or Date Of Surgery 04/18/24   Therapy Frequency 1x follow up   Predicted Duration 90 days pending VFSS results   Certification date from 05/06/24   Certification date to 08/04/24   Progress Note Due Date 08/04/24   SLP Goals   SLP Goals 1;2   SLP Goal 1   Goal Identifier Strategies/HEP   Goal Description Patient will verbalize and/or demonstrate understanding of compensatory strategies, HEP, and recommendations with 100% accuracy given min-no cues across 2 consecutive sessions.   Rationale To maximize safety, ease and/or independence of oral intake   Target Date 08/04/24   SLP Goal 2   Goal Identifier VFSS   Goal Description Patient will complete video swallow study to objectively assess swallow function, aspiration risk, guide treatment recommendations and diet recommendations within 1 month and will demonstrate understanding of study outcomes in order to generate/modify goals as needed.   Rationale To maximize safety, ease and/or independence of oral intake   Target Date 08/04/24   Treatment Interventions (SLP)   Treatment Interventions Treatment Swallow/Oral dysfunction   Treatment Swallow/Oral dysfunction   Swallow/Oral Dysfunction 1 Strategies/HEP   Swallow/Oral Dysfunction 1 - Details SLP educated Pt on safe swallow strategies to reduce risk of choking/aspiration. Strategies educated on include re: alternating liquids/solids, chewing carefully, single bites/sips, slow rate of intake, minimize distractions, upright psoture during and 30 minutes  after meals. Pt verbalized comprehension of all strategies/recommendations.   Treatment of Swallowing Dysfunction &/or Oral Function for Feeding Minutes (18485) 10 Minutes   Skilled Intervention Provided written and verbal information on diet modifications.;Educated patient on swallowing strategies.;Educated patient on risks of aspiration;Demonstrated safe swallow strategies   Patient Response/Progress Pt receptive to all education and recommendations. Provided with visual handout of safe swallowing for reference.   Eval/Assessments   Eval/Assessments Oral/Pharyngeal Swallow Function   SLP Eval: oral/pharyngeal swallow function, clinical minutes (97507) 15   Education   Learner/Method Patient;Listening;Reading;Demonstration;Pictures/Video;No Barriers to Learning   Education Comments Results of assessment, POC and role of SLP in OP setting   Plan   Home program Safe swallow strategies, schedule video swallow study   Updates to plan of care Pt reports she will cancel appt on 5/20 if deteremined not needed pending video swallow or not having it scheduled yet   Plan for next session F/u safe swallow strategies, diet tolerance, VFSS results   Total Session Time   Total Treatment Time (sum of timed and untimed services) 25

## 2024-09-10 RX ORDER — ROSUVASTATIN CALCIUM 10 MG/1
10 TABLET, COATED ORAL DAILY
Qty: 90 TABLET | Refills: 2 | Status: SHIPPED | OUTPATIENT
Start: 2024-09-10 | End: 2024-09-11

## 2024-09-10 NOTE — TELEPHONE ENCOUNTER
Patient called and said that she would like medication refilled instead to [place] mail order pharmacy instead.

## 2024-09-10 NOTE — TELEPHONE ENCOUNTER
Pt clarified that she wants the Crestor Rx transferred to  mail order pharmacy.    Hans METZ, RN  09/10/24 11:59 AM

## 2024-09-11 RX ORDER — ROSUVASTATIN CALCIUM 10 MG/1
10 TABLET, COATED ORAL DAILY
Qty: 90 TABLET | Refills: 2 | Status: SHIPPED | OUTPATIENT
Start: 2024-09-11

## 2024-09-11 NOTE — TELEPHONE ENCOUNTER
Pt did not want medication sent to  mail pharmacy, but rather [facility] mail order pharmacy.    Hans METZ, RN  09/11/24 8:50 AM

## 2024-09-17 ENCOUNTER — MYC MEDICAL ADVICE (OUTPATIENT)
Dept: NEUROLOGY | Facility: CLINIC | Age: 79
End: 2024-09-17
Payer: COMMERCIAL

## 2024-09-17 DIAGNOSIS — G43.109 MIGRAINE WITH AURA AND WITHOUT STATUS MIGRAINOSUS, NOT INTRACTABLE: Primary | ICD-10-CM

## 2024-09-18 ENCOUNTER — TRANSFERRED RECORDS (OUTPATIENT)
Dept: HEALTH INFORMATION MANAGEMENT | Facility: CLINIC | Age: 79
End: 2024-09-18
Payer: COMMERCIAL

## 2024-09-19 ENCOUNTER — OFFICE VISIT (OUTPATIENT)
Dept: URGENT CARE | Facility: URGENT CARE | Age: 79
End: 2024-09-19
Payer: COMMERCIAL

## 2024-09-19 VITALS
OXYGEN SATURATION: 96 % | SYSTOLIC BLOOD PRESSURE: 111 MMHG | DIASTOLIC BLOOD PRESSURE: 74 MMHG | HEART RATE: 69 BPM | BODY MASS INDEX: 22.31 KG/M2 | TEMPERATURE: 97.8 F | WEIGHT: 122 LBS | RESPIRATION RATE: 16 BRPM

## 2024-09-19 DIAGNOSIS — J01.90 ACUTE NON-RECURRENT SINUSITIS, UNSPECIFIED LOCATION: Primary | ICD-10-CM

## 2024-09-19 PROCEDURE — 99213 OFFICE O/P EST LOW 20 MIN: CPT | Performed by: PHYSICIAN ASSISTANT

## 2024-09-19 ASSESSMENT — ENCOUNTER SYMPTOMS
SINUS PAIN: 1
SINUS PRESSURE: 1
APPETITE CHANGE: 0
RHINORRHEA: 1
COUGH: 1
FEVER: 0
SORE THROAT: 0

## 2024-09-19 NOTE — PROGRESS NOTES
"SUBJECTIVE:   Rakel Parish is a 78 year old female presenting with a chief complaint of   Chief Complaint   Patient presents with    Urgent Care     Pt present with sinus headache, facial pain, chills(on and off) onset 2 weeks. Symptoms are worse at night.        She is a new patient of [facility].  Patient presents with left side facial pain x 2 weeks.  No hx of sinus surgery.      Treatment:  nasocort, nettipot    Review of Systems   Constitutional:  Negative for appetite change and fever.   HENT:  Positive for congestion, postnasal drip, rhinorrhea, sinus pressure and sinus pain. Negative for ear pain and sore throat.    Respiratory:  Positive for cough.        Past Medical History:   Diagnosis Date    Asthma     Global amnesia     Hypothyroidism    Hyperlipidaemia LDL goal < 130     Hypothyroidism     Insomnia     Migraine, unspecified, without mention of intractable migraine without mention of status migrainosus     Osteopenia     Osteoporosis     Raynaud's disease     Unspecified asthma(493.90)      Family History   Problem Relation Age of Onset    Osteoporosis Mother 94         at 94 after hip fracture    Dementia Mother     Hypertension Mother     C.A.D. Father 85         of renal failure. ASCVD, MI x 2    Prostate Cancer Father 80    Cerebrovascular Disease Father     Ovarian Cancer Sister 82    Neurologic Disorder Sister         \"unable to grab thoughts\"    Gout Sister     Fibromyalgia Sister     Cancer Brother         CLL    Prostate Cancer Brother 68    Multiple myeloma Brother 93        new onset    Asthma Brother     Breast Cancer Niece 40        Maternal    Colon Cancer No family hx of      Current Outpatient Medications   Medication Sig Dispense Refill    albuterol (PROAIR HFA/PROVENTIL HFA/VENTOLIN HFA) 108 (90 Base) MCG/ACT inhaler Inhale 2 puffs into the lungs every 6 hours as needed for shortness of breath, wheezing or cough 18 g 3    amoxicillin-clavulanate (AUGMENTIN) 875-125 MG tablet " Take 1 tablet by mouth 2 times daily for 7 days. 14 tablet 0    aspirin 81 MG tablet Take 81 mg by mouth daily       beclomethasone HFA (QVAR REDIHALER) 40 MCG/ACT inhaler 2 puffs twice daily 10.6 g 11    Calcium-Vitamin D 600-200 MG-UNIT TABS Take 1 tablet by mouth At Bedtime       citalopram (CELEXA) 10 MG tablet Take 1 tablet (10 mg) by mouth daily 90 tablet 3    cyanocobalamin (VITAMIN B-12) 1000 MCG SUBL sublingual tablet Place 1,000 mcg under the tongue daily      fexofenadine (ALLEGRA) 180 MG tablet Take 180 mg by mouth daily      gabapentin (NEURONTIN) 300 MG capsule Take 1 capsule (300 mg) by mouth 2 times daily Take 1 cap (300 mg) twice daily      galcanezumab-gnlm (EMGALITY) 120 MG/ML injection INJECT 1ML UNDER THE SKIN EVERY 28 DAYS 1 mL 11    indomethacin (INDOCIN) 50 MG capsule Take 1 capsule (50 mg) by mouth as needed for moderate pain Take 1 tablet as needed 10 capsule 3    levothyroxine (SYNTHROID/LEVOTHROID) 88 MCG tablet Take 1 tablet (88 mcg) by mouth daily Except skip Sunday dose 90 tablet 3    magnesium oxide (MAG-OX) 400 MG tablet Take 800 mg by mouth At Bedtime      montelukast (SINGULAIR) 10 MG tablet Take 1 tablet (10 mg) by mouth at bedtime 90 tablet 3    Multiple vitamin TABS Take 3 tablets by mouth daily       omeprazole (PRILOSEC) 20 MG capsule Take 40 mg by mouth daily (with dinner)       predniSONE (DELTASONE) 20 MG tablet Take 2 po q am with food x 5 days for asthma flare 10 tablet 1    rimegepant (NURTEC) 75 MG ODT tablet Place 1 tablet (75 mg) under the tongue daily as needed for migraine Maximum of 1 tablet every 24 hours. 8 tablet 3    rizatriptan (MAXALT) 10 MG tablet Take 1 tablet (10 mg) by mouth at onset of headache for migraine May repeat in 2 hours. Max 3 tablets/24 hours. 12 tablet 4    rosuvastatin (CRESTOR) 10 MG tablet Take 1 tablet (10 mg) by mouth daily. 90 tablet 2    spacer (OPTICHAMBER CHIVO) holding chamber Use with MDI      Vitamin D (Cholecalciferol) 50 MCG  (2000 UT) CAPS Take 2,000 Units by mouth daily       zolpidem (AMBIEN) 10 MG tablet Take 1 tablet (10 mg) by mouth nightly as needed for sleep 30 tablet 0    zolpidem (AMBIEN) 5 MG tablet Take one po q hs, note lower weaning dose 30 tablet 3     Social History     Tobacco Use    Smoking status: Former     Current packs/day: 0.00     Average packs/day: 0.5 packs/day for 10.0 years (5.0 ttl pk-yrs)     Types: Cigarettes     Start date: 1960     Quit date: 1970     Years since quittin.7    Smokeless tobacco: Never   Substance Use Topics    Alcohol use: Yes     Alcohol/week: 2.0 standard drinks of alcohol     Types: 2 Cans of beer per week     Comment: rare       OBJECTIVE  /74   Pulse 69   Temp 97.8  F (36.6  C) (Tympanic)   Resp 16   Wt 55.3 kg (122 lb)   SpO2 96%   BMI 22.31 kg/m      Physical Exam  Vitals and nursing note reviewed.   Constitutional:       Appearance: Normal appearance. She is normal weight.   HENT:      Head: Normocephalic and atraumatic.      Right Ear: Ear canal and external ear normal. There is impacted cerumen.      Left Ear: Tympanic membrane, ear canal and external ear normal.      Nose: Nose normal.      Mouth/Throat:      Mouth: Mucous membranes are moist.      Pharynx: Oropharynx is clear.   Eyes:      Extraocular Movements: Extraocular movements intact.      Conjunctiva/sclera: Conjunctivae normal.   Cardiovascular:      Rate and Rhythm: Normal rate and regular rhythm.      Pulses: Normal pulses.      Heart sounds: Normal heart sounds.   Pulmonary:      Effort: Pulmonary effort is normal.      Breath sounds: Normal breath sounds.   Musculoskeletal:      Cervical back: Normal range of motion.   Skin:     General: Skin is warm and dry.      Findings: No rash.   Neurological:      General: No focal deficit present.      Mental Status: She is alert.   Psychiatric:         Mood and Affect: Mood normal.         Behavior: Behavior normal.         Labs:  No results found  for this or any previous visit (from the past 24 hour(s)).        ASSESSMENT:      ICD-10-CM    1. Acute non-recurrent sinusitis, unspecified location  J01.90 amoxicillin-clavulanate (AUGMENTIN) 875-125 MG tablet           Medical Decision Making:    Differential Diagnosis:  URI Adult/Peds:  Sinusitis    Serious Comorbid Conditions:  Adult:   reviewed    PLAN:    Rx for augmentin.  Push fluids.  Discussed reasons to seek immediate medical attention.  Additionally if no improvement or worsening in one week, may follow up with PCP and/or UC.        Followup:    If not improving or if condition worsens, follow up with your Primary Care Provider, If not improving or if conditions worsens over the next 12-24 hours, go to the Emergency Department    There are no Patient Instructions on file for this visit.

## 2024-09-23 DIAGNOSIS — G43.109 MIGRAINE WITH AURA AND WITHOUT STATUS MIGRAINOSUS, NOT INTRACTABLE: ICD-10-CM

## 2024-09-24 RX ORDER — NARATRIPTAN 2.5 MG/1
TABLET ORAL
Qty: 9 TABLET | Refills: 4 | OUTPATIENT
Start: 2024-09-24

## 2024-09-24 NOTE — TELEPHONE ENCOUNTER
Per chart review, naratriptan was discontinued 1/11/24 due to side effects.     Will deny request.    SHANT Mojica, BSN  [facility]

## 2024-09-24 NOTE — TELEPHONE ENCOUNTER
[facility] Call Center    Phone Message    May a detailed message be left on voicemail: yes     Reason for Call: Patient called to speak to care team about her   Headaches and medications.   Action Taken: Other: cs neurology    Travel Screening: Not Applicable     Date of Service:

## 2024-09-25 ENCOUNTER — MYC MEDICAL ADVICE (OUTPATIENT)
Dept: NEUROLOGY | Facility: CLINIC | Age: 79
End: 2024-09-25
Payer: COMMERCIAL

## 2024-09-25 NOTE — TELEPHONE ENCOUNTER
Returned call to Nancy, her headaches have been well controlled with Emgality for past 5-6 years per pt statement. Starting 9/1 she has had icepick/stabbing headache in L temple, ear, eye when lying down. Headache is 9/10 but will suddenly improve and then worsen again later. Minimal headache while upright during the day.     No relief with Nurtec. She has not tried rizatriptan as she has not had medication on hand, did request refill from pharmacy.     She went to urgent care for evaluation and was given 7 day course of amoxicillin-clav for possibility of sinus infection. She has completed 5 days of antibiotic with no change in headache. She does have seasonal allergies and takes claritin twice daily.     She admits to occasional use of neti pot with tap water, RN asked her to use distilled or sterile saline in future. She is agreeable.     Pt is leaving Friday morning for 8 day trip. Please advise if you have any recommendations for acute headache? Due to take her next emgality dose 10/3. Per pt she has never taken steroids for headache, but has tolerated for chest congestion.     Gurvinder Cherry, RN, BSN  [facility] Neurology

## 2024-09-26 RX ORDER — METHYLPREDNISOLONE 4 MG
TABLET, DOSE PACK ORAL
Qty: 21 TABLET | Refills: 0 | Status: SHIPPED | OUTPATIENT
Start: 2024-09-26

## 2024-09-26 NOTE — TELEPHONE ENCOUNTER
Lazarus Villa MD  You22 hours ago (2:14 PM)       We could try a Medrol Dosepak, but I wonder if the patient also has additional reason for her headache, such as occipital neuralgia.  It would be helpful to see patient in clinic to evaluate for this.  Please let me know what she decides.  Thanks,  Lazarus Villa MD.     MomentFeed message sent to patient    Gurvinder Cherry RN, BSN  [facility] Neurology

## 2024-09-26 NOTE — TELEPHONE ENCOUNTER
Pt responded stating that she would like to avoid prednisone, but will fill dosepack and take if needed while on her trip. Will follow up 10/15 as scheduled to discuss symptoms further.     Medrol dosepak pended, please review.     Gurvinder Cherry RN, BSN  [facility] Neurology

## 2024-10-09 ENCOUNTER — OFFICE VISIT (OUTPATIENT)
Dept: UROLOGY | Facility: CLINIC | Age: 79
End: 2024-10-09
Attending: INTERNAL MEDICINE
Payer: COMMERCIAL

## 2024-10-09 VITALS
BODY MASS INDEX: 20.91 KG/M2 | HEIGHT: 63 IN | SYSTOLIC BLOOD PRESSURE: 113 MMHG | DIASTOLIC BLOOD PRESSURE: 77 MMHG | OXYGEN SATURATION: 99 % | WEIGHT: 118 LBS | HEART RATE: 64 BPM

## 2024-10-09 DIAGNOSIS — N39.3 FEMALE STRESS INCONTINENCE: ICD-10-CM

## 2024-10-09 DIAGNOSIS — M79.18 MYALGIA OF PELVIC FLOOR: ICD-10-CM

## 2024-10-09 DIAGNOSIS — M62.89 PELVIC FLOOR DYSFUNCTION: Primary | ICD-10-CM

## 2024-10-09 LAB — RESIDUAL VOLUME (RV) (EXTERNAL): 23

## 2024-10-09 PROCEDURE — 99203 OFFICE O/P NEW LOW 30 MIN: CPT | Mod: 25 | Performed by: UROLOGY

## 2024-10-09 PROCEDURE — 51798 US URINE CAPACITY MEASURE: CPT | Performed by: UROLOGY

## 2024-10-09 NOTE — PATIENT INSTRUCTIONS
Websites with free information:    [employer] patient website: www.voicesforpfd.org    Total Control Program: www.totalcontrolprogram.com    Please see one of the dedicated pelvic floor physical therapist. If you have not heard from the scheduling office within 2 business days, please call [phone] for [email or web address]    Please return to see FRANCES in 6 months, sooner if needed    It was a pleasure meeting with you today.  Thank you for allowing me and my team the privilege of caring for you today.  YOU are the reason we are here, and I truly hope we provided you with the excellent service you deserve.  Please let us know if there is anything else we can do for you so that we can be sure you are leaving completely satisfied with your care experience.

## 2024-10-09 NOTE — PROGRESS NOTES
October 9, 2024    Referring Provider: Alondra Foster MD  [address] [place] [address]    Primary Care Provider: Alondra Foster    Assessment & Plan     Female stress incontinence  We discussed GAYATRI and the treatments to include observation, pelvic floor physical therapy, urethral bulking, and surgeries most commonly synthetic midurethral sling or autologous rectus fascial sling.  Given exam recommend starting with PT    - Adult Urology  Referral  - Physical Therapy  Referral; Future  - MEASURE POST-VOID RESIDUAL URINE/BLADDER CAPACITY, US NON-IMAGING ([id number])    Myalgia of pelvic floor/Pelvic floor dysfunction  We discussed how her pelvic floor symptoms are related to the physical exam findings and her pelvic floor myofascial dysfunction.  We discussed how the recommended treatment is dedicated pelvic floor therapy.  We discussed how the pelvic floor physical therapy works and patient is agreeable.  Referral was placed.      - Physical Therapy  Referral; Future  - MEASURE POST-VOID RESIDUAL URINE/BLADDER CAPACITY, US NON-IMAGING (44621)    RTC 6 months, sooner if needed    30 minutes were spent on this day of the encounter in reviewing the EMR including Dr Foster's note, direct patient care, coordination of care and documentation    Anuja Hernández MD MPH  (she/her/hers)   of Urology  [facility]      HPI:  Rakel Parish is a 78 year old female who presents for evaluation of her pelvic floor symptoms.  She is referred by Dr Foster-note from 6/4/24 reviewed in Epic.  Has stress incontinence that has been getting worse, but in volume and frequency.      She denies gross hematuria, UTI, vaginal bleeding, vaginal bulge.  Has has had TL no other pelvic surgery    Past Medical History:   Diagnosis Date    Asthma     Global amnesia     Hypothyroidism    Hyperlipidaemia LDL goal < 130     Hypothyroidism     Insomnia     Migraine,  unspecified, without mention of intractable migraine without mention of status migrainosus     Osteopenia     Osteoporosis     Raynaud's disease     Unspecified asthma(493.90)      Past Surgical History:   Procedure Laterality Date    APPENDECTOMY      BUNIONECTOMY Right 2010    COLONOSCOPY  11/16/10    Every 7 years    COLONOSCOPY  2018    hemorrhoids, [place] [facility]    FOOT SURGERY Left     tendon rupture    HC REPAIR OF HAMMERTOE,ONE      TONSILLECTOMY & ADENOIDECTOMY      TUBAL LIGATION  1965     Social History     Socioeconomic History    Marital status:      Spouse name: Not on file    Number of children: Not on file    Years of education: Not on file    Highest education level: Not on file   Occupational History    Not on file   Tobacco Use    Smoking status: Former     Current packs/day: 0.00     Average packs/day: 0.5 packs/day for 10.0 years (5.0 ttl pk-yrs)     Types: Cigarettes     Start date: 1960     Quit date: 1970     Years since quittin.8    Smokeless tobacco: Never   Vaping Use    Vaping status: Never Used   Substance and Sexual Activity    Alcohol use: Yes     Alcohol/week: 2.0 standard drinks of alcohol     Types: 2 Cans of beer per week     Comment: rare    Drug use: No    Sexual activity: Yes     Partners: Male   Other Topics Concern    Parent/sibling w/ CABG, MI or angioplasty before 65F 55M? Not Asked   Social History Narrative    Not on file     Social Determinants of Health     Financial Resource Strain: Low Risk  (2024)    Financial Resource Strain     Within the past 12 months, have you or your family members you live with been unable to get utilities (heat, electricity) when it was really needed?: No   Food Insecurity: No Food Insecurity (2024)    Received from [facility]    Hunger Vital Sign     Worried About Running Out of Food in the Last Year: Never true     Ran Out of Food in the Last Year: Never true  "  Transportation Needs: No Transportation Needs (2024)    Received from [facility]    PRAPARE - Transportation     Lack of Transportation (Medical): No     Lack of Transportation (Non-Medical): No   Physical Activity: Not on file   Stress: Not on file   Social Connections: Not on file   Interpersonal Safety: Not At Risk (2024)    Received from [facility]    EH IP Custom IPV     Do you feel UNSAFE in any of your personal relationships with your family members or any other acquaintances?: No   Housing Stability: Low Risk  (2024)    Received from [facility]    Housing Stability Vital Sign     Unable to Pay for Housing in the Last Year: No     Number of Times Moved in the Last Year: 0     Homeless in the Last Year: No     Family History   Problem Relation Age of Onset    Osteoporosis Mother 94         at 94 after hip fracture    Dementia Mother     Hypertension Mother     C.A.D. Father 85         of renal failure. ASCVD, MI x 2    Prostate Cancer Father 80    Cerebrovascular Disease Father     Ovarian Cancer Sister 82    Neurologic Disorder Sister         \"unable to grab thoughts\"    Gout Sister     Fibromyalgia Sister     Cancer Brother         CLL    Prostate Cancer Brother 68    Multiple myeloma Brother 93        new onset    Asthma Brother     Breast Cancer Niece 40        Maternal    Colon Cancer No family hx of      Allergies   Allergen Reactions    Cyclobenzaprine Other (See Comments)    Hydrocodone Itching    Levofloxacin Unknown     Tendon injury (torn)    Rofecoxib Nausea    Simvastatin Other (See Comments)    Trazodone Other (See Comments)     Other reaction(s): Nightmares    Latex Rash     Current Outpatient Medications   Medication Sig Dispense Refill    albuterol (PROAIR HFA/PROVENTIL HFA/VENTOLIN HFA) 108 (90 Base) MCG/ACT inhaler Inhale 2 puffs into the lungs every 6 hours as " needed for shortness of breath, wheezing or cough 18 g 3    aspirin 81 MG tablet Take 81 mg by mouth daily       beclomethasone HFA (QVAR REDIHALER) 40 MCG/ACT inhaler 2 puffs twice daily 10.6 g 11    Calcium-Vitamin D 600-200 MG-UNIT TABS Take 1 tablet by mouth At Bedtime       citalopram (CELEXA) 10 MG tablet Take 1 tablet (10 mg) by mouth daily 90 tablet 3    cyanocobalamin (VITAMIN B-12) 1000 MCG SUBL sublingual tablet Place 1,000 mcg under the tongue daily      fexofenadine (ALLEGRA) 180 MG tablet Take 180 mg by mouth daily      gabapentin (NEURONTIN) 300 MG capsule Take 1 capsule (300 mg) by mouth 2 times daily Take 1 cap (300 mg) twice daily      galcanezumab-gnlm (EMGALITY) 120 MG/ML injection INJECT 1ML UNDER THE SKIN EVERY 28 DAYS 1 mL 11    indomethacin (INDOCIN) 50 MG capsule Take 1 capsule (50 mg) by mouth as needed for moderate pain Take 1 tablet as needed 10 capsule 3    levothyroxine (SYNTHROID/LEVOTHROID) 88 MCG tablet Take 1 tablet (88 mcg) by mouth daily Except skip Sunday dose 90 tablet 3    magnesium oxide (MAG-OX) 400 MG tablet Take 800 mg by mouth At Bedtime      methylPREDNISolone (MEDROL DOSEPAK) 4 MG tablet therapy pack Follow Package Directions 21 tablet 0    montelukast (SINGULAIR) 10 MG tablet Take 1 tablet (10 mg) by mouth at bedtime 90 tablet 3    Multiple vitamin TABS Take 3 tablets by mouth daily       omeprazole (PRILOSEC) 20 MG capsule Take 40 mg by mouth daily (with dinner)       predniSONE (DELTASONE) 20 MG tablet Take 2 po q am with food x 5 days for asthma flare 10 tablet 1    rimegepant (NURTEC) 75 MG ODT tablet Place 1 tablet (75 mg) under the tongue daily as needed for migraine Maximum of 1 tablet every 24 hours. 8 tablet 3    rizatriptan (MAXALT) 10 MG tablet Take 1 tablet (10 mg) by mouth at onset of headache for migraine May repeat in 2 hours. Max 3 tablets/24 hours. 12 tablet 4    rosuvastatin (CRESTOR) 10 MG tablet Take 1 tablet (10 mg) by mouth daily. 90 tablet 2     "spacer (OPTICHAMBER CHIVO) holding chamber Use with MDI      Vitamin D (Cholecalciferol) 50 MCG (2000 UT) CAPS Take 2,000 Units by mouth daily       zolpidem (AMBIEN) 10 MG tablet Take 1 tablet (10 mg) by mouth nightly as needed for sleep 30 tablet 0    zolpidem (AMBIEN) 5 MG tablet Take one po q hs, note lower weaning dose 30 tablet 3     No current facility-administered medications for this visit.     /77   Pulse 64   Ht 1.6 m (5' 3\")   Wt 53.5 kg (118 lb)   SpO2 99%   BMI 20.90 kg/m    GENERAL: healthy, alert and no distress  EYES: Eyes grossly normal to inspection, conjunctivae and sclerae normal  HENT: normal cephalic/atraumatic.  External ears, nose and mouth without ulcers or lesions.  RESP: no audible wheeze, cough, or visible cyanosis.  No visible retractions or increased work of breathing.  Able to speak fully in complete sentences.  NEURO: Cranial nerves grossly intact, mentation intact and speech normal  PSYCH: mentation appears normal, affect normal/bright, judgement and insight intact, normal speech and appearance well-groomed    [facility] CMA chaperoned exam    ABD: soft, nontender, nondistended  : Normal external genitalia, negative ESST, exam remarkable for diffuse myofascial tenderness of the pelvic floor    PVR 23 mL by bladder scan    CC  Patient Care Team:  Alondra Foster MD as PCP - General (Internal Medicine)  Alondra Foster MD as MD (Internal Medicine)  Marleny Jones PA-C as Physician Assistant (Family Practice)  Aida Bey MD as MD (Family Practice)  Cristobal Fox MD as MD (Neurology)  Hernandez Charles MD as MD (Family Practice)  Walter Bingham MD as Resident (Student in organized health care education/training program)  Alondra Foster MD as Assigned PCP  Lazarus Villa MD as Assigned Neuroscience Provider  Janeth Villeda NP as Nurse Practitioner  Blanca Valerio AuD as Audiologist " (Audiology)  Santa Sidhu MD as Assigned Endocrinology Provider  Miguel De MD as Assigned Cancer Care Provider  Anuja Hernández MD as MD (Urology)  Anuja Hernández MD as MD (Urology)  RADHA UGARTE

## 2024-10-09 NOTE — NURSING NOTE
Chief Complaint   Patient presents with    stress incontinence     Patient here today for incontinence       Patient here today to discuss her incontinence  Patient voided in clinic  Patient bladder was scaned  PVR 23ML by bladder scan          RASHI Eaton

## 2024-10-09 NOTE — LETTER
10/9/2024       RE: Rakel Parish  [address]  [place] [address]     Dear Colleague,    Thank you for referring your patient, Rakel Parish, to the [facility] at [facility]. Please see a copy of my visit note below.    October 9, 2024    Referring Provider: Alondra Foster MD  [address] [facility] [place] [address]    Primary Care Provider: Alondra Foster    Assessment & Plan    Female stress incontinence  We discussed GAYATRI and the treatments to include observation, pelvic floor physical therapy, urethral bulking, and surgeries most commonly synthetic midurethral sling or autologous rectus fascial sling.  Given exam recommend starting with PT    - Adult Urology  Referral  - Physical Therapy  Referral; Future  - MEASURE POST-VOID RESIDUAL URINE/BLADDER CAPACITY, US NON-IMAGING (47987)    Myalgia of pelvic floor/Pelvic floor dysfunction  We discussed how her pelvic floor symptoms are related to the physical exam findings and her pelvic floor myofascial dysfunction.  We discussed how the recommended treatment is dedicated pelvic floor therapy.  We discussed how the pelvic floor physical therapy works and patient is agreeable.  Referral was placed.      - Physical Therapy  Referral; Future  - MEASURE POST-VOID RESIDUAL URINE/BLADDER CAPACITY, US NON-IMAGING ([id number])    RTC 6 months, sooner if needed    30 minutes were spent on this day of the encounter in reviewing the EMR including Dr Foster's note, direct patient care, coordination of care and documentation    Anuja Hernández MD MPH  (she/her/hers)   of Urology  [facility]      HPI:  Rakel Parish is a 78 year old female who presents for evaluation of her pelvic floor symptoms.  She is referred by Dr Foster-note from 6/4/24 reviewed in Epic.  Has stress incontinence that has been getting worse, but  in volume and frequency.      She denies gross hematuria, UTI, vaginal bleeding, vaginal bulge.  Has has had TL no other pelvic surgery    Past Medical History:   Diagnosis Date     Asthma      Global amnesia     Hypothyroidism     Hyperlipidaemia LDL goal < 130      Hypothyroidism      Insomnia      Migraine, unspecified, without mention of intractable migraine without mention of status migrainosus      Osteopenia      Osteoporosis      Raynaud's disease      Unspecified asthma(493.90)      Past Surgical History:   Procedure Laterality Date     APPENDECTOMY       BUNIONECTOMY Right 2010     COLONOSCOPY  11/16/10    Every 7 years     COLONOSCOPY  2018    hemorrhoids, [place] [facility]     FOOT SURGERY Left     tendon rupture     HC REPAIR OF HAMMERTOE,ONE       TONSILLECTOMY & ADENOIDECTOMY       TUBAL LIGATION  1965     Social History     Socioeconomic History     Marital status:      Spouse name: Not on file     Number of children: Not on file     Years of education: Not on file     Highest education level: Not on file   Occupational History     Not on file   Tobacco Use     Smoking status: Former     Current packs/day: 0.00     Average packs/day: 0.5 packs/day for 10.0 years (5.0 ttl pk-yrs)     Types: Cigarettes     Start date: 1960     Quit date: 1970     Years since quittin.8     Smokeless tobacco: Never   Vaping Use     Vaping status: Never Used   Substance and Sexual Activity     Alcohol use: Yes     Alcohol/week: 2.0 standard drinks of alcohol     Types: 2 Cans of beer per week     Comment: rare     Drug use: No     Sexual activity: Yes     Partners: Male   Other Topics Concern     Parent/sibling w/ CABG, MI or angioplasty before 65F 55M? Not Asked   Social History Narrative     Not on file     Social Determinants of Health     Financial Resource Strain: Low Risk  (2024)    Financial Resource Strain      Within the past 12 months, have you or your family members you live with been  "unable to get utilities (heat, electricity) when it was really needed?: No   Food Insecurity: No Food Insecurity (2024)    Received from [facility]    Hunger Vital Sign      Worried About Running Out of Food in the Last Year: Never true      Ran Out of Food in the Last Year: Never true   Transportation Needs: No Transportation Needs (2024)    Received from [facility]    PRAPARE - Transportation      Lack of Transportation (Medical): No      Lack of Transportation (Non-Medical): No   Physical Activity: Not on file   Stress: Not on file   Social Connections: Not on file   Interpersonal Safety: Not At Risk (2024)    Received from [facility]    EH IP Custom IPV      Do you feel UNSAFE in any of your personal relationships with your family members or any other acquaintances?: No   Housing Stability: Low Risk  (2024)    Received from [facility]    Housing Stability Vital Sign      Unable to Pay for Housing in the Last Year: No      Number of Times Moved in the Last Year: 0      Homeless in the Last Year: No     Family History   Problem Relation Age of Onset     Osteoporosis Mother 94         at 94 after hip fracture     Dementia Mother      Hypertension Mother      C.A.D. Father 85         of renal failure. ASCVD, MI x 2     Prostate Cancer Father 80     Cerebrovascular Disease Father      Ovarian Cancer Sister 82     Neurologic Disorder Sister         \"unable to grab thoughts\"     Gout Sister      Fibromyalgia Sister      Cancer Brother         CLL     Prostate Cancer Brother 68     Multiple myeloma Brother 93        new onset     Asthma Brother      Breast Cancer Niece 40        Maternal     Colon Cancer No family hx of      Allergies   Allergen Reactions     Cyclobenzaprine Other (See Comments)     Hydrocodone Itching     Levofloxacin Unknown     Tendon " injury (torn)     Rofecoxib Nausea     Simvastatin Other (See Comments)     Trazodone Other (See Comments)     Other reaction(s): Nightmares     Latex Rash     Current Outpatient Medications   Medication Sig Dispense Refill     albuterol (PROAIR HFA/PROVENTIL HFA/VENTOLIN HFA) 108 (90 Base) MCG/ACT inhaler Inhale 2 puffs into the lungs every 6 hours as needed for shortness of breath, wheezing or cough 18 g 3     aspirin 81 MG tablet Take 81 mg by mouth daily        beclomethasone HFA (QVAR REDIHALER) 40 MCG/ACT inhaler 2 puffs twice daily 10.6 g 11     Calcium-Vitamin D 600-200 MG-UNIT TABS Take 1 tablet by mouth At Bedtime        citalopram (CELEXA) 10 MG tablet Take 1 tablet (10 mg) by mouth daily 90 tablet 3     cyanocobalamin (VITAMIN B-12) 1000 MCG SUBL sublingual tablet Place 1,000 mcg under the tongue daily       fexofenadine (ALLEGRA) 180 MG tablet Take 180 mg by mouth daily       gabapentin (NEURONTIN) 300 MG capsule Take 1 capsule (300 mg) by mouth 2 times daily Take 1 cap (300 mg) twice daily       galcanezumab-gnlm (EMGALITY) 120 MG/ML injection INJECT 1ML UNDER THE SKIN EVERY 28 DAYS 1 mL 11     indomethacin (INDOCIN) 50 MG capsule Take 1 capsule (50 mg) by mouth as needed for moderate pain Take 1 tablet as needed 10 capsule 3     levothyroxine (SYNTHROID/LEVOTHROID) 88 MCG tablet Take 1 tablet (88 mcg) by mouth daily Except skip Sunday dose 90 tablet 3     magnesium oxide (MAG-OX) 400 MG tablet Take 800 mg by mouth At Bedtime       methylPREDNISolone (MEDROL DOSEPAK) 4 MG tablet therapy pack Follow Package Directions 21 tablet 0     montelukast (SINGULAIR) 10 MG tablet Take 1 tablet (10 mg) by mouth at bedtime 90 tablet 3     Multiple vitamin TABS Take 3 tablets by mouth daily        omeprazole (PRILOSEC) 20 MG capsule Take 40 mg by mouth daily (with dinner)        predniSONE (DELTASONE) 20 MG tablet Take 2 po q am with food x 5 days for asthma flare 10 tablet 1     rimegepant (NURTEC) 75 MG ODT tablet  "Place 1 tablet (75 mg) under the tongue daily as needed for migraine Maximum of 1 tablet every 24 hours. 8 tablet 3     rizatriptan (MAXALT) 10 MG tablet Take 1 tablet (10 mg) by mouth at onset of headache for migraine May repeat in 2 hours. Max 3 tablets/24 hours. 12 tablet 4     rosuvastatin (CRESTOR) 10 MG tablet Take 1 tablet (10 mg) by mouth daily. 90 tablet 2     spacer (OPTICHAMBER CHIVO) holding chamber Use with MDI       Vitamin D (Cholecalciferol) 50 MCG (2000 UT) CAPS Take 2,000 Units by mouth daily        zolpidem (AMBIEN) 10 MG tablet Take 1 tablet (10 mg) by mouth nightly as needed for sleep 30 tablet 0     zolpidem (AMBIEN) 5 MG tablet Take one po q hs, note lower weaning dose 30 tablet 3     No current facility-administered medications for this visit.     /77   Pulse 64   Ht 1.6 m (5' 3\")   Wt 53.5 kg (118 lb)   SpO2 99%   BMI 20.90 kg/m    GENERAL: healthy, alert and no distress  EYES: Eyes grossly normal to inspection, conjunctivae and sclerae normal  HENT: normal cephalic/atraumatic.  External ears, nose and mouth without ulcers or lesions.  RESP: no audible wheeze, cough, or visible cyanosis.  No visible retractions or increased work of breathing.  Able to speak fully in complete sentences.  NEURO: Cranial nerves grossly intact, mentation intact and speech normal  PSYCH: mentation appears normal, affect normal/bright, judgement and insight intact, normal speech and appearance well-groomed    J [facility] CMA chaperoned exam    ABD: soft, nontender, nondistended  : Normal external genitalia, negative ESST, exam remarkable for diffuse myofascial tenderness of the pelvic floor    PVR 23 mL by bladder scan    CC  Patient Care Team:  Alondra Foster MD as PCP - General (Internal Medicine)  Alondra Foster MD as MD (Internal Medicine)  Marleny Jones PA-C as Physician Assistant (Family Practice)  Aida Bey MD as MD (Family Practice)  Cristobal Fox MD " as MD (Neurology)  Hernandez Charles MD as MD (Family Practice)  Walter Bingham MD as Resident (Student in organized health care education/training program)  Radha Foster MD as Assigned PCP  Lazarus Villa MD as Assigned Neuroscience Provider  Janeth Villeda NP as Nurse Practitioner  Blanca Valerio AuD as Audiologist (Audiology)  Santa Sidhu MD as Assigned Endocrinology Provider  Miguel De MD as Assigned Cancer Care Provider  Anuja Hernández MD as MD (Urology)  Anuja Hernández MD as MD (Urology)  RADHA FOSTER                Again, thank you for allowing me to participate in the care of your patient.      Sincerely,    Anuja Hernández MD

## 2024-10-14 NOTE — PROGRESS NOTES
ESTABLISHED PATIENT NEUROLOGY NOTE    DATE OF VISIT: 10/15/2024  CLINIC LOCATION: [facility]  MRN: [id number]  PATIENT NAME: Rakel Parish  YOB: 1945    REASON FOR VISIT: No chief complaint on file.    SUBJECTIVE:                                                      HISTORY OF PRESENT ILLNESS: Patient is here to follow up regarding migraine, left occipital neuralgia and possibly chronic paroxysmal hemicrania.  The last visit was on 4/29/2024.  Please refer to my initial/other prior notes for further information.    Since the last visit, the patient reports ***.  She is on Emgality for headache prevention.  She feels that Nurtec ***.  She experienced exacerbation of headaches at the end of September and I ordered Medrol Dosepak on 9/26/2024.    Today, the patient reports ***.  She denies any additional focal neurological symptoms.  EXAM:                                                    Physical Exam:   Vitals: There were no vitals taken for this visit.    General: pt is in NAD, cooperative.  Skin: normal turgor, moist mucous membranes, no lesions/rashes noticed.  HEENT: ATNC, white sclera, normal conjunctiva.  Respiratory: Symmetric lung excursion, no accessory respiratory muscle use.  Abdomen: Non distended.  Neurological: awake, cooperative, follows commands, face is symmetric, equally moves all extremities, no sensory deficits, no dysmetria bilaterally.  ASSESSMENT AND PLAN:                                                    Assessment: 78 year old female patient presents for follow-up of left occipital neuralgia, migraine, and possibly chronic paroxysmal hemicrania.    Diagnoses:    ICD-10-CM    1. Migraine with aura and without status migrainosus, not intractable  G43.109       2. Chronic paroxysmal hemicrania, not intractable  G44.049       3. Cervico-occipital neuralgia of left side  M54.81         Plan:  There are no Patient Instructions on file for this visit.    Total  Time: *** minutes spent on the date of the encounter doing chart review, history and exam, documentation and further activities per the note.    Lazarus Villa MD  [facility] Neurology  (Chart documentation was completed in part with Dragon voice-recognition software. Even though reviewed, some grammatical, spelling, and word errors may remain.)

## 2024-10-15 ENCOUNTER — OFFICE VISIT (OUTPATIENT)
Dept: NEUROLOGY | Facility: CLINIC | Age: 79
End: 2024-10-15
Payer: COMMERCIAL

## 2024-10-15 ENCOUNTER — TELEPHONE (OUTPATIENT)
Dept: NEUROLOGY | Facility: CLINIC | Age: 79
End: 2024-10-15

## 2024-10-15 VITALS — SYSTOLIC BLOOD PRESSURE: 100 MMHG | HEART RATE: 75 BPM | OXYGEN SATURATION: 100 % | DIASTOLIC BLOOD PRESSURE: 70 MMHG

## 2024-10-15 DIAGNOSIS — G43.109 MIGRAINE WITH AURA AND WITHOUT STATUS MIGRAINOSUS, NOT INTRACTABLE: Primary | ICD-10-CM

## 2024-10-15 DIAGNOSIS — M54.81 CERVICO-OCCIPITAL NEURALGIA OF LEFT SIDE: ICD-10-CM

## 2024-10-15 DIAGNOSIS — G44.049 CHRONIC PAROXYSMAL HEMICRANIA, NOT INTRACTABLE: ICD-10-CM

## 2024-10-15 DIAGNOSIS — M54.2 CERVICALGIA: ICD-10-CM

## 2024-10-15 PROCEDURE — 99214 OFFICE O/P EST MOD 30 MIN: CPT | Performed by: PSYCHIATRY & NEUROLOGY

## 2024-10-15 PROCEDURE — G2211 COMPLEX E/M VISIT ADD ON: HCPCS | Performed by: PSYCHIATRY & NEUROLOGY

## 2024-10-15 NOTE — PROGRESS NOTES
ESTABLISHED PATIENT NEUROLOGY NOTE    DATE OF VISIT: 10/15/2024  CLINIC LOCATION: [facility]  MRN: [id number]  PATIENT NAME: Rakel Parish  YOB: 1945    REASON FOR VISIT:   Chief Complaint   Patient presents with    Follow Up     Migraine- dids'nt have migraine for 2 months then Sept was worse and had multiple, Medrol dose pack helped until occipital issues flared up       SUBJECTIVE:                                                      HISTORY OF PRESENT ILLNESS: Patient is here to follow up regarding migraine, left occipital neuralgia and possibly chronic paroxysmal hemicrania.  The last visit was on 4/29/2024.  Please refer to my initial/other prior notes for further information.    Since the last visit, the patient reports that her headaches improved after Medrol Dosepak.  She is on Emgality for headache prevention.  She feels that Nurtec is working, but she does not take it often due to cost.  She experienced exacerbation of headaches at the end of September, and I ordered Medrol Dosepak on 9/26/2024.    Today, the patient reports that headaches are stable, but she has bilateral neck pain radiating down to both of her shoulders.  She is working with physical therapy and taking Flexeril in addition to gabapentin.  She denies any additional focal neurological symptoms.  EXAM:                                                    Physical Exam:   Vitals: /70 (BP Location: Right arm, Patient Position: Sitting, Cuff Size: Adult Regular)   Pulse 75   SpO2 100%     General: pt is in NAD, cooperative.  Skin: normal turgor, moist mucous membranes, no lesions/rashes noticed.  HEENT: ATNC, white sclera, normal conjunctiva.  Respiratory: Symmetric lung excursion, no accessory respiratory muscle use.  Abdomen: Non distended.  Neurological: awake, cooperative, follows commands, face is symmetric, equally moves all extremities, no sensory deficits, no dysmetria  bilaterally.  ASSESSMENT AND PLAN:                                                    Assessment: 78 year old female patient presents for follow-up of left occipital neuralgia, migraine, and possibly chronic paroxysmal hemicrania.  The patient had flare of stabbing pain that responded to Medrol Dosepak.  Now is doing better on current therapy.      Complains of cervical pain, which is likely musculoskeletal and possible related to degenerative changes.  We discussed that dose of gabapentin could be further increased.  Continue with muscle relaxant and physical therapy would be helpful.    Diagnoses:    ICD-10-CM    1. Migraine with aura and without status migrainosus, not intractable  G43.109 rimegepant (NURTEC) 75 MG ODT tablet      2. Chronic paroxysmal hemicrania, not intractable  G44.049       3. Cervico-occipital neuralgia of left side  M54.81       4. Cervicalgia  M54.2         Plan: At today's visit we thoroughly discussed current symptoms, available treatment options, and the plan.    We decided to continue current medications without changes.  I refilled Nurtec.  Emgality will be due at the next visit.    For neck discomfort, advised to continue muscle relaxants along with physical therapy.    I advised the patient to keep the headache diary and bring it to the next follow-up visit or upload via My Chart.     Next follow-up appointment is in the next 6 months or earlier if needed.    Total Time: 21 minutes spent on the date of the encounter doing chart review, history and exam, documentation and further activities per the note.    Lazarus Villa MD  [facility] Neurology  (Chart documentation was completed in part with Dragon voice-recognition software. Even though reviewed, some grammatical, spelling, and word errors may remain.)

## 2024-10-15 NOTE — PATIENT INSTRUCTIONS
AFTER VISIT SUMMARY (AVS):    At today's visit we thoroughly discussed current symptoms, available treatment options, and the plan.    We decided to continue current medications without changes.  I refilled Nurtec.  Emgality will be due at the next visit.    For your neck discomfort, please continue to use muscle relaxants along with physical therapy.    Please keep the headache diary and bring it to the next follow-up visit or upload via My Chart.     Next follow-up appointment is in the next 6 months or earlier if needed.    Please do not hesitate to call me with any questions or concerns.    Thanks.

## 2024-10-15 NOTE — PROGRESS NOTES
"Rakel Parish is a 78 year old female who presents for:  Chief Complaint   Patient presents with    Follow Up     Migraine- dids'nt have migraine for 2 months then Sept was worse and had multiple, Medrol dose pack helped until occipital issues flared up          Initial Vitals:  /70 (BP Location: Right arm, Patient Position: Sitting, Cuff Size: Adult Regular)   Pulse 75   SpO2 100%  Estimated body mass index is 20.9 kg/m  as calculated from the following:    Height as of 10/9/24: 1.6 m (5' 3\").    Weight as of 10/9/24: 53.5 kg (118 lb).. There is no height or weight on file to calculate BSA. BP completed using cuff size: regular    Ana Maria Lind   "

## 2024-10-15 NOTE — TELEPHONE ENCOUNTER
Prior Authorization Retail Medication Request    Medication/Dose: rimegepant (NURTEC) 75 MG ODT tablet  Diagnosis and ICD code (if different than what is on RX):    New/renewal/insurance change PA/secondary ins. PA:  Previously Tried and Failed:    Rationale:      Insurance   Primary:   Insurance ID:      Secondary (if applicable):  Insurance ID:      Pharmacy Information (if different than what is on RX)  Name:    Phone:    Fax:    Clinic Information  Preferred routing pool for dept communication:       Patient ID# [id number]    Primary Visit Coverage    Payer Plan Sponsor Code Group Number Group Name   BCBS BCBS MEDICARE ADVANTAGE [id number]      Primary Visit Coverage Subscriber    ID Name SSN Address   [id number] GILBERT FRANCO [id number] [id number]

## 2024-10-15 NOTE — LETTER
10/15/2024      Rakel Parish  [address]  [place] [address]      Dear Colleague,    Thank you for referring your patient, Rakel Parish, to the [facility]UROLOG[facility]. Please see a copy of my visit note below.    ESTABLISHED PATIENT NEUROLOGY NOTE    DATE OF VISIT: 10/15/2024  CLINIC LOCATION: [facility]  MRN: [id number]  PATIENT NAME: Rakel Parish  YOB: 1945    REASON FOR VISIT:   Chief Complaint   Patient presents with     Follow Up     Migraine- dids'nt have migraine for 2 months then Sept was worse and had multiple, Medrol dose pack helped until occipital issues flared up       SUBJECTIVE:                                                      HISTORY OF PRESENT ILLNESS: Patient is here to follow up regarding migraine, left occipital neuralgia and possibly chronic paroxysmal hemicrania.  The last visit was on 4/29/2024.  Please refer to my initial/other prior notes for further information.    Since the last visit, the patient reports that her headaches improved after Medrol Dosepak.  She is on Emgality for headache prevention.  She feels that Nurtec is working, but she does not take it often due to cost.  She experienced exacerbation of headaches at the end of September, and I ordered Medrol Dosepak on 9/26/2024.    Today, the patient reports that headaches are stable, but she has bilateral neck pain radiating down to both of her shoulders.  She is working with physical therapy and taking Flexeril in addition to gabapentin.  She denies any additional focal neurological symptoms.  EXAM:                                                    Physical Exam:   Vitals: /70 (BP Location: Right arm, Patient Position: Sitting, Cuff Size: Adult Regular)   Pulse 75   SpO2 100%     General: pt is in NAD, cooperative.  Skin: normal turgor, moist mucous membranes, no lesions/rashes noticed.  HEENT: ATNC, white sclera, normal  conjunctiva.  Respiratory: Symmetric lung excursion, no accessory respiratory muscle use.  Abdomen: Non distended.  Neurological: awake, cooperative, follows commands, face is symmetric, equally moves all extremities, no sensory deficits, no dysmetria bilaterally.  ASSESSMENT AND PLAN:                                                    Assessment: 78 year old female patient presents for follow-up of left occipital neuralgia, migraine, and possibly chronic paroxysmal hemicrania.  The patient had flare of stabbing pain that responded to Medrol Dosepak.  Now is doing better on current therapy.      Complains of cervical pain, which is likely musculoskeletal and possible related to degenerative changes.  We discussed that dose of gabapentin could be further increased.  Continue with muscle relaxant and physical therapy would be helpful.    Diagnoses:    ICD-10-CM    1. Migraine with aura and without status migrainosus, not intractable  G43.109 rimegepant (NURTEC) 75 MG ODT tablet      2. Chronic paroxysmal hemicrania, not intractable  G44.049       3. Cervico-occipital neuralgia of left side  M54.81       4. Cervicalgia  M54.2         Plan: At today's visit we thoroughly discussed current symptoms, available treatment options, and the plan.    We decided to continue current medications without changes.  I refilled Nurtec.  Emgality will be due at the next visit.    For neck discomfort, advised to continue muscle relaxants along with physical therapy.    I advised the patient to keep the headache diary and bring it to the next follow-up visit or upload via My Chart.     Next follow-up appointment is in the next 6 months or earlier if needed.    Total Time: 21 minutes spent on the date of the encounter doing chart review, history and exam, documentation and further activities per the note.    Lazarus Villa MD  [facility] Neurology  (Chart documentation was completed in part with Dragon voice-recognition  "software. Even though reviewed, some grammatical, spelling, and word errors may remain.)    Rakel Parish is a 78 year old female who presents for:  Chief Complaint   Patient presents with     Follow Up     Migraine- dids'nt have migraine for 2 months then Sept was worse and had multiple, Medrol dose pack helped until occipital issues flared up          Initial Vitals:  /70 (BP Location: Right arm, Patient Position: Sitting, Cuff Size: Adult Regular)   Pulse 75   SpO2 100%  Estimated body mass index is 20.9 kg/m  as calculated from the following:    Height as of 10/9/24: 1.6 m (5' 3\").    Weight as of 10/9/24: 53.5 kg (118 lb).. There is no height or weight on file to calculate BSA. BP completed using cuff size: regular    Ana Maria Lind       Again, thank you for allowing me to participate in the care of your patient.        Sincerely,        Lazarus Villa MD  "

## 2024-10-17 NOTE — TELEPHONE ENCOUNTER
Prior Authorization Not Needed per Insurance    Medication: NURTEC 75 MG PO TBDP  Insurance Company: "[employer]" Clinical Review - Phone [phone] Fax [phone]  Expected CoPay: $    Pharmacy Filling the Rx: [id number] - [place] [address] [address]  Pharmacy Notified: YES  Patient Notified: **Instructed pharmacy to notify patient when script is ready to /ship.**

## 2024-10-23 ENCOUNTER — TRANSFERRED RECORDS (OUTPATIENT)
Dept: HEALTH INFORMATION MANAGEMENT | Facility: CLINIC | Age: 79
End: 2024-10-23
Payer: COMMERCIAL

## 2024-11-20 ENCOUNTER — THERAPY VISIT (OUTPATIENT)
Dept: PHYSICAL THERAPY | Facility: CLINIC | Age: 79
End: 2024-11-20
Attending: UROLOGY
Payer: COMMERCIAL

## 2024-11-20 DIAGNOSIS — N39.3 FEMALE STRESS INCONTINENCE: ICD-10-CM

## 2024-11-20 DIAGNOSIS — M79.18 MYALGIA OF PELVIC FLOOR: ICD-10-CM

## 2024-11-20 DIAGNOSIS — M62.89 PELVIC FLOOR DYSFUNCTION: ICD-10-CM

## 2024-11-20 NOTE — PROGRESS NOTES
PHYSICAL THERAPY EVALUATION  Type of Visit: Evaluation       Fall Risk Screen:  Fall screen completed by: PT  Have you fallen 2 or more times in the past year?: (Patient-Rptd) No  Have you fallen and had an injury in the past year?: (Patient-Rptd) No  Is patient a fall risk?: No    Subjective     Nancy reports her chief complaint is difficulty emptying bladder. She c/o difficulty starting stream, slow stream, straining to empty bladder. Thinks she has always strained to urinate a bit but has become noticeable the last 5 years. Unsure of any aggravating or alleviating factors.  Denies urinary frequency- can wait long periods in AM and voids every 90 min in the afternoon. Denies pain w/ urination. Waking 1x/night. Notes rare GAYATRI w/ coughing- using inhaler now and that is helping. Bowels can vary from constipated to loose stool. Is better managed now w/ fiber. Denies pelvic pain except w/ pelvic exams. Does have neck and back stiffness x1 wk, chronic R glute pain (hx of R TKA).         Presenting condition or subjective complaint: (Patient-Rptd) Prlvic floor weakness  Date of onset: 10/09/24    Relevant medical history:     Dates & types of surgery:      Prior diagnostic imaging/testing results:       Prior therapy history for the same diagnosis, illness or injury: (Patient-Rptd) No      Prior Level of Function  Transfers:   Ambulation:   ADL:   IADL:     Living Environment  Social support: (Patient-Rptd) With a significant other or spouse   Type of home: (Patient-Rptd) Apartment/condo; 1 level   Stairs to enter the home: (Patient-Rptd) No       Ramp: (Patient-Rptd) No   Stairs inside the home: (Patient-Rptd) No       Help at home: (Patient-Rptd) None  Equipment owned:       Employment: (Patient-Rptd) No    Hobbies/Interests:      Patient goals for therapy: (Patient-Rptd) Bladder control    Pain assessment: See objective evaluation for additional pain details     Objective      PELVIC EVALUATION  ADDITIONAL  HISTORY:  Sex assigned at birth: (Patient-Rptd) Female  Gender identity:      Pronouns: (Patient-Rptd) She/Her Hers      Bladder History:  Feels bladder filling: (Patient-Rptd) Yes  Triggers for feeling of inability to wait to go to the bathroom: (Patient-Rptd) No    How long can you wait to urinate: (Patient-Rptd) Varies  Gets up at night to urinate: (Patient-Rptd) Yes (Patient-Rptd) 1  Can stop the flow of urine when urinating: (Patient-Rptd) Yes  Volume of urine usually released: (Patient-Rptd) Average   Other issues: (Patient-Rptd) Straining to pass urine; Slow or hesitant urine stream; Trouble emptying bladder completely  Number of bladder infections in last 12 months:    Fluid intake per day: (Patient-Rptd) 24 oz (Patient-Rptd) 12 oz (Patient-Rptd) 0-1  Medications taken for bladder: (Patient-Rptd) No     Activities causing urine leak: (Patient-Rptd) Cough    Amount of urine typically leaked: (Patient-Rptd) Usually minimal  Pads used to help with leaking: (Patient-Rptd) No        Bowel History:  Frequency of bowel movement: (Patient-Rptd) Daily  Consistency of stool: (Patient-Rptd) Other (Patient-Rptd) Varies  Ignores the urge to defecate: (Patient-Rptd) No  Other bowel issues: (Patient-Rptd) Loss of gas  Length of time spent trying to have a bowel movement:      Sexual Function History:  Sexual orientation: (Patient-Rptd) Straight    Sexually active: (Patient-Rptd) No  Lubrication used: (Patient-Rptd) No (Patient-Rptd) No  Pelvic pain:      Pain or difficulty with orgasms/erection/ejaculation:      State of menopause: (Patient-Rptd) Post-menopause (I am done with menopause)  Hormone medications: (Patient-Rptd) No      Are you currently pregnant: (Patient-Rptd) No  Number of previous pregnancies: (Patient-Rptd) 2  Number of deliveries: (Patient-Rptd) 2  If you have delivered before, did you have any of these issues during delivery: (Patient-Rptd) Episiotomy; Vaginal delivery  Have you been diagnosed with  pelvic prolapse or abdominal separation: (Patient-Rptd) No  Do you get regular exercise: (Patient-Rptd) Yes  I do this type of exercise: (Patient-Rptd) Walk, aqua aerobics , PT exercise s  Have you tried pelvic floor strengthening exercises for 4 weeks: (Patient-Rptd) No  Do you have any history of trauma that is relevant to your care that you d like to share: (Patient-Rptd) No      Discussed reason for referral regarding pelvic health needs and external/internal pelvic floor muscle examination with patient/guardian.  Opportunity provided to ask questions and verbal consent for assessment and intervention was given.    PAIN: Pain Location: cervical spine, lumbar spine, and hip  POSTURE: WNL  LUMBAR SCREEN: AROM WNL  PROM WNL  HIP SCREEN:  R TFL is slightly TTP. R Piriformis, greater troch, and ischial tuberosity non-tender  Strength:   Pain: - none + mild ++ moderate +++ severe  Strength Scale: 0-5/5 Left Right   Hip Flexion 5 4   Hip Extension 4 4   Hip Abduction 4+ 4   Hip Adduction     Hip Internal Rotation 4+ 4   Hip External Rotation 4+ 4   Knee Flexion 4 5   Knee Extension        Functional Strength Testing: Double Leg Squat: Slight L weight shift  SLS: 5 seconds L, 10 seconds R    PELVIC EXAM  External Visual Inspection:  At rest: Normal  With voluntary pelvic floor contraction: Perineal elevation, Present  Relaxation of PFM: Yes  With intra-abdominal pressure: Cough: Perineal descent  Bearing down as defecation: Perineal elevation    Integumentary:   Introitus: Atrophy    External Digital Palpation per Perineum:   Ischiocavernosis: Tightness  Bulbo cavernosis: Tightness  Transverse perineal: Tightness    Internal Digital Palpation:  Per Vagina:  Myofascial Resistance to Palpation: Firm, Taut  Digital Muscle Performance: P (Power): 4/5  E (Endurance): 7 seconds  R (Repetitions): 3  F (Fast Twitch): 11  Compensations: Adductors, Gluts  Relaxation Post-Contraction: Normal    Per Rectum:        Pelvic Organ  Prolapse:   None w/ bearing down in hooklying    Assessment & Plan   CLINICAL IMPRESSIONS  Medical Diagnosis: Female stress incontinence  Pelvic floor dysfunction  Myalgia of pelvic floor    Treatment Diagnosis: PFM overactivity, PFD, GAYATRI   Impression/Assessment: Patient is a 79 year old female with pelvic floor and LBP complaints.  The following significant findings have been identified: Pain, Decreased ROM/flexibility, Decreased strength, Impaired muscle performance, and Decreased activity tolerance. These impairments interfere with their ability to perform self care tasks, recreational activities, household chores, household mobility, and community mobility as compared to previous level of function.     Clinical Decision Making (Complexity):  Clinical Presentation: Stable/Uncomplicated  Clinical Presentation Rationale: based on medical and personal factors listed in PT evaluation  Clinical Decision Making (Complexity): Low complexity    PLAN OF CARE  Treatment Interventions:  Modalities: Biofeedback, Ultrasound  Interventions: Manual Therapy, Neuromuscular Re-education, Therapeutic Activity, Therapeutic Exercise, Self-Care/Home Management    Long Term Goals     PT Goal 1  Goal Identifier: Voids  Goal Description: Pt will report straining or difficulty starting stream to urinate no more than 20% of the time x2 weeks for normal voiding function  Rationale: to maximize safety and independence with performance of ADLs and functional tasks;to maximize safety and independence within the home;to maximize safety and independence within the community;to maximize safety and independence with self cares  Target Date: 02/17/25  PT Goal 2  Goal Identifier: GAYATRI  Goal Description: Pt will report no UI x2 wks for skin hygiene  Rationale: to maximize safety and independence with performance of ADLs and functional tasks;to maximize safety and independence within the home;to maximize safety and independence within the community;to  maximize safety and independence with transportation;to maximize safety and independence with self cares  Target Date: 02/17/25      Frequency of Treatment: every-other wk  Duration of Treatment: 3 mo    Recommended Referrals to Other Professionals:   Education Assessment:   Learner/Method: Patient;Listening;Demonstration;Pictures/Video;No Barriers to Learning    Risks and benefits of evaluation/treatment have been explained.   Patient/Family/caregiver agrees with Plan of Care.     Evaluation Time:     PT Eval, Low Complexity Minutes (24753): 22       Signing Clinician: JIGNA MYRICK PT        [facility]                                                                                   OUTPATIENT PHYSICAL THERAPY      PLAN OF TREATMENT FOR OUTPATIENT REHABILITATION   Patient's Last Name, First Name, Rakel Haynes YOB: 1945   Provider's Name   [facility]   Medical Record No.  [id number]     Onset Date: 10/09/24  Start of Care Date: 11/20/24     Medical Diagnosis:  Female stress incontinence  Pelvic floor dysfunction  Myalgia of pelvic floor      PT Treatment Diagnosis:  PFM overactivity, PFD, GAYATRI Plan of Treatment  Frequency/Duration: every-other wk/ 3 mo    Certification date from 11/20/24 to 02/17/25         See note for plan of treatment details and functional goals     JIGNA MYRICK, PT                         I CERTIFY THE NEED FOR THESE SERVICES FURNISHED UNDER        THIS PLAN OF TREATMENT AND WHILE UNDER MY CARE     (Physician attestation of this document indicates review and certification of the therapy plan).              Referring Provider:  Anuja Hernández    Initial Assessment  See Epic Evaluation- Start of Care Date: 11/20/24

## 2024-11-25 ENCOUNTER — THERAPY VISIT (OUTPATIENT)
Dept: PHYSICAL THERAPY | Facility: CLINIC | Age: 79
End: 2024-11-25
Attending: UROLOGY
Payer: COMMERCIAL

## 2024-11-25 DIAGNOSIS — M62.89 PELVIC FLOOR DYSFUNCTION: ICD-10-CM

## 2024-11-25 DIAGNOSIS — M79.18 MYALGIA OF PELVIC FLOOR: ICD-10-CM

## 2024-11-25 DIAGNOSIS — N39.3 FEMALE STRESS INCONTINENCE: Primary | ICD-10-CM

## 2024-11-25 PROCEDURE — 97530 THERAPEUTIC ACTIVITIES: CPT | Mod: GP

## 2024-11-25 PROCEDURE — 97110 THERAPEUTIC EXERCISES: CPT | Mod: GP

## 2024-11-29 DIAGNOSIS — F41.1 GAD (GENERALIZED ANXIETY DISORDER): ICD-10-CM

## 2024-11-29 DIAGNOSIS — G43.919 INTRACTABLE MIGRAINE, UNSPECIFIED MIGRAINE TYPE: ICD-10-CM

## 2024-11-29 DIAGNOSIS — E03.9 HYPOTHYROIDISM, UNSPECIFIED TYPE: ICD-10-CM

## 2024-11-29 RX ORDER — CITALOPRAM HYDROBROMIDE 10 MG/1
10 TABLET ORAL DAILY
Qty: 90 TABLET | Refills: 2 | Status: SHIPPED | OUTPATIENT
Start: 2024-11-29

## 2024-11-29 RX ORDER — LEVOTHYROXINE SODIUM 88 UG/1
88 TABLET ORAL DAILY
Qty: 90 TABLET | Refills: 3 | Status: CANCELLED | OUTPATIENT
Start: 2024-11-29

## 2024-11-29 RX ORDER — CITALOPRAM HYDROBROMIDE 10 MG/1
10 TABLET ORAL DAILY
Qty: 90 TABLET | Refills: 3 | Status: CANCELLED | OUTPATIENT
Start: 2024-11-29

## 2024-11-29 RX ORDER — LEVOTHYROXINE SODIUM 88 UG/1
88 TABLET ORAL DAILY
Qty: 90 TABLET | Refills: 2 | Status: SHIPPED | OUTPATIENT
Start: 2024-11-29

## 2024-11-29 NOTE — TELEPHONE ENCOUNTER
Transferring Citalopram & Levothyroxine prescriptions to [facility].    Medication requested: gabapentin (NEURONTIN) 300 MG capsule   Last office visit: 7/12/2024  [facility] appointments: none  Medication last refilled: 3/1/2024; Last sold  #[id number] on 2/19/2024 per   Last qualifying labs: n/a    Routing refill request to provider for review/approval because:  Drug not on the G refill protocol     LASHAY Hernandez, RN  11/29/24, 3:36 PM

## 2024-12-03 ENCOUNTER — TRANSFERRED RECORDS (OUTPATIENT)
Dept: HEALTH INFORMATION MANAGEMENT | Facility: CLINIC | Age: 79
End: 2024-12-03
Payer: COMMERCIAL

## 2024-12-05 ENCOUNTER — HOSPITAL ENCOUNTER (OUTPATIENT)
Dept: MAMMOGRAPHY | Facility: CLINIC | Age: 79
Discharge: HOME | End: 2024-12-05
Attending: INTERNAL MEDICINE
Payer: COMMERCIAL

## 2024-12-05 ENCOUNTER — TELEPHONE (OUTPATIENT)
Dept: FAMILY MEDICINE | Facility: CLINIC | Age: 79
End: 2024-12-05

## 2024-12-05 DIAGNOSIS — Z12.31 SCREENING MAMMOGRAM, ENCOUNTER FOR: ICD-10-CM

## 2024-12-05 DIAGNOSIS — G43.919 INTRACTABLE MIGRAINE, UNSPECIFIED MIGRAINE TYPE: Primary | ICD-10-CM

## 2024-12-05 PROCEDURE — 77067 SCR MAMMO BI INCL CAD: CPT

## 2024-12-05 PROCEDURE — 77063 BREAST TOMOSYNTHESIS BI: CPT

## 2024-12-05 RX ORDER — GABAPENTIN 300 MG/1
300 CAPSULE ORAL 2 TIMES DAILY
Qty: 180 CAPSULE | Refills: 0 | Status: SHIPPED | OUTPATIENT
Start: 2024-12-05

## 2024-12-05 NOTE — TELEPHONE ENCOUNTER
Pt called and states that Pingel is the prescriber of her Gabapentin. She is asking for Pingel to refill it.    Daxa

## 2024-12-05 NOTE — TELEPHONE ENCOUNTER
Gabapentin (Neurontin) 300 mg    Last Office Visit: 7/12/24  Future [facility] Appointments: None  Medication last refilled: 4/4/23 #270 with 3 refill(s)     verified - last fill date: 2/19/24 #270    CSA on File - None    Routing refill request to provider for review/approval because:  Drug not on the [facility] refill protocol     Hashtagot message sent to Nancy to call and schedule her annual exam appointment.    SHIREEN SethN, RN, CCM

## 2024-12-10 RX ORDER — GABAPENTIN 300 MG/1
300 CAPSULE ORAL 2 TIMES DAILY
Qty: 180 CAPSULE | Refills: 1 | Status: SHIPPED | OUTPATIENT
Start: 2024-12-10

## 2024-12-10 NOTE — TELEPHONE ENCOUNTER
Refill Request    Pending Prescriptions:                       Disp   Refills    gabapentin (NEURONTIN) 300 MG capsule                         Sig: Take 1 capsule (300 mg) by mouth 2 times daily.           Take 1 cap (300 mg) twice daily    Signed Prescriptions:                        Disp   Refills    levothyroxine (SYNTHROID/LEVOTHROID) 88 MC*90 tab*2        Sig: Take 1 tablet (88 mcg) by mouth daily. Except skip           Sunday dose  Authorizing Provider: RADHA UGARTE  Ordering User: JODI COX    citalopram (CELEXA) 10 MG tablet           90 tab*2        Sig: Take 1 tablet (10 mg) by mouth daily.  Authorizing Provider: RADHA UGARTE  Ordering User: JODI COX      Last Office Visit:10/15/24    Next Office Visit: 04/15/25    Prescribing Provider: Daisy    Last Medication Refill Date: 12/05/24 ( Aida Sher, RN: [facility] )    Prior Auth Date (if applicable): NA    Chart Notes:  We decided to continue current medications without changes.  I refilled Nurtec.  Emgality will be due at the next visit.     For neck discomfort, advised to continue muscle relaxants along with physical therapy.     I advised the patient to keep the headache diary and bring it to the next follow-up visit or upload via My Chart.      Next follow-up appointment is in the next 6 months or earlier if needed.    Complains of cervical pain, which is likely musculoskeletal and possible related to degenerative changes. We discussed that dose of gabapentin could be further increased. Continue with muscle relaxant and physical therapy would be helpful.     Action(s):  Routing to RN.

## 2024-12-10 NOTE — TELEPHONE ENCOUNTER
Pt's PCP Dr. Foster requests that neurology take over gabapentin script.     Pt has follow up with Dr. Villa 4/15/25.  Will route to Dr. Villa to see if he can send in updated script for patient.     Galina RN, BSN  [facility]

## 2025-01-06 ENCOUNTER — TELEPHONE (OUTPATIENT)
Dept: NEUROLOGY | Facility: CLINIC | Age: 80
End: 2025-01-06

## 2025-01-06 DIAGNOSIS — G43.109 MIGRAINE WITH AURA AND WITHOUT STATUS MIGRAINOSUS, NOT INTRACTABLE: ICD-10-CM

## 2025-01-06 RX ORDER — GALCANEZUMAB 120 MG/ML
120 INJECTION, SOLUTION SUBCUTANEOUS
Qty: 1 ML | Refills: 4 | Status: SHIPPED | OUTPATIENT
Start: 2025-01-06

## 2025-01-06 NOTE — TELEPHONE ENCOUNTER
Prior Authorization Retail Medication Request    Medication/Dose: galcanezumab-gnlm (EMGALITY) 120 MG/ML injection  Diagnosis and ICD code (if different than what is on RX):    New/renewal/insurance change PA/secondary ins. PA: insurance change  Previously Tried and Failed:    Rationale:      Insurance   Primary: [facility] [[id number]] (Ph: [phone]) - [facility] MEDICARE [[id number]] (Ph: [phone])    Insurance ID:  [id number]       Pharmacy Information (if different than what is on RX)  Name:    [id number] - [place], [place] - [address] AT [facility] [address]     Phone:    Fax:    Clinic Information  Preferred routing pool for dept communication: [facility] [[id number]]

## 2025-01-06 NOTE — TELEPHONE ENCOUNTER
Prior Authorization Approval    Medication: EMGALITY 120 MG/ML SC SOAJ  Authorization Effective Date: 1/6/2025  Authorization Expiration Date: 1/6/2026  Approved Dose/Quantity: NA  Reference #:     Insurance Company: [employer] - Phone [phone] Fax [phone]  Expected CoPay: $    CoPay Card Available:      Financial Assistance Needed: NA  Which Pharmacy is filling the prescription: [id number] - [place], [place] - [address]  Pharmacy Notified: No, approval came before they received the script  Patient Notified: Pharmacy will call pt once ready

## 2025-02-05 ENCOUNTER — TRANSFERRED RECORDS (OUTPATIENT)
Dept: HEALTH INFORMATION MANAGEMENT | Facility: CLINIC | Age: 80
End: 2025-02-05
Payer: COMMERCIAL

## 2025-03-22 ENCOUNTER — HEALTH MAINTENANCE LETTER (OUTPATIENT)
Age: 80
End: 2025-03-22

## 2025-03-31 ENCOUNTER — MYC MEDICAL ADVICE (OUTPATIENT)
Dept: NEUROLOGY | Facility: CLINIC | Age: 80
End: 2025-03-31
Payer: COMMERCIAL

## 2025-03-31 DIAGNOSIS — G43.109 MIGRAINE WITH AURA AND WITHOUT STATUS MIGRAINOSUS, NOT INTRACTABLE: ICD-10-CM

## 2025-04-01 RX ORDER — RIZATRIPTAN BENZOATE 10 MG/1
10 TABLET ORAL
Qty: 12 TABLET | Refills: 1 | Status: SHIPPED | OUTPATIENT
Start: 2025-04-01

## 2025-04-07 ENCOUNTER — LAB (OUTPATIENT)
Dept: LAB | Facility: CLINIC | Age: 80
End: 2025-04-07
Payer: COMMERCIAL

## 2025-04-07 DIAGNOSIS — D47.2 MONOCLONAL GAMMOPATHY PRESENT ON SERUM PROTEIN ELECTROPHORESIS: Primary | ICD-10-CM

## 2025-04-07 DIAGNOSIS — D47.2 MONOCLONAL GAMMOPATHY PRESENT ON SERUM PROTEIN ELECTROPHORESIS: ICD-10-CM

## 2025-04-07 LAB
ALBUMIN SERPL BCG-MCNC: 4.3 G/DL (ref 3.5–5.2)
ALP SERPL-CCNC: 77 U/L (ref 40–150)
ALT SERPL W P-5'-P-CCNC: 25 U/L (ref 0–50)
ANION GAP SERPL CALCULATED.3IONS-SCNC: 10 MMOL/L (ref 7–15)
AST SERPL W P-5'-P-CCNC: 30 U/L (ref 0–45)
BILIRUB SERPL-MCNC: 0.3 MG/DL
BUN SERPL-MCNC: 17.4 MG/DL (ref 8–23)
CALCIUM SERPL-MCNC: 9.1 MG/DL (ref 8.8–10.4)
CHLORIDE SERPL-SCNC: 102 MMOL/L (ref 98–107)
CREAT SERPL-MCNC: 0.83 MG/DL (ref 0.51–0.95)
EGFRCR SERPLBLD CKD-EPI 2021: 71 ML/MIN/1.73M2
GLUCOSE SERPL-MCNC: 99 MG/DL (ref 70–99)
HCO3 SERPL-SCNC: 25 MMOL/L (ref 22–29)
POTASSIUM SERPL-SCNC: 4.2 MMOL/L (ref 3.4–5.3)
PROT SERPL-MCNC: 6.9 G/DL (ref 6.4–8.3)
SODIUM SERPL-SCNC: 137 MMOL/L (ref 135–145)
TOTAL PROTEIN SERUM FOR ELP: 6.6 G/DL (ref 6.4–8.3)

## 2025-04-07 PROCEDURE — 84165 PROTEIN E-PHORESIS SERUM: CPT | Mod: TC | Performed by: PATHOLOGY

## 2025-04-07 PROCEDURE — 84460 ALANINE AMINO (ALT) (SGPT): CPT

## 2025-04-07 PROCEDURE — 84165 PROTEIN E-PHORESIS SERUM: CPT | Mod: 26 | Performed by: PATHOLOGY

## 2025-04-07 PROCEDURE — 36415 COLL VENOUS BLD VENIPUNCTURE: CPT

## 2025-04-07 PROCEDURE — 84155 ASSAY OF PROTEIN SERUM: CPT

## 2025-04-07 PROCEDURE — 83521 IG LIGHT CHAINS FREE EACH: CPT

## 2025-04-08 LAB
ALBUMIN SERPL ELPH-MCNC: 4.2 G/DL (ref 3.7–5.1)
ALPHA1 GLOB SERPL ELPH-MCNC: 0.3 G/DL (ref 0.2–0.4)
ALPHA2 GLOB SERPL ELPH-MCNC: 0.7 G/DL (ref 0.5–0.9)
B-GLOBULIN SERPL ELPH-MCNC: 0.6 G/DL (ref 0.6–1)
GAMMA GLOB SERPL ELPH-MCNC: 0.8 G/DL (ref 0.7–1.6)
KAPPA LC FREE SER-MCNC: 2.69 MG/DL (ref 0.33–1.94)
KAPPA LC FREE/LAMBDA FREE SER NEPH: 1.3 {RATIO} (ref 0.26–1.65)
LAMBDA LC FREE SERPL-MCNC: 2.07 MG/DL (ref 0.57–2.63)
LOCATION OF TASK: ABNORMAL
M PROTEIN SERPL ELPH-MCNC: 0.1 G/DL
PROT PATTERN SERPL ELPH-IMP: ABNORMAL

## 2025-04-14 ENCOUNTER — LAB (OUTPATIENT)
Dept: LAB | Facility: CLINIC | Age: 80
End: 2025-04-14
Payer: COMMERCIAL

## 2025-04-14 ENCOUNTER — ONCOLOGY VISIT (OUTPATIENT)
Dept: ONCOLOGY | Facility: CLINIC | Age: 80
End: 2025-04-14
Attending: INTERNAL MEDICINE
Payer: COMMERCIAL

## 2025-04-14 VITALS
BODY MASS INDEX: 21.97 KG/M2 | HEIGHT: 63 IN | SYSTOLIC BLOOD PRESSURE: 117 MMHG | DIASTOLIC BLOOD PRESSURE: 75 MMHG | HEART RATE: 65 BPM | OXYGEN SATURATION: 100 % | RESPIRATION RATE: 16 BRPM | WEIGHT: 124 LBS

## 2025-04-14 DIAGNOSIS — D47.2 MONOCLONAL GAMMOPATHY PRESENT ON SERUM PROTEIN ELECTROPHORESIS: Primary | ICD-10-CM

## 2025-04-14 DIAGNOSIS — D47.2 MONOCLONAL GAMMOPATHY PRESENT ON SERUM PROTEIN ELECTROPHORESIS: ICD-10-CM

## 2025-04-14 LAB
ERYTHROCYTE [DISTWIDTH] IN BLOOD BY AUTOMATED COUNT: 12.8 % (ref 10–15)
HCT VFR BLD AUTO: 38.4 % (ref 35–47)
HGB BLD-MCNC: 12.6 G/DL (ref 11.7–15.7)
MCH RBC QN AUTO: 31.7 PG (ref 26.5–33)
MCHC RBC AUTO-ENTMCNC: 32.8 G/DL (ref 31.5–36.5)
MCV RBC AUTO: 97 FL (ref 78–100)
PLATELET # BLD AUTO: 282 10E3/UL (ref 150–450)
RBC # BLD AUTO: 3.98 10E6/UL (ref 3.8–5.2)
WBC # BLD AUTO: 6.8 10E3/UL (ref 4–11)

## 2025-04-14 PROCEDURE — 36415 COLL VENOUS BLD VENIPUNCTURE: CPT

## 2025-04-14 PROCEDURE — 85027 COMPLETE CBC AUTOMATED: CPT

## 2025-04-14 PROCEDURE — 99214 OFFICE O/P EST MOD 30 MIN: CPT | Performed by: INTERNAL MEDICINE

## 2025-04-14 PROCEDURE — G0463 HOSPITAL OUTPT CLINIC VISIT: HCPCS | Performed by: INTERNAL MEDICINE

## 2025-04-14 ASSESSMENT — PAIN SCALES - GENERAL: PAINLEVEL_OUTOF10: NO PAIN (0)

## 2025-04-14 NOTE — PROGRESS NOTES
[facility]    Hematology/Oncology New Patient Note virtual      Today's Date: 04/15/2025    Reason for Consult: MGUS      HISTORY OF PRESENT ILLNESS:Nancy is a very pleasant 79-year-old female with the following hematologic history  1.  Incidental lab SPEP drawn for secondary causes of osteoporosis  2.  SPEP showed a small monoclonal proteinOf 0.1 g/dL.  3. Immunofixation showed faint monoclonal IgG immunoglobulin of lambda chain type  4.  CBC normal    Nancy feels well.  Has been has been metstatic prostate cancer. They are going to [place] this summer. MGUS labs look stable     REVIEW OF SYSTEMS:   14 point ROS was reviewed and is negative other than as noted above in HPI.       HOME MEDICATIONS:  Current Outpatient Medications   Medication Sig Dispense Refill    albuterol (PROAIR HFA/PROVENTIL HFA/VENTOLIN HFA) 108 (90 Base) MCG/ACT inhaler Inhale 2 puffs into the lungs every 6 hours as needed for shortness of breath, wheezing or cough 18 g 3    aspirin 81 MG tablet Take 81 mg by mouth daily       beclomethasone HFA (QVAR REDIHALER) 40 MCG/ACT inhaler 2 puffs twice daily 10.6 g 11    Calcium-Vitamin D 600-200 MG-UNIT TABS Take 1 tablet by mouth At Bedtime       citalopram (CELEXA) 10 MG tablet Take 1 tablet (10 mg) by mouth daily. 90 tablet 2    cyanocobalamin (VITAMIN B-12) 1000 MCG SUBL sublingual tablet Place 1,000 mcg under the tongue daily      fexofenadine (ALLEGRA) 180 MG tablet Take 180 mg by mouth daily      gabapentin (NEURONTIN) 300 MG capsule Take 1 capsule (300 mg) by mouth 2 times daily. Take 1 cap (300 mg) twice daily 180 capsule 1    gabapentin (NEURONTIN) 300 MG capsule Take 1 capsule (300 mg) by mouth 2 times daily. Needs an appointment 180 capsule 0    galcanezumab-gnlm (EMGALITY) 120 MG/ML injection Inject 1 mL (120 mg) subcutaneously every 28 days. 1 mL 4    indomethacin (INDOCIN) 50 MG capsule Take 1 capsule (50 mg) by mouth as needed for moderate pain Take 1 tablet as  needed 10 capsule 3    levothyroxine (SYNTHROID/LEVOTHROID) 88 MCG tablet Take 1 tablet (88 mcg) by mouth daily. Except skip Sunday dose 90 tablet 2    magnesium oxide (MAG-OX) 400 MG tablet Take 800 mg by mouth At Bedtime      methylPREDNISolone (MEDROL DOSEPAK) 4 MG tablet therapy pack Follow Package Directions 21 tablet 0    Multiple vitamin TABS Take 3 tablets by mouth daily       omeprazole (PRILOSEC) 20 MG capsule Take 40 mg by mouth daily (with dinner)       predniSONE (DELTASONE) 20 MG tablet Take 2 po q am with food x 5 days for asthma flare 10 tablet 1    rizatriptan (MAXALT) 10 MG tablet Take 1 tablet (10 mg) by mouth at onset of headache for migraine. May repeat in 2 hours. Max 3 tablets/24 hours. 12 tablet 1    rosuvastatin (CRESTOR) 10 MG tablet Take 1 tablet (10 mg) by mouth daily. 90 tablet 2    spacer (OPTICHAMBER CHIVO) holding chamber Use with MDI      Vitamin D (Cholecalciferol) 50 MCG (2000 UT) CAPS Take 2,000 Units by mouth daily       zolpidem (AMBIEN) 10 MG tablet Take 1 tablet (10 mg) by mouth nightly as needed for sleep (for use while traveling, patient prefers to break into 3 doses). 30 tablet 0    montelukast (SINGULAIR) 10 MG tablet Take 1 tablet (10 mg) by mouth at bedtime (Patient not taking: Reported on 4/14/2025) 90 tablet 3         ALLERGIES:  Allergies   Allergen Reactions    Cyclobenzaprine Other (See Comments)    Hydrocodone Itching    Levofloxacin Unknown     Tendon injury (torn)    Rofecoxib Nausea    Simvastatin Other (See Comments)    Trazodone Other (See Comments)     Other reaction(s): Nightmares    Latex Rash         PAST MEDICAL HISTORY:  Past Medical History:   Diagnosis Date    Asthma     Global amnesia     Hypothyroidism    Hyperlipidaemia LDL goal < 130     Hypothyroidism     Insomnia     Migraine, unspecified, without mention of intractable migraine without mention of status migrainosus     Osteopenia     Osteoporosis     Raynaud's disease     Unspecified  asthma(493.90)          PAST SURGICAL HISTORY:  Past Surgical History:   Procedure Laterality Date    APPENDECTOMY      BUNIONECTOMY Right 2010    COLONOSCOPY  11/16/10    Every 7 years    COLONOSCOPY  2018    hemorrhoids, [place] [facility]    FOOT SURGERY Left     tendon rupture    HC REPAIR OF HAMMERTOE,ONE      TONSILLECTOMY & ADENOIDECTOMY      TUBAL LIGATION           SOCIAL HISTORY:  Social History     Socioeconomic History    Marital status:      Spouse name: Not on file    Number of children: Not on file    Years of education: Not on file    Highest education level: Not on file   Occupational History    Not on file   Tobacco Use    Smoking status: Former     Current packs/day: 0.00     Average packs/day: 0.5 packs/day for 10.0 years (5.0 ttl pk-yrs)     Types: Cigarettes     Start date: 1960     Quit date: 1970     Years since quittin.3    Smokeless tobacco: Never   Vaping Use    Vaping status: Never Used   Substance and Sexual Activity    Alcohol use: Yes     Alcohol/week: 2.0 standard drinks of alcohol     Types: 2 Cans of beer per week     Comment: 1 beer per day    Drug use: No    Sexual activity: Yes     Partners: Male   Other Topics Concern    Parent/sibling w/ CABG, MI or angioplasty before 65F 55M? Not Asked   Social History Narrative    Not on file     Social Drivers of Health     Financial Resource Strain: Low Risk  (2024)    Financial Resource Strain     Within the past 12 months, have you or your family members you live with been unable to get utilities (heat, electricity) when it was really needed?: No   Food Insecurity: No Food Insecurity (2024)    Received from [facility]    Hunger Vital Sign     Worried About Running Out of Food in the Last Year: Never true     Ran Out of Food in the Last Year: Never true   Transportation Needs: No Transportation Needs (2024)    Received from [facility]    PRAPARE - Transportation     Lack of Transportation (Medical): No     Lack of Transportation (Non-Medical): No   Physical Activity: Not on file   Stress: Not on file   Social Connections: Not on file   Interpersonal Safety: Not At Risk (2024)    Received from [facility]    EH IP Custom IPV     Do you feel UNSAFE in any of your personal relationships with your family members or any other acquaintances?: No   Housing Stability: Low Risk  (2024)    Received from [facility]    Housing Stability Vital Sign     Unable to Pay for Housing in the Last Year: No     Number of Times Moved in the Last Year: 0     Homeless in the Last Year: No         FAMILY HISTORY:  Family History   Problem Relation Age of Onset    Osteoporosis Mother 94         at 94 after hip fracture    Dementia Mother     Hypertension Mother     C.A.D. Father 85         of renal failure. ASCVD, MI x 2    Prostate Cancer Father 80    Cerebrovascular Disease Father     Ovarian Cancer Sister 82    Neurologic Disorder Sister         \"unable to grab thoughts\"    Gout Sister     Fibromyalgia Sister     Cancer Brother         CLL    Prostate Cancer Brother 68    Multiple myeloma Brother 93        new onset    Asthma Brother     Breast Cancer Niece 40        Maternal    Colon Cancer No family hx of          PHYSICAL EXAM:  Vital signs:  /75 (BP Location: Left arm, Patient Position: Sitting, Cuff Size: Adult Regular)   Pulse 65   Resp 16   Ht 1.6 m (5' 3\")   Wt 56.2 kg (124 lb)   SpO2 100%   BMI 21.97 kg/m     ECO     GENERAL/CONSTITUTIONAL: No acute distress. Healthy, alert.  EYES: No scleral icterus.  No redness or discharge.    RESPIRATORY: No audible wheeze, cough, or visible cyanosis.  No visible retractions or increased work of breathing.  Able to speak fully in complete sentences.  MUSCULOSKELETAL: Normal range of motion.  NEUROLOGIC: Alert, " oriented, answers questions appropriately. No tremor. Mentation intact and speech normal  INTEGUMENTARY: No jaundice.  No obvious rash or skin lesions.  PSYCHIATRIC:  Mentation appears normal, affect normal/bright, judgement and insight intact, normal speech and appearance well-groomed.    The rest of a comprehensive physical exam is deferred due to public health emergency video visit restrictions.       LABS:  Labs extensively noted and reviewed with the patient in [facility]    PATHOLOGY:  N/A    IMAGING:  Noted x-rays of the lumbar spine    ASSESSMENT/PLAN: Nancy is a very pleasant 79-year-old female with a new diagnosis of IgG lambda related MGUS    She has low risk MGUS based on the IgG subtype and level on the SPEP.  No evidence of endorgan damage      Discussed with the patient of 0.5 %/year risk of conversion to myeloma.  See me in a year      1.  MGUS: Low risk. See me in a year       Total time spent on day of visit, including review of tests, obtaining/reviewing separately obtained history, ordering medications/tests/procedures, communicating with PCP/consultants, and documenting in electronic medical record: 30 minutes       Miguel De MD  Hematology/Oncology  [facility]

## 2025-04-14 NOTE — PROGRESS NOTES
"Oncology Rooming Note    April 14, 2025 1:35 PM   Rakel Parish is a 79 year old female who presents for:    Chief Complaint   Patient presents with    Oncology Clinic Visit     Initial Vitals: /75 (BP Location: Left arm, Patient Position: Sitting, Cuff Size: Adult Regular)   Pulse 65   Resp 16   Ht 1.6 m (5' 3\")   Wt 56.2 kg (124 lb)   SpO2 100%   BMI 21.97 kg/m   Estimated body mass index is 21.97 kg/m  as calculated from the following:    Height as of this encounter: 1.6 m (5' 3\").    Weight as of this encounter: 56.2 kg (124 lb). Body surface area is 1.58 meters squared.  No Pain (0) Comment: Data Unavailable   No LMP recorded. Patient is postmenopausal.  Allergies reviewed: Yes  Medications reviewed: Yes    Medications: Medication refills not needed today.  Pharmacy name entered into [employer]:    [id number] - [place], [place] - [address] [facility] [address] [address] [place] [facility] [place], [place] - [address]  [facility] - [place], [place] - [address]    Frailty Screening:   Is the patient here for a new oncology consult visit in cancer care? 2. No    PHQ9:  Did this patient require a PHQ9?: No      Clinical concerns: None       Nely Tomlinson MA            "

## 2025-04-15 ENCOUNTER — OFFICE VISIT (OUTPATIENT)
Dept: NEUROLOGY | Facility: CLINIC | Age: 80
End: 2025-04-15
Payer: COMMERCIAL

## 2025-04-15 VITALS — DIASTOLIC BLOOD PRESSURE: 63 MMHG | HEART RATE: 64 BPM | SYSTOLIC BLOOD PRESSURE: 99 MMHG | OXYGEN SATURATION: 98 %

## 2025-04-15 DIAGNOSIS — G43.109 MIGRAINE WITH AURA AND WITHOUT STATUS MIGRAINOSUS, NOT INTRACTABLE: Primary | ICD-10-CM

## 2025-04-15 DIAGNOSIS — M54.2 CERVICALGIA: ICD-10-CM

## 2025-04-15 DIAGNOSIS — G44.049 CHRONIC PAROXYSMAL HEMICRANIA, NOT INTRACTABLE: ICD-10-CM

## 2025-04-15 DIAGNOSIS — M54.81 CERVICO-OCCIPITAL NEURALGIA OF LEFT SIDE: ICD-10-CM

## 2025-04-15 PROCEDURE — 3078F DIAST BP <80 MM HG: CPT | Performed by: PSYCHIATRY & NEUROLOGY

## 2025-04-15 PROCEDURE — 99214 OFFICE O/P EST MOD 30 MIN: CPT | Performed by: PSYCHIATRY & NEUROLOGY

## 2025-04-15 PROCEDURE — G2211 COMPLEX E/M VISIT ADD ON: HCPCS | Performed by: PSYCHIATRY & NEUROLOGY

## 2025-04-15 PROCEDURE — 3074F SYST BP LT 130 MM HG: CPT | Performed by: PSYCHIATRY & NEUROLOGY

## 2025-04-15 RX ORDER — GALCANEZUMAB 120 MG/ML
120 INJECTION, SOLUTION SUBCUTANEOUS
Qty: 1 ML | Refills: 11 | Status: SHIPPED | OUTPATIENT
Start: 2025-04-15

## 2025-04-15 RX ORDER — RIZATRIPTAN BENZOATE 10 MG/1
10 TABLET ORAL
Qty: 12 TABLET | Refills: 5 | Status: SHIPPED | OUTPATIENT
Start: 2025-04-15

## 2025-04-15 NOTE — PROGRESS NOTES
ESTABLISHED PATIENT NEUROLOGY NOTE    DATE OF VISIT: 4/15/2025  CLINIC LOCATION: [facility]  MRN: [id number]  PATIENT NAME: Rakel Parish  YOB: 1945    REASON FOR VISIT:   Chief Complaint   Patient presents with    RECHECK     Doing well, happy with the rizatriptan - uses it once or twice a month     SUBJECTIVE:                                                      HISTORY OF PRESENT ILLNESS: Patient is here to follow up regarding migraine, left occipital neuralgia, and possible chronic paroxysmal hemicrania.  She was last seen on 10/15/2024.  Please refer to my initial/other prior notes for further information.    Since the last visit, the patient reports that her headaches flared up last fall, but winter was better (headaches were infrequent).  Doing well now, averages 1-2 migraines per month.  She is on Emgality for headache prevention.  She contacted me on 4/1/2025 to switch back from Nurtec to Maxalt, as she felt that it works the best.  Already was able to try it, and it works well.  Denies any new additional focal neurological symptoms.  EXAM:                                                    Physical Exam:   Vitals: BP 99/63 (BP Location: Right arm, Patient Position: Sitting, Cuff Size: Adult Regular)   Pulse 64   SpO2 98%     General: pt is in NAD, cooperative.  Skin: normal turgor, moist mucous membranes, no lesions/rashes noticed.  HEENT: ATNC, white sclera, normal conjunctiva.  Respiratory: Symmetric lung excursion, no accessory respiratory muscle use.  Abdomen: Non distended.  Neurological: awake, cooperative, follows commands, no exam changes compared to previous visits.  ASSESSMENT AND PLAN:                                                    Assessment: 79 year old female patient presents for follow-up of cervicalgia, migraine, left occipital neuralgia, and possible chronic paroxysmal hemicrania.  She reports that her headaches are well-controlled at the present  time.  We decided not to make any medication changes.  I refilled her Emgality and rizatriptan.    Diagnoses:    ICD-10-CM    1. Migraine with aura and without status migrainosus, not intractable  G43.109       2. Chronic paroxysmal hemicrania, not intractable  G44.049       3. Cervico-occipital neuralgia of left side  M54.81       4. Cervicalgia  M54.2         Plan: At today's visit we thoroughly discussed current symptoms, available treatment options, and the plan.  Pleased to hear that patient's headaches remain well-controlled on current therapy.    We decided not to make any medication changes.  Emgality and rizatriptan were refilled.    Advised the patient to keep the headache diary and bring it to the next follow-up visit or upload via My Chart.     Next follow-up appointment is in the next 6 months or earlier if needed.    Total Time: 14 minutes spent on the date of the encounter doing chart review, history and exam, documentation and further activities per the note.    Lazarus Villa MD  [facility]  (Chart documentation was completed in part with Dragon voice-recognition software. Even though reviewed, some grammatical, spelling, and word errors may remain.)

## 2025-04-15 NOTE — PATIENT INSTRUCTIONS
AFTER VISIT SUMMARY (AVS):    At today's visit we thoroughly discussed current symptoms, available treatment options, and the plan.  Pleased to hear that your headaches remain well-controlled on current therapy.    We decided not to make any medication changes.  Emgality and rizatriptan were refilled.    Please keep the headache diary and bring it to the next follow-up visit or upload via My Chart.     Next follow-up appointment is in the next 6 months or earlier if needed.    Please do not hesitate to call me with any questions or concerns.    Thanks.

## 2025-04-15 NOTE — LETTER
4/15/2025      Rakel Parish  [address]  [place] [facility] [address]      Dear Colleague,    Thank you for referring your patient, Rakel Parish, to the [facility]. Please see a copy of my visit note below.    ESTABLISHED PATIENT NEUROLOGY NOTE    DATE OF VISIT: 4/15/2025  CLINIC LOCATION: [facility]  MRN: [id number]  PATIENT NAME: Rakel Parish  YOB: 1945    REASON FOR VISIT:   Chief Complaint   Patient presents with     RECHECK     Doing well, happy with the rizatriptan - uses it once or twice a month     SUBJECTIVE:                                                      HISTORY OF PRESENT ILLNESS: Patient is here to follow up regarding migraine, left occipital neuralgia, and possible chronic paroxysmal hemicrania.  She was last seen on 10/15/2024.  Please refer to my initial/other prior notes for further information.    Since the last visit, the patient reports that her headaches flared up last fall, but winter was better (headaches were infrequent).  Doing well now, averages 1-2 migraines per month.  She is on Emgality for headache prevention.  She contacted me on 4/1/2025 to switch back from Nurtec to Maxalt, as she felt that it works the best.  Already was able to try it, and it works well.  Denies any new additional focal neurological symptoms.  EXAM:                                                    Physical Exam:   Vitals: BP 99/63 (BP Location: Right arm, Patient Position: Sitting, Cuff Size: Adult Regular)   Pulse 64   SpO2 98%     General: pt is in NAD, cooperative.  Skin: normal turgor, moist mucous membranes, no lesions/rashes noticed.  HEENT: ATNC, white sclera, normal conjunctiva.  Respiratory: Symmetric lung excursion, no accessory respiratory muscle use.  Abdomen: Non distended.  Neurological: awake, cooperative, follows commands, no exam changes compared to previous visits.  ASSESSMENT AND PLAN:                                                     Assessment: 79 year old female patient presents for follow-up of cervicalgia, migraine, left occipital neuralgia, and possible chronic paroxysmal hemicrania.  She reports that her headaches are well-controlled at the present time.  We decided not to make any medication changes.  I refilled her Emgality and rizatriptan.    Diagnoses:    ICD-10-CM    1. Migraine with aura and without status migrainosus, not intractable  G43.109       2. Chronic paroxysmal hemicrania, not intractable  G44.049       3. Cervico-occipital neuralgia of left side  M54.81       4. Cervicalgia  M54.2         Plan: At today's visit we thoroughly discussed current symptoms, available treatment options, and the plan.  Pleased to hear that patient's headaches remain well-controlled on current therapy.    We decided not to make any medication changes.  Emgality and rizatriptan were refilled.    Advised the patient to keep the headache diary and bring it to the next follow-up visit or upload via My Chart.     Next follow-up appointment is in the next 6 months or earlier if needed.    Total Time: 14 minutes spent on the date of the encounter doing chart review, history and exam, documentation and further activities per the note.    Lazarus Villa MD  [facility]  (Chart documentation was completed in part with Dragon voice-recognition software. Even though reviewed, some grammatical, spelling, and word errors may remain.)      Again, thank you for allowing me to participate in the care of your patient.        Sincerely,        Lazarus Villa MD    Electronically signed

## 2025-04-15 NOTE — NURSING NOTE
"Rakel Parish is a 79 year old female who presents for:  Chief Complaint   Patient presents with    RECHECK     Doing well, happy with the rizatriptan - uses it once or twice a month        Initial Vitals:  BP 99/63 (BP Location: Right arm, Patient Position: Sitting, Cuff Size: Adult Regular)   Pulse 64   SpO2 98%  Estimated body mass index is 21.97 kg/m  as calculated from the following:    Height as of 4/14/25: 1.6 m (5' 3\").    Weight as of 4/14/25: 56.2 kg (124 lb).. There is no height or weight on file to calculate BSA. BP completed using cuff size: regular    Chris Burerll    "

## 2025-04-15 NOTE — PROGRESS NOTES
ESTABLISHED PATIENT NEUROLOGY NOTE    DATE OF VISIT: 4/15/2025  CLINIC LOCATION: [facility]  MRN: [id number]  PATIENT NAME: Rakel Parish  YOB: 1945    REASON FOR VISIT: No chief complaint on file.    SUBJECTIVE:                                                      HISTORY OF PRESENT ILLNESS: Patient is here to follow up regarding migraine, left occipital neuralgia, and possible chronic paroxysmal hemicrania.  She was last seen on 10/15/2024.  Please refer to my initial/other prior notes for further information.    Since the last visit, the patient reports that her headaches flared up last fall, but winter was better (headaches are infrequent).  Spring ***.  She is on Emgality for headache prevention.  She contacted me on 4/1/2025 to switch back from Nurtec to Maxalt, as she felt that it works the best.  Denies any new additional focal neurological symptoms.  EXAM:                                                    Physical Exam:   Vitals: There were no vitals taken for this visit.    General: pt is in NAD, cooperative.  Skin: normal turgor, moist mucous membranes, no lesions/rashes noticed.  HEENT: ATNC, white sclera, normal conjunctiva.  Respiratory: Symmetric lung excursion, no accessory respiratory muscle use.  Abdomen: Non distended.  Neurological: awake, cooperative, follows commands, no exam changes compared to previous visits.  ASSESSMENT AND PLAN:                                                    Assessment: 79 year old female patient presents for follow-up of cervicalgia, migraine, left occipital neuralgia, and possible chronic paroxysmal hemicrania.    Diagnoses:    ICD-10-CM    1. Migraine with aura and without status migrainosus, not intractable  G43.109       2. Chronic paroxysmal hemicrania, not intractable  G44.049       3. Cervico-occipital neuralgia of left side  M54.81       4. Cervicalgia  M54.2         Plan:  There are no Patient Instructions on file for  this visit.    Total Time: *** minutes spent on the date of the encounter doing chart review, history and exam, documentation and further activities per the note.    Lazarus Villa MD  [facility]  (Chart documentation was completed in part with Dragon voice-recognition software. Even though reviewed, some grammatical, spelling, and word errors may remain.)

## 2025-04-29 ENCOUNTER — OFFICE VISIT (OUTPATIENT)
Dept: FAMILY MEDICINE | Facility: CLINIC | Age: 80
End: 2025-04-29
Payer: COMMERCIAL

## 2025-04-29 VITALS
OXYGEN SATURATION: 99 % | SYSTOLIC BLOOD PRESSURE: 99 MMHG | TEMPERATURE: 96.5 F | DIASTOLIC BLOOD PRESSURE: 66 MMHG | HEART RATE: 59 BPM | RESPIRATION RATE: 15 BRPM

## 2025-04-29 DIAGNOSIS — L29.9 EAR ITCHING: ICD-10-CM

## 2025-04-29 DIAGNOSIS — M79.18 MYALGIA, MULTIPLE SITES: Primary | ICD-10-CM

## 2025-04-29 DIAGNOSIS — M77.02 MEDIAL EPICONDYLITIS OF ELBOW, LEFT: ICD-10-CM

## 2025-04-29 DIAGNOSIS — M77.12 LATERAL EPICONDYLITIS OF BOTH ELBOWS: ICD-10-CM

## 2025-04-29 DIAGNOSIS — M75.22 BICEPS TENDONITIS, LEFT: ICD-10-CM

## 2025-04-29 DIAGNOSIS — M77.11 LATERAL EPICONDYLITIS OF BOTH ELBOWS: ICD-10-CM

## 2025-04-29 DIAGNOSIS — M25.512 CHRONIC PAIN OF BOTH SHOULDERS: ICD-10-CM

## 2025-04-29 DIAGNOSIS — G89.29 CHRONIC PAIN OF BOTH SHOULDERS: ICD-10-CM

## 2025-04-29 DIAGNOSIS — H61.21 IMPACTED CERUMEN OF RIGHT EAR: ICD-10-CM

## 2025-04-29 DIAGNOSIS — M25.511 CHRONIC PAIN OF BOTH SHOULDERS: ICD-10-CM

## 2025-04-29 ASSESSMENT — ASTHMA QUESTIONNAIRES
QUESTION_3 LAST FOUR WEEKS HOW OFTEN DID YOUR ASTHMA SYMPTOMS (WHEEZING, COUGHING, SHORTNESS OF BREATH, CHEST TIGHTNESS OR PAIN) WAKE YOU UP AT NIGHT OR EARLIER THAN USUAL IN THE MORNING: NOT AT ALL
QUESTION_2 LAST FOUR WEEKS HOW OFTEN HAVE YOU HAD SHORTNESS OF BREATH: NOT AT ALL
QUESTION_5 LAST FOUR WEEKS HOW WOULD YOU RATE YOUR ASTHMA CONTROL: WELL CONTROLLED
ACT_TOTALSCORE: 24
QUESTION_4 LAST FOUR WEEKS HOW OFTEN HAVE YOU USED YOUR RESCUE INHALER OR NEBULIZER MEDICATION (SUCH AS ALBUTEROL): NOT AT ALL
QUESTION_1 LAST FOUR WEEKS HOW MUCH OF THE TIME DID YOUR ASTHMA KEEP YOU FROM GETTING AS MUCH DONE AT WORK, SCHOOL OR AT HOME: NONE OF THE TIME

## 2025-04-29 ASSESSMENT — PAIN SCALES - GENERAL: PAINLEVEL_OUTOF10: NO PAIN (0)

## 2025-04-29 NOTE — PROGRESS NOTES
"    [facility] [place] [id number] [facility]  [facility] [address]  Phone: [phone]  Fax: [phone]    Patient:  Rakel Parish 1945  Date of Visit:  9:45 PM  Referring Provider Referred Self      Assessment & Plan    (M79.18) Myalgia, multiple sites  (primary encounter diagnosis)  Comment: Tenderness over the muscles of the neck and the upper back.  Plan: Recommend physical therapy referral to optimize home exercise program    (M25.511,  G89.29,  M25.512) Chronic pain of both shoulders  Comment: left > right  (M77.11,  M77.12) Lateral epicondylitis of both elbows  (M77.02) Medial epicondylitis of elbow, left  (M75.22) Biceps tendonitis, left  Comment: Multiple diffuse joint and tendon pain.  Plan: Physical Therapy  Referral        Discussed cool packs over the tendons, warm packs over the muscles.  Referral made to physical therapy at [facility].  She will call for an appointment.  May continue with Tylenol up to 3000 mg daily.    (L29.9) Ear itching  (H61.21) Impacted cerumen of right ear  Comment: she reports itchy canals, has cerumen impaction of right ear where she feels \"fluttering\"  Plan: REMOVE IMPACTED CERUMEN        Unable to remove cerumen today. Use Debrox ear drops and return for ear wash    35 minutes spend on the date of this encounter doing chart review, history and exam, documentation and further activities as noted above.       Alondra Foster MD       Rakel Parish is a 79 year old female with hx of constipation, transient global amnesia, osteoporosis, migraine headache, insomnia, hypothyroidism, GERD, hyperlipidemia, anxiety, asthma, stress incontinence. She is here for the following issues:    Body aches and muscle aches  Hands, thickening and deformity  Feels stiff all the time  Raynauds  Right handed  Base of thumbs can be painful  Hard to  door knobs, jars    Left rotator cuff, biciptal tendons, "   Medial and Laterl epiconduyles L> R    Ear itching  R>L  Inside ear canals   Not using cotton swabs  Has right sided fluttering sensation  Tinnitus in both ears constant chronic  No hearing aids  Some allergy symptoms, Allegra, nasacort nasal spray    Patient Active Problem List   Diagnosis    Bladder neck obstruction    Constipation    Transient global amnesia    Other hammer toe (acquired)    Ostium secundum type atrial septal defect    Senile osteoporosis    Syndrome affecting cervical region    Variants of migraine    Malignant neoplasm of skin    Persistent insomnia    Hypothyroidism    Family history of malignant neoplasm of ovary    Tear film insufficiency    Circulatory system disorder    PFO (patent foramen ovale)    Gastroesophageal reflux disease without esophagitis    ACP (advance care planning)    Amnesia    Chronic paroxysmal hemicrania, not intractable    Osteopenia of necks of both femurs    Hyperlipidemia LDL goal <100    Intractable migraine, unspecified migraine type    VLAD (generalized anxiety disorder)    Choking sensation    Localized swelling of lower leg    Choking due to food in larynx, initial encounter    Mild intermittent asthma without complication    Female stress incontinence    Pelvic floor dysfunction    Myalgia of pelvic floor       Current Outpatient Medications   Medication Sig Dispense Refill    albuterol (PROAIR HFA/PROVENTIL HFA/VENTOLIN HFA) 108 (90 Base) MCG/ACT inhaler Inhale 2 puffs into the lungs every 6 hours as needed for shortness of breath, wheezing or cough 18 g 3    aspirin 81 MG tablet Take 81 mg by mouth daily       beclomethasone HFA (QVAR REDIHALER) 40 MCG/ACT inhaler 2 puffs twice daily 10.6 g 11    Calcium-Vitamin D 600-200 MG-UNIT TABS Take 1 tablet by mouth At Bedtime       citalopram (CELEXA) 10 MG tablet Take 1 tablet (10 mg) by mouth daily. 90 tablet 2    cyanocobalamin (VITAMIN B-12) 1000 MCG SUBL sublingual tablet Place 1,000 mcg under the tongue daily       fexofenadine (ALLEGRA) 180 MG tablet Take 180 mg by mouth daily      gabapentin (NEURONTIN) 300 MG capsule Take 1 capsule (300 mg) by mouth 2 times daily. Take 1 cap (300 mg) twice daily 180 capsule 1    gabapentin (NEURONTIN) 300 MG capsule Take 1 capsule (300 mg) by mouth 2 times daily. Needs an appointment 180 capsule 0    galcanezumab-gnlm (EMGALITY) 120 MG/ML injection Inject 1 mL (120 mg) subcutaneously every 28 days. 1 mL 11    indomethacin (INDOCIN) 50 MG capsule Take 1 capsule (50 mg) by mouth as needed for moderate pain Take 1 tablet as needed 10 capsule 3    levothyroxine (SYNTHROID/LEVOTHROID) 88 MCG tablet Take 1 tablet (88 mcg) by mouth daily. Except skip Sunday dose 90 tablet 2    magnesium oxide (MAG-OX) 400 MG tablet Take 800 mg by mouth At Bedtime      methylPREDNISolone (MEDROL DOSEPAK) 4 MG tablet therapy pack Follow Package Directions 21 tablet 0    montelukast (SINGULAIR) 10 MG tablet Take 1 tablet (10 mg) by mouth at bedtime 90 tablet 3    Multiple vitamin TABS Take 3 tablets by mouth daily       omeprazole (PRILOSEC) 20 MG capsule Take 40 mg by mouth daily (with dinner)       predniSONE (DELTASONE) 20 MG tablet Take 2 po q am with food x 5 days for asthma flare 10 tablet 1    rizatriptan (MAXALT) 10 MG tablet Take 1 tablet (10 mg) by mouth at onset of headache for migraine. May repeat in 2 hours. Max 3 tablets/24 hours. 12 tablet 5    rosuvastatin (CRESTOR) 10 MG tablet Take 1 tablet (10 mg) by mouth daily. 90 tablet 2    spacer (OPTICHAMBER CHIVO) holding chamber Use with MDI      Vitamin D (Cholecalciferol) 50 MCG (2000 UT) CAPS Take 2,000 Units by mouth daily       zolpidem (AMBIEN) 10 MG tablet Take 1 tablet (10 mg) by mouth nightly as needed for sleep (for use while traveling, patient prefers to break into 3 doses). 30 tablet 0       Allergies   Allergen Reactions    Cyclobenzaprine Other (See Comments)    Hydrocodone Itching    Levofloxacin Unknown     Tendon injury (torn)     Rofecoxib Nausea    Simvastatin Other (See Comments)    Trazodone Other (See Comments)     Other reaction(s): Nightmares    Latex Rash        EXAM  BP 99/66 (BP Location: Left arm, Patient Position: Sitting, Cuff Size: Adult Regular)   Pulse 59   Temp (!) 96.5  F (35.8  C) (Skin)   Resp 15   SpO2 99%   Gen: Alert, pleasant, NAD  Right ear: No flaking or redness.  Cerumen impaction is present  Left ear canal-no flaking or redness.  No cerumen, TM is fully visualized.  COR: S1,S2, no murmur  Lungs: CTA bilaterally, no rhonchi, wheezes or rales  MS: Supple with FROM  point tenderness at the suboccipital and paracervical muscles   Point tenderness at the upper trapezius and rhomboid muscles bilaterally  Shoulders: point tenderness with palpation over the left posterior shoulder.  Pain with palpation over the left bicipital groove  Elbows point tenderness with palpation over the medial and the lateral epicondyles, left arm more than right  Left hand: Angulation of the DIP joint of the index and the fifth finger.  Point tenderness over the most proximal thumb joint.  No redness warmth or swelling.  No tenderness with palpation over the wrist.  Right hand: Point tenderness with palpation over the DIP joint of the index finger, PIP joint thickening over the fifth digit.  Tenderness with palpation over the carpometacarpal joint.  No tenderness with palpation over the wrist no redness warmth or swelling

## 2025-04-29 NOTE — NURSING NOTE
"79 year old    Chief Complaint   Patient presents with    Arthritis    Ear Problem     Tinnitus, itching, \"fluttering\"        Blood pressure 99/66, pulse 59, temperature (!) 96.5  F (35.8  C), temperature source Skin, resp. rate 15, SpO2 99%, not currently breastfeeding. There is no height or weight on file to calculate BMI.    Patient Active Problem List   Diagnosis    Bladder neck obstruction    Constipation    Transient global amnesia    Other hammer toe (acquired)    Ostium secundum type atrial septal defect    Senile osteoporosis    Syndrome affecting cervical region    Variants of migraine    Malignant neoplasm of skin    Persistent insomnia    Hypothyroidism    Family history of malignant neoplasm of ovary    Tear film insufficiency    Circulatory system disorder    PFO (patent foramen ovale)    Gastroesophageal reflux disease without esophagitis    ACP (advance care planning)    Amnesia    Chronic paroxysmal hemicrania, not intractable    Osteopenia of necks of both femurs    Hyperlipidemia LDL goal <100    Intractable migraine, unspecified migraine type    VLAD (generalized anxiety disorder)    Choking sensation    Localized swelling of lower leg    Choking due to food in larynx, initial encounter    Mild intermittent asthma without complication    Female stress incontinence    Pelvic floor dysfunction    Myalgia of pelvic floor          Wt Readings from Last 2 Encounters:   04/14/25 56.2 kg (124 lb)   01/24/25 55.4 kg (122 lb 3.2 oz)       BP Readings from Last 3 Encounters:   04/29/25 99/66   04/15/25 99/63   04/14/25 117/75             Current Outpatient Medications   Medication Sig Dispense Refill    albuterol (PROAIR HFA/PROVENTIL HFA/VENTOLIN HFA) 108 (90 Base) MCG/ACT inhaler Inhale 2 puffs into the lungs every 6 hours as needed for shortness of breath, wheezing or cough 18 g 3    aspirin 81 MG tablet Take 81 mg by mouth daily       beclomethasone HFA (QVAR REDIHALER) 40 MCG/ACT inhaler 2 puffs twice " daily 10.6 g 11    Calcium-Vitamin D 600-200 MG-UNIT TABS Take 1 tablet by mouth At Bedtime       citalopram (CELEXA) 10 MG tablet Take 1 tablet (10 mg) by mouth daily. 90 tablet 2    cyanocobalamin (VITAMIN B-12) 1000 MCG SUBL sublingual tablet Place 1,000 mcg under the tongue daily      fexofenadine (ALLEGRA) 180 MG tablet Take 180 mg by mouth daily      gabapentin (NEURONTIN) 300 MG capsule Take 1 capsule (300 mg) by mouth 2 times daily. Take 1 cap (300 mg) twice daily 180 capsule 1    gabapentin (NEURONTIN) 300 MG capsule Take 1 capsule (300 mg) by mouth 2 times daily. Needs an appointment 180 capsule 0    galcanezumab-gnlm (EMGALITY) 120 MG/ML injection Inject 1 mL (120 mg) subcutaneously every 28 days. 1 mL 11    indomethacin (INDOCIN) 50 MG capsule Take 1 capsule (50 mg) by mouth as needed for moderate pain Take 1 tablet as needed 10 capsule 3    levothyroxine (SYNTHROID/LEVOTHROID) 88 MCG tablet Take 1 tablet (88 mcg) by mouth daily. Except skip Sunday dose 90 tablet 2    magnesium oxide (MAG-OX) 400 MG tablet Take 800 mg by mouth At Bedtime      montelukast (SINGULAIR) 10 MG tablet Take 1 tablet (10 mg) by mouth at bedtime 90 tablet 3    Multiple vitamin TABS Take 3 tablets by mouth daily       omeprazole (PRILOSEC) 20 MG capsule Take 40 mg by mouth daily (with dinner)       predniSONE (DELTASONE) 20 MG tablet Take 2 po q am with food x 5 days for asthma flare 10 tablet 1    rizatriptan (MAXALT) 10 MG tablet Take 1 tablet (10 mg) by mouth at onset of headache for migraine. May repeat in 2 hours. Max 3 tablets/24 hours. 12 tablet 5    rosuvastatin (CRESTOR) 10 MG tablet Take 1 tablet (10 mg) by mouth daily. 90 tablet 2    spacer (OPTICHAMBER CHIVO) holding chamber Use with MDI      Vitamin D (Cholecalciferol) 50 MCG (2000 UT) CAPS Take 2,000 Units by mouth daily       zolpidem (AMBIEN) 10 MG tablet Take 1 tablet (10 mg) by mouth nightly as needed for sleep (for use while traveling, patient prefers to break  "into 3 doses). 30 tablet 0    methylPREDNISolone (MEDROL DOSEPAK) 4 MG tablet therapy pack Follow Package Directions (Patient not taking: Reported on 2025) 21 tablet 0     No current facility-administered medications for this visit.          Social History     Tobacco Use    Smoking status: Former     Current packs/day: 0.00     Average packs/day: 0.5 packs/day for 10.0 years (5.0 ttl pk-yrs)     Types: Cigarettes     Start date: 1960     Quit date: 1970     Years since quittin.3    Smokeless tobacco: Never   Vaping Use    Vaping status: Never Used   Substance Use Topics    Alcohol use: Yes     Alcohol/week: 2.0 standard drinks of alcohol     Types: 2 Cans of beer per week     Comment: 1 beer per day    Drug use: No          Health Maintenance Due   Topic Date Due    RSV VACCINE (1 - 1-dose 75+ series) Never done    MEDICARE ANNUAL WELLNESS VISIT  2024    DEXA  2024    ASTHMA ACTION PLAN  2024    COVID-19 Vaccine (10 - 2024-25 season) 2025        No results found for: \"PAP\"        2025 11:10 AM        "

## 2025-05-13 ENCOUNTER — TRANSFERRED RECORDS (OUTPATIENT)
Dept: HEALTH INFORMATION MANAGEMENT | Facility: CLINIC | Age: 80
End: 2025-05-13
Payer: COMMERCIAL

## 2025-05-16 ENCOUNTER — TRANSFERRED RECORDS (OUTPATIENT)
Dept: HEALTH INFORMATION MANAGEMENT | Facility: CLINIC | Age: 80
End: 2025-05-16
Payer: COMMERCIAL

## 2025-06-09 DIAGNOSIS — E78.5 HYPERLIPIDEMIA LDL GOAL <100: ICD-10-CM

## 2025-06-09 DIAGNOSIS — E78.5 HYPERLIPIDEMIA LDL GOAL <100: Primary | ICD-10-CM

## 2025-06-09 DIAGNOSIS — M79.10 MYALGIA: ICD-10-CM

## 2025-06-09 RX ORDER — ROSUVASTATIN CALCIUM 10 MG/1
10 TABLET, COATED ORAL DAILY
Qty: 90 TABLET | Refills: 0 | Status: SHIPPED | OUTPATIENT
Start: 2025-06-09

## 2025-06-09 NOTE — TELEPHONE ENCOUNTER
Medication requested: rosuvastatin (CRESTOR) 10 MG tablet   Last office visit: 4/29/25  [facility] appointments: none  Medication last refilled: 9/11/24; 90 + 2 refills  Last qualifying labs:  Component      Latest Ref Rng 6/4/2024  12:13 PM   Cholesterol      <200 mg/dL 197    Triglycerides      <150 mg/dL 271 (H)    HDL Cholesterol      >=50 mg/dL 71    LDL Cholesterol Calculated      <=100 mg/dL 72    Non HDL Cholesterol      <130 mg/dL 126    Patient Fasting? Unknown       Routing refill request to provider for review/approval because:  Labs not current:  lipid panel    Hans METZ, RN  06/09/25 4:52 PM

## 2025-06-14 ENCOUNTER — HEALTH MAINTENANCE LETTER (OUTPATIENT)
Age: 80
End: 2025-06-14

## 2025-07-03 ENCOUNTER — OFFICE VISIT (OUTPATIENT)
Dept: FAMILY MEDICINE | Facility: CLINIC | Age: 80
End: 2025-07-03
Payer: COMMERCIAL

## 2025-07-03 VITALS
SYSTOLIC BLOOD PRESSURE: 105 MMHG | OXYGEN SATURATION: 98 % | RESPIRATION RATE: 17 BRPM | TEMPERATURE: 98.2 F | HEART RATE: 70 BPM | DIASTOLIC BLOOD PRESSURE: 70 MMHG

## 2025-07-03 DIAGNOSIS — H10.33 ACUTE CONJUNCTIVITIS OF BOTH EYES, UNSPECIFIED ACUTE CONJUNCTIVITIS TYPE: ICD-10-CM

## 2025-07-03 DIAGNOSIS — J45.20 MILD INTERMITTENT ASTHMA WITHOUT COMPLICATION: ICD-10-CM

## 2025-07-03 DIAGNOSIS — R05.1 ACUTE COUGH: Primary | ICD-10-CM

## 2025-07-03 LAB
FLUAV RNA SPEC QL NAA+PROBE: NEGATIVE
FLUBV RNA RESP QL NAA+PROBE: NEGATIVE
RSV RNA SPEC NAA+PROBE: NEGATIVE
SARS-COV-2 RNA RESP QL NAA+PROBE: NEGATIVE

## 2025-07-03 RX ORDER — PREDNISONE 20 MG/1
TABLET ORAL
Qty: 10 TABLET | Refills: 1 | Status: SHIPPED | OUTPATIENT
Start: 2025-07-03

## 2025-07-03 RX ORDER — MONTELUKAST SODIUM 10 MG/1
10 TABLET ORAL AT BEDTIME
Qty: 90 TABLET | Refills: 3 | Status: CANCELLED | OUTPATIENT
Start: 2025-07-03

## 2025-07-03 RX ORDER — ALBUTEROL SULFATE 90 UG/1
2 INHALANT RESPIRATORY (INHALATION) EVERY 6 HOURS PRN
Qty: 18 G | Refills: 3 | Status: SHIPPED | OUTPATIENT
Start: 2025-07-03

## 2025-07-03 RX ORDER — PREDNISONE 20 MG/1
TABLET ORAL
Qty: 10 TABLET | Refills: 0 | Status: SHIPPED | OUTPATIENT
Start: 2025-07-03

## 2025-07-03 NOTE — NURSING NOTE
79 year old  Chief Complaint   Patient presents with    URI     Cough, matted eyes -- eye sx noticed crusties last night, this morning completely matted on waking. Cough started about 5 days ago. Swapna got back from [place] recently. Neg covid.       Blood pressure 105/70, pulse 70, temperature 98.2  F (36.8  C), temperature source Oral, resp. rate 17, SpO2 98%, not currently breastfeeding. There is no height or weight on file to calculate BMI.  Patient Active Problem List   Diagnosis    Bladder neck obstruction    Constipation    Transient global amnesia    Other hammer toe (acquired)    Ostium secundum type atrial septal defect    Senile osteoporosis    Syndrome affecting cervical region    Variants of migraine    Malignant neoplasm of skin    Persistent insomnia    Hypothyroidism    Family history of malignant neoplasm of ovary    Tear film insufficiency    Circulatory system disorder    PFO (patent foramen ovale)    Gastroesophageal reflux disease without esophagitis    ACP (advance care planning)    Amnesia    Chronic paroxysmal hemicrania, not intractable    Osteopenia of necks of both femurs    Hyperlipidemia LDL goal <100    Intractable migraine, unspecified migraine type    VLAD (generalized anxiety disorder)    Choking sensation    Localized swelling of lower leg    Choking due to food in larynx, initial encounter    Mild intermittent asthma without complication    Female stress incontinence    Pelvic floor dysfunction    Myalgia of pelvic floor       Wt Readings from Last 2 Encounters:   04/14/25 56.2 kg (124 lb)   01/24/25 55.4 kg (122 lb 3.2 oz)     BP Readings from Last 3 Encounters:   07/03/25 105/70   04/29/25 99/66   04/15/25 99/63         Current Outpatient Medications   Medication Sig Dispense Refill    albuterol (PROAIR HFA/PROVENTIL HFA/VENTOLIN HFA) 108 (90 Base) MCG/ACT inhaler Inhale 2 puffs into the lungs every 6 hours as needed for shortness of breath, wheezing or cough 18 g 3    aspirin 81 MG  tablet Take 81 mg by mouth daily       beclomethasone HFA (QVAR REDIHALER) 40 MCG/ACT inhaler 2 puffs twice daily 10.6 g 11    Calcium-Vitamin D 600-200 MG-UNIT TABS Take 1 tablet by mouth At Bedtime       citalopram (CELEXA) 10 MG tablet Take 1 tablet (10 mg) by mouth daily. 90 tablet 2    cyanocobalamin (VITAMIN B-12) 1000 MCG SUBL sublingual tablet Place 1,000 mcg under the tongue daily      fexofenadine (ALLEGRA) 180 MG tablet Take 180 mg by mouth daily      gabapentin (NEURONTIN) 300 MG capsule Take 1 capsule (300 mg) by mouth 2 times daily. Take 1 cap (300 mg) twice daily 180 capsule 1    galcanezumab-gnlm (EMGALITY) 120 MG/ML injection Inject 1 mL (120 mg) subcutaneously every 28 days. 1 mL 11    indomethacin (INDOCIN) 50 MG capsule Take 1 capsule (50 mg) by mouth as needed for moderate pain Take 1 tablet as needed 10 capsule 3    levothyroxine (SYNTHROID/LEVOTHROID) 88 MCG tablet Take 1 tablet (88 mcg) by mouth daily. Except skip Sunday dose 90 tablet 2    magnesium oxide (MAG-OX) 400 MG tablet Take 800 mg by mouth At Bedtime      methylPREDNISolone (MEDROL DOSEPAK) 4 MG tablet therapy pack Follow Package Directions 21 tablet 0    montelukast (SINGULAIR) 10 MG tablet Take 1 tablet (10 mg) by mouth at bedtime 90 tablet 3    Multiple vitamin TABS Take 3 tablets by mouth daily       omeprazole (PRILOSEC) 20 MG capsule Take 40 mg by mouth daily (with dinner)       predniSONE (DELTASONE) 20 MG tablet Take 2 po q am with food x 5 days for asthma flare 10 tablet 1    rizatriptan (MAXALT) 10 MG tablet Take 1 tablet (10 mg) by mouth at onset of headache for migraine. May repeat in 2 hours. Max 3 tablets/24 hours. 12 tablet 5    rosuvastatin (CRESTOR) 10 MG tablet Take 1 tablet (10 mg) by mouth daily. Please schedule lab appointment 90 tablet 0    spacer (OPTICHAMBER CHIVO) holding chamber Use with MDI      Vitamin D (Cholecalciferol) 50 MCG (2000 UT) CAPS Take 2,000 Units by mouth daily       zolpidem (AMBIEN) 10  "MG tablet Take 1 tablet (10 mg) by mouth nightly as needed for sleep (for use while traveling, patient prefers to break into 3 doses). 30 tablet 0     No current facility-administered medications for this visit.       Social History     Tobacco Use    Smoking status: Former     Current packs/day: 0.00     Average packs/day: 0.5 packs/day for 10.0 years (5.0 ttl pk-yrs)     Types: Cigarettes     Start date: 1960     Quit date: 1970     Years since quittin.5    Smokeless tobacco: Never   Vaping Use    Vaping status: Never Used   Substance Use Topics    Alcohol use: Yes     Alcohol/week: 2.0 standard drinks of alcohol     Types: 2 Cans of beer per week     Comment: 1 beer per day    Drug use: No       Health Maintenance Due   Topic Date Due    RSV VACCINE (1 - 1-dose 75+ series) Never done    MEDICARE ANNUAL WELLNESS VISIT  2024    DEXA  2024    ASTHMA ACTION PLAN  2024    COVID-19 VACCINE (10 - 2024-25 season) 2025    LIPID  2025    TSH W/FREE T4 REFLEX  2025       No results found for: \"PAP\"      July 3, 2025 2:26 PM    "

## 2025-07-03 NOTE — PROGRESS NOTES
[facility] [address], [address]  [facility] [address]  Phone: [phone]  Fax: [phone]    Patient:  Rakel Parish 1945  Date of Visit:  10:25 AM  Referring Provider Referred Self      Assessment & Plan    (R05.1) Acute cough  (primary encounter diagnosis)  Comment: 5 d hx of cough, fatigue, bilateral eye mattering, right sided sore throat, PND, loose stools after returning home from [place]. Negative COVID test 2 d ago, suspect viral illness  Plan: predniSONE (DELTASONE) 20 MG tablet, Influenza         A/B, RSV and SARS-CoV2 PCR (COVID-19)        Check triple swab. Recommend fluids, rest, diet as tolerated. Prednisone burst for the cough. Use albuterol 1-2 puff q 2-4 hr prn, continue Qvar inhaler    (J45.20) Mild intermittent asthma without complication  Comment: history of asthma, just restarted inhalers with this illness  Plan: beclomethasone HFA (QVAR REDIHALER) 40 MCG/ACT         inhaler, albuterol (PROAIR HFA/PROVENTIL         HFA/VENTOLIN HFA) 108 (90 Base) MCG/ACT inhaler        Refilled inhalers, gave steroid burst    (H10.33) Acute conjunctivitis of both eyes, unspecified acute conjunctivitis type  Comment: bilateral mattering of eyes, clear discharge, no significant injection of conjunctiva, suspect this is viral  Plan: recommend cool compresses for comfort, remove mattering as it accumulates      Alondra Foster MD       Rakel Parish is a 79 year old female with hx of constipation, transient global amnesia, osteoporosis, migraine headache, insomnia, hypothyroidism, GERD, hyperlipidemia, anxiety, asthma, stress incontinence. She is here for the following issues:        Acute cough  Returned from [place] 6/24/25  Ill since 6/28 with fatigue, cough, right sided sore throat, PND, loose stools, eye mattering.   No fever, no congestion or headache  Having some coughing jags  Denies shortness of breath or wheezing  started Q roxanne  inhaler 40mcg, 2 puffs twice daily when illness started  Albuterol MDI 2 puff q 4 hr prn. --using QID, not sure it is giving relief  Took Ambien last night  and OTC cough syrup, tessalon pearls  COVID test at home 2 d ago  Spouse ill with UTI symptoms sepsis in [place], in hospital x 2 wk    Patient Active Problem List   Diagnosis    Bladder neck obstruction    Constipation    Transient global amnesia    Other hammer toe (acquired)    Ostium secundum type atrial septal defect    Senile osteoporosis    Syndrome affecting cervical region    Variants of migraine    Malignant neoplasm of skin    Persistent insomnia    Hypothyroidism    Family history of malignant neoplasm of ovary    Tear film insufficiency    Circulatory system disorder    PFO (patent foramen ovale)    Gastroesophageal reflux disease without esophagitis    ACP (advance care planning)    Amnesia    Chronic paroxysmal hemicrania, not intractable    Osteopenia of necks of both femurs    Hyperlipidemia LDL goal <100    Intractable migraine, unspecified migraine type    VLAD (generalized anxiety disorder)    Choking sensation    Localized swelling of lower leg    Choking due to food in larynx, initial encounter    Mild intermittent asthma without complication    Female stress incontinence    Pelvic floor dysfunction    Myalgia of pelvic floor       Current Outpatient Medications   Medication Sig Dispense Refill    albuterol (PROAIR HFA/PROVENTIL HFA/VENTOLIN HFA) 108 (90 Base) MCG/ACT inhaler Inhale 2 puffs into the lungs every 6 hours as needed for shortness of breath, wheezing or cough 18 g 3    aspirin 81 MG tablet Take 81 mg by mouth daily       beclomethasone HFA (QVAR REDIHALER) 40 MCG/ACT inhaler 2 puffs twice daily 10.6 g 11    Calcium-Vitamin D 600-200 MG-UNIT TABS Take 1 tablet by mouth At Bedtime       citalopram (CELEXA) 10 MG tablet Take 1 tablet (10 mg) by mouth daily. 90 tablet 2    cyanocobalamin (VITAMIN B-12) 1000 MCG SUBL sublingual tablet  Place 1,000 mcg under the tongue daily      fexofenadine (ALLEGRA) 180 MG tablet Take 180 mg by mouth daily      gabapentin (NEURONTIN) 300 MG capsule Take 1 capsule (300 mg) by mouth 2 times daily. Take 1 cap (300 mg) twice daily 180 capsule 1    gabapentin (NEURONTIN) 300 MG capsule Take 1 capsule (300 mg) by mouth 2 times daily. Needs an appointment 180 capsule 0    galcanezumab-gnlm (EMGALITY) 120 MG/ML injection Inject 1 mL (120 mg) subcutaneously every 28 days. 1 mL 11    indomethacin (INDOCIN) 50 MG capsule Take 1 capsule (50 mg) by mouth as needed for moderate pain Take 1 tablet as needed 10 capsule 3    levothyroxine (SYNTHROID/LEVOTHROID) 88 MCG tablet Take 1 tablet (88 mcg) by mouth daily. Except skip Sunday dose 90 tablet 2    magnesium oxide (MAG-OX) 400 MG tablet Take 800 mg by mouth At Bedtime      methylPREDNISolone (MEDROL DOSEPAK) 4 MG tablet therapy pack Follow Package Directions (Patient not taking: Reported on 4/29/2025) 21 tablet 0    montelukast (SINGULAIR) 10 MG tablet Take 1 tablet (10 mg) by mouth at bedtime 90 tablet 3    Multiple vitamin TABS Take 3 tablets by mouth daily       omeprazole (PRILOSEC) 20 MG capsule Take 40 mg by mouth daily (with dinner)       predniSONE (DELTASONE) 20 MG tablet Take 2 po q am with food x 5 days for asthma flare 10 tablet 1    rizatriptan (MAXALT) 10 MG tablet Take 1 tablet (10 mg) by mouth at onset of headache for migraine. May repeat in 2 hours. Max 3 tablets/24 hours. 12 tablet 5    rosuvastatin (CRESTOR) 10 MG tablet Take 1 tablet (10 mg) by mouth daily. Please schedule lab appointment 90 tablet 0    spacer (OPTICHAMBER CHIVO) holding chamber Use with MDI      Vitamin D (Cholecalciferol) 50 MCG (2000 UT) CAPS Take 2,000 Units by mouth daily       zolpidem (AMBIEN) 10 MG tablet Take 1 tablet (10 mg) by mouth nightly as needed for sleep (for use while traveling, patient prefers to break into 3 doses). 30 tablet 0       Allergies   Allergen Reactions     Cyclobenzaprine Other (See Comments)    Hydrocodone Itching    Levofloxacin Unknown     Tendon injury (torn)    Rofecoxib Nausea    Simvastatin Other (See Comments)    Trazodone Other (See Comments)     Other reaction(s): Nightmares    Latex Rash        EXAM  There were no vitals taken for this visit.  {[id number]}

## 2025-07-04 ENCOUNTER — RESULTS FOLLOW-UP (OUTPATIENT)
Dept: INTERNAL MEDICINE | Facility: CLINIC | Age: 80
End: 2025-07-04

## 2025-07-04 DIAGNOSIS — J01.90 SUBACUTE SINUSITIS, UNSPECIFIED LOCATION: Primary | ICD-10-CM

## 2025-07-11 RX ORDER — AZITHROMYCIN 250 MG/1
TABLET, FILM COATED ORAL
Qty: 6 TABLET | Refills: 0 | Status: SHIPPED | OUTPATIENT
Start: 2025-07-11 | End: 2025-07-17

## 2025-07-14 DIAGNOSIS — F51.04 PSYCHOPHYSIOLOGICAL INSOMNIA: ICD-10-CM

## 2025-07-14 RX ORDER — ZOLPIDEM TARTRATE 10 MG/1
10 TABLET ORAL
Qty: 30 TABLET | Refills: 0 | Status: SHIPPED | OUTPATIENT
Start: 2025-07-14

## 2025-07-17 SDOH — HEALTH STABILITY: PHYSICAL HEALTH: ON AVERAGE, HOW MANY DAYS PER WEEK DO YOU ENGAGE IN MODERATE TO STRENUOUS EXERCISE (LIKE A BRISK WALK)?: 5 DAYS

## 2025-07-17 SDOH — HEALTH STABILITY: PHYSICAL HEALTH: ON AVERAGE, HOW MANY MINUTES DO YOU ENGAGE IN EXERCISE AT THIS LEVEL?: 50 MIN

## 2025-07-17 ASSESSMENT — SOCIAL DETERMINANTS OF HEALTH (SDOH): HOW OFTEN DO YOU GET TOGETHER WITH FRIENDS OR RELATIVES?: TWICE A WEEK

## 2025-07-17 NOTE — PROGRESS NOTES
"Rakel Parish is a 80 yo woman with hx of osteoporosis, constipation, complex migraines, insomnia, hypothyroidism, GERD, hyperlipidemia, anxiety, monoclonal gammopathy on SPEP . She is here for a preventive exam.  She is not fasting. She is up to date on eye exams and dental visits.    HCM  Advanced directive: on file  COVID vaccine UTD  Other vaccines UTD  Colon cancer screening no longer indicated  Mammogram completed 12/2024  DEXA follows with endocrinologist --last visit 5/2023,  due now for updated DEXA  RECLAST 8/1/23      Medicare questions:  Hearing concerns: yes, but declines referral  Fall Risk: no  Independent at home: yes  Safe : yes  Memory concerns: no     COGNITIVE SCREEN  1) Repeat 3 items (Banana, Sunrise, Chair)    2) Clock draw: NORMAL  3) 3 item recall: Recalls 3 objects  Results: 3 items recalled: COGNITIVE IMPAIRMENT LESS LIKELY     Mini-CogTM Copyright S Christian. Licensed by the author for use in [employer]; reprinted with permission (ashley@.[facility]). All rights reserved.        Diet: omnivore  Down 8 lbs from April---situational stress, down 8 lb since April  Wt Readings from Last 4 Encounters:   07/18/25 52.6 kg (116 lb)   04/14/25 56.2 kg (124 lb)   01/24/25 55.4 kg (122 lb 3.2 oz)   10/09/24 53.5 kg (118 lb)     Body mass index is 20.3 kg/m .    Situational stress  Spouse was hospitalized with sepsis during trip to [place]  Upon return he is now hospitalized again for sepsis, infection of pacemaker, will need ABX as outpatient  + fatigue, GI upset , no GERD on PPI, \"queasy\", dampened appetite  2 children in town  Sleep--using ambien that helps  Current support/therapy--children    Monoclonal gammopathy  +SPEP during workup of osteoporosis by endocrinologist  Small monoclonal spike  Nancy's brother has multiple myeloma  Immunofixation showed faint monoclonal IgG immunoglobulin of lambda chain type  CBC normal  Last visit with hematologist 4/14/25, things stable, return in one " year    Hypothryoidism  Levothryroxine 88mcg daily M-Sa, skip Sun dose  Alternating constipation and diarrhea  Consistent with dosing, on empty stomach  No palpitations   TSH   Date Value Ref Range Status   07/12/2024 1.08 0.30 - 4.20 uIU/mL Final   05/10/2022 2.20 0.40 - 4.00 mU/L Final   04/16/2021 1.34 0.40 - 4.00 mU/L Final     Hyperlipidemia LDL goal <100  Rosuvastatin 10mg daily  No chest pain or palpitations.   Recent Labs   Lab Test 06/04/24  1213 04/04/23  1424 09/27/21  1015 04/16/21  1117 08/14/20  1400 02/06/19  1337   CHOL 197 203*   < > 234.0*   < > 235.0*   HDL 71 75   < > 71.0   < > 87.0   LDL 72 89   < > 134.0*   < > 101.0   TRIG 271* 196*   < > 144.0   < > 234.0*   CHOLHDLRATIO  --   --   --  3.3  --  2.7    < > = values in this interval not displayed.     Anxiety  Citalopram 10mg dose  Wishes to stay at this dose  Current stressors  spouse in hospital for past week, had urosepsis then pacemaker became infected, will need outpatient IV abx  Therapist  --declines  Sleep doing ok      10/24/2022     1:39 PM 6/4/2024    11:02 AM 7/12/2024     9:56 AM   PHQ   PHQ-9 Total Score 3 10 1   Q9: Thoughts of better off dead/self-harm past 2 weeks Not at all Not at all Not at all         10/24/2022     1:39 PM 6/4/2024    11:05 AM 7/12/2024     9:57 AM   VLAD-7 SCORE   Total Score  2 (minimal anxiety) 0 (minimal anxiety)   Total Score 2 2 0     Osteoporosis  Following with endocrinology at [facility]  Had GI symptoms 11/2022 with Fosamax after 2 y of tx   Medication stopped and referral to endocrinology  S/p first Reclast infusion 8/2023  Due for DEXA now  Due for follow up visit    PMH, PSH, FH, medications, allergies and immunizations are updated this visit.      Social    Retired RN  2 sons, 6 grandchildren  Knee pain with treadmill use, likes to walk outside     HABITS:  Tob: quit 1970,  5 yr pack hx  ETOH: 0-1/day  Calcium: calcium 1200mg/magnesium supplement 2 per day.  Coconut milk + yoghurt ,  vitamin D supplement  Caffeine: green tea  Exercise: regular walking     OB/GYN HISTORY:  LMP: postmenopausal  No bleeding      Current Outpatient Medications   Medication Sig Dispense Refill    albuterol (PROAIR HFA/PROVENTIL HFA/VENTOLIN HFA) 108 (90 Base) MCG/ACT inhaler Inhale 2 puffs into the lungs every 6 hours as needed for shortness of breath, wheezing or cough. 18 g 3    aspirin 81 MG tablet Take 81 mg by mouth daily       beclomethasone HFA (QVAR REDIHALER) 40 MCG/ACT inhaler 2 puffs twice daily 10.6 g 11    Calcium-Vitamin D 600-200 MG-UNIT TABS Take 1 tablet by mouth At Bedtime       citalopram (CELEXA) 10 MG tablet Take 1 tablet (10 mg) by mouth daily. 90 tablet 2    cyanocobalamin (VITAMIN B-12) 1000 MCG SUBL sublingual tablet Place 1,000 mcg under the tongue daily      fexofenadine (ALLEGRA) 180 MG tablet Take 180 mg by mouth daily      gabapentin (NEURONTIN) 300 MG capsule Take 1 capsule (300 mg) by mouth 2 times daily. Take 1 cap (300 mg) twice daily 180 capsule 1    galcanezumab-gnlm (EMGALITY) 120 MG/ML injection Inject 1 mL (120 mg) subcutaneously every 28 days. 1 mL 11    indomethacin (INDOCIN) 50 MG capsule Take 1 capsule (50 mg) by mouth as needed for moderate pain Take 1 tablet as needed 10 capsule 3    levothyroxine (SYNTHROID/LEVOTHROID) 88 MCG tablet Take 1 tablet (88 mcg) by mouth daily. Except skip Sunday dose 90 tablet 2    magnesium oxide (MAG-OX) 400 MG tablet Take 800 mg by mouth At Bedtime      methylPREDNISolone (MEDROL DOSEPAK) 4 MG tablet therapy pack Follow Package Directions 21 tablet 0    Multiple vitamin TABS Take 3 tablets by mouth daily       omeprazole (PRILOSEC) 20 MG capsule Take 40 mg by mouth daily (with dinner)       predniSONE (DELTASONE) 20 MG tablet Take 2 po q am with food x 5 days for asthma flare 10 tablet 1    rizatriptan (MAXALT) 10 MG tablet Take 1 tablet (10 mg) by mouth at onset of headache for migraine. May repeat in 2 hours. Max 3 tablets/24 hours. 12  tablet 5    rosuvastatin (CRESTOR) 10 MG tablet Take 1 tablet (10 mg) by mouth daily. Please schedule lab appointment 90 tablet 0    spacer (OPTICHAMBER CHIVO) holding chamber Use with MDI      Vitamin D (Cholecalciferol) 50 MCG (2000 UT) CAPS Take 2,000 Units by mouth daily       zolpidem (AMBIEN) 10 MG tablet Take 1 tablet (10 mg) by mouth nightly as needed for sleep (for use while traveling, patient prefers to break into 3 doses). 30 tablet 0     Allergies   Allergen Reactions    Cyclobenzaprine Other (See Comments)    Hydrocodone Itching    Levofloxacin Unknown     Tendon injury (torn)    Rofecoxib Nausea    Simvastatin Other (See Comments)    Trazodone Other (See Comments)     Other reaction(s): Nightmares    Latex Rash         ROS  CONSTITUTIONAL:NEGATIVE for fever, chills,+weight loss, situational stress, discussed today  INTEGUMENTARY/SKIN: NEGATIVE for worrisome rashes, moles or lesions---follows with dermatology  EYES: NEGATIVE for vision changes or irritation  ENT/MOUTH: NEGATIVE for ear, mouth and throat problems   RESP:NEGATIVE for significant cough or SOB  CV: NEGATIVE for chest pain, palpitations, MAN, orthopnea, PND  or peripheral edema  GI: NEGATIVE for nausea, abdominal pain, heartburn, or change in bowel habits  :NEGATIVE for frequency, dysuria, or hematuria, some stress incontinence. Went to pelvic floor clinic but did not return. Declines referral at this time.   MUSCULOSKELETAL:NEGATIVE for significant arthralgias or myalgia--intermittent knee, low back pain  NEURO: NEGATIVE for weakness, dizziness or paresthesias--+hx of migraines, stable  ENDOCRINE: NEGATIVE for polyuria/dipsia,  temperature intolerance, skin/hair changes  HEME/ALLERGY/IMMUNE: NEGATIVE for bleeding problems--history of MGUS, stable  PSYCHIATRIC: Situational stress as above, on citalopram    EXAM  BP 97/65 (BP Location: Left arm, Patient Position: Sitting, Cuff Size: Adult Regular)   Pulse 64   Temp 97.4  F (36.3  C)  "(Skin)   Resp 15   Ht 1.61 m (5' 3.39\")   Wt 52.6 kg (116 lb)   SpO2 98%   BMI 20.30 kg/m    GENERAL APPEARANCE: Alert, pleasant, NAD  EYES: PERRL, EOMI, conjunctiva clear  HENT: TM normal bilaterally. Nose and mouth without lesions  NECK: no adenopathy, thyroid normal to palpation  RESP: lungs clear to auscultation bilaterally  BREAST: normal without masses, no tenderness or nipple discharge and no palpable  axillary masses or adenopathy  CV: regular rate and rhythm, normal S1 S2, no murmur, no carotid bruits  ABDOMEN: soft, nontender, without HSM or masses. Bowel sounds normal  MS: extremities normal- no gross deformities noted, no tender, hot or swollen joints.   SKIN: no suspicious lesions or rashes  NEURO: Normal strength and tone, sensory exam grossly normal, DTR normoreflexive in upper and lower extremities  PSYCH: mentation appears normal. and affect normal/bright.  EXT: no peripheral edema, pedal pulses palpable    Assessment:  (Z00.00) Encounter for Medicare annual wellness exam  (primary encounter diagnosis)  Comment: 79 year old with complex medical history, in good health  Plan: Today we discussed ways to maintain a healthy lifestyle with sensible eating, regular exercise and self cares. We dicussed calcium and Vitamin D intake, vaccinations and preventive health screens.        (M81.0) Senile osteoporosis  Comment: Currently under treatment for osteoporosis with endocrinology team.  Reclast injection last given in 2023.  Currently due for bone density testing  Plan: DEXA HIP/PELVIS/SPINE - Future        Continue calcium 1200 mg daily and vitamin D 1000 units daily.  Call for follow-up appointment with endocrinologist    (E78.5) Hyperlipidemia LDL goal <100  Comment: Currently on rosuvastatin 10 mg daily.  No chest pain or palpitations  Recent Labs   Lab Test 07/18/25  1201 06/04/24  1213 09/27/21  1015 04/16/21  1117 08/14/20  1400 02/06/19  1337   CHOL 201* 197   < > 234.0*   < > 235.0*   HDL 73 " 71   < > 71.0   < > 87.0   * 72   < > 134.0*   < > 101.0   TRIG 124 271*   < > 144.0   < > 234.0*   CHOLHDLRATIO  --   --   --  3.3  --  2.7    < > = values in this interval not displayed.   Plan: rosuvastatin (CRESTOR) 10 MG tablet, Lipid         panel        Continue current dose, refilled for 1 year    (E03.9) Hypothyroidism, unspecified type  Comment: TSH in target range, euthyroid by history  TSH   Date Value Ref Range Status   07/18/2025 0.43 0.30 - 4.20 uIU/mL Final   05/10/2022 2.20 0.40 - 4.00 mU/L Final   04/16/2021 1.34 0.40 - 4.00 mU/L Final   Plan: levothyroxine (SYNTHROID/LEVOTHROID) 88 MCG         tablet, TSH with free T4 reflex        Refilled medication for 1 year    (F41.1) VLAD (generalized anxiety disorder)  Comment: Currently on Celexa 10 mg daily. + Situational stress at this time.  She does not wish to increase her dose.  Declines referral for therapist.  Plan: citalopram (CELEXA) 10 MG tablet        Refilled medication for 1 year.  Contact me if needed.    Alondra Foster MD  Internal Medicine/Pediatrics

## 2025-07-18 ENCOUNTER — OFFICE VISIT (OUTPATIENT)
Dept: FAMILY MEDICINE | Facility: CLINIC | Age: 80
End: 2025-07-18
Payer: COMMERCIAL

## 2025-07-18 VITALS
DIASTOLIC BLOOD PRESSURE: 65 MMHG | HEIGHT: 63 IN | SYSTOLIC BLOOD PRESSURE: 97 MMHG | TEMPERATURE: 97.4 F | RESPIRATION RATE: 15 BRPM | OXYGEN SATURATION: 98 % | WEIGHT: 116 LBS | BODY MASS INDEX: 20.55 KG/M2 | HEART RATE: 64 BPM

## 2025-07-18 DIAGNOSIS — Z00.00 ENCOUNTER FOR MEDICARE ANNUAL WELLNESS EXAM: Primary | ICD-10-CM

## 2025-07-18 DIAGNOSIS — E78.5 HYPERLIPIDEMIA LDL GOAL <100: ICD-10-CM

## 2025-07-18 DIAGNOSIS — M81.0 SENILE OSTEOPOROSIS: ICD-10-CM

## 2025-07-18 DIAGNOSIS — F41.1 GAD (GENERALIZED ANXIETY DISORDER): ICD-10-CM

## 2025-07-18 DIAGNOSIS — E03.9 HYPOTHYROIDISM, UNSPECIFIED TYPE: ICD-10-CM

## 2025-07-18 RX ORDER — LEVOTHYROXINE SODIUM 88 UG/1
88 TABLET ORAL DAILY
Qty: 90 TABLET | Refills: 3 | Status: SHIPPED | OUTPATIENT
Start: 2025-07-18

## 2025-07-18 RX ORDER — CITALOPRAM HYDROBROMIDE 10 MG/1
10 TABLET ORAL DAILY
Qty: 90 TABLET | Refills: 3 | Status: SHIPPED | OUTPATIENT
Start: 2025-07-18

## 2025-07-18 RX ORDER — ROSUVASTATIN CALCIUM 10 MG/1
10 TABLET, COATED ORAL DAILY
Qty: 90 TABLET | Refills: 3 | Status: SHIPPED | OUTPATIENT
Start: 2025-07-18

## 2025-07-18 ASSESSMENT — PAIN SCALES - GENERAL: PAINLEVEL_OUTOF10: NO PAIN (0)

## 2025-07-18 NOTE — NURSING NOTE
"79 year old    Chief Complaint   Patient presents with    Physical     Fatigue, under stress, GI upset        Blood pressure 97/65, pulse 64, temperature 97.4  F (36.3  C), temperature source Skin, resp. rate 15, height 1.61 m (5' 3.39\"), weight 52.6 kg (116 lb), SpO2 98%, not currently breastfeeding. Body mass index is 20.3 kg/m .    Patient Active Problem List   Diagnosis    Bladder neck obstruction    Constipation    Transient global amnesia    Other hammer toe (acquired)    Ostium secundum type atrial septal defect    Senile osteoporosis    Syndrome affecting cervical region    Variants of migraine    Malignant neoplasm of skin    Persistent insomnia    Hypothyroidism    Family history of malignant neoplasm of ovary    Tear film insufficiency    Circulatory system disorder    PFO (patent foramen ovale)    Gastroesophageal reflux disease without esophagitis    ACP (advance care planning)    Amnesia    Chronic paroxysmal hemicrania, not intractable    Osteopenia of necks of both femurs    Hyperlipidemia LDL goal <100    Intractable migraine, unspecified migraine type    VLAD (generalized anxiety disorder)    Choking sensation    Localized swelling of lower leg    Choking due to food in larynx, initial encounter    Mild intermittent asthma without complication    Female stress incontinence    Pelvic floor dysfunction    Myalgia of pelvic floor          Wt Readings from Last 2 Encounters:   07/18/25 52.6 kg (116 lb)   04/14/25 56.2 kg (124 lb)       BP Readings from Last 3 Encounters:   07/18/25 97/65   07/03/25 105/70   04/29/25 99/66             Current Outpatient Medications   Medication Sig Dispense Refill    albuterol (PROAIR HFA/PROVENTIL HFA/VENTOLIN HFA) 108 (90 Base) MCG/ACT inhaler Inhale 2 puffs into the lungs every 6 hours as needed for shortness of breath, wheezing or cough. 18 g 3    aspirin 81 MG tablet Take 81 mg by mouth daily       beclomethasone HFA (QVAR REDIHALER) 40 MCG/ACT inhaler 2 puffs twice " daily 10.6 g 11    Calcium-Vitamin D 600-200 MG-UNIT TABS Take 1 tablet by mouth At Bedtime       citalopram (CELEXA) 10 MG tablet Take 1 tablet (10 mg) by mouth daily. 90 tablet 2    cyanocobalamin (VITAMIN B-12) 1000 MCG SUBL sublingual tablet Place 1,000 mcg under the tongue daily      fexofenadine (ALLEGRA) 180 MG tablet Take 180 mg by mouth daily      gabapentin (NEURONTIN) 300 MG capsule Take 1 capsule (300 mg) by mouth 2 times daily. Take 1 cap (300 mg) twice daily 180 capsule 1    galcanezumab-gnlm (EMGALITY) 120 MG/ML injection Inject 1 mL (120 mg) subcutaneously every 28 days. 1 mL 11    indomethacin (INDOCIN) 50 MG capsule Take 1 capsule (50 mg) by mouth as needed for moderate pain Take 1 tablet as needed 10 capsule 3    levothyroxine (SYNTHROID/LEVOTHROID) 88 MCG tablet Take 1 tablet (88 mcg) by mouth daily. Except skip Sunday dose 90 tablet 2    magnesium oxide (MAG-OX) 400 MG tablet Take 800 mg by mouth At Bedtime      methylPREDNISolone (MEDROL DOSEPAK) 4 MG tablet therapy pack Follow Package Directions 21 tablet 0    Multiple vitamin TABS Take 3 tablets by mouth daily       omeprazole (PRILOSEC) 20 MG capsule Take 40 mg by mouth daily (with dinner)       predniSONE (DELTASONE) 20 MG tablet Take 2 po q am with food x 5 days for asthma flare 10 tablet 1    rizatriptan (MAXALT) 10 MG tablet Take 1 tablet (10 mg) by mouth at onset of headache for migraine. May repeat in 2 hours. Max 3 tablets/24 hours. 12 tablet 5    rosuvastatin (CRESTOR) 10 MG tablet Take 1 tablet (10 mg) by mouth daily. Please schedule lab appointment 90 tablet 0    spacer (OPTICHAMBER CHIVO) holding chamber Use with MDI      Vitamin D (Cholecalciferol) 50 MCG (2000 UT) CAPS Take 2,000 Units by mouth daily       zolpidem (AMBIEN) 10 MG tablet Take 1 tablet (10 mg) by mouth nightly as needed for sleep (for use while traveling, patient prefers to break into 3 doses). 30 tablet 0     No current facility-administered medications for  "this visit.          Social History     Tobacco Use    Smoking status: Former     Current packs/day: 0.00     Average packs/day: 0.5 packs/day for 10.0 years (5.0 ttl pk-yrs)     Types: Cigarettes     Start date: 1960     Quit date: 1970     Years since quittin.5    Smokeless tobacco: Never   Vaping Use    Vaping status: Never Used   Substance Use Topics    Alcohol use: Yes     Alcohol/week: 2.0 standard drinks of alcohol     Types: 2 Cans of beer per week     Comment: 1 beer per day    Drug use: No          Health Maintenance Due   Topic Date Due    DEXA  2024    ASTHMA ACTION PLAN  2024    COVID-19 VACCINE (10 - 2024-25 season) 2025    LIPID  2025    TSH W/FREE T4 REFLEX  2025        No results found for: \"PAP\"        2025 11:07 AM        "

## 2025-07-19 LAB
ALT SERPL W P-5'-P-CCNC: 29 U/L (ref 0–50)
CHOLEST SERPL-MCNC: 201 MG/DL
FASTING STATUS PATIENT QL REPORTED: ABNORMAL
HDLC SERPL-MCNC: 73 MG/DL
LDLC SERPL CALC-MCNC: 103 MG/DL
NONHDLC SERPL-MCNC: 128 MG/DL
TRIGL SERPL-MCNC: 124 MG/DL
TSH SERPL DL<=0.005 MIU/L-ACNC: 0.43 UIU/ML (ref 0.3–4.2)

## 2025-07-21 ENCOUNTER — PATIENT OUTREACH (OUTPATIENT)
Dept: CARE COORDINATION | Facility: CLINIC | Age: 80
End: 2025-07-21
Payer: COMMERCIAL

## 2025-07-23 ENCOUNTER — PATIENT OUTREACH (OUTPATIENT)
Dept: CARE COORDINATION | Facility: CLINIC | Age: 80
End: 2025-07-23
Payer: COMMERCIAL

## 2025-08-18 ENCOUNTER — HOSPITAL ENCOUNTER (OUTPATIENT)
Dept: BONE DENSITY | Facility: CLINIC | Age: 80
Discharge: HOME | End: 2025-08-18
Attending: INTERNAL MEDICINE | Admitting: INTERNAL MEDICINE
Payer: COMMERCIAL

## 2025-08-18 DIAGNOSIS — M81.0 SENILE OSTEOPOROSIS: ICD-10-CM

## 2025-08-18 PROCEDURE — 77080 DXA BONE DENSITY AXIAL: CPT

## 2025-09-02 ENCOUNTER — OFFICE VISIT (OUTPATIENT)
Dept: FAMILY MEDICINE | Facility: CLINIC | Age: 80
End: 2025-09-02
Payer: COMMERCIAL

## 2025-09-02 VITALS
TEMPERATURE: 97.8 F | DIASTOLIC BLOOD PRESSURE: 65 MMHG | SYSTOLIC BLOOD PRESSURE: 94 MMHG | RESPIRATION RATE: 15 BRPM | OXYGEN SATURATION: 98 % | HEART RATE: 66 BPM

## 2025-09-02 DIAGNOSIS — M19.041 PRIMARY OSTEOARTHRITIS OF BOTH HANDS: ICD-10-CM

## 2025-09-02 DIAGNOSIS — G89.29 CHRONIC PAIN OF BOTH SHOULDERS: ICD-10-CM

## 2025-09-02 DIAGNOSIS — M25.511 CHRONIC PAIN OF BOTH SHOULDERS: ICD-10-CM

## 2025-09-02 DIAGNOSIS — M79.10 MYALGIA: Primary | ICD-10-CM

## 2025-09-02 DIAGNOSIS — M25.512 CHRONIC PAIN OF BOTH SHOULDERS: ICD-10-CM

## 2025-09-02 DIAGNOSIS — M19.042 PRIMARY OSTEOARTHRITIS OF BOTH HANDS: ICD-10-CM

## 2025-09-02 LAB
ERYTHROCYTE [DISTWIDTH] IN BLOOD BY AUTOMATED COUNT: 12.4 % (ref 10–15)
ERYTHROCYTE [SEDIMENTATION RATE] IN BLOOD BY WESTERGREN METHOD: 11 MM/HR (ref 0–30)
HCT VFR BLD AUTO: 42.1 % (ref 35–47)
HGB BLD-MCNC: 13.4 G/DL (ref 11.7–15.7)
MCH RBC QN AUTO: 30.9 PG (ref 26.5–33)
MCHC RBC AUTO-ENTMCNC: 31.8 G/DL (ref 31.5–36.5)
MCV RBC AUTO: 97.2 FL (ref 78–100)
PLATELET # BLD AUTO: 318 10E3/UL (ref 150–450)
RBC # BLD AUTO: 4.33 10E6/UL (ref 3.8–5.2)
WBC # BLD AUTO: 7.96 10E3/UL (ref 4–11)

## 2025-09-02 ASSESSMENT — PAIN SCALES - GENERAL: PAINLEVEL_OUTOF10: SEVERE PAIN (8)

## 2025-09-03 ENCOUNTER — PATIENT OUTREACH (OUTPATIENT)
Dept: CARE COORDINATION | Facility: CLINIC | Age: 80
End: 2025-09-03
Payer: COMMERCIAL

## 2025-09-03 LAB
ANA SER QL IF: NEGATIVE
CCP AB SER IA-ACNC: 0.7 U/ML
CRP SERPL-MCNC: <3 MG/L
RHEUMATOID FACT SERPL-ACNC: <10 IU/ML